# Patient Record
Sex: FEMALE | Race: WHITE | NOT HISPANIC OR LATINO | Employment: OTHER | URBAN - METROPOLITAN AREA
[De-identification: names, ages, dates, MRNs, and addresses within clinical notes are randomized per-mention and may not be internally consistent; named-entity substitution may affect disease eponyms.]

---

## 2011-12-09 LAB — COLONOSCOPY: NORMAL

## 2017-01-04 ENCOUNTER — ALLIED HEALTH/NURSE VISIT (OUTPATIENT)
Dept: CARDIOLOGY | Facility: CLINIC | Age: 72
End: 2017-01-04
Attending: INTERNAL MEDICINE
Payer: MEDICARE

## 2017-01-04 DIAGNOSIS — I45.9 HEART BLOCK: Primary | ICD-10-CM

## 2017-01-04 PROCEDURE — 93296 REM INTERROG EVL PM/IDS: CPT | Mod: ZF

## 2017-01-04 PROCEDURE — 93294 REM INTERROG EVL PM/LDLS PM: CPT | Performed by: INTERNAL MEDICINE

## 2017-01-04 NOTE — PROGRESS NOTES
Pt sent in a routine remote pacemaker check for evaluation per MD order.  Her pacemaker check does not show any episodes of atrial or ventricular arrhythmias, she is RV pacing 99.7% of the time, her heart rate histograms show good heart rate variation and her pacemaker battery estimates 7 years 6 mos left.  Pt was called with the results and a message was left for her to call us back with any questions or concerns.  RTC 3/27/17.    Remote pacemaker

## 2017-03-15 ENCOUNTER — OFFICE VISIT (OUTPATIENT)
Dept: FAMILY MEDICINE | Facility: CLINIC | Age: 72
End: 2017-03-15

## 2017-03-15 VITALS
BODY MASS INDEX: 24.56 KG/M2 | SYSTOLIC BLOOD PRESSURE: 129 MMHG | WEIGHT: 138 LBS | DIASTOLIC BLOOD PRESSURE: 82 MMHG | TEMPERATURE: 97.4 F | OXYGEN SATURATION: 100 % | HEART RATE: 83 BPM

## 2017-03-15 DIAGNOSIS — M94.0 COSTOCHONDRITIS: Primary | ICD-10-CM

## 2017-03-15 RX ORDER — CALCIUM CARBONATE 500(1250)
600 TABLET ORAL DAILY
COMMUNITY

## 2017-03-15 ASSESSMENT — PAIN SCALES - GENERAL: PAINLEVEL: NO PAIN (0)

## 2017-03-15 NOTE — MR AVS SNAPSHOT
After Visit Summary   3/15/2017    Sharona Bravo    MRN: [id number]           Patient Information     Date Of Birth          1945        Visit Information        Provider Department      3/15/2017 4:20 PM Yang Aceves MD [facility]         Follow-ups after your visit        Your next 10 appointments already scheduled     Mar 27, 2017  1:00 PM CDT   (Arrive by 12:45 PM)   MA SCREENING DIGITAL BILATERAL with [id number]   [facility] ([facility])    [address] [place] [id number] [phone]           Do not use any powder, lotion or deodorant under your arms or on your breast. If you do, we will ask you to remove it before your exam.  Wear comfortable, two-piece clothing.  If you have any allergies, tell your care team.  Bring any previous mammograms from other facilities or have them mailed to the breast center. This mammogram location, [facility], now offers 3D mammography. It doesn't replace a screening mammogram and can be done with a regular screening mammogram. It is optional and not all insurances will pay for it. 3D mammography is a special kind of mammogram that produces a three-dimensional image of the breast by using low dose-xrays. 3D allows the radiologist to see the breast tissue differently from 2D, which reduces the chance of repeat testing due to overlapping breast tissue. If you are interested in have a 3D mammogram, please check with your insurance before you arrive for your exam. 3D mammography is offered to all patients. On the day of your exam you will be asked to sign a form stating yes, you wish to have 3D imaging or, no, you decline.            Mar 27, 2017  1:30 PM CDT   (Arrive by 1:15 PM)   Pacemaker Check with Uc Cv Device 1   [facility] ([facility])    [address] [place] [id number]    [phone]              Who to contact     Please call your clinic at [phone] to:    Ask questions about your health    Make or cancel appointments    Discuss your medicines    Learn about your test results    Speak to your doctor   If you have compliments or concerns about an experience at your clinic, or if you wish to file a complaint, please contact [facility] Patient Relations at [phone] or email us at [email or web address]         Additional Information About Your Visit        [employer] Information     [employer] gives you secure access to your electronic health record. If you see a primary care provider, you can also send messages to your care team and make appointments. If you have questions, please call your primary care clinic.  If you do not have a primary care provider, please call [phone] and they will assist you.      [employer] is an electronic gateway that provides easy, online access to your medical records. With [employer], you can request a clinic appointment, read your test results, renew a prescription or communicate with your care team.     To access your existing account, please contact your [facility] or call [phone] for assistance.        Care EveryWhere ID     This is your Care EveryWhere ID. This could be used by other organizations to access your [place] medical records  [id number]        Your Vitals Were     Pulse Temperature Last Period Pulse Oximetry BMI (Body Mass Index)       83 97.4  F (36.3  C) (Oral) 01/01/2000 100% 24.56 kg/m2        Blood Pressure from Last 3 Encounters:   03/15/17 129/82   08/01/16 108/73   07/11/16 117/78    Weight from Last 3 Encounters:   03/15/17 138 lb (62.6 kg)   08/01/16 135 lb (61.2 kg)   07/11/16 134 lb (60.8 kg)              Today, you had the following     No orders found for display       Primary Care Provider Office Phone # Fax #    Ruslan Jean MD  [phone]       [facility] [address]  [facility] [address]        Thank you!     Thank you for choosing [facility]  for your care. Our goal is always to provide you with excellent care. Hearing back from our patients is one way we can continue to improve our services. Please take a few minutes to complete the written survey that you may receive in the mail after your visit with us. Thank you!             Your Updated Medication List - Protect others around you: Learn how to safely use, store and throw away your medicines at www.disposemymeds.org.          This list is accurate as of: 3/15/17  5:11 PM.  Always use your most recent med list.                   Brand Name Dispense Instructions for use    BENADRYL PO      Take by mouth as needed       calcium carbonate 500 MG tablet    OS-DALLAS 500 mg [place]. [place]     Take 600 mg by mouth 2 times daily       estradiol 0.1 MG/GM cream    ESTRACE VAGINAL    42.5 g    Place 2 g vaginally twice a week       levothyroxine 50 MCG tablet    SYNTHROID/LEVOTHROID    90 tablet    Take 1 tablet (50 mcg) by mouth daily Needs labs for further refills       Magnesium 500 MG Caps      Take by mouth daily       MSM 1000 MG Tabs      Take 1,000 mg by mouth 2 times daily       simvastatin 20 MG tablet    ZOCOR    90 tablet    Take 1 tablet (20 mg) by mouth At Bedtime       SUDAFED PO      Take by mouth as needed       temazepam 15 MG capsule    RESTORIL    20 capsule    Take 1 to 2 capsules at bedtime prn insomnia       VITAMIN D (CHOLECALCIFEROL) PO      Take 1,000 Units by mouth daily

## 2017-03-15 NOTE — NURSING NOTE
71 year old  Chief Complaint   Patient presents with     Pain     left side of mid back        Blood pressure 129/82, pulse 83, temperature 97.4  F (36.3  C), temperature source Oral, weight 138 lb (62.6 kg), last menstrual period 01/01/2000, SpO2 100 %, not currently breastfeeding. Body mass index is 24.56 kg/(m^2).  Patient Active Problem List   Diagnosis     Adenocarcinoma of breast (H)     Allergic rhinitis     Hyperlipidemia LDL goal <100     Acquired hypothyroidism     Heart block     Cardiac pacemaker in situ- Medtronic, dual chamber- NOT dependent (Advisa: MRI conditional)     Diverticulosis of large intestine without hemorrhage     BPPV (benign paroxysmal positional vertigo), unspecified laterality     Preventative health care       Wt Readings from Last 2 Encounters:   03/15/17 138 lb (62.6 kg)   08/01/16 135 lb (61.2 kg)     BP Readings from Last 3 Encounters:   03/15/17 129/82   08/01/16 108/73   07/11/16 117/78         Current Outpatient Prescriptions   Medication     levothyroxine (SYNTHROID/LEVOTHROID) 50 MCG tablet     simvastatin (ZOCOR) 20 MG tablet     Pseudoephedrine HCl (SUDAFED PO)     DiphenhydrAMINE HCl (BENADRYL PO)     Magnesium 500 MG CAPS     temazepam (RESTORIL) 15 MG capsule     estradiol (ESTRACE VAGINAL) 0.1 MG/GM vaginal cream     VITAMIN D, CHOLECALCIFEROL, PO     Methylsulfonylmethane (MSM) 1000 MG TABS     No current facility-administered medications for this visit.        Social History   Substance Use Topics     Smoking status: Never Smoker     Smokeless tobacco: Never Used     Alcohol use 0.0 oz/week     0 Standard drinks or equivalent per week      Comment: 10-14 wine a week        Health Maintenance Due   Topic Date Due     ADVANCE DIRECTIVE PLANNING Q5 YRS (NO INBASKET)  12/10/1965       Lab Results   Component Value Date    PAP NIL 06/20/2016         March 15, 2017 4:26 PM

## 2017-03-17 DIAGNOSIS — E03.9 ACQUIRED HYPOTHYROIDISM: ICD-10-CM

## 2017-03-17 RX ORDER — LEVOTHYROXINE SODIUM 50 UG/1
50 TABLET ORAL DAILY
Qty: 30 TABLET | Refills: 0 | Status: SHIPPED | OUTPATIENT
Start: 2017-03-17 | End: 2017-04-17

## 2017-03-17 NOTE — TELEPHONE ENCOUNTER
Routing refill request to provider for review/approval because:  Yuki given x1 and patient did not follow up, please advise  Labs not current:  Needs thyroid labs done.  Has been over a year .

## 2017-03-27 ENCOUNTER — ALLIED HEALTH/NURSE VISIT (OUTPATIENT)
Dept: CARDIOLOGY | Facility: CLINIC | Age: 72
End: 2017-03-27
Attending: FAMILY MEDICINE
Payer: MEDICARE

## 2017-03-27 ENCOUNTER — OFFICE VISIT (OUTPATIENT)
Dept: FAMILY MEDICINE | Facility: CLINIC | Age: 72
End: 2017-03-27

## 2017-03-27 VITALS
WEIGHT: 136.75 LBS | BODY MASS INDEX: 24.34 KG/M2 | HEART RATE: 74 BPM | TEMPERATURE: 97.4 F | SYSTOLIC BLOOD PRESSURE: 109 MMHG | DIASTOLIC BLOOD PRESSURE: 73 MMHG | OXYGEN SATURATION: 98 %

## 2017-03-27 DIAGNOSIS — R10.12 ABDOMINAL PAIN, LEFT UPPER QUADRANT: Primary | ICD-10-CM

## 2017-03-27 DIAGNOSIS — I45.9 HEART BLOCK: Primary | ICD-10-CM

## 2017-03-27 LAB
ALBUMIN SERPL-MCNC: 3.5 G/DL (ref 3.2–4.5)
ALP SERPL-CCNC: 45 U/L (ref 40–150)
ALT SERPL-CCNC: 22 U/L (ref 0–50)
AST SERPL-CCNC: 28 U/L (ref 0–45)
BASOPHILS # BLD AUTO: 0 10E9/L (ref 0–0.2)
BASOPHILS NFR BLD AUTO: 0.5 %
BILIRUB SERPL-MCNC: 0.6 MG/DL (ref 0.2–1.3)
BILIRUBIN UR: NEGATIVE
BLOOD UR: NEGATIVE
BUN SERPL-MCNC: 25 MG/DL (ref 7–30)
CALCIUM SERPL-MCNC: 9.3 MG/DL (ref 8.5–10.4)
CHLORIDE SERPLBLD-SCNC: 107 MMOL/L (ref 94–109)
CO2 SERPL-SCNC: 28 MMOL/L (ref 20–32)
CREAT SERPL-MCNC: 1 MG/DL (ref 0.6–1.3)
DIFFERENTIAL METHOD BLD: NORMAL
EGFR CALCULATED (BLACK REFERENCE): 70.7
EGFR CALCULATED (NON BLACK REFERENCE): 58.4
EOSINOPHIL # BLD AUTO: 0.1 10E9/L (ref 0–0.7)
EOSINOPHIL NFR BLD AUTO: 1.9 %
ERYTHROCYTE [DISTWIDTH] IN BLOOD BY AUTOMATED COUNT: 13.7 % (ref 10–15)
GLUCOSE SERPL-MCNC: 106 MG/DL (ref 60–109)
GLUCOSE URINE: NEGATIVE
HCT VFR BLD AUTO: 44 % (ref 35–47)
HGB BLD-MCNC: 14.4 G/DL (ref 11.7–15.7)
IMM GRANULOCYTES # BLD: 0 10E9/L (ref 0–0.4)
IMM GRANULOCYTES NFR BLD: 0.3 %
KETONES UR QL: NEGATIVE
LEUKOCYTE ESTERASE UR: NEGATIVE
LYMPHOCYTES # BLD AUTO: 1.2 10E9/L (ref 0.8–5.3)
LYMPHOCYTES NFR BLD AUTO: 16.6 %
MCH RBC QN AUTO: 30.8 PG (ref 26.5–33)
MCHC RBC AUTO-ENTMCNC: 32.7 G/DL (ref 31.5–36.5)
MCV RBC AUTO: 94 FL (ref 78–100)
MONOCYTES # BLD AUTO: 0.5 10E9/L (ref 0–1.3)
MONOCYTES NFR BLD AUTO: 6.8 %
NEUTROPHILS # BLD AUTO: 5.5 10E9/L (ref 1.6–8.3)
NEUTROPHILS NFR BLD AUTO: 73.9 %
NITRITE UR QL STRIP: NEGATIVE
NRBC # BLD AUTO: 0 10*3/UL
NRBC BLD AUTO-RTO: 0 /100
PH UR STRIP: 5 [PH] (ref 5–7)
PLATELET # BLD AUTO: 344 10E9/L (ref 150–450)
POTASSIUM SERPL-SCNC: 4.3 MMOL/L (ref 3.4–5.3)
PROT SERPL-MCNC: 7 G/DL (ref 6.8–8.8)
PROTEIN UR: NEGATIVE
RBC # BLD AUTO: 4.68 10E12/L (ref 3.8–5.2)
SODIUM SERPL-SCNC: 142 MMOL/L (ref 133–144)
SP GR UR STRIP: >=1.03
UROBILINOGEN UR STRIP-ACNC: NORMAL
WBC # BLD AUTO: 7.5 10E9/L (ref 4–11)

## 2017-03-27 PROCEDURE — 93280 PM DEVICE PROGR EVAL DUAL: CPT | Mod: 26 | Performed by: INTERNAL MEDICINE

## 2017-03-27 PROCEDURE — 93280 PM DEVICE PROGR EVAL DUAL: CPT | Mod: ZF

## 2017-03-27 ASSESSMENT — PAIN SCALES - GENERAL: PAINLEVEL: NO PAIN (0)

## 2017-03-27 NOTE — PATIENT INSTRUCTIONS
It was a pleasure to see you in clinic today.  Please do not hesitate to call us if you have any questions or concerns.  Please return to clinic in 1 year for a pacemaker check.    Next remote 6/27/17    Abena Wolfe R.N.  Electrophysiology nurse specialist  [facility]  [address] [place] [address]     Ruby Alonso  [phone] business hours    After business hours please call [phone], option #4, and ask for Job code [id number].

## 2017-03-27 NOTE — MR AVS SNAPSHOT
After Visit Summary   3/27/2017    Sharona Bravo    MRN: [id number]           Patient Information     Date Of Birth          1945        Visit Information        Provider Department      3/27/2017 1:30 PM 1, Leighann Cv Device [facility]        Today's Diagnoses     Heart block    -  1      Care Instructions    It was a pleasure to see you in clinic today.  Please do not hesitate to call us if you have any questions or concerns.  Please return to clinic in 1 year for a pacemaker check.    Next remote 6/27/17    Abena Wolfe R.N.  Electrophysiology nurse specialist  [facility]  [address] [place] [address]     Ruby Alonso  [phone] business hours    After business hours please call [phone], option #4, and ask for Job code [id number].              Follow-ups after your visit        Follow-up notes from your care team     Discussed this visit Return in about 1 year (around 3/27/2018) for Pacemaker check.      Your next 10 appointments already scheduled     Mar 27, 2017  3:40 PM CDT   Return Visit with Yang Aceves MD   [facility])    [address], [address]  [facility] [address]   [phone]              Who to contact     If you have questions or need follow up information about today's clinic visit or your schedule please contact [facility] directly at [phone].  Normal or non-critical lab and imaging results will be communicated to you by MyChart, letter or phone within 4 business days after the clinic has received the results. If you do not hear from us within 7 days, please contact the clinic through MyChart or phone. If you have a critical or abnormal lab result, we will notify you by phone as soon as possible.  Submit refill requests through [employer] or call your pharmacy and they will forward the refill request to us. Please allow 3  business days for your refill to be completed.          Additional Information About Your Visit        MyChart Information     [employer] gives you secure access to your electronic health record. If you see a primary care provider, you can also send messages to your care team and make appointments. If you have questions, please call your primary care clinic.  If you do not have a primary care provider, please call [phone] and they will assist you.        Care EveryWhere ID     This is your Care EveryWhere ID. This could be used by other organizations to access your [place] medical records  [id number]        Your Vitals Were     Last Period                   01/01/2000            Blood Pressure from Last 3 Encounters:   03/15/17 129/82   08/01/16 108/73   07/11/16 117/78    Weight from Last 3 Encounters:   03/15/17 62.6 kg (138 lb)   08/01/16 61.2 kg (135 lb)   07/11/16 60.8 kg (134 lb)              We Performed the Following     PM DEVICE PROGRAMMING EVAL, DUAL LEAD PACER        Primary Care Provider Office Phone # Fax #    Ruslan Jean [phone]       [address] [address]        Thank you!     Thank you for choosing [facility]  for your care. Our goal is always to provide you with excellent care. Hearing back from our patients is one way we can continue to improve our services. Please take a few minutes to complete the written survey that you may receive in the mail after your visit with us. Thank you!             Your Updated Medication List - Protect others around you: Learn how to safely use, store and throw away your medicines at www.disposemymeds.org.          This list is accurate as of: 3/27/17  1:53 PM.  Always use your most recent med list.                   Brand Name Dispense Instructions for use    BENADRYL PO      Take by mouth as needed       calcium carbonate 500 MG tablet    OS-DALLAS 500 mg [place]. Ca     Take 600 mg by mouth 2  times daily       estradiol 0.1 MG/GM cream    ESTRACE VAGINAL    42.5 g    Place 2 g vaginally twice a week       levothyroxine 50 MCG tablet    SYNTHROID/LEVOTHROID    30 tablet    Take 1 tablet (50 mcg) by mouth daily Needs labs for further refills       Magnesium 500 MG Caps      Take by mouth daily       MSM 1000 MG Tabs      Take 1,000 mg by mouth 2 times daily       simvastatin 20 MG tablet    ZOCOR    90 tablet    Take 1 tablet (20 mg) by mouth At Bedtime       SUDAFED PO      Take by mouth as needed       temazepam 15 MG capsule    RESTORIL    20 capsule    Take 1 to 2 capsules at bedtime prn insomnia       VITAMIN D (CHOLECALCIFEROL) PO      Take 1,000 Units by mouth daily

## 2017-03-27 NOTE — PROGRESS NOTES
Pt seen in clinic for evaluation and iterative programming of her Medtronic dual chamber pacemaker per MD order.  She looks great and she states that she feels well and she does not offer any complaints.  Her pacemaker does not show any episodes of atrial or ventricular arrhythmias, she is RV pacing 100% of the time, her heart rate histograms show good heart rate variation and her pacemaker battery estimates 7 years left.  Pt will plan to do remote checks quarterly and RTC in 1 year.  Normal pacemaker function.    Dual pacemaker

## 2017-03-27 NOTE — MR AVS SNAPSHOT
After Visit Summary   3/27/2017    Sharona Bravo    MRN: [id number]           Patient Information     Date Of Birth          1945        Visit Information        Provider Department      3/27/2017 3:40 PM Yang Aceves MD [facility]        Today's Diagnoses     Abdominal pain, left upper quadrant    -  1      Care Instructions    PLAN:  - Discussed a variety of options.   Elected to check Urine analysis and also liver and kidney function. Check CBC  For imaging, we discussed issues of a CT scan vs MRI. She has a pacemaker and so for that reason and the issue of diverticula and other intraabdominal possibilities, ordered a CT scan    I will call Sharona with the results. She was informed that I will be away the week of April 3        Follow-ups after your visit        Future tests that were ordered for you today     Open Future Orders        Priority Expected Expires Ordered    CT Abdomen w Contrast Routine  3/27/2018 3/27/2017            Who to contact     Please call your clinic at [phone] to:    Ask questions about your health    Make or cancel appointments    Discuss your medicines    Learn about your test results    Speak to your doctor   If you have compliments or concerns about an experience at your clinic, or if you wish to file a complaint, please contact [facility] Patient Relations at [phone] or email us at [email or web address]         Additional Information About Your Visit        MyChart Information     [employer] gives you secure access to your electronic health record. If you see a primary care provider, you can also send messages to your care team and make appointments. If you have questions, please call your primary care clinic.  If you do not have a primary care provider, please call [phone] and they will assist you.      [employer] is an electronic gateway that provides easy, online access to your medical  records. With [employer], you can request a clinic appointment, read your test results, renew a prescription or communicate with your care team.     To access your existing account, please contact your [facility] or call [phone] for assistance.        Care EveryWhere ID     This is your Care EveryWhere ID. This could be used by other organizations to access your [place] medical records  [id number]        Your Vitals Were     Pulse Temperature Last Period Pulse Oximetry BMI (Body Mass Index)       74 97.4  F (36.3  C) (Oral) 01/01/2000 98% 24.34 kg/m2        Blood Pressure from Last 3 Encounters:   03/27/17 109/73   03/15/17 129/82   08/01/16 108/73    Weight from Last 3 Encounters:   03/27/17 136 lb 12 oz (62 kg)   03/15/17 138 lb (62.6 kg)   08/01/16 135 lb (61.2 kg)              We Performed the Following     CBC with platelets differential     Comprehensive Metabolic Panel ([place])     Urinalysis ([place])        Primary Care Provider Office Phone # Fax #    Ruslan Jean [phone]       [place] [address]        Thank you!     Thank you for choosing [facility]  for your care. Our goal is always to provide you with excellent care. Hearing back from our patients is one way we can continue to improve our services. Please take a few minutes to complete the written survey that you may receive in the mail after your visit with us. Thank you!             Your Updated Medication List - Protect others around you: Learn how to safely use, store and throw away your medicines at www.disposemymeds.org.          This list is accurate as of: 3/27/17  4:07 PM.  Always use your most recent med list.                   Brand Name Dispense Instructions for use    BENADRYL PO      Take by mouth as needed       calcium carbonate 500 MG tablet    OS-DALLAS 500 mg [place]. Ca     Take 600 mg by mouth 2 times daily       estradiol  0.1 MG/GM cream    ESTRACE VAGINAL    42.5 g    Place 2 g vaginally twice a week       levothyroxine 50 MCG tablet    SYNTHROID/LEVOTHROID    30 tablet    Take 1 tablet (50 mcg) by mouth daily Needs labs for further refills       Magnesium 500 MG Caps      Take by mouth daily       MSM 1000 MG Tabs      Take 1,000 mg by mouth 2 times daily       simvastatin 20 MG tablet    ZOCOR    90 tablet    Take 1 tablet (20 mg) by mouth At Bedtime       SUDAFED PO      Take by mouth as needed       temazepam 15 MG capsule    RESTORIL    20 capsule    Take 1 to 2 capsules at bedtime prn insomnia       VITAMIN D (CHOLECALCIFEROL) PO      Take 1,000 Units by mouth daily

## 2017-03-27 NOTE — PROGRESS NOTES
"Sharona Bravo is a 71 year old female who presents to [facility] with the following concern:  -Persistent LEFT sided rib pain.     Since visit on 3/15 (12 days ago), Sharona tried the Advil plus the Salon Pas patches. Both helped some, but without them the pain is still there and even more constant.     No change with food or bowels.   Review Of Systems:  Has felt \"off\" over the past 2 days. Not tired, but just doesn't feel well.   Cardiovascular: negative  Respiratory: No shortness of breath, dyspnea on exertion, cough, or hemoptysis  Gastrointestinal: negative  Genitourinary: negative    Problem list per EMR:  Patient Active Problem List   Diagnosis     Adenocarcinoma of breast (H)     Allergic rhinitis     Hyperlipidemia LDL goal <100     Acquired hypothyroidism     Heart block     Cardiac pacemaker in situ- Medtronic, dual chamber- NOT dependent (Advisa: MRI conditional)     Diverticulosis of large intestine without hemorrhage     BPPV (benign paroxysmal positional vertigo), unspecified laterality     Preventative health care       Current Outpatient Prescriptions   Medication Sig Dispense Refill     levothyroxine (SYNTHROID/LEVOTHROID) 50 MCG tablet Take 1 tablet (50 mcg) by mouth daily Needs labs for further refills 30 tablet 0     calcium carbonate (OS-DALLAS 500 MG [place]. CA) 500 MG tablet Take 600 mg by mouth 2 times daily       simvastatin (ZOCOR) 20 MG tablet Take 1 tablet (20 mg) by mouth At Bedtime 90 tablet 3     Pseudoephedrine HCl (SUDAFED PO) Take by mouth as needed       DiphenhydrAMINE HCl (BENADRYL PO) Take by mouth as needed       Magnesium 500 MG CAPS Take by mouth daily       temazepam (RESTORIL) 15 MG capsule Take 1 to 2 capsules at bedtime prn insomnia 20 capsule 0     estradiol (ESTRACE VAGINAL) 0.1 MG/GM vaginal cream Place 2 g vaginally twice a week 42.5 g 6     VITAMIN D, CHOLECALCIFEROL, PO Take 1,000 Units by mouth daily        Methylsulfonylmethane (MSM) 1000 MG TABS Take " 1,000 mg by mouth 2 times daily          Allergies   Allergen Reactions     Gabapentin Swelling and Rash     Face swelling          EXAM    Vitals: /73 (BP Location: Right arm, Patient Position: Left side, Cuff Size: Adult Regular)  Pulse 74  Temp 97.4  F (36.3  C) (Oral)  Wt 136 lb 12 oz (62 kg)  LMP 01/01/2000  SpO2 98%  BMI 24.34 kg/m2  BMI= Body mass index is 24.34 kg/(m^2).  Appears well and in no distress.  Pain is on LEFT side in Anterior Axillary line at about T12. This is between the normal abdomen and the back. No rebound nor guarding. No masses are palpable.   Normal CV and Lung exams.     ASSESSMENT:  -LEFT sided pain in lateral aspect of abdomen    PLAN:  - Discussed a variety of options.   Elected to check Urine analysis and also liver and kidney function. Check CBC  For imaging, we discussed issues of a CT scan vs MRI. She has a pacemaker and so for that reason and the issue of diverticula and other intraabdominal possibilities, ordered a CT scan    I will call Sharona with the results. She was informed that I will be away the week of April 3.       --Yang Aceves MD  [facility], Department of Family Medicine and Community Health

## 2017-03-27 NOTE — PATIENT INSTRUCTIONS
PLAN:  - Discussed a variety of options.   Elected to check Urine analysis and also liver and kidney function. Check CBC  For imaging, we discussed issues of a CT scan vs MRI. She has a pacemaker and so for that reason and the issue of diverticula and other intraabdominal possibilities, ordered a CT scan    I will call Sharona with the results. She was informed that I will be away the week of April 3

## 2017-03-27 NOTE — NURSING NOTE
71 year old  Chief Complaint   Patient presents with     Pain     left side        Blood pressure 109/73, pulse 74, temperature 97.4  F (36.3  C), temperature source Oral, weight 136 lb 12 oz (62 kg), last menstrual period 01/01/2000, SpO2 98 %, not currently breastfeeding. Body mass index is 24.34 kg/(m^2).  Patient Active Problem List   Diagnosis     Adenocarcinoma of breast (H)     Allergic rhinitis     Hyperlipidemia LDL goal <100     Acquired hypothyroidism     Heart block     Cardiac pacemaker in situ- Medtronic, dual chamber- NOT dependent (Advisa: MRI conditional)     Diverticulosis of large intestine without hemorrhage     BPPV (benign paroxysmal positional vertigo), unspecified laterality     Preventative health care       Wt Readings from Last 2 Encounters:   03/27/17 136 lb 12 oz (62 kg)   03/15/17 138 lb (62.6 kg)     BP Readings from Last 3 Encounters:   03/27/17 109/73   03/15/17 129/82   08/01/16 108/73         Current Outpatient Prescriptions   Medication     levothyroxine (SYNTHROID/LEVOTHROID) 50 MCG tablet     calcium carbonate (OS-DALLAS 500 MG [place]. CA) 500 MG tablet     simvastatin (ZOCOR) 20 MG tablet     Pseudoephedrine HCl (SUDAFED PO)     DiphenhydrAMINE HCl (BENADRYL PO)     Magnesium 500 MG CAPS     temazepam (RESTORIL) 15 MG capsule     estradiol (ESTRACE VAGINAL) 0.1 MG/GM vaginal cream     VITAMIN D, CHOLECALCIFEROL, PO     Methylsulfonylmethane (MSM) 1000 MG TABS     No current facility-administered medications for this visit.        Social History   Substance Use Topics     Smoking status: Never Smoker     Smokeless tobacco: Never Used     Alcohol use 0.0 oz/week     0 Standard drinks or equivalent per week      Comment: 10-14 wine a week        Health Maintenance Due   Topic Date Due     ADVANCE DIRECTIVE PLANNING Q5 YRS (NO INBASKET)  12/10/1965       Lab Results   Component Value Date    PAP NIL 06/20/2016 March 27, 2017 3:50 PM

## 2017-04-06 DIAGNOSIS — E03.9 ACQUIRED HYPOTHYROIDISM: ICD-10-CM

## 2017-04-06 LAB — TSH SERPL DL<=0.005 MIU/L-ACNC: 3.68 MU/L (ref 0.4–4)

## 2017-04-17 DIAGNOSIS — E03.9 ACQUIRED HYPOTHYROIDISM: ICD-10-CM

## 2017-04-17 RX ORDER — LEVOTHYROXINE SODIUM 50 UG/1
50 TABLET ORAL DAILY
Qty: 90 TABLET | Refills: 3 | Status: SHIPPED | OUTPATIENT
Start: 2017-04-17 | End: 2018-05-01

## 2017-06-26 ENCOUNTER — RADIANT APPOINTMENT (OUTPATIENT)
Dept: BONE DENSITY | Facility: CLINIC | Age: 72
End: 2017-06-26
Payer: MEDICARE

## 2017-06-26 ENCOUNTER — OFFICE VISIT (OUTPATIENT)
Dept: OBGYN | Facility: CLINIC | Age: 72
End: 2017-06-26
Payer: MEDICARE

## 2017-06-26 VITALS
SYSTOLIC BLOOD PRESSURE: 122 MMHG | HEIGHT: 62 IN | DIASTOLIC BLOOD PRESSURE: 86 MMHG | WEIGHT: 136 LBS | BODY MASS INDEX: 25.03 KG/M2

## 2017-06-26 DIAGNOSIS — Z01.419 ENCOUNTER FOR GYNECOLOGICAL EXAMINATION WITHOUT ABNORMAL FINDING: Primary | ICD-10-CM

## 2017-06-26 DIAGNOSIS — Z78.0 ASYMPTOMATIC POSTMENOPAUSAL STATE: ICD-10-CM

## 2017-06-26 DIAGNOSIS — N95.2 VAGINAL ATROPHY: ICD-10-CM

## 2017-06-26 PROCEDURE — 87624 HPV HI-RISK TYP POOLED RSLT: CPT | Performed by: OBSTETRICS & GYNECOLOGY

## 2017-06-26 PROCEDURE — 77080 DXA BONE DENSITY AXIAL: CPT | Performed by: OBSTETRICS & GYNECOLOGY

## 2017-06-26 PROCEDURE — G0101 CA SCREEN;PELVIC/BREAST EXAM: HCPCS | Mod: GA | Performed by: OBSTETRICS & GYNECOLOGY

## 2017-06-26 PROCEDURE — G0145 SCR C/V CYTO,THINLAYER,RESCR: HCPCS | Performed by: OBSTETRICS & GYNECOLOGY

## 2017-06-26 RX ORDER — ESTRADIOL 0.1 MG/G
2 CREAM VAGINAL
Qty: 42.5 G | Refills: 6 | Status: SHIPPED | OUTPATIENT
Start: 2017-06-26 | End: 2019-12-02

## 2017-06-26 ASSESSMENT — PATIENT HEALTH QUESTIONNAIRE - PHQ9: 5. POOR APPETITE OR OVEREATING: NOT AT ALL

## 2017-06-26 ASSESSMENT — ANXIETY QUESTIONNAIRES
2. NOT BEING ABLE TO STOP OR CONTROL WORRYING: NOT AT ALL
3. WORRYING TOO MUCH ABOUT DIFFERENT THINGS: NOT AT ALL
1. FEELING NERVOUS, ANXIOUS, OR ON EDGE: NOT AT ALL
GAD7 TOTAL SCORE: 0
6. BECOMING EASILY ANNOYED OR IRRITABLE: NOT AT ALL
7. FEELING AFRAID AS IF SOMETHING AWFUL MIGHT HAPPEN: NOT AT ALL
5. BEING SO RESTLESS THAT IT IS HARD TO SIT STILL: NOT AT ALL

## 2017-06-26 NOTE — MR AVS SNAPSHOT
"              After Visit Summary   6/26/2017    Sharona Bravo    MRN: [id number]           Patient Information     Date Of Birth          1945        Visit Information        Provider Department      6/26/2017 1:00 PM Sonny Gupta MD [facility]        Today's Diagnoses     Encounter for gynecological examination without abnormal finding    -  1    Vaginal atrophy           Follow-ups after your visit        Who to contact     If you have questions or need follow up information about today's clinic visit or your schedule please contact [facility] directly at [phone].  Normal or non-critical lab and imaging results will be communicated to you by Dotstudiozhart, letter or phone within 4 business days after the clinic has received the results. If you do not hear from us within 7 days, please contact the clinic through Dotstudiozhart or phone. If you have a critical or abnormal lab result, we will notify you by phone as soon as possible.  Submit refill requests through Cura TV or call your pharmacy and they will forward the refill request to us. Please allow 3 business days for your refill to be completed.          Additional Information About Your Visit        MyChart Information     Cura TV gives you secure access to your electronic health record. If you see a primary care provider, you can also send messages to your care team and make appointments. If you have questions, please call your primary care clinic.  If you do not have a primary care provider, please call [phone] and they will assist you.        Care EveryWhere ID     This is your Care EveryWhere ID. This could be used by other organizations to access your [place] medical records  [id number]        Your Vitals Were     Height Last Period Breastfeeding? BMI (Body Mass Index)          5' 2.25\" (1.581 m) 01/01/2000 No 24.68 kg/m2         Blood Pressure from Last 3 Encounters:   06/26/17 122/86 "   03/27/17 109/73   03/15/17 129/82    Weight from Last 3 Encounters:   06/26/17 136 lb (61.7 kg)   03/27/17 136 lb 12 oz (62 kg)   03/15/17 138 lb (62.6 kg)              We Performed the Following     HPV High Risk Types DNA Cervical     Medicare Limited Visit     Pap imaged thin layer screen with HPV - recommended age 30 - 65          Where to get your medicines      These medications were sent to [id number] - [place], [place] - [address]  [address], [facility] [address]     Phone:  [phone]     estradiol 0.1 MG/GM cream          Primary Care Provider Office Phone # Fax #    Ruslan Jean [phone]       [address] [address]        Equal Access to Services     [facility] : Rich Jasmine, sabas bobo, jeff bashir, reilly escalera . So [facility] [phone].    ATENCIÓN: Si habla español, tiene a mc disposición servicios gratuitos de asistencia lingüística. Llame al [phone].    We comply with applicable federal civil rights laws and [place] laws. We do not discriminate on the basis of race, color, national origin, age, disability sex, sexual orientation or gender identity.            Thank you!     Thank you for choosing [facility] [place]  for your care. Our goal is always to provide you with excellent care. Hearing back from our patients is one way we can continue to improve our services. Please take a few minutes to complete the written survey that you may receive in the mail after your visit with us. Thank you!             Your Updated Medication List - Protect others around you: Learn how to safely use, store and throw away your medicines at www.disposemymeds.org.          This list is accurate as of: 6/26/17  1:48 PM.  Always use your most recent med list.                   Brand Name Dispense Instructions for use Diagnosis    calcium  carbonate 1250 MG tablet    OS-DALLAS 500 mg [place]. [place]     Take 600 mg by mouth 2 times daily        estradiol 0.1 MG/GM cream    ESTRACE VAGINAL    42.5 g    Place 2 g vaginally twice a week    Vaginal atrophy       FLONASE NA           levothyroxine 50 MCG tablet    SYNTHROID/LEVOTHROID    90 tablet    Take 1 tablet (50 mcg) by mouth daily    Acquired hypothyroidism       Magnesium 500 MG Caps      Take by mouth daily        MSM 1000 MG Tabs      Take 1,000 mg by mouth 2 times daily        simvastatin 20 MG tablet    ZOCOR    90 tablet    Take 1 tablet (20 mg) by mouth At Bedtime    Hyperlipidemia LDL goal <100       VITAMIN D (CHOLECALCIFEROL) PO      Take 1,000 Units by mouth daily

## 2017-06-26 NOTE — PROGRESS NOTES
Sharona is a 71 year old No obstetric history on file. female who presents for Medicare Limited exam.     Do you have a Health Care Directive?: Yes: Patient states has Advance Directive and will bring in a copy to clinic.    Fall risk:   Fallen 2 or more times in the past year?: No  Any fall with injury in the past year?: No    HPI : The patient is seen for her annual examination. She is 26 years out from diagnosis of breast cancer with no evidence of recurrence to date. She uses a small amount of Estrace cream for vaginal dryness and does not have the desired effect so she is either noncompliant or needs to use it more frequently. She has no other health concerns.      GYNECOLOGIC HISTORY:  Patient's last menstrual period was 01/01/2000..   reports that she has never smoked. She has never used smokeless tobacco.    STD testing offered?  Declined  Last PHQ-9 score on record=   PHQ-9 SCORE 6/26/2017   Total Score 0     Last GAD7 score on record=   BYRON-7 SCORE 6/20/2016 6/26/2017   Total Score 0 0       HEALTH MAINTENANCE:  Cholesterol: (  Cholesterol   Date Value Ref Range Status   05/17/2016 172.0 0.0 - 200.0 Final   01/08/2015 200.0 0.0 - 200.0 Final      Last Mammo: 3/2017, Result: normal, Next Mammo: 3/2018   Pap: (  Lab Results   Component Value Date    PAP NIL 06/20/2016    PAP NIL 06/18/2015    )  DEXA: 2015  Colonoscopy:  2014, Result:  normal, Next Colonoscopy: 2024    HISTORY:  Obstetric History     No data available        Past Medical History:   Diagnosis Date     Malignant neoplasm (H)      No past surgical history on file.  Family History   Problem Relation Age of Onset     CANCER Mother      HEART DISEASE Father      Social History     Social History     Marital status:      Spouse name: N/A     Number of children: N/A     Years of education: N/A     Social History Main Topics     Smoking status: Never Smoker     Smokeless tobacco: Never Used     Alcohol use 0.0 oz/week     0 Standard drinks  "or equivalent per week      Comment: 10-14 wine a week      Drug use: No     Sexual activity: Yes     Partners: Male     Birth control/ protection: Post-menopausal     Other Topics Concern     Not on file     Social History Narrative     Current Outpatient Prescriptions   Medication Sig     Fluticasone Propionate (FLONASE NA)      levothyroxine (SYNTHROID/LEVOTHROID) 50 MCG tablet Take 1 tablet (50 mcg) by mouth daily     calcium carbonate (OS-DALLAS 500 MG [place]. [place]) 500 MG tablet Take 600 mg by mouth 2 times daily     simvastatin (ZOCOR) 20 MG tablet Take 1 tablet (20 mg) by mouth At Bedtime     Magnesium 500 MG CAPS Take by mouth daily     estradiol (ESTRACE VAGINAL) 0.1 MG/GM vaginal cream Place 2 g vaginally twice a week     VITAMIN D, CHOLECALCIFEROL, PO Take 1,000 Units by mouth daily      Methylsulfonylmethane (MSM) 1000 MG TABS Take 1,000 mg by mouth 2 times daily      No current facility-administered medications for this visit.      Allergies   Allergen Reactions     Gabapentin Swelling and Rash     Face swelling       Past medical, surgical, social and family history were reviewed and updated in EPIC.    EXAM:  /86  Ht 5' 2.25\" (1.581 m)  Wt 136 lb (61.7 kg)  LMP 01/01/2000  Breastfeeding? No  BMI 24.68 kg/m2   BMI: Body mass index is 24.68 kg/(m^2).    Constitutional: Appearance: Well nourished, well developed alert, in no acute distress  Breasts: Inspection of Breasts:  No lymphadenopathy present    Palpation of Breasts and Axillae:  No masses present on palpation, no  breast tenderness    Axillary Lymph Nodes:  No lymphadenopathy present  Neurologic/Psychiatric:    Mental Status:  Oriented X3     Pelvic Exam:  External Genitalia:     Normal appearance for age, no discharge present, no tenderness present, no inflammatory lesions present, color normal  Vagina:     Normal vaginal vault without central or paravaginal defects, ATROPHIC  Bladder:     Nontender to palpation  Urethra:   Urethral Body:  " Urethra palpation normal, urethra structural support normal   Urethral Meatus:  No erythema or lesions present  Cervix:     Appearance healthy, no lesions present, nontender to palpation, no bleeding present  Uterus:     Nontender to palpation, no masses present, position anteflexed, mobility: normal  Adnexa:     No adnexal tenderness present, no adnexal masses present  Perineum:     Perineum within normal limits, no evidence of trauma, no rashes or skin lesions present  Inguinal Lymph Nodes:     No lymphadenopathy present      Body mass index is 24.68 kg/(m^2).     reports that she has never smoked. She has never used smokeless tobacco.      ASSESSMENT:  71 year old female with satisfactory annual exam.    ICD-10-CM    1. Encounter for gynecological examination without abnormal finding Z01.419        COUNSELING:   Reviewed preventive health counseling, as reflected in patient instructions       Regular exercise       Healthy diet/nutrition    PLAN/PATIENT INSTRUCTIONS:    Contact patient with her bone density results. Her general exam is fine. She has no evidence of return of her breast cancer. She receives her mammography at the [facility].    Sonny Gupta MD

## 2017-06-27 ENCOUNTER — ALLIED HEALTH/NURSE VISIT (OUTPATIENT)
Dept: CARDIOLOGY | Facility: CLINIC | Age: 72
End: 2017-06-27
Attending: INTERNAL MEDICINE
Payer: MEDICARE

## 2017-06-27 DIAGNOSIS — I45.9 HEART BLOCK: Primary | ICD-10-CM

## 2017-06-27 PROCEDURE — 93280 PM DEVICE PROGR EVAL DUAL: CPT | Mod: 26 | Performed by: INTERNAL MEDICINE

## 2017-06-27 PROCEDURE — 93280 PM DEVICE PROGR EVAL DUAL: CPT | Mod: ZF

## 2017-06-27 ASSESSMENT — PATIENT HEALTH QUESTIONNAIRE - PHQ9: SUM OF ALL RESPONSES TO PHQ QUESTIONS 1-9: 0

## 2017-06-27 ASSESSMENT — ANXIETY QUESTIONNAIRES: GAD7 TOTAL SCORE: 0

## 2017-06-27 NOTE — MR AVS SNAPSHOT
After Visit Summary   6/27/2017    Sharona Bravo    MRN: [id number]           Patient Information     Date Of Birth          1945        Visit Information        Provider Department      6/27/2017 6:00 AM UC ICD REMOTE [facility]        Today's Diagnoses     Heart block    -  1       Follow-ups after your visit        Who to contact     If you have questions or need follow up information about today's clinic visit or your schedule please contact [facility] directly at [phone].  Normal or non-critical lab and imaging results will be communicated to you by divorce360hart, letter or phone within 4 business days after the clinic has received the results. If you do not hear from us within 7 days, please contact the clinic through divorce360hart or phone. If you have a critical or abnormal lab result, we will notify you by phone as soon as possible.  Submit refill requests through Telller or call your pharmacy and they will forward the refill request to us. Please allow 3 business days for your refill to be completed.          Additional Information About Your Visit        MyChart Information     Telller gives you secure access to your electronic health record. If you see a primary care provider, you can also send messages to your care team and make appointments. If you have questions, please call your primary care clinic.  If you do not have a primary care provider, please call [phone] and they will assist you.        Care EveryWhere ID     This is your Care EveryWhere ID. This could be used by other organizations to access your [place] medical records  [id number]        Your Vitals Were     Last Period                   01/01/2000            Blood Pressure from Last 3 Encounters:   06/26/17 122/86   03/27/17 109/73   03/15/17 129/82    Weight from Last 3 Encounters:   06/26/17 61.7 kg (136 lb)   03/27/17 62 kg (136 lb 12 oz)   03/15/17 62.6 kg (138 lb)              We  Performed the Following     PM DEVICE PROGRAMMING EVAL, DUAL LEAD PACER        Primary Care Provider Office Phone # Fax #    Ruslan Jean [phone]       [address] [address]        Equal Access to Services     PARTH HUNT : Hadii aad ku hadjonejacquelyn Soloi, waaxda luqadaha, qaybta kaalmada adeegyada, waxcorinna eric cesarnitesh rivastatumscott alford. So [facility] [phone].    ATENCIÓN: Si habla español, tiene a mc disposición servicios gratuitos de asistencia lingüística. Llame al [phone].    We comply with applicable federal civil rights laws and [place] laws. We do not discriminate on the basis of race, color, national origin, age, disability sex, sexual orientation or gender identity.            Thank you!     Thank you for choosing [facility]  for your care. Our goal is always to provide you with excellent care. Hearing back from our patients is one way we can continue to improve our services. Please take a few minutes to complete the written survey that you may receive in the mail after your visit with us. Thank you!             Your Updated Medication List - Protect others around you: Learn how to safely use, store and throw away your medicines at www.disposemymeds.org.          This list is accurate as of: 6/27/17 11:59 PM.  Always use your most recent med list.                   Brand Name Dispense Instructions for use Diagnosis    calcium carbonate 1250 MG tablet    OS-DALLAS 500 mg [place]. [place]     Take 600 mg by mouth 2 times daily        estradiol 0.1 MG/GM cream    ESTRACE VAGINAL    42.5 g    Place 2 g vaginally twice a week    Vaginal atrophy       FLONASE NA           levothyroxine 50 MCG tablet    SYNTHROID/LEVOTHROID    90 tablet    Take 1 tablet (50 mcg) by mouth daily    Acquired hypothyroidism       Magnesium 500 MG Caps      Take by mouth daily        MSM 1000 MG Tabs      Take 1,000 mg by mouth 2 times daily        simvastatin 20 MG  tablet    ZOCOR    90 tablet    Take 1 tablet (20 mg) by mouth At Bedtime    Hyperlipidemia LDL goal <100       VITAMIN D (CHOLECALCIFEROL) PO      Take 1,000 Units by mouth daily

## 2017-06-28 LAB
COPATH REPORT: NORMAL
PAP: NORMAL

## 2017-06-28 NOTE — PROGRESS NOTES
Pt sent in a routine remote pacemaker check for evaluation per MD order.  Her pacemaker check does not show any episodes of atrial or ventricular arrhythmias, she is RV pacing 100% of the time, her heart rate histograms show good heart rate variation and her pacemaker battery estimates 7 years left and her pacemaker is functioning normally.  Pt was called with the results and a message was left for her to call us back with any questions or concerns.  We will plan for her to send another remote check on 9/28/17.    Remote pacemaker

## 2017-06-30 LAB
FINAL DIAGNOSIS: NORMAL
HPV HR 12 DNA CVX QL NAA+PROBE: NEGATIVE
HPV16 DNA SPEC QL NAA+PROBE: NEGATIVE
HPV18 DNA SPEC QL NAA+PROBE: NEGATIVE
SPECIMEN DESCRIPTION: NORMAL

## 2017-07-14 DIAGNOSIS — E78.5 HYPERLIPIDEMIA LDL GOAL <100: ICD-10-CM

## 2017-07-14 RX ORDER — SIMVASTATIN 20 MG
20 TABLET ORAL AT BEDTIME
Qty: 90 TABLET | Refills: 0 | Status: SHIPPED | OUTPATIENT
Start: 2017-07-14 | End: 2017-10-10

## 2017-07-14 NOTE — TELEPHONE ENCOUNTER
Medication is being filled for 1 time refill only due to:  Patient needs labs lipid plus. Future labs ordered lipid plus.

## 2017-09-27 ENCOUNTER — ALLIED HEALTH/NURSE VISIT (OUTPATIENT)
Dept: CARDIOLOGY | Facility: CLINIC | Age: 72
End: 2017-09-27
Attending: INTERNAL MEDICINE
Payer: MEDICARE

## 2017-09-27 DIAGNOSIS — I45.9 HEART BLOCK: Primary | ICD-10-CM

## 2017-09-27 PROCEDURE — 93294 REM INTERROG EVL PM/LDLS PM: CPT | Performed by: INTERNAL MEDICINE

## 2017-09-27 PROCEDURE — 93296 REM INTERROG EVL PM/IDS: CPT | Mod: ZF

## 2017-09-27 NOTE — MR AVS SNAPSHOT
After Visit Summary   9/27/2017    Sharona Bravo    MRN: [id number]           Patient Information     Date Of Birth          1945        Visit Information        Provider Department      9/27/2017 6:00 AM ISHA ICD REMOTE [facility]        Today's Diagnoses     Heart block    -  1       Follow-ups after your visit        Your next 10 appointments already scheduled     Mar 27, 2018 10:00 AM CDT   (Arrive by 9:45 AM)   Pacemaker Check with  Cv Device 1   [facility] ([email or web address] and Surgery [place])    [address] [place] [address]  3rd [facility] [address] [phone]              Who to contact     If you have questions or need follow up information about today's clinic visit or your schedule please contact [facility] directly at [phone].  Normal or non-critical lab and imaging results will be communicated to you by MyChart, letter or phone within 4 business days after the clinic has received the results. If you do not hear from us within 7 days, please contact the clinic through MyChart or phone. If you have a critical or abnormal lab result, we will notify you by phone as soon as possible.  Submit refill requests through CorTec or call your pharmacy and they will forward the refill request to us. Please allow 3 business days for your refill to be completed.          Additional Information About Your Visit        MyChart Information     CorTec gives you secure access to your electronic health record. If you see a primary care provider, you can also send messages to your care team and make appointments. If you have questions, please call your primary care clinic.  If you do not have a primary care provider, please call [phone] and they will assist you.        Care EveryWhere ID     This is your Care EveryWhere ID. This could be used by other organizations to access your [place] medical records  [id number]        Your Vitals Were     Last  Period                   01/01/2000            Blood Pressure from Last 3 Encounters:   09/28/17 122/80   06/26/17 122/86   03/27/17 109/73    Weight from Last 3 Encounters:   09/28/17 61.5 kg (135 lb 8 oz)   06/26/17 61.7 kg (136 lb)   03/27/17 62 kg (136 lb 12 oz)              We Performed the Following     INTERROGATION DEVICE EVAL REMOTE, PACER/ICD        Primary Care Provider Office Phone # Fax #    Ruslannitesh Jean [phone]       [address] [place] [address]        Equal Access to Services     [facility]: Hadii aad eliel hadasho Soloi, waaxda luheberadaha, qaybta kaalmada mckay, reilly escalera . So [facility] [phone].    ATENCIÓN: Si habla español, tiene a mc disposición servicios gratuitos de asistencia lingüística. LlMercy Health St[facility] [phone].    We comply with applicable federal civil rights laws and [place] laws. We do not discriminate on the basis of race, color, national origin, age, disability sex, sexual orientation or gender identity.            Thank you!     Thank you for choosing [facility]  for your care. Our goal is always to provide you with excellent care. Hearing back from our patients is one way we can continue to improve our services. Please take a few minutes to complete the written survey that you may receive in the mail after your visit with us. Thank you!             Your Updated Medication List - Protect others around you: Learn how to safely use, store and throw away your medicines at www.disposemymeds.org.          This list is accurate as of: 9/27/17 11:59 PM.  Always use your most recent med list.                   Brand Name Dispense Instructions for use Diagnosis    calcium carbonate 1250 MG tablet    OS-DALLAS 500 mg [place]. [place]     Take 600 mg by mouth daily        estradiol 0.1 MG/GM cream    ESTRACE VAGINAL    42.5 g    Place 2 g vaginally twice a week    Vaginal atrophy       FLONASE NA           levothyroxine 50  MCG tablet    SYNTHROID/LEVOTHROID    90 tablet    Take 1 tablet (50 mcg) by mouth daily    Acquired hypothyroidism       Magnesium 500 MG Caps      Take 250 mg by mouth daily        MSM 1000 MG Tabs      Take 1,000 mg by mouth 2 times daily        simvastatin 20 MG tablet    ZOCOR    90 tablet    Take 1 tablet (20 mg) by mouth At Bedtime    Hyperlipidemia LDL goal <100       VITAMIN D (CHOLECALCIFEROL) PO      Take 800 Units by mouth daily

## 2017-09-28 ENCOUNTER — OFFICE VISIT (OUTPATIENT)
Dept: FAMILY MEDICINE | Facility: CLINIC | Age: 72
End: 2017-09-28

## 2017-09-28 VITALS
TEMPERATURE: 97.9 F | OXYGEN SATURATION: 97 % | SYSTOLIC BLOOD PRESSURE: 122 MMHG | DIASTOLIC BLOOD PRESSURE: 80 MMHG | HEIGHT: 63 IN | WEIGHT: 135.5 LBS | BODY MASS INDEX: 24.01 KG/M2 | HEART RATE: 61 BPM

## 2017-09-28 DIAGNOSIS — Z23 NEEDS FLU SHOT: ICD-10-CM

## 2017-09-28 DIAGNOSIS — Z00.00 PREVENTATIVE HEALTH CARE: ICD-10-CM

## 2017-09-28 DIAGNOSIS — E03.9 ACQUIRED HYPOTHYROIDISM: ICD-10-CM

## 2017-09-28 DIAGNOSIS — Z23 NEED FOR TD VACCINE: ICD-10-CM

## 2017-09-28 DIAGNOSIS — G47.09 SECONDARY INSOMNIA: ICD-10-CM

## 2017-09-28 DIAGNOSIS — Z13.1 SCREENING FOR DIABETES MELLITUS: ICD-10-CM

## 2017-09-28 DIAGNOSIS — Z00.00 MEDICARE ANNUAL WELLNESS VISIT, SUBSEQUENT: Primary | ICD-10-CM

## 2017-09-28 LAB
GLUCOSE P FAST SERPL-MCNC: 94 MG/DL (ref 51–110)
TSH SERPL DL<=0.005 MIU/L-ACNC: 3.51 MU/L (ref 0.4–4)

## 2017-09-28 RX ORDER — ZOLPIDEM TARTRATE 5 MG/1
5 TABLET ORAL
Qty: 30 TABLET | Refills: 0 | Status: SHIPPED | OUTPATIENT
Start: 2017-09-28 | End: 2018-04-05

## 2017-09-28 ASSESSMENT — PAIN SCALES - GENERAL: PAINLEVEL: NO PAIN (0)

## 2017-09-28 NOTE — NURSING NOTE
"71 year old  Chief Complaint   Patient presents with     Physical     pt. is fasting       Blood pressure 122/80, pulse 61, temperature 97.9  F (36.6  C), temperature source Oral, height 5' 2.5\" (158.8 cm), weight 135 lb 8 oz (61.5 kg), last menstrual period 01/01/2000, SpO2 97 %, not currently breastfeeding. Body mass index is 24.39 kg/(m^2).  Patient Active Problem List   Diagnosis     Adenocarcinoma of breast (H)     Allergic rhinitis     Hyperlipidemia LDL goal <100     Acquired hypothyroidism     Heart block     Cardiac pacemaker in situ- Medtronic, dual chamber- NOT dependent (Advisa: MRI conditional)     Diverticulosis of large intestine without hemorrhage     BPPV (benign paroxysmal positional vertigo), unspecified laterality     Preventative health care       Wt Readings from Last 2 Encounters:   09/28/17 135 lb 8 oz (61.5 kg)   06/26/17 136 lb (61.7 kg)     BP Readings from Last 3 Encounters:   09/28/17 122/80   06/26/17 122/86   03/27/17 109/73         Current Outpatient Prescriptions   Medication     simvastatin (ZOCOR) 20 MG tablet     Fluticasone Propionate (FLONASE NA)     estradiol (ESTRACE VAGINAL) 0.1 MG/GM cream     levothyroxine (SYNTHROID/LEVOTHROID) 50 MCG tablet     calcium carbonate (OS-DALLAS 500 MG [place]. [place]) 500 MG tablet     Magnesium 500 MG CAPS     VITAMIN D, CHOLECALCIFEROL, PO     Methylsulfonylmethane (MSM) 1000 MG TABS     No current facility-administered medications for this visit.        Social History   Substance Use Topics     Smoking status: Never Smoker     Smokeless tobacco: Never Used     Alcohol use 0.0 oz/week     0 Standard drinks or equivalent per week      Comment: 10-14 wine a week        Health Maintenance Due   Topic Date Due     ADVANCE DIRECTIVE PLANNING Q5 YRS  12/10/2002     TETANUS IMMUNIZATION (SYSTEM ASSIGNED)  08/17/2017     INFLUENZA VACCINE (SYSTEM ASSIGNED)  09/01/2017       Lab Results   Component Value Date    PAP NIL 06/26/2017         Aren Shelley, " "L.P.N.  September 28, 2017 8:49 AM      Injectable Influenza Immunization Documentation    1.  Has the patient received the information for the injectable influenza vaccine? YES     2. Is the patient 6 months of age or older? YES     3. Does the patient have any of the following contraindications?         Severe allergy to eggs? No     Severe allergic reaction to previous influenza vaccines? No   Severe allergy to latex? No       History of Guillain-Watson syndrome? No     Currently have a temperature greater than 100.4F? No        4.  Severely egg allergic patients should have flu vaccine eligibility assessed by an MD, RN, or pharmacist, and those who received flu vaccine should be observed for 15 min by an MD, RN, Pharmacist, Medical Technician, or member of clinic staff.\": YES    5. Latex-allergic patients should be given latex-free influenza vaccine Yes. Please reference the Vaccine latex table to determine if your clinic s product is latex-containing.       Vaccination given by Elba Everett CMA  September 28, 2017 9:33 AM        Screening Questionnaire for Adult Immunization    Are you sick today?   No   Do you have allergies to medications, food, a vaccine component or latex?   No   Have you ever had a serious reaction after receiving a vaccination?   No   Do you have a long-term health problem with heart disease, lung disease, asthma, kidney disease, metabolic disease (e.g. diabetes), anemia, or other blood disorder?   No   Do you have cancer, leukemia, HIV/AIDS, or any other immune system problem?   No   In the past 3 months, have you taken medications that affect  your immune system, such as prednisone, other steroids, or anticancer drugs; drugs for the treatment of rheumatoid arthritis, Crohn s disease, or psoriasis; or have you had radiation treatments?   No   Have you had a seizure, or a brain or other nervous system problem?   No   During the past year, have you received a transfusion of blood or blood     " products, or been given immune (gamma) globulin or antiviral drug?   No   For women: Are you pregnant or is there a chance you could become        pregnant during the next month?   No   Have you received any vaccinations in the past 4 weeks?   No     Immunization questionnaire answers were all negative.        Per orders of Dr. Jean, injection of TD given by Elba Everett. Patient instructed to remain in clinic for 15 minutes afterwards, and to report any adverse reaction to me immediately.       Screening performed by Elba Everett on 9/28/2017 at 9:33 AM.

## 2017-09-28 NOTE — MR AVS SNAPSHOT
After Visit Summary   9/28/2017    Sharona Bravo    MRN: [id number]           Patient Information     Date Of Birth          1945        Visit Information        Provider Department      9/28/2017 8:40 AM Ruslan Jean MD [facility]        Today's Diagnoses     Medicare annual wellness visit, subsequent    -  1    Need for TD vaccine        Preventative health care        Needs flu shot        Secondary insomnia        Screening for diabetes mellitus        Acquired hypothyroidism           Follow-ups after your visit        Your next 10 appointments already scheduled     Mar 27, 2018 10:00 AM CDT   (Arrive by 9:45 AM)   Pacemaker Check with Uc Cv Device 1   [facility] ([facility])    [address]  [address] [facility] [address] [phone]              Who to contact     Please call your clinic at [phone] to:    Ask questions about your health    Make or cancel appointments    Discuss your medicines    Learn about your test results    Speak to your doctor   If you have compliments or concerns about an experience at your clinic, or if you wish to file a complaint, please contact [facility] Patient Relations at [phone] or email us at [email or web address]         Additional Information About Your Visit        MyChart Information     [employer] gives you secure access to your electronic health record. If you see a primary care provider, you can also send messages to your care team and make appointments. If you have questions, please call your primary care clinic.  If you do not have a primary care provider, please call [phone] and they will assist you.      Guarnic is an electronic gateway that provides easy, online access to your medical records. With Guarnic, you can request a clinic appointment, read your test results, renew a prescription or communicate with your care team.    "  To access your existing account, please contact your [facility] or call [phone] for assistance.        Care EveryWhere ID     This is your Care EveryWhere ID. This could be used by other organizations to access your [facility] medical records  [id number]        Your Vitals Were     Pulse Temperature Height Last Period Pulse Oximetry BMI (Body Mass Index)    61 97.9  F (36.6  C) (Oral) 5' 2.5\" (158.8 cm) 01/01/2000 97% 24.39 kg/m2       Blood Pressure from Last 3 Encounters:   09/28/17 122/80   06/26/17 122/86   03/27/17 109/73    Weight from Last 3 Encounters:   09/28/17 135 lb 8 oz (61.5 kg)   06/26/17 136 lb (61.7 kg)   03/27/17 136 lb 12 oz (62 kg)              We Performed the Following     ADMIN INFLUENZA VIRUS VACCINE     Glucose Fasting ([place])     HC FLU VACCINE, INCREASED ANTIGEN, PRESV FREE     TD (ADULT, 7+) PRESERVE FREE     TSH with free T4 reflex     VACCINE ADMINISTRATION, EACH ADDITIONAL          Today's Medication Changes          These changes are accurate as of: 9/28/17 12:22 PM.  If you have any questions, ask your nurse or doctor.               Start taking these medicines.        Dose/Directions    zolpidem 5 MG tablet   Commonly known as:  AMBIEN   Used for:  Secondary insomnia        Dose:  5 mg   Take 1 tablet (5 mg) by mouth nightly as needed for sleep   Quantity:  30 tablet   Refills:  0            Where to get your medicines      Some of these will need a paper prescription and others can be bought over the counter.  Ask your nurse if you have questions.     Bring a paper prescription for each of these medications     zolpidem 5 MG tablet                Primary Care Provider Office Phone # Fax #    Ruslan Jean [phone] [address] [place] [id number]        Equal Access to Services     [facility]: Rich Jasmine, sabas bobo, reilly zhou " alex escalera ah. So [facility] [phone].    ATENCIÓN: Si dylan gomez, tiene a mc disposición servicios gratuitos de asistencia lingüística. Nisha alfonso [phone].    We comply with applicable federal civil rights laws and [place] laws. We do not discriminate on the basis of race, color, national origin, age, disability sex, sexual orientation or gender identity.            Thank you!     Thank you for choosing [facility]  for your care. Our goal is always to provide you with excellent care. Hearing back from our patients is one way we can continue to improve our services. Please take a few minutes to complete the written survey that you may receive in the mail after your visit with us. Thank you!             Your Updated Medication List - Protect others around you: Learn how to safely use, store and throw away your medicines at www.disposemymeds.org.          This list is accurate as of: 9/28/17 12:22 PM.  Always use your most recent med list.                   Brand Name Dispense Instructions for use Diagnosis    calcium carbonate 1250 MG tablet    OS-DALLAS 500 mg [place]. [place]     Take 600 mg by mouth daily        estradiol 0.1 MG/GM cream    ESTRACE VAGINAL    42.5 g    Place 2 g vaginally twice a week    Vaginal atrophy       FLONASE NA           levothyroxine 50 MCG tablet    SYNTHROID/LEVOTHROID    90 tablet    Take 1 tablet (50 mcg) by mouth daily    Acquired hypothyroidism       Magnesium 500 MG Caps      Take 250 mg by mouth daily        MSM 1000 MG Tabs      Take 1,000 mg by mouth 2 times daily        simvastatin 20 MG tablet    ZOCOR    90 tablet    Take 1 tablet (20 mg) by mouth At Bedtime    Hyperlipidemia LDL goal <100       VITAMIN D (CHOLECALCIFEROL) PO      Take 800 Units by mouth daily        zolpidem 5 MG tablet    AMBIEN    30 tablet    Take 1 tablet (5 mg) by mouth nightly as needed for sleep    Secondary insomnia

## 2017-09-28 NOTE — PROGRESS NOTES
Remote pacemaker transmission received and reviewed.  Device transmission sent per MD orders.  Patient has a Medtronic dual lead pacemaker.  Normal pacemaker function.  3 AT/AF episodes recorded - 11 seconds in duration.  Presenting EGM = AS- @ 81 bpm.  AP = 43%.   = 97.3%.  Estimated battery longevity to JYOTI = 7 years.  Patient notified of interrogation results via voicemail.  Plan for patient to send a remote transmission in 3 months as scheduled.    Remote pacemaker transmission

## 2017-10-01 NOTE — PROGRESS NOTES
CHIEF COMPLAINT:  Medicare annual wellness visit, subsequent.      HISTORY OF PRESENT ILLNESS:  Kristal is a 71 year old here for the above.  Overall, she is doing quite well.      ACTIVE MEDICAL PROBLEMS:  Reviewed.      She still has some gut issues.  We did an extensive workup last year, and she was relieved there was nothing ominous that was discovered.  She continues to have constipation alternating with softer stools every 2-3 days.  We talked about IBS and the fact that she is not currently taking any Citrucel fiber tablets, and I really think that would be a good idea.  She is doing some yogurt.      She is struggling with some ongoing knee pain and has been getting cortisone injections and is now trying Synvisc.  This actually is working a little bit better.      Finally, she is waking up at night 2-3 times per week, often between 2:00-3:00 in the morning.  Her daughter was recently diagnosed (in her 30s) with a dissection of the left anterior descending artery.  She had a heart attack-like phenomena with this and even some congestive heart failure findings, all which should resolve.  Nevertheless, this really has frightened her and shaken her.  She could use something to help her sleep that will not linger too long into the morning.        CURRENT MEDICATIONS:  Reviewed.      MEDICARE QUESTIONS:  No problems at all at home with activities of daily living.  No falls at home.  No symptoms of depression.  No memory loss symptoms.      HEALTHCARE MAINTENANCE:  She just had her eyes checked and has some early bilateral cataracts.  No surgery at this point.  Her hearing reveals no changes.  Dental care is up-to-date.  Blood pressure is 122/80.  Body mass index is perfect at 24.39.  She will get a seasonal flu shot today.  She also needs TD immunization today.  We are going to check a TSH and glucose, and she has a lipid panel plus pending.      She recently saw her gynecologist and had a Pap smear and mammogram.   Colon cancer screening is up-to-date until 2021.  She had her pacemaker checked yesterday.      REVIEW OF SYSTEMS:  A 10 point review of systems is negative.      OBJECTIVE:     GENERAL:  Pat is in absolutely no distress.  Affect is upbeat.  She is completely alert and oriented x3.     VITAL SIGNS:  BP is 122/80 with a heart rate of 61.  Temperature is 97.9.  She is 5 feet 2.5 inches in height and weighs 135 with a BMI of 24.39.  O2 sats are 97% on room air.   HEENT:  Head is normocephalic, atraumatic.  Ears are free of any significant cerumen.  There is some dry wax.  Her hearing is unaffected.  There is no pain with palpation of the frontal or maxillary sinuses.  Eyes are grossly normal.  Nose is free of any congestion or discharge.  Teeth are in good repair.  No dentures.  Mucous membranes are moist.  Throat looks benign.     NECK:  Supple without any anterior or posterior chain adenopathy.  No visible palpable thyromegaly.   BACK:  Smooth and straight.  No pain to percussion.  No CVA tenderness.   LUNGS:  Clear to auscultation bilaterally.   HEART:  Regular rate and rhythm without murmur.  Pacemaker noted under the left anterior chest wall.     EXTREMITIES:  Ankles are free of any edema.  Good peripheral pulses.      LABORATORY DATA:  Pending.      ASSESSMENT/PLAN:   1.  Medicare annual wellness visit, subsequent.    2.  Up-to-date with gynecologic care and mammography through her OB/GYN.    3.  Up-to-date with colon cancer screening.   4.  TD given today.   5.  Seasonal high-dose influenza immunization given today.   6.  Acquired hypothyroidism.  TSH level pending.   7.  IBS-like symptoms.  Continuing.  Strong recommendation of fiber supplement was made today.   8.  Secondary insomnia.  Options were discussed.  We are going to go ahead with generic zolpidem 5 mg 1-2 at bedtime as needed.  She has not been using anything like this for quite some time.  I will go ahead and give her 30 tablets with no refills.   Call with any problems or questions.  I look forward to seeing her back in approximately 1 year for a similar visit.

## 2017-10-10 DIAGNOSIS — E78.5 HYPERLIPIDEMIA LDL GOAL <100: ICD-10-CM

## 2017-10-10 RX ORDER — SIMVASTATIN 20 MG
20 TABLET ORAL AT BEDTIME
Qty: 90 TABLET | Refills: 0 | Status: SHIPPED | OUTPATIENT
Start: 2017-10-10 | End: 2018-01-08

## 2017-10-10 NOTE — TELEPHONE ENCOUNTER
Routing refill request to provider for review/approval because:  Yuki given x1 and patient did not follow up, please advise  Labs not current:  Lipid panel plus, future placed last July, and lab missed when just in for Medicare Wellness visit unfortunately

## 2017-10-16 DIAGNOSIS — E78.5 HYPERLIPIDEMIA LDL GOAL <100: ICD-10-CM

## 2017-10-16 DIAGNOSIS — Z53.9 ERRONEOUS ENCOUNTER--DISREGARD: Primary | ICD-10-CM

## 2017-10-16 PROBLEM — Z12.4 CERVICAL CANCER SCREENING: Status: RESOLVED | Noted: 2017-10-16 | Resolved: 2017-10-16

## 2017-10-16 RX ORDER — SIMVASTATIN 20 MG
20 TABLET ORAL AT BEDTIME
Qty: 30 TABLET | Refills: 0 | Status: CANCELLED | OUTPATIENT
Start: 2017-10-16

## 2017-10-16 NOTE — TELEPHONE ENCOUNTER
Simvastatin     Last Written Prescription Date: 10/10/2017  Last Fill Quantity: 90, # refills: 0  Last Office Visit with [facility] or [facility] prescribing provider: 09/28/2017       Lab Results   Component Value Date    CHOL 172.0 05/17/2016     Lab Results   Component Value Date    HDL 62.0 05/17/2016     Lab Results   Component Value Date    LDL 93.0 05/17/2016     Lab Results   Component Value Date    TRIG 89.0 05/17/2016     Lab Results   Component Value Date    CHOLHDLRATIO 2.8 05/17/2016     Clay fasting blood work before any more refills.  RX sent to the RN for approval.  Yan DE LA ROSA

## 2017-12-29 ENCOUNTER — ALLIED HEALTH/NURSE VISIT (OUTPATIENT)
Dept: CARDIOLOGY | Facility: CLINIC | Age: 72
End: 2017-12-29
Attending: INTERNAL MEDICINE
Payer: MEDICARE

## 2017-12-29 DIAGNOSIS — I45.9 HEART BLOCK: Primary | ICD-10-CM

## 2017-12-29 PROCEDURE — 93296 REM INTERROG EVL PM/IDS: CPT | Mod: ZF

## 2017-12-29 NOTE — MR AVS SNAPSHOT
After Visit Summary   12/29/2017    Sharona Bravo    MRN: [id number]           Patient Information     Date Of Birth          1945        Visit Information        Provider Department      12/29/2017 6:00 AM UC ICD REMOTE [facility]        Today's Diagnoses     Heart block    -  1       Follow-ups after your visit        Your next 10 appointments already scheduled     Mar 27, 2018 10:00 AM CDT   (Arrive by 9:45 AM)   Pacemaker Check with  Cv Device 1   [facility] ([facility] [place])    [address] [place] [address]  [address] [place] [address] [phone]              Who to contact     If you have questions or need follow up information about today's clinic visit or your schedule please contact [facility] directly at [phone].  Normal or non-critical lab and imaging results will be communicated to you by MyChart, letter or phone within 4 business days after the clinic has received the results. If you do not hear from us within 7 days, please contact the clinic through MyChart or phone. If you have a critical or abnormal lab result, we will notify you by phone as soon as possible.  Submit refill requests through [employer]Made or call your pharmacy and they will forward the refill request to us. Please allow 3 business days for your refill to be completed.          Additional Information About Your Visit        MyChart Information     [employer]Made gives you secure access to your electronic health record. If you see a primary care provider, you can also send messages to your care team and make appointments. If you have questions, please call your primary care clinic.  If you do not have a primary care provider, please call [phone] and they will assist you.        Care EveryWhere ID     This is your Care EveryWhere ID. This could be used by other organizations to access your [facility] medical records  [id number]        Your Vitals Were     Last  Period                   01/01/2000            Blood Pressure from Last 3 Encounters:   09/28/17 122/80   06/26/17 122/86   03/27/17 109/73    Weight from Last 3 Encounters:   09/28/17 61.5 kg (135 lb 8 oz)   06/26/17 61.7 kg (136 lb)   03/27/17 62 kg (136 lb 12 oz)              We Performed the Following     (58408) INTERROGATION DEVICE EVAL REMOTE, PACER/ICD        Primary Care Provider Office Phone # Fax #    Ruslan Jean [phone] [address] [place] [address]        Equal Access to Services     [facility]: Hadii aad eliel Jasmine, wauzairda jihan, qaybta kaalmada mckay, reilly escalera . So [facility] [phone].    ATENCIÓN: Si habla español, tiene a mc disposición servicios gratuitos de asistencia lingüística. [facility] [phone].    We comply with applicable federal civil rights laws and [place] laws. We do not discriminate on the basis of race, color, national origin, age, disability, sex, sexual orientation, or gender identity.            Thank you!     Thank you for choosing [facility]  for your care. Our goal is always to provide you with excellent care. Hearing back from our patients is one way we can continue to improve our services. Please take a few minutes to complete the written survey that you may receive in the mail after your visit with us. Thank you!             Your Updated Medication List - Protect others around you: Learn how to safely use, store and throw away your medicines at www.disposemymeds.org.          This list is accurate as of: 12/29/17 11:59 PM.  Always use your most recent med list.                   Brand Name Dispense Instructions for use Diagnosis    calcium carbonate 1250 MG tablet    OS-DALLAS 500 mg [place]. [place]     Take 600 mg by mouth daily        estradiol 0.1 MG/GM cream    ESTRACE VAGINAL    42.5 g    Place 2 g vaginally twice a week    Vaginal atrophy       FLONASE NA            levothyroxine 50 MCG tablet    SYNTHROID/LEVOTHROID    90 tablet    Take 1 tablet (50 mcg) by mouth daily    Acquired hypothyroidism       Magnesium 500 MG Caps      Take 250 mg by mouth daily        MSM 1000 MG Tabs      Take 1,000 mg by mouth 2 times daily        simvastatin 20 MG tablet    ZOCOR    90 tablet    Take 1 tablet (20 mg) by mouth At Bedtime Needs fasting labs for any further refills    Hyperlipidemia LDL goal <100       VITAMIN D (CHOLECALCIFEROL) PO      Take 800 Units by mouth daily        zolpidem 5 MG tablet    AMBIEN    30 tablet    Take 1 tablet (5 mg) by mouth nightly as needed for sleep    Secondary insomnia

## 2018-01-08 DIAGNOSIS — E78.5 HYPERLIPIDEMIA LDL GOAL <100: ICD-10-CM

## 2018-01-08 RX ORDER — SIMVASTATIN 20 MG
20 TABLET ORAL AT BEDTIME
Qty: 30 TABLET | Refills: 0 | Status: SHIPPED | OUTPATIENT
Start: 2018-01-08 | End: 2018-02-06

## 2018-01-08 NOTE — TELEPHONE ENCOUNTER
Routing refill request to provider for review/approval because:  Yuki given x1 and patient did not follow up, please advise  Labs not current:  Lipid panel plus, future already placed     Nicolle Matamoros RN  January 8, 2018 1:07 PM

## 2018-01-17 ENCOUNTER — RADIANT APPOINTMENT (OUTPATIENT)
Dept: GENERAL RADIOLOGY | Facility: CLINIC | Age: 73
End: 2018-01-17
Payer: MEDICARE

## 2018-01-17 ENCOUNTER — OFFICE VISIT (OUTPATIENT)
Dept: FAMILY MEDICINE | Facility: CLINIC | Age: 73
End: 2018-01-17
Payer: MEDICARE

## 2018-01-17 VITALS
HEART RATE: 79 BPM | HEIGHT: 62 IN | BODY MASS INDEX: 25.21 KG/M2 | WEIGHT: 137 LBS | DIASTOLIC BLOOD PRESSURE: 84 MMHG | SYSTOLIC BLOOD PRESSURE: 129 MMHG | OXYGEN SATURATION: 99 % | TEMPERATURE: 97.9 F

## 2018-01-17 DIAGNOSIS — R53.83 OTHER FATIGUE: ICD-10-CM

## 2018-01-17 DIAGNOSIS — J20.9 ACUTE BRONCHITIS, UNSPECIFIED ORGANISM: Primary | ICD-10-CM

## 2018-01-17 LAB
BASOPHILS # BLD AUTO: 0.1 10E9/L (ref 0–0.2)
BASOPHILS NFR BLD AUTO: 0.8 %
DIFFERENTIAL METHOD BLD: NORMAL
EOSINOPHIL # BLD AUTO: 0.2 10E9/L (ref 0–0.7)
EOSINOPHIL NFR BLD AUTO: 2.6 %
ERYTHROCYTE [DISTWIDTH] IN BLOOD BY AUTOMATED COUNT: 13.2 % (ref 10–15)
HCT VFR BLD AUTO: 42.3 % (ref 35–47)
HGB BLD-MCNC: 14.1 G/DL (ref 11.7–15.7)
IMM GRANULOCYTES # BLD: 0 10E9/L (ref 0–0.4)
IMM GRANULOCYTES NFR BLD: 0.3 %
LYMPHOCYTES # BLD AUTO: 1.6 10E9/L (ref 0.8–5.3)
LYMPHOCYTES NFR BLD AUTO: 22.3 %
MCH RBC QN AUTO: 31.8 PG (ref 26.5–33)
MCHC RBC AUTO-ENTMCNC: 33.3 G/DL (ref 31.5–36.5)
MCV RBC AUTO: 96 FL (ref 78–100)
MONOCYTES # BLD AUTO: 0.8 10E9/L (ref 0–1.3)
MONOCYTES NFR BLD AUTO: 10.8 %
NEUTROPHILS # BLD AUTO: 4.6 10E9/L (ref 1.6–8.3)
NEUTROPHILS NFR BLD AUTO: 63.2 %
NRBC # BLD AUTO: 0 10*3/UL
NRBC BLD AUTO-RTO: 0 /100
PLATELET # BLD AUTO: 295 10E9/L (ref 150–450)
RBC # BLD AUTO: 4.43 10E12/L (ref 3.8–5.2)
WBC # BLD AUTO: 7.3 10E9/L (ref 4–11)

## 2018-01-17 ASSESSMENT — PAIN SCALES - GENERAL: PAINLEVEL: NO PAIN (0)

## 2018-01-17 NOTE — PATIENT INSTRUCTIONS
ASSESSMENT/PLAN    Bronchitis with fatigue. Also, LEFT ear cerumen impaction    Plan - Check CBC and Chest X-ray  Use Debrox ear wax removal.   Communicate via the EMR and consider antibiotics if no improvement in 2 days and/or if dictated by the lab and x-ray    Of note, she's leaving for [place] for 10 days tomorrow. Will send me the pharmacy phone number if needed.       --Yang Aceves MD  [facility], Department of Family Medicine and Community Health

## 2018-01-17 NOTE — PROGRESS NOTES
Sharona Bravo is a 72 year old female who presents to [facility] with the following concern:  - LEFT scapulothoracic pain for past 2 weeks. Went to chiropractor at gym and that has been helpful    -1 week of cough (productive) and  hoarse throat. LEFT ear congestion for a few days. Fatigue for a week.   No fever.   Voice is improving. Still with chest congestion. No shortness of breath.     *Of note, just prior to symptoms starting, Ms Bravo donated platelets. She donates once/month    Review Of Systems:  Has otherwise been in usual state of health, e.g.   Gastrointestinal: negative  Genitourinary: negative    Problem list per EMR:  Patient Active Problem List   Diagnosis     Adenocarcinoma of breast (H)     Allergic rhinitis     Hyperlipidemia LDL goal <100     Acquired hypothyroidism     Heart block     Cardiac pacemaker in situ- Medtronic, dual chamber- NOT dependent (Advisa: MRI conditional)     Diverticulosis of large intestine without hemorrhage     BPPV (benign paroxysmal positional vertigo), unspecified laterality     Preventative health care       Current Outpatient Prescriptions   Medication Sig Dispense Refill     simvastatin (ZOCOR) 20 MG tablet Take 1 tablet (20 mg) by mouth At Bedtime Needs fasting labs for any further refills 30 tablet 0     zolpidem (AMBIEN) 5 MG tablet Take 1 tablet (5 mg) by mouth nightly as needed for sleep 30 tablet 0     Fluticasone Propionate (FLONASE NA)        estradiol (ESTRACE VAGINAL) 0.1 MG/GM cream Place 2 g vaginally twice a week 42.5 g 6     levothyroxine (SYNTHROID/LEVOTHROID) 50 MCG tablet Take 1 tablet (50 mcg) by mouth daily 90 tablet 3     calcium carbonate (OS-DALLAS 500 MG [place]. [place]) 500 MG tablet Take 600 mg by mouth daily        Magnesium 500 MG CAPS Take 250 mg by mouth daily        VITAMIN D, CHOLECALCIFEROL, PO Take 800 Units by mouth daily        Methylsulfonylmethane (MSM) 1000 MG TABS Take 1,000 mg by mouth 2 times daily          Allergies  "  Allergen Reactions     Gabapentin Swelling and Rash     Face swelling        Social:   Does volunteer work    EXAM    Vitals: /84 (BP Location: Right arm, Patient Position: Chair, Cuff Size: Adult Regular)  Pulse 79  Temp 97.9  F (36.6  C) (Oral)  Ht 5' 2.36\" (158.4 cm)  Wt 137 lb 0.1 oz (62.1 kg)  LMP 01/01/2000  SpO2 99%  BMI 24.77 kg/m2  BMI= Body mass index is 24.77 kg/(m^2).  Appears with hoarse throat but otherwise in no distress  Tms - Obscured by cerumen   Nasal passages patent  Oropharynx - clear  Neck - supple. No lymphadenopathy  CV--RRR. No murmurs  Lungs - scattered rhonchi in Right lower lobes.     Attempt at ear irrigation done but cerumen remained in ears    ASSESSMENT/PLAN    Bronchitis with fatigue. Also, LEFT ear cerumen impaction    Plan - Check CBC and Chest X-ray  Use Debrox ear wax removal.   Communicate via the EMR and consider antibiotics if no improvement in 2 days and/or if dictated by the lab and x-ray    Of note, she's leaving for [place] for 10 days tomorrow. Will send me the pharmacy phone number if needed.       --Yang Aceves MD  [facility], Department of Family Medicine and Community Health    "

## 2018-01-17 NOTE — NURSING NOTE
"72 year old  Chief Complaint   Patient presents with     URI     Started with a dry cough and had a bad sore throat for one night. Onset last Wednesday 1/10/18.     Hoarse     Hoarse voice and lost her voice last Wednesday, 1/10/18.        Blood pressure 129/84, pulse 79, temperature 97.9  F (36.6  C), temperature source Oral, height 5' 2.36\" (158.4 cm), weight 137 lb 0.1 oz (62.1 kg), last menstrual period 01/01/2000, SpO2 99 %, not currently breastfeeding. Body mass index is 24.77 kg/(m^2).  Patient Active Problem List   Diagnosis     Adenocarcinoma of breast (H)     Allergic rhinitis     Hyperlipidemia LDL goal <100     Acquired hypothyroidism     Heart block     Cardiac pacemaker in situ- Medtronic, dual chamber- NOT dependent (Advisa: MRI conditional)     Diverticulosis of large intestine without hemorrhage     BPPV (benign paroxysmal positional vertigo), unspecified laterality     Preventative health care       Wt Readings from Last 2 Encounters:   01/17/18 137 lb 0.1 oz (62.1 kg)   09/28/17 135 lb 8 oz (61.5 kg)     BP Readings from Last 3 Encounters:   01/17/18 129/84   09/28/17 122/80   06/26/17 122/86         Current Outpatient Prescriptions   Medication     simvastatin (ZOCOR) 20 MG tablet     zolpidem (AMBIEN) 5 MG tablet     Fluticasone Propionate (FLONASE NA)     estradiol (ESTRACE VAGINAL) 0.1 MG/GM cream     levothyroxine (SYNTHROID/LEVOTHROID) 50 MCG tablet     calcium carbonate (OS-DALLAS 500 MG [place]. [place]) 500 MG tablet     Magnesium 500 MG CAPS     VITAMIN D, CHOLECALCIFEROL, PO     Methylsulfonylmethane (MSM) 1000 MG TABS     No current facility-administered medications for this visit.        Social History   Substance Use Topics     Smoking status: Never Smoker     Smokeless tobacco: Never Used     Alcohol use 0.0 oz/week     0 Standard drinks or equivalent per week      Comment: 10-14 wine a week        Health Maintenance Due   Topic Date Due     ADVANCE DIRECTIVE PLANNING Q5 YRS  12/10/2002 "       Lab Results   Component Value Date    PAP NIL 06/26/2017       Nellie Hearn MA  January 17, 2018 2:58 PM

## 2018-01-17 NOTE — MR AVS SNAPSHOT
After Visit Summary   1/17/2018    Sharona Bravo    MRN: [id number]           Patient Information     Date Of Birth          1945        Visit Information        Provider Department      1/17/2018 2:40 PM Yang Aceves MD [facility]        Today's Diagnoses     Acute bronchitis, unspecified organism    -  1    Other fatigue          Care Instructions      ASSESSMENT/PLAN    Bronchitis with fatigue. Also, LEFT ear cerumen impaction    Plan - Check CBC and Chest X-ray  Use Debrox ear wax removal.   Communicate via the EMR and consider antibiotics if no improvement in 2 days and/or if dictated by the lab and x-ray    Of note, she's leaving for [place] for 10 days tomorrow. Will send me the pharmacy phone number if needed.       --Yang Aceves MD  [facility], Department of Family Medicine and Community Health          Follow-ups after your visit        Your next 10 appointments already scheduled     Mar 27, 2018 10:00 AM CDT   (Arrive by 9:45 AM)   Pacemaker Check with Uc Cv Device 1   [facility] ([facility])    [address] [place] [address] [phone]              Who to contact     Please call your clinic at [phone] to:    Ask questions about your health    Make or cancel appointments    Discuss your medicines    Learn about your test results    Speak to your doctor   If you have compliments or concerns about an experience at your clinic, or if you wish to file a complaint, please contact [facility] Patient Relations at [phone] or email us at [email or web address]         Additional Information About Your Visit        MyChart Information     [employer] gives you secure access to your electronic health record. If you see a primary care provider, you can also send messages to your care team and make appointments. If you have questions, please call your  "primary care clinic.  If you do not have a primary care provider, please call [phone] and they will assist you.      [employer] is an electronic gateway that provides easy, online access to your medical records. With [employer], you can request a clinic appointment, read your test results, renew a prescription or communicate with your care team.     To access your existing account, please contact your [facility] or call [phone] for assistance.        Care EveryWhere ID     This is your Care EveryWhere ID. This could be used by other organizations to access your [place] medical records  [id number]        Your Vitals Were     Pulse Temperature Height Last Period Pulse Oximetry BMI (Body Mass Index)    79 97.9  F (36.6  C) (Oral) 5' 2.36\" (158.4 cm) 01/01/2000 99% 24.77 kg/m2       Blood Pressure from Last 3 Encounters:   01/17/18 129/84   09/28/17 122/80   06/26/17 122/86    Weight from Last 3 Encounters:   01/17/18 137 lb 0.1 oz (62.1 kg)   09/28/17 135 lb 8 oz (61.5 kg)   06/26/17 136 lb (61.7 kg)              We Performed the Following     CBC with platelets differential     X-ray Chest 2 vws*        Primary Care Provider Office Phone # Fax #    Ruslan Jean [phone]       [address] [place] [address]        Equal Access to Services     [facility] Center[facility] : Hadii aad ku hadasho Soomaali, waaxda luqadaha, qaybta kaalmada adeegyada, reilly robertsonin luiza escalera . So [facility] [phone].    ATENCIÓN: Si habla español, tiene a mc disposición servicios gratuitos de asistencia lingüística. Llame al [phone].    We comply with applicable federal civil rights laws and [place] laws. We do not discriminate on the basis of race, color, national origin, age, disability, sex, sexual orientation, or gender identity.            Thank you!     Thank you for choosing [facility]  for your care. Our goal is always to provide you with " excellent care. Hearing back from our patients is one way we can continue to improve our services. Please take a few minutes to complete the written survey that you may receive in the mail after your visit with us. Thank you!             Your Updated Medication List - Protect others around you: Learn how to safely use, store and throw away your medicines at www.disposemymeds.org.          This list is accurate as of: 1/17/18  3:58 PM.  Always use your most recent med list.                   Brand Name Dispense Instructions for use Diagnosis    calcium carbonate 1250 MG tablet    OS-DALLAS 500 mg [place]. [place]     Take 600 mg by mouth daily        estradiol 0.1 MG/GM cream    ESTRACE VAGINAL    42.5 g    Place 2 g vaginally twice a week    Vaginal atrophy       FLONASE NA           levothyroxine 50 MCG tablet    SYNTHROID/LEVOTHROID    90 tablet    Take 1 tablet (50 mcg) by mouth daily    Acquired hypothyroidism       Magnesium 500 MG Caps      Take 250 mg by mouth daily        MSM 1000 MG Tabs      Take 1,000 mg by mouth 2 times daily        simvastatin 20 MG tablet    ZOCOR    30 tablet    Take 1 tablet (20 mg) by mouth At Bedtime Needs fasting labs for any further refills    Hyperlipidemia LDL goal <100       VITAMIN D (CHOLECALCIFEROL) PO      Take 800 Units by mouth daily        zolpidem 5 MG tablet    AMBIEN    30 tablet    Take 1 tablet (5 mg) by mouth nightly as needed for sleep    Secondary insomnia

## 2018-02-06 DIAGNOSIS — E78.5 HYPERLIPIDEMIA LDL GOAL <100: ICD-10-CM

## 2018-02-07 NOTE — TELEPHONE ENCOUNTER
Patient is due for repeat lipids. Future order is in place. Nicho refill was given last month. Please call the patient and assist with scheduling a lab appointment then forward to the provider for possible second nicho refill.    Anna Jaramillo RN

## 2018-02-08 RX ORDER — SIMVASTATIN 20 MG
20 TABLET ORAL AT BEDTIME
Qty: 7 TABLET | Refills: 0 | Status: SHIPPED | OUTPATIENT
Start: 2018-02-08 | End: 2018-02-13

## 2018-02-08 NOTE — TELEPHONE ENCOUNTER
Patient did not read her last 2 uBeam messages, so called patient and informed.  She was under the impression her Lipids had been done.  She will go in the next week to have labs done. She asks for 1 weeks worth.    Per RN protocol, gave 7 pills nicho refill, Lipid orders extended.  Edited Health Maintenance to alert of yearly Lipid Monitoring.    Ina Clifford RN, [facility]

## 2018-02-09 DIAGNOSIS — E78.5 HYPERLIPIDEMIA LDL GOAL <100: ICD-10-CM

## 2018-02-09 LAB
ALT SERPL-CCNC: 18 U/L (ref 0–50)
AST SERPL-CCNC: 23 U/L (ref 0–45)
CHOLEST SERPL-MCNC: 205 MG/DL (ref 0–200)
CHOLEST/HDLC SERPL: 2.8 {RATIO} (ref 0–5)
FASTING SPECIMEN: YES
GLUCOSE SERPL-MCNC: 95 MG/DL (ref 60–109)
HDLC SERPL-MCNC: 74 MG/DL
LDLC SERPL CALC-MCNC: 111 MG/DL (ref 0–129)
TRIGL SERPL-MCNC: 100 MG/DL (ref 0–150)
VLDL-CHOLESTEROL: 20 (ref 7–32)

## 2018-02-13 DIAGNOSIS — E78.5 HYPERLIPIDEMIA LDL GOAL <100: ICD-10-CM

## 2018-02-13 RX ORDER — SIMVASTATIN 20 MG
20 TABLET ORAL AT BEDTIME
Qty: 90 TABLET | Refills: 3 | Status: SHIPPED | OUTPATIENT
Start: 2018-02-13 | End: 2019-02-19

## 2018-03-07 ENCOUNTER — OFFICE VISIT (OUTPATIENT)
Dept: FAMILY MEDICINE | Facility: CLINIC | Age: 73
End: 2018-03-07
Payer: MEDICARE

## 2018-03-07 VITALS
BODY MASS INDEX: 25.72 KG/M2 | WEIGHT: 139.75 LBS | SYSTOLIC BLOOD PRESSURE: 137 MMHG | TEMPERATURE: 97.6 F | OXYGEN SATURATION: 97 % | HEART RATE: 60 BPM | HEIGHT: 62 IN | DIASTOLIC BLOOD PRESSURE: 80 MMHG

## 2018-03-07 DIAGNOSIS — R05.9 COUGH: Primary | ICD-10-CM

## 2018-03-07 RX ORDER — AZITHROMYCIN 250 MG/1
TABLET, FILM COATED ORAL
Qty: 6 TABLET | Refills: 0 | Status: SHIPPED | OUTPATIENT
Start: 2018-03-07 | End: 2018-04-05

## 2018-03-07 NOTE — MR AVS SNAPSHOT
"              After Visit Summary   3/7/2018    Sharona Bravo    MRN: [id number]           Patient Information     Date Of Birth          1945        Visit Information        Provider Department      3/7/2018 3:00 PM Bettye Betts MD [facility]        Today's Diagnoses     Cough    -  1       Follow-ups after your visit        Your next 10 appointments already scheduled     Mar 27, 2018 10:00 AM CDT   (Arrive by 9:45 AM)   Pacemaker Check with Uc Cv Device 1   [facility] ([facility] [place])    [address] [place] [address]  [address] [place] [address] [phone]              Who to contact     Please call your clinic at [phone] to:    Ask questions about your health    Make or cancel appointments    Discuss your medicines    Learn about your test results    Speak to your doctor            Additional Information About Your Visit        MyChart Information     [employer] gives you secure access to your electronic health record. If you see a primary care provider, you can also send messages to your care team and make appointments. If you have questions, please call your primary care clinic.  If you do not have a primary care provider, please call [phone] and they will assist you.      [employer] is an electronic gateway that provides easy, online access to your medical records. With [employer], you can request a clinic appointment, read your test results, renew a prescription or communicate with your care team.     To access your existing account, please contact your [facility] or call [phone] for assistance.        Care EveryWhere ID     This is your Care EveryWhere ID. This could be used by other organizations to access your [place] medical records  [id number]        Your Vitals Were     Pulse Temperature Height Last Period Pulse Oximetry BMI (Body Mass Index)    60 97.6  F (36.4  C) (Oral) 5' 2.36\" (158.4 cm) " 01/01/2000 97% 25.26 kg/m2       Blood Pressure from Last 3 Encounters:   03/07/18 137/80   01/17/18 129/84   09/28/17 122/80    Weight from Last 3 Encounters:   03/07/18 139 lb 12 oz (63.4 kg)   01/17/18 137 lb 0.1 oz (62.1 kg)   09/28/17 135 lb 8 oz (61.5 kg)              Today, you had the following     No orders found for display         Today's Medication Changes          These changes are accurate as of 3/7/18  3:29 PM.  If you have any questions, ask your nurse or doctor.               Start taking these medicines.        Dose/Directions    azithromycin 250 MG tablet   Commonly known as:  ZITHROMAX   Used for:  Cough   Started by:  Bettye Betts MD        Two tablets first day, then one tablet daily for four days.   Quantity:  6 tablet   Refills:  0            Where to get your medicines      These medications were sent to [id number] - [place], [place] - [address] [place] [address]  [address] [facility] [address]     Phone:  [phone]     azithromycin 250 MG tablet                Primary Care Provider Office Phone # Fax #    Ruslan Jean [phone]       [address] [place] [address]        Equal Access to Services     PARTH HUNT : Hadii jo julian hadjoneo Soloi, waaxda luqadaha, qaybta kaalmada adekaz, reilly alford. So [facility] [phone].    ATENCIÓN: Si habla español, tiene a mc disposición servicios gratuitos de asistencia lingüística. Nisha al [phone].    We comply with applicable federal civil rights laws and [place] laws. We do not discriminate on the basis of race, color, national origin, age, disability, sex, sexual orientation, or gender identity.            Thank you!     Thank you for choosing [facility]  for your care. Our goal is always to provide you with excellent care. Hearing back from our patients is one way we can continue to improve our services. Please take a few minutes to complete  the written survey that you may receive in the mail after your visit with us. Thank you!             Your Updated Medication List - Protect others around you: Learn how to safely use, store and throw away your medicines at www.disposemymeds.org.          This list is accurate as of 3/7/18  3:29 PM.  Always use your most recent med list.                   Brand Name Dispense Instructions for use Diagnosis    azithromycin 250 MG tablet    ZITHROMAX    6 tablet    Two tablets first day, then one tablet daily for four days.    Cough       calcium carbonate 1250 MG tablet    OS-DALLAS 500 mg [place]. [place]     Take 600 mg by mouth daily        estradiol 0.1 MG/GM cream    ESTRACE VAGINAL    42.5 g    Place 2 g vaginally twice a week    Vaginal atrophy       FLONASE NA           levothyroxine 50 MCG tablet    SYNTHROID/LEVOTHROID    90 tablet    Take 1 tablet (50 mcg) by mouth daily    Acquired hypothyroidism       Magnesium 500 MG Caps      Take 250 mg by mouth daily        MSM 1000 MG Tabs      Take 1,000 mg by mouth 2 times daily        simvastatin 20 MG tablet    ZOCOR    90 tablet    Take 1 tablet (20 mg) by mouth At Bedtime    Hyperlipidemia LDL goal <100       VITAMIN D (CHOLECALCIFEROL) PO      Take 800 Units by mouth daily        zolpidem 5 MG tablet    AMBIEN    30 tablet    Take 1 tablet (5 mg) by mouth nightly as needed for sleep    Secondary insomnia

## 2018-03-07 NOTE — NURSING NOTE
"72 year old  Chief Complaint   Patient presents with     Cough     muscus coming up but pt states it seems as if it is stuck in her throat.       Blood pressure 137/80, pulse 60, temperature 97.6  F (36.4  C), temperature source Oral, height 5' 2.36\" (158.4 cm), weight 139 lb 12 oz (63.4 kg), last menstrual period 01/01/2000, SpO2 97 %, not currently breastfeeding. Body mass index is 25.26 kg/(m^2).  Patient Active Problem List   Diagnosis     Adenocarcinoma of breast (H)     Allergic rhinitis     Hyperlipidemia LDL goal <100     Acquired hypothyroidism     Heart block     Cardiac pacemaker in situ- Medtronic, dual chamber- NOT dependent (Advisa: MRI conditional)     Diverticulosis of large intestine without hemorrhage     BPPV (benign paroxysmal positional vertigo), unspecified laterality     Preventative health care       Wt Readings from Last 2 Encounters:   03/07/18 139 lb 12 oz (63.4 kg)   01/17/18 137 lb 0.1 oz (62.1 kg)     BP Readings from Last 3 Encounters:   03/07/18 137/80   01/17/18 129/84   09/28/17 122/80         Current Outpatient Prescriptions   Medication     simvastatin (ZOCOR) 20 MG tablet     zolpidem (AMBIEN) 5 MG tablet     Fluticasone Propionate (FLONASE NA)     estradiol (ESTRACE VAGINAL) 0.1 MG/GM cream     levothyroxine (SYNTHROID/LEVOTHROID) 50 MCG tablet     calcium carbonate (OS-DALLAS 500 MG [place]. [place]) 500 MG tablet     Magnesium 500 MG CAPS     VITAMIN D, CHOLECALCIFEROL, PO     Methylsulfonylmethane (MSM) 1000 MG TABS     No current facility-administered medications for this visit.        Social History   Substance Use Topics     Smoking status: Never Smoker     Smokeless tobacco: Never Used     Alcohol use 0.0 oz/week     0 Standard drinks or equivalent per week      Comment: 10-14 wine a week        Health Maintenance Due   Topic Date Due     ADVANCE DIRECTIVE PLANNING Q5 YRS  12/10/2002       Lab Results   Component Value Date    PAP NIL 06/26/2017 March 7, 2018 3:00 PM  "

## 2018-03-07 NOTE — PROGRESS NOTES
"Sharona Bravo is a 72 year old female here for the following issues:    Cough  Kristal is a 73 yo woman who reports that 10 days ago she came down with flu like symptoms. Felt very fatigued, had to take bedrest for several days. Denied fever, sore throat or body aches. However she had a harsh cough that has persisted. She is coughing so hard her abdominal muscles ache. No hx of asthma, no SOB or wheezing, nonsmoker.      Sleeping ok, as she is taking cough medicine, Appetite is ok.    Patient Active Problem List   Diagnosis     Adenocarcinoma of breast (H)     Allergic rhinitis     Hyperlipidemia LDL goal <100     Acquired hypothyroidism     Heart block     Cardiac pacemaker in situ- Medtronic, dual chamber- NOT dependent (Advisa: MRI conditional)     Diverticulosis of large intestine without hemorrhage     BPPV (benign paroxysmal positional vertigo), unspecified laterality     Preventative health care       Current Outpatient Prescriptions   Medication Sig Dispense Refill     simvastatin (ZOCOR) 20 MG tablet Take 1 tablet (20 mg) by mouth At Bedtime 90 tablet 3     zolpidem (AMBIEN) 5 MG tablet Take 1 tablet (5 mg) by mouth nightly as needed for sleep 30 tablet 0     Fluticasone Propionate (FLONASE NA)        estradiol (ESTRACE VAGINAL) 0.1 MG/GM cream Place 2 g vaginally twice a week 42.5 g 6     levothyroxine (SYNTHROID/LEVOTHROID) 50 MCG tablet Take 1 tablet (50 mcg) by mouth daily 90 tablet 3     calcium carbonate (OS-DALLAS 500 MG [place]. [place]) 500 MG tablet Take 600 mg by mouth daily        Magnesium 500 MG CAPS Take 250 mg by mouth daily        VITAMIN D, CHOLECALCIFEROL, PO Take 800 Units by mouth daily        Methylsulfonylmethane (MSM) 1000 MG TABS Take 1,000 mg by mouth 2 times daily          Allergies   Allergen Reactions     Gabapentin Swelling and Rash     Face swelling        EXAM  /80  Pulse 60  Temp 97.6  F (36.4  C) (Oral)  Ht 5' 2.36\" (158.4 cm)  Wt 139 lb 12 oz (63.4 kg)  LMP 01/01/2000 "  SpO2 97%  BMI 25.26 kg/m2  Gen: Pleasant woman with slightly productive cough  HEENT:  Conjunctiva nl, TM normal bilaterally, OP clear, no posterior erythema  COR: S1,S2, no murmur  Lungs: CTA bilaterally, no rhonchi, wheezes or rales  Ext: no peripheral edema, pulses full    Assessment:  (R05) Cough  (primary encounter diagnosis)  Comment: bronchitis on top of flu like illness 10 days ago  Plan: azithromycin (ZITHROMAX) 250 MG tablet        rx for zpack with instruction  Contact MD if not improving, consider MDI of albuterol    Bettye Betts MD  Internal Medicine/Pediatrics

## 2018-03-23 ENCOUNTER — ALLIED HEALTH/NURSE VISIT (OUTPATIENT)
Dept: CARDIOLOGY | Facility: CLINIC | Age: 73
End: 2018-03-23
Payer: MEDICARE

## 2018-03-23 DIAGNOSIS — I44.2 ATRIOVENTRICULAR BLOCK, COMPLETE (H): Primary | ICD-10-CM

## 2018-03-23 PROCEDURE — 93280 PM DEVICE PROGR EVAL DUAL: CPT | Mod: ZF

## 2018-03-23 PROCEDURE — 93288 INTERROG EVL PM/LDLS PM IP: CPT | Mod: 26 | Performed by: INTERNAL MEDICINE

## 2018-03-23 NOTE — PATIENT INSTRUCTIONS
It was a pleasure to see you in clinic today.  Please do not hesitate to call us if you have any questions or concerns.  Please return to clinic in 1 year for a pacemaker check.    Next remote 6/27/18    Abena Wolfe R.N.  Electrophysiology nurse specialist  [facility]  [address] [place] [address]     Ruby Alonso  [phone] business hours    After business hours please call [phone], option #4, and ask for Job code [phone].

## 2018-03-23 NOTE — MR AVS SNAPSHOT
After Visit Summary   3/23/2018    Sharona Bravo    MRN: [id number]           Patient Information     Date Of Birth          1945        Visit Information        Provider Department      3/23/2018 1:30 PM 1, [facility]  [facility]        Today's Diagnoses     Atrioventricular block, complete (H)    -  1      Care Instructions    It was a pleasure to see you in clinic today.  Please do not hesitate to call us if you have any questions or concerns.  Please return to clinic in 1 year for a pacemaker check.    Next remote 6/27/18    Abena Wolfe R.N.  Electrophysiology nurse specialist  [facility]  [address] [place] [address]     Ruby Alonso  [phone] business hours    After business hours please call [phone], option #4, and ask for Job code [phone].                  Follow-ups after your visit        Additional Services     Follow-Up with Device Clinic - 1 year                 Follow-up notes from your care team     Discussed this visit Return in about 1 year (around 3/23/2019) for Pacemaker check.      Your next 10 appointments already scheduled     Apr 06, 2018  1:30 PM CDT   Ech Complete with [address] [facility] Echo ([facility])    [address] [facility]  [address] [facility] [address] [phone]           1. Please bring or wear a comfortable two-piece outfit. 2. You may eat, drink and take your normal medicines. 3. For any questions that cannot be answered, please contact the ordering physician              Future tests that were ordered for you today     Open Future Orders        Priority Expected Expires Ordered    Echocardiogram Complete Routine  3/23/2019 3/23/2018    Follow-Up with Device Clinic - 1 year Routine 3/23/2019 3/24/2019 3/23/2018            Who to contact     If you have questions or need follow up information about today's clinic visit or your schedule please  contact [facility] directly at [phone].  Normal or non-critical lab and imaging results will be communicated to you by MyChart, letter or phone within 4 business days after the clinic has received the results. If you do not hear from us within 7 days, please contact the clinic through MyChart or phone. If you have a critical or abnormal lab result, we will notify you by phone as soon as possible.  Submit refill requests through [employer] or call your pharmacy and they will forward the refill request to us. Please allow 3 business days for your refill to be completed.          Additional Information About Your Visit        Refresh [employer] Information     [employer] gives you secure access to your electronic health record. If you see a primary care provider, you can also send messages to your care team and make appointments. If you have questions, please call your primary care clinic.  If you do not have a primary care provider, please call [phone] and they will assist you.        Care EveryWhere ID     This is your Care EveryWhere ID. This could be used by other organizations to access your [facility] medical records  [id number]        Your Vitals Were     Last Period                   01/01/2000            Blood Pressure from Last 3 Encounters:   03/07/18 137/80   01/17/18 129/84   09/28/17 122/80    Weight from Last 3 Encounters:   03/07/18 63.4 kg (139 lb 12 oz)   01/17/18 62.1 kg (137 lb 0.1 oz)   09/28/17 61.5 kg (135 lb 8 oz)              We Performed the Following     (70500) PM DEVICE PROGRAMMING EVAL, DUAL LEAD PACER        Primary Care Provider Office Phone # Fax #    Ruslan Jean [phone] [address] [facility] [address] [address]  [facility] [address]        Equal Access to Services     PARTH HUNT : Hadii jo Jasmine, sabas bobo, reilly zhou. So [facility] [phone].    ATENCIÓN: Si wendy colin mc  disposición servicios gratuitos de asistencia lingüística. Nisha alfonso [phone].    We comply with applicable federal civil rights laws and [place] laws. We do not discriminate on the basis of race, color, national origin, age, disability, sex, sexual orientation, or gender identity.            Thank you!     Thank you for choosing [facility]  for your care. Our goal is always to provide you with excellent care. Hearing back from our patients is one way we can continue to improve our services. Please take a few minutes to complete the written survey that you may receive in the mail after your visit with us. Thank you!             Your Updated Medication List - Protect others around you: Learn how to safely use, store and throw away your medicines at www.disposemymeds.org.          This list is accurate as of 3/23/18  2:46 PM.  Always use your most recent med list.                   Brand Name Dispense Instructions for use Diagnosis    azithromycin 250 MG tablet    ZITHROMAX    6 tablet    Two tablets first day, then one tablet daily for four days.    Cough       calcium carbonate 1250 MG tablet    OS-DALLAS 500 mg [place]. [place]     Take 600 mg by mouth daily        estradiol 0.1 MG/GM cream    ESTRACE VAGINAL    42.5 g    Place 2 g vaginally twice a week    Vaginal atrophy       FLONASE NA           levothyroxine 50 MCG tablet    SYNTHROID/LEVOTHROID    90 tablet    Take 1 tablet (50 mcg) by mouth daily    Acquired hypothyroidism       Magnesium 500 MG Caps      Take 250 mg by mouth daily        MSM 1000 MG Tabs      Take 1,000 mg by mouth 2 times daily        simvastatin 20 MG tablet    ZOCOR    90 tablet    Take 1 tablet (20 mg) by mouth At Bedtime    Hyperlipidemia LDL goal <100       VITAMIN D (CHOLECALCIFEROL) PO      Take 800 Units by mouth daily        zolpidem 5 MG tablet    AMBIEN    30 tablet    Take 1 tablet (5 mg) by mouth nightly as needed for sleep    Secondary insomnia

## 2018-03-23 NOTE — PROGRESS NOTES
Preliminary Device Interrogation Results.  Final physician signed paceart report to be scanned and attached.    Pt seen in clinic for evaluation and iterative programming of her Medtronic dual chamber per MD order.  She looks well and she states that she feels well and she does not offer any complaints.  Her pacemaker check does not show any episodes of atrial or ventricular arrhythmias, she is RV pacing 99.4% of the time, her heart rate histograms show good heart rate variation and her pacemaker battery estimates 6.5 years left.  Because she RV Paces all the time we will order an echo to monitor her LV function, per Dr Durán.  We will plan for remote checks q 3 mos and see her back in clinic in 1 year.  If her echo results have changed for the worse, then she will RTC and see Dr Durán in 3-6 mos.    Dual pacemaker

## 2018-04-05 ENCOUNTER — OFFICE VISIT (OUTPATIENT)
Dept: FAMILY MEDICINE | Facility: CLINIC | Age: 73
End: 2018-04-05
Payer: MEDICARE

## 2018-04-05 VITALS
SYSTOLIC BLOOD PRESSURE: 117 MMHG | BODY MASS INDEX: 24.05 KG/M2 | WEIGHT: 135.75 LBS | TEMPERATURE: 97.8 F | DIASTOLIC BLOOD PRESSURE: 80 MMHG | HEIGHT: 63 IN | HEART RATE: 81 BPM | OXYGEN SATURATION: 96 %

## 2018-04-05 DIAGNOSIS — G47.09 SECONDARY INSOMNIA: ICD-10-CM

## 2018-04-05 DIAGNOSIS — J98.01 BRONCHOSPASM: ICD-10-CM

## 2018-04-05 DIAGNOSIS — R05.8 POST-VIRAL COUGH SYNDROME: Primary | ICD-10-CM

## 2018-04-05 RX ORDER — ZOLPIDEM TARTRATE 5 MG/1
5 TABLET ORAL
Qty: 30 TABLET | Refills: 0 | Status: SHIPPED | OUTPATIENT
Start: 2018-04-05 | End: 2019-02-18

## 2018-04-05 RX ORDER — ZOLPIDEM TARTRATE 5 MG/1
5 TABLET ORAL
Qty: 30 TABLET | Refills: 0 | Status: SHIPPED | OUTPATIENT
Start: 2018-04-05 | End: 2018-04-05

## 2018-04-05 NOTE — NURSING NOTE
"72 year old  Chief Complaint   Patient presents with     RECHECK     pt reports her chest still feeling tight       Blood pressure 117/80, pulse 81, temperature 97.8  F (36.6  C), temperature source Oral, height 5' 2.52\" (158.8 cm), weight 135 lb 12 oz (61.6 kg), last menstrual period 01/01/2000, SpO2 96 %, not currently breastfeeding. Body mass index is 24.42 kg/(m^2).  Patient Active Problem List   Diagnosis     Adenocarcinoma of breast (H)     Allergic rhinitis     Hyperlipidemia LDL goal <100     Acquired hypothyroidism     Heart block     Cardiac pacemaker in situ- Medtronic, dual chamber- NOT dependent (Advisa: MRI conditional)     Diverticulosis of large intestine without hemorrhage     BPPV (benign paroxysmal positional vertigo), unspecified laterality     Preventative health care       Wt Readings from Last 2 Encounters:   04/05/18 135 lb 12 oz (61.6 kg)   03/07/18 139 lb 12 oz (63.4 kg)     BP Readings from Last 3 Encounters:   04/05/18 117/80   03/07/18 137/80   01/17/18 129/84         Current Outpatient Prescriptions   Medication     azithromycin (ZITHROMAX) 250 MG tablet     simvastatin (ZOCOR) 20 MG tablet     zolpidem (AMBIEN) 5 MG tablet     Fluticasone Propionate (FLONASE NA)     estradiol (ESTRACE VAGINAL) 0.1 MG/GM cream     levothyroxine (SYNTHROID/LEVOTHROID) 50 MCG tablet     calcium carbonate (OS-DALLAS 500 MG [place]. [place]) 500 MG tablet     Magnesium 500 MG CAPS     VITAMIN D, CHOLECALCIFEROL, PO     Methylsulfonylmethane (MSM) 1000 MG TABS     No current facility-administered medications for this visit.        Social History   Substance Use Topics     Smoking status: Never Smoker     Smokeless tobacco: Never Used     Alcohol use 0.0 oz/week     0 Standard drinks or equivalent per week      Comment: 10-14 wine a week        Health Maintenance Due   Topic Date Due     ADVANCE DIRECTIVE PLANNING Q5 YRS  12/10/2002       Lab Results   Component Value Date    PAP NIL 06/26/2017 April 5, 2018 " 4:29 PM

## 2018-04-05 NOTE — PROGRESS NOTES
SUBJECTIVE:   Sharona Bravo is a 72 year old female who presents to clinic today for the following health issues:    Acute Illness   Acute illness concerns: Persistent congested cough for the past 4 weeks following presumed diagnosis of influenza. She describes congested cough with tightness in her chest worse in the morning.  She has no history of asthma and is a non-smoker but she has used an inhaler in the remote past.  Notably she has been moving and been exposed to more dust and dirt due to move. Cough has not limited her activity and she denies shortness of breath.      Fever: no    Chills/Sweats: no    Headache (location?): no    Sinus Pressure:no    Conjunctivitis:  no    Ear Pain: no    Rhinorrhea: no    Nasal Congestion: no    Sore Throat: no     Cough: YES-mild and occasionally productive    Wheeze: suspected but described as tightness in her chest.    Decreased Appetite: no    Nausea: no    Vomiting: no    Diarrhea:  no    Dysuria/Freq.: no    Fatigue/Achiness: no    Sick/Strep Exposure: no     Therapies Tried and outcome: Nothing recently.  Was taking cold medicine initially and tylenol    Insomnia:  Patient has a history of intermittent insomnia. Primary care provider has previously prescribed Ambien 5 mg. Her last prescription was written 11/2017. [place] prescription monitoring system was queried and no concerns were noted. With her recent move she has struggled more with insomnia due to stress. The cough mentioned above is not playing a role in her difficulty with sleep. She is requesting a refill of the Ambien 5 mg.      Problem list and histories reviewed & adjusted, as indicated.  Additional history: as documented    Patient Active Problem List   Diagnosis     Adenocarcinoma of breast (H)     Allergic rhinitis     Hyperlipidemia LDL goal <100     Acquired hypothyroidism     Heart block     Cardiac pacemaker in situ- Medtronic, dual chamber- NOT dependent (Advisa: MRI conditional)      Diverticulosis of large intestine without hemorrhage     BPPV (benign paroxysmal positional vertigo), unspecified laterality     Preventative health care     Primary osteoarthritis of left knee     Tear of medial meniscus of left knee     Patellofemoral syndrome, left     No past surgical history on file.    Social History   Substance Use Topics     Smoking status: Never Smoker     Smokeless tobacco: Never Used     Alcohol use 0.0 oz/week     0 Standard drinks or equivalent per week      Comment: 10-14 wine a week      Family History   Problem Relation Age of Onset     CANCER Mother      HEART DISEASE Father          Current Outpatient Prescriptions   Medication Sig Dispense Refill     albuterol (PROAIR HFA/PROVENTIL HFA/VENTOLIN HFA) 108 (90 BASE) MCG/ACT Inhaler Inhale 2 puffs into the lungs every 6 hours as needed for shortness of breath / dyspnea or wheezing 1 Inhaler 1     zolpidem (AMBIEN) 5 MG tablet Take 1 tablet (5 mg) by mouth nightly as needed for sleep 30 tablet 0     simvastatin (ZOCOR) 20 MG tablet Take 1 tablet (20 mg) by mouth At Bedtime 90 tablet 3     Fluticasone Propionate (FLONASE NA)        estradiol (ESTRACE VAGINAL) 0.1 MG/GM cream Place 2 g vaginally twice a week 42.5 g 6     levothyroxine (SYNTHROID/LEVOTHROID) 50 MCG tablet Take 1 tablet (50 mcg) by mouth daily 90 tablet 3     calcium carbonate (OS-DALLAS 500 MG [place]. [place]) 500 MG tablet Take 600 mg by mouth daily        Magnesium 500 MG CAPS Take 250 mg by mouth daily        VITAMIN D, CHOLECALCIFEROL, PO Take 800 Units by mouth daily        Methylsulfonylmethane (MSM) 1000 MG TABS Take 1,000 mg by mouth 2 times daily        Allergies   Allergen Reactions     Gabapentin Swelling and Rash     Face swelling       Reviewed and updated as needed this visit by clinical staff  Tobacco  Allergies  Meds  Problems       Reviewed and updated as needed this visit by Provider  Allergies  Meds  Problems         ROS:  Constitutional, HEENT,  "cardiovascular, pulmonary, gi and gu systems are negative, except as otherwise noted.    OBJECTIVE:     /80  Pulse 81  Temp 97.8  F (36.6  C) (Oral)  Ht 5' 2.52\" (158.8 cm)  Wt 135 lb 12 oz (61.6 kg)  LMP 01/01/2000  SpO2 96%  BMI 24.42 kg/m2  Body mass index is 24.42 kg/(m^2).  GENERAL: healthy, alert and no distress  EYES: Eyes grossly normal to inspection, PERRL and conjunctivae and sclerae normal.  Cerumen noted in both ear canals without complete occlusion.  HENT: ear canals and TM's normal, nose and mouth without ulcers or lesions  NECK: no adenopathy, no asymmetry, masses, or scars and thyroid normal to palpation  RESP: lungs clear to auscultation - no rales, rhonchi or wheezes.  Harsh breath sounds noted. Mild congested cough present.  CV: regular rate and rhythm, normal S1 S2, no S3 or S4, no murmur, click or rub, no peripheral edema and peripheral pulses strong  SKIN: no suspicious lesions or rashes    ASSESSMENT/PLAN:       ICD-10-CM    1. Post-viral cough syndrome R05 albuterol (PROAIR HFA/PROVENTIL HFA/VENTOLIN HFA) 108 (90 BASE) MCG/ACT Inhaler   2. Bronchospasm J98.01 albuterol (PROAIR HFA/PROVENTIL HFA/VENTOLIN HFA) 108 (90 BASE) MCG/ACT Inhaler   3. Secondary insomnia G47.00 zolpidem (AMBIEN) 5 MG tablet     DISCONTINUED: zolpidem (AMBIEN) 5 MG tablet       Patient Instructions   Use the Mucinex Plain twice daily.    Trial of the albuterol inhaler if it helps continue using.  I think this is a reaction to your recent flu illness but could also be triggered by the move and dust and change of moving.    Drink plenty of fluids, rest and take care of yourself.      Step-by-Step  Using an Inhaler (in Mouth) Without a Spacer    Date Last Reviewed: 2/1/2017 2000-2017 [employer]. [address] [place] [address]. All rights reserved. This information is not intended as a substitute for professional medical care. Always follow your healthcare professional's " instructions.            Melissa Álvarez PA-C  [facility]

## 2018-04-05 NOTE — MR AVS SNAPSHOT
After Visit Summary   4/5/2018    Sharona Bravo    MRN: [id number]           Patient Information     Date Of Birth          1945        Visit Information        Provider Department      4/5/2018 4:20 PM Melissa Álvarez PA-C [facility]        Today's Diagnoses     Secondary insomnia          Care Instructions    Use the Mucinex Plain twice daily.    Trial of the albuterol inhaler if it helps continue using.  I think this is a reaction to your recent flu illness but could also be triggered by the move and dust and change of moving.    Drink plenty of fluids, rest and take care of yourself.      Step-by-Step  Using an Inhaler (in Mouth) Without a Spacer    Date Last Reviewed: 2/1/2017 2000-2017 [employer]. [address] [place] [address]. All rights reserved. This information is not intended as a substitute for professional medical care. Always follow your healthcare professional's instructions.                Follow-ups after your visit        Your next 10 appointments already scheduled     Apr 06, 2018  1:30 PM CDT   Ech Complete with [address] ([facility])    [address] [place] [address] [phone]           1. Please bring or wear a comfortable two-piece outfit. 2. You may eat, drink and take your normal medicines. 3. For any questions that cannot be answered, please contact the ordering physician              Who to contact     Please call your clinic at [phone] to:    Ask questions about your health    Make or cancel appointments    Discuss your medicines    Learn about your test results    Speak to your doctor            Additional Information About Your Visit        MyChart Information     [employer] gives you secure access to your electronic health record. If you see a primary care provider, you can also send messages to your care team and make appointments. If you  "have questions, please call your primary care clinic.  If you do not have a primary care provider, please call [phone] and they will assist you.      [employer] is an electronic gateway that provides easy, online access to your medical records. With [employer], you can request a clinic appointment, read your test results, renew a prescription or communicate with your care team.     To access your existing account, please contact your [facility] or call [phone] for assistance.        Care EveryWhere ID     This is your Care EveryWhere ID. This could be used by other organizations to access your [facility] medical records  [id number]        Your Vitals Were     Pulse Temperature Height Last Period Pulse Oximetry BMI (Body Mass Index)    81 97.8  F (36.6  C) (Oral) 5' 2.52\" (158.8 cm) 01/01/2000 96% 24.42 kg/m2       Blood Pressure from Last 3 Encounters:   04/05/18 117/80   03/07/18 137/80   01/17/18 129/84    Weight from Last 3 Encounters:   04/05/18 135 lb 12 oz (61.6 kg)   03/07/18 139 lb 12 oz (63.4 kg)   01/17/18 137 lb 0.1 oz (62.1 kg)              Today, you had the following     No orders found for display         Today's Medication Changes          These changes are accurate as of 4/5/18  4:47 PM.  If you have any questions, ask your nurse or doctor.               Start taking these medicines.        Dose/Directions    zolpidem 5 MG tablet   Commonly known as:  AMBIEN   Used for:  Secondary insomnia   Started by:  Melissa Álvarez PA-C        Dose:  5 mg   Take 1 tablet (5 mg) by mouth nightly as needed for sleep   Quantity:  30 tablet   Refills:  0         Stop taking these medicines if you haven't already. Please contact your care team if you have questions.     azithromycin 250 MG tablet   Commonly known as:  ZITHROMAX   Stopped by:  Melissa Álvarez PA-C                Where to get your medicines      Some of these will need a paper prescription and others can " be bought over the counter.  Ask your nurse if you have questions.     Bring a paper prescription for each of these medications     zolpidem 5 MG tablet                Primary Care Provider Office Phone # Fax #    Ruslan Jean [phone]       0 [address] [place] [address]        Equal Access to Services     PARTH HUNT : Hadii jo ku hadasho Soomaali, waaxda luqadaha, qaybta kaalmada adechristenyada, reilly rivastatumscott escalera . So [facility] [phone].    ATENCIÓN: Si habla español, tiene a mc disposición servicios gratuitos de asistencia lingüística. Nisha al [phone].    We comply with applicable federal civil rights laws and [place] laws. We do not discriminate on the basis of race, color, national origin, age, disability, sex, sexual orientation, or gender identity.            Thank you!     Thank you for choosing [facility]  for your care. Our goal is always to provide you with excellent care. Hearing back from our patients is one way we can continue to improve our services. Please take a few minutes to complete the written survey that you may receive in the mail after your visit with us. Thank you!             Your Updated Medication List - Protect others around you: Learn how to safely use, store and throw away your medicines at www.disposemymeds.org.          This list is accurate as of 4/5/18  4:47 PM.  Always use your most recent med list.                   Brand Name Dispense Instructions for use Diagnosis    calcium carbonate 1250 MG tablet    OS-DALLAS 500 mg [place]. [place]     Take 600 mg by mouth daily        estradiol 0.1 MG/GM cream    ESTRACE VAGINAL    42.5 g    Place 2 g vaginally twice a week    Vaginal atrophy       FLONASE NA           levothyroxine 50 MCG tablet    SYNTHROID/LEVOTHROID    90 tablet    Take 1 tablet (50 mcg) by mouth daily    Acquired hypothyroidism       Magnesium 500 MG Caps      Take 250 mg by mouth daily        MSM 1000 MG  Tabs      Take 1,000 mg by mouth 2 times daily        simvastatin 20 MG tablet    ZOCOR    90 tablet    Take 1 tablet (20 mg) by mouth At Bedtime    Hyperlipidemia LDL goal <100       VITAMIN D (CHOLECALCIFEROL) PO      Take 800 Units by mouth daily        zolpidem 5 MG tablet    AMBIEN    30 tablet    Take 1 tablet (5 mg) by mouth nightly as needed for sleep    Secondary insomnia

## 2018-04-05 NOTE — PATIENT INSTRUCTIONS
Use the Mucinex Plain twice daily.    Trial of the albuterol inhaler if it helps continue using.  I think this is a reaction to your recent flu illness but could also be triggered by the move and dust and change of moving.    Drink plenty of fluids, rest and take care of yourself.      Step-by-Step  Using an Inhaler (in Mouth) Without a Spacer    Date Last Reviewed: 2/1/2017 2000-2017 [employer]. [address] [place] [address], [place] [id number]. All rights reserved. This information is not intended as a substitute for professional medical care. Always follow your healthcare professional's instructions.

## 2018-04-06 ENCOUNTER — RADIANT APPOINTMENT (OUTPATIENT)
Dept: CARDIOLOGY | Facility: CLINIC | Age: 73
End: 2018-04-06
Payer: MEDICARE

## 2018-04-06 DIAGNOSIS — I44.2 ATRIOVENTRICULAR BLOCK, COMPLETE (H): ICD-10-CM

## 2018-04-06 PROBLEM — M17.12 PRIMARY OSTEOARTHRITIS OF LEFT KNEE: Status: ACTIVE | Noted: 2017-06-26

## 2018-04-06 RX ORDER — ALBUTEROL SULFATE 90 UG/1
2 AEROSOL, METERED RESPIRATORY (INHALATION) EVERY 6 HOURS PRN
Qty: 1 INHALER | Refills: 1 | Status: SHIPPED | OUTPATIENT
Start: 2018-04-06 | End: 2019-02-18

## 2018-04-16 NOTE — PROGRESS NOTES
[address], [address]  [facility] [address]  [phone]  Dept: [phone]    PRE-OP EVALUATION:  Today's date: 2018    Sharona Bravo (: 1945) presents for pre-operative evaluation assessment as requested by Dr. Marcin Mendosa.  She requires evaluation and anesthesia risk assessment prior to undergoing surgery/procedure for treatment of Left knee replacement .    Proposed Surgery/ Procedure: Left TKA  Date of Surgery/ Procedure: 2018  Time of Surgery/ Procedure: [facility]  Hospital/Surgical Facility: [facility]  Fax number for surgical facility: [phone]  Primary Physician: Ruslan Jean  Type of Anesthesia Anticipated: General        RE: Sharona Bravo   MRN: [id number]   : 1945              Dear Dr. Mendosa:        Thank you for having me perform the preoperative evaluation on Sharona Bravo.  She has had significant discomfort in her left knee due to bone-on-bone arthritis.  She has failed conservative measures.  She is looking forward to having her knee replaced.  She has no pulmonary or neurologic disease that puts her at higher risk.  She has a history of heart block and is on a cardiac pacemaker with paced rhythm.  This does not pose higher risk for her.  Please contact me if I can be of further assistance in her care.      HISTORY OF PRESENT ILLNESS:  Kristal is a 72-year-old female with longstanding left knee arthritis.  It has gotten to the point now where she would like to have the knee replaced.  She has no concerns about this.      ACTIVE MEDICAL PROBLEMS:  Benign paroxysmal positional vertigo, episodic, allergic rhinitis, diverticulosis of the large intestine, hyperlipidemia, hypothyroidism, heart block requiring pacemaker and a history of adenocarcinoma of the breast.      CURRENT MEDICATIONS:     1. Please see the attached alphabetical list.  Of note, she takes aspirin 81 mg daily and will stop  that 1 week prior to surgery.        PAST SURGICAL HISTORY:   1. Pacemaker insertion, 06/2015.     2. Oophorectomy approximately 15 years ago.   3. Bilateral eyelid surgery, approximately 15 years ago.   4. Breast lumpectomy for adenocarcinoma of the breast, approximately 1990.   5. Arthroscopy of the knee when she was 35-40 years old.   6. Tonsillectomy and adenoidectomy in childhood.      ANESTHETIC COMPLICATIONS:  None.  However, she was somewhat nauseous after having the pacemaker placed in 2015.      BLEEDING TENDENCIES:  None.      FAMILY HISTORY:  No bleeding tendencies or anesthetic complications that she is aware of.      SOCIAL HISTORY:  She is .  She is a nonsmoker.  Very physically active.  Alcohol is minimal.      ALLERGIES AND SENSITIVITIES:  Gabapentin (face swelling).  No other allergies are noted.        REVIEW OF SYSTEMS:  A 10-point review of systems is negative.  No recent upper respiratory infection symptoms or UTI symptoms.      OBJECTIVE:  Pat is in absolutely no distress.  Affect is very upbeat.  BP is 112/74 with a heart rate of 60 and regular.  Temperature is 97.5.  O2 sats are 99% on room air.  She is 5 feet 2.5 inches in height and weighs 135 pounds with a BMI of 24.31.  HEENT:  Head is normocephalic, atraumatic.  Ears show a right canal completely free of any cerumen.  However, the left ear was completely full.  This was irrigated.  The ear looked great.  There is no pain with palpation of the frontal or maxillary sinuses.  Eyes are grossly normal.  Nose is free of any congestion or discharge.  Teeth in good repair.  No dentures.  Mucous membranes are moist.  Throat looks benign.  No tonsillar hypertrophy.  Neck is supple without any anterior or posterior chain adenopathy.  No visible or palpable thyromegaly.  Good flexion of the neck.  Back is smooth and straight.  Lungs are clear to auscultation bilaterally.  Heart reveals a regular rate and rhythm without murmur.  Pacemaker  under the left pectoral skin noted.  Abdomen is benign.  Ankles are free of any edema.  Good peripheral pulses.      LABORATORY DATA:  Laboratory tests will consist of the far consist of the following:  Basic metabolic panel was entirely normal.  Creatinine 0.9 with a GFR 65.8.  Potassium 4.2.  CBC with platelets was normal with a hemoglobin of 15.5.  Platelet count 223,000.  Blood type is O positive.  INR 0.98.  EKG shows a paced rhythm at 60 beats per minute.      ASSESSMENT AND PLAN:  Kristal is a 72-year-old female with significant left knee pain due to bone on bone osteoarthritis that has defied conservative management.  Undergoing a total left knee arthroplasty.  She has a paced rhythm due to heart block.  She has been seen by Cardiology.  She has tolerated previous surgeries without difficulty.  She should be at low risk.  She will stop aspirin 1 week prior to surgery.  She had some nausea following her most recent procedure in 2015.  Ear wax removed from her left canal.  Please contact me if I can be of any further assistance in her care.      Sincerely,              Ruslan Jean MD

## 2018-04-20 ENCOUNTER — OFFICE VISIT (OUTPATIENT)
Dept: FAMILY MEDICINE | Facility: CLINIC | Age: 73
End: 2018-04-20
Payer: MEDICARE

## 2018-04-20 VITALS
DIASTOLIC BLOOD PRESSURE: 79 MMHG | WEIGHT: 136.75 LBS | BODY MASS INDEX: 25.17 KG/M2 | HEIGHT: 62 IN | OXYGEN SATURATION: 99 % | HEART RATE: 60 BPM | SYSTOLIC BLOOD PRESSURE: 125 MMHG | TEMPERATURE: 96.1 F

## 2018-04-20 DIAGNOSIS — G47.09 SECONDARY INSOMNIA: ICD-10-CM

## 2018-04-20 DIAGNOSIS — J20.9 ACUTE BRONCHITIS WITH SYMPTOMS > 10 DAYS: Primary | ICD-10-CM

## 2018-04-20 RX ORDER — DOXYCYCLINE 100 MG/1
100 CAPSULE ORAL 2 TIMES DAILY
Qty: 20 CAPSULE | Refills: 0 | Status: SHIPPED | OUTPATIENT
Start: 2018-04-20 | End: 2019-02-18

## 2018-04-20 ASSESSMENT — PAIN SCALES - GENERAL: PAINLEVEL: NO PAIN (0)

## 2018-04-20 NOTE — NURSING NOTE
"72 year old  Chief Complaint   Patient presents with     URI     Chest congestion for the last 2 weeks. Has not gotten better, but worse.        Blood pressure 125/79, pulse 60, temperature 96.1  F (35.6  C), temperature source Temporal, height 5' 2.28\" (158.2 cm), weight 136 lb 12 oz (62 kg), last menstrual period 01/01/2000, SpO2 99 %, not currently breastfeeding. Body mass index is 24.79 kg/(m^2).  Patient Active Problem List   Diagnosis     Adenocarcinoma of breast (H)     Allergic rhinitis     Hyperlipidemia LDL goal <100     Acquired hypothyroidism     Heart block     Cardiac pacemaker in situ- Medtronic, dual chamber- NOT dependent (Advisa: MRI conditional)     Diverticulosis of large intestine without hemorrhage     BPPV (benign paroxysmal positional vertigo), unspecified laterality     Preventative health care     Primary osteoarthritis of left knee     Tear of medial meniscus of left knee     Patellofemoral syndrome, left       Wt Readings from Last 2 Encounters:   04/20/18 136 lb 12 oz (62 kg)   04/05/18 135 lb 12 oz (61.6 kg)     BP Readings from Last 3 Encounters:   04/20/18 125/79   04/05/18 117/80   03/07/18 137/80         Current Outpatient Prescriptions   Medication     albuterol (PROAIR HFA/PROVENTIL HFA/VENTOLIN HFA) 108 (90 BASE) MCG/ACT Inhaler     calcium carbonate (OS-DALLAS 500 MG [place]. [place]) 500 MG tablet     estradiol (ESTRACE VAGINAL) 0.1 MG/GM cream     Fluticasone Propionate (FLONASE NA)     levothyroxine (SYNTHROID/LEVOTHROID) 50 MCG tablet     Magnesium 500 MG CAPS     Methylsulfonylmethane (MSM) 1000 MG TABS     simvastatin (ZOCOR) 20 MG tablet     VITAMIN D, CHOLECALCIFEROL, PO     zolpidem (AMBIEN) 5 MG tablet     No current facility-administered medications for this visit.        Social History   Substance Use Topics     Smoking status: Never Smoker     Smokeless tobacco: Never Used     Alcohol use 0.0 oz/week     0 Standard drinks or equivalent per week      Comment: 10-14 wine a week  "       Health Maintenance Due   Topic Date Due     ADVANCE DIRECTIVE PLANNING Q5 YRS  12/10/2002       Lab Results   Component Value Date    PAP NIL 06/26/2017       Nellie Hearn MA  April 20, 2018 10:25 AM

## 2018-04-20 NOTE — PROGRESS NOTES
SUBJECTIVE:   Sharona Bravo is a 72 year old female who presents to clinic today for the following health issues:    Acute Illness   Acute illness concerns: Patient was seen 4/6/2018 for a persistent cough 4 weeks following the onset of a presumed diagnosis of influenza. Notes reviewed. She was prescribed and completed a course of azithromycin 3/8/2018. She presents today with persistent cough and chest congestion.  When she takes a deep breath she feels tightness in the upper airways.  She has been using albuterol and it has not been helpful.  Her cough is mildly productive and minimal.   Though she does not have a fever she does not feel perfectly well.  Onset: 6 weeks ago with acute infection/likely influenza.    Fever: no    Chills/Sweats: no    Headache (location?): no    Sinus Pressure:no    Conjunctivitis:  no    Ear Pain: no    Rhinorrhea: no    Congestion: YES    Sore Throat: no     Cough: YES-non-productive    Wheeze: no    Decreased Appetite: no    Nausea: no    Vomiting: no    Diarrhea:  no    Dysuria/Freq.: no    Fatigue/Achiness: no    Sick/Strep Exposure: YES     Therapies Tried and outcome: albuterol, Mucinex DM    Feels tight in her chest and she feels sick.  Still doing all her regualr activities    Problem list and histories reviewed & adjusted, as indicated.  Additional history: as documented    Patient Active Problem List   Diagnosis     Adenocarcinoma of breast (H)     Allergic rhinitis     Hyperlipidemia LDL goal <100     Acquired hypothyroidism     Heart block     Cardiac pacemaker in situ- Medtronic, dual chamber- NOT dependent (Advisa: MRI conditional)     Diverticulosis of large intestine without hemorrhage     BPPV (benign paroxysmal positional vertigo), unspecified laterality     Preventative health care     Primary osteoarthritis of left knee     Tear of medial meniscus of left knee     Patellofemoral syndrome, left     Past Surgical History:   Procedure Laterality Date      PACEMAKER/ICD CHECK         Social History   Substance Use Topics     Smoking status: Never Smoker     Smokeless tobacco: Never Used     Alcohol use 0.0 oz/week     0 Standard drinks or equivalent per week      Comment: 10-14 wine a week      Family History   Problem Relation Age of Onset     CANCER Mother      HEART DISEASE Father          Current Outpatient Prescriptions   Medication Sig Dispense Refill     doxycycline (VIBRAMYCIN) 100 MG capsule Take 1 capsule (100 mg) by mouth 2 times daily 20 capsule 0     albuterol (PROAIR HFA/PROVENTIL HFA/VENTOLIN HFA) 108 (90 BASE) MCG/ACT Inhaler Inhale 2 puffs into the lungs every 6 hours as needed for shortness of breath / dyspnea or wheezing 1 Inhaler 1     calcium carbonate (OS-DALLAS 500 MG [place]. [place]) 500 MG tablet Take 600 mg by mouth daily        estradiol (ESTRACE VAGINAL) 0.1 MG/GM cream Place 2 g vaginally twice a week 42.5 g 6     Fluticasone Propionate (FLONASE NA)        levothyroxine (SYNTHROID/LEVOTHROID) 50 MCG tablet Take 1 tablet (50 mcg) by mouth daily 90 tablet 3     Magnesium 500 MG CAPS Take 250 mg by mouth daily        Methylsulfonylmethane (MSM) 1000 MG TABS Take 1,000 mg by mouth 2 times daily        simvastatin (ZOCOR) 20 MG tablet Take 1 tablet (20 mg) by mouth At Bedtime 90 tablet 3     VITAMIN D, CHOLECALCIFEROL, PO Take 800 Units by mouth daily        zolpidem (AMBIEN) 5 MG tablet Take 1 tablet (5 mg) by mouth nightly as needed for sleep 30 tablet 0     Allergies   Allergen Reactions     Gabapentin Swelling and Rash     Face swelling       Reviewed and updated as needed this visit by clinical staff  Tobacco  Allergies  Meds  Problems  Med Hx  Surg Hx  Fam Hx  Soc Hx        Reviewed and updated as needed this visit by Provider  Tobacco  Allergies  Meds  Problems  Med Hx  Surg Hx  Fam Hx  Soc Hx          ROS:  Constitutional, HEENT, cardiovascular, pulmonary, gi and gu systems are negative, except as otherwise noted.    OBJECTIVE:  "    /79 (BP Location: Right arm, Patient Position: Chair, Cuff Size: Adult Regular)  Pulse 60  Temp 96.1  F (35.6  C) (Temporal)  Ht 5' 2.28\" (158.2 cm)  Wt 136 lb 12 oz (62 kg)  LMP 01/01/2000  SpO2 99%  BMI 24.79 kg/m2  Body mass index is 24.79 kg/(m^2).  GENERAL: healthy, alert and no distress  EYES: Eyes grossly normal to inspection, PERRL and conjunctivae and sclerae normal  HENT:  TM's normal, nose and mouth without ulcers or lesions.  Ear canals bilaterally occluded by cerumen.  NECK: no adenopathy, no asymmetry, masses, or scars and thyroid normal to palpation  RESP: lungs clear to auscultation - no rales, rhonchi or wheezes.  Cough noted.  Slight congestion  CV: regular rate and rhythm, normal S1 S2, no S3 or S4, no murmur, click or rub, no peripheral edema and peripheral pulses strong  MS: extremities normal- no gross deformities noted.  Mild tenderness with palpation right anterior chest along sternal border.  SKIN: no suspicious lesions or rashes      ASSESSMENT/PLAN:       ICD-10-CM    1. Acute bronchitis with symptoms > 10 days J20.9 doxycycline (VIBRAMYCIN) 100 MG capsule   2. Secondary insomnia G47.00      I believe there is some post viral inflammation.  Can stop the albuterol as no wheezing is heard and no improvement with the inhaler.  Patient Instructions   We are going to try an antibiotic take as directed.    You have been prescribed an antibiotic to treat a bacterial infection. Watch for signs of allergic reaction including swelling around the mouth or eyes and the development of a rash.  If you develop any of these symptoms, stop your medication, take a benadryl (25-50 mg) and give our office a call. Consider probiotic therapy to decrease the possibility of diarrhea associated with antibiotic use.    Trial of flonase  Daily for nasal congestion and inflammation.    Drink plenty of fluids.    Follow up if you have any worsening or persistent problems or " concerns.          LYNDON PatinoC  [facility]

## 2018-04-20 NOTE — MR AVS SNAPSHOT
After Visit Summary   4/20/2018    Sharona Bravo    MRN: [id number]           Patient Information     Date Of Birth          1945        Visit Information        Provider Department      4/20/2018 10:20 AM Melissa Álvarez PA-C [facility]        Today's Diagnoses     Acute bronchitis with symptoms > 10 days    -  1    Secondary insomnia          Care Instructions    We are going to try an antibiotic take as directed.    You have been prescribed an antibiotic to treat a bacterial infection. Watch for signs of allergic reaction including swelling around the mouth or eyes and the development of a rash.  If you develop any of these symptoms, stop your medication, take a benadryl (25-50 mg) and give our office a call. Consider probiotic therapy to decrease the possibility of diarrhea associated with antibiotic use.    Trial of flonase  Daily for nasal congestion and inflammation.    Drink plenty of fluids.    Follow up if you have any worsening or persistent problems or concerns.              Follow-ups after your visit        Your next 10 appointments already scheduled     Apr 30, 2018 10:40 AM CDT   PRE-OP with Ruslan Jean MD   [facility] ([facility])    [place] [address]   [phone]              Who to contact     Please call your clinic at [phone] to:    Ask questions about your health    Make or cancel appointments    Discuss your medicines    Learn about your test results    Speak to your doctor            Additional Information About Your Visit        MyChart Information     [employer] gives you secure access to your electronic health record. If you see a primary care provider, you can also send messages to your care team and make appointments. If you have questions, please call your primary care clinic.  If you do not have a primary care provider, please call [phone] and they will  "assist you.      [employer] is an electronic gateway that provides easy, online access to your medical records. With [employer], you can request a clinic appointment, read your test results, renew a prescription or communicate with your care team.     To access your existing account, please contact your [facility] or call [phone] for assistance.        Care EveryWhere ID     This is your Care EveryWhere ID. This could be used by other organizations to access your [place] medical records  [id number]        Your Vitals Were     Pulse Temperature Height Last Period Pulse Oximetry BMI (Body Mass Index)    60 96.1  F (35.6  C) (Temporal) 5' 2.28\" (158.2 cm) 01/01/2000 99% 24.79 kg/m2       Blood Pressure from Last 3 Encounters:   04/20/18 125/79   04/05/18 117/80   03/07/18 137/80    Weight from Last 3 Encounters:   04/20/18 136 lb 12 oz (62 kg)   04/05/18 135 lb 12 oz (61.6 kg)   03/07/18 139 lb 12 oz (63.4 kg)              Today, you had the following     No orders found for display         Today's Medication Changes          These changes are accurate as of 4/20/18 10:46 AM.  If you have any questions, ask your nurse or doctor.               Start taking these medicines.        Dose/Directions    doxycycline 100 MG capsule   Commonly known as:  VIBRAMYCIN   Used for:  Acute bronchitis with symptoms > 10 days   Started by:  Melissa Álvarez PA-C        Dose:  100 mg   Take 1 capsule (100 mg) by mouth 2 times daily   Quantity:  20 capsule   Refills:  0            Where to get your medicines      These medications [id number] - [place], [place] - [address] [place] [id number]     Phone:  [phone]     doxycycline 100 MG capsule                Primary Care Provider Office Phone # Fax #    Ruslan Jean [phone]       [address] [place] [address]        Equal Access to Services  "    PARTH HUNT : Hadii aad eliel erick Jasmine, waaxda luqadaha, qaybta kaalmada mckay, reilly eric cesarnitesh rivastatumscott escalera . So [facility] [phone].    ATENCIÓN: Si habla español, tiene a mc disposición servicios gratuitos de asistencia lingüística. Llame al [phone].    We comply with applicable federal civil rights laws and [place] laws. We do not discriminate on the basis of race, color, national origin, age, disability, sex, sexual orientation, or gender identity.            Thank you!     Thank you for choosing [facility]  for your care. Our goal is always to provide you with excellent care. Hearing back from our patients is one way we can continue to improve our services. Please take a few minutes to complete the written survey that you may receive in the mail after your visit with us. Thank you!             Your Updated Medication List - Protect others around you: Learn how to safely use, store and throw away your medicines at www.disposemymeds.org.          This list is accurate as of 4/20/18 10:46 AM.  Always use your most recent med list.                   Brand Name Dispense Instructions for use Diagnosis    albuterol 108 (90 Base) MCG/ACT Inhaler    PROAIR HFA/PROVENTIL HFA/VENTOLIN HFA    1 Inhaler    Inhale 2 puffs into the lungs every 6 hours as needed for shortness of breath / dyspnea or wheezing    Post-viral cough syndrome, Bronchospasm       calcium carbonate 1250 MG tablet    OS-DALLAS 500 mg [place]. [place]     Take 600 mg by mouth daily        doxycycline 100 MG capsule    VIBRAMYCIN    20 capsule    Take 1 capsule (100 mg) by mouth 2 times daily    Acute bronchitis with symptoms > 10 days       estradiol 0.1 MG/GM cream    ESTRACE VAGINAL    42.5 g    Place 2 g vaginally twice a week    Vaginal atrophy       FLONASE NA           levothyroxine 50 MCG tablet    SYNTHROID/LEVOTHROID    90 tablet    Take 1 tablet (50 mcg) by mouth daily    Acquired hypothyroidism       Magnesium 500 MG  Caps      Take 250 mg by mouth daily        MSM 1000 MG Tabs      Take 1,000 mg by mouth 2 times daily        simvastatin 20 MG tablet    ZOCOR    90 tablet    Take 1 tablet (20 mg) by mouth At Bedtime    Hyperlipidemia LDL goal <100       VITAMIN D (CHOLECALCIFEROL) PO      Take 800 Units by mouth daily        zolpidem 5 MG tablet    AMBIEN    30 tablet    Take 1 tablet (5 mg) by mouth nightly as needed for sleep    Secondary insomnia

## 2018-04-30 ENCOUNTER — OFFICE VISIT (OUTPATIENT)
Dept: FAMILY MEDICINE | Facility: CLINIC | Age: 73
End: 2018-04-30
Payer: MEDICARE

## 2018-04-30 VITALS
WEIGHT: 135.08 LBS | OXYGEN SATURATION: 99 % | SYSTOLIC BLOOD PRESSURE: 112 MMHG | TEMPERATURE: 97.5 F | BODY MASS INDEX: 23.93 KG/M2 | HEART RATE: 60 BPM | HEIGHT: 63 IN | DIASTOLIC BLOOD PRESSURE: 74 MMHG

## 2018-04-30 DIAGNOSIS — H61.22 IMPACTED CERUMEN OF LEFT EAR: ICD-10-CM

## 2018-04-30 DIAGNOSIS — Z01.818 PRE-OP EVALUATION: Primary | ICD-10-CM

## 2018-04-30 LAB
ABO + RH BLD: NORMAL
ABO + RH BLD: NORMAL
BLD GP AB SCN SERPL QL: NORMAL
BLOOD BANK CMNT PATIENT-IMP: NORMAL
BUN SERPL-MCNC: 18 MG/DL (ref 7–30)
CALCIUM SERPL-MCNC: 9.5 MG/DL (ref 8.5–10.4)
CHLORIDE SERPLBLD-SCNC: 103 MMOL/L (ref 94–109)
CO2 SERPL-SCNC: 26 MMOL/L (ref 20–32)
CREAT SERPL-MCNC: 0.9 MG/DL (ref 0.6–1.3)
EGFR CALCULATED (BLACK REFERENCE): 79.6
EGFR CALCULATED (NON BLACK REFERENCE): 65.8
ERYTHROCYTE [DISTWIDTH] IN BLOOD BY AUTOMATED COUNT: 13.2 % (ref 10–15)
GLUCOSE SERPL-MCNC: 99 MG/DL (ref 60–109)
HCT VFR BLD AUTO: 46.3 % (ref 35–47)
HGB BLD-MCNC: 15.5 G/DL (ref 11.7–15.7)
INR PPP: 0.98 (ref 0.86–1.14)
MCH RBC QN AUTO: 31.7 PG (ref 26.5–33)
MCHC RBC AUTO-ENTMCNC: 33.5 G/DL (ref 31.5–36.5)
MCV RBC AUTO: 95 FL (ref 78–100)
PLATELET # BLD AUTO: 223 10E9/L (ref 150–450)
POTASSIUM SERPL-SCNC: 4.2 MMOL/L (ref 3.4–5.3)
RBC # BLD AUTO: 4.89 10E12/L (ref 3.8–5.2)
SODIUM SERPL-SCNC: 140 MMOL/L (ref 133–144)
SPECIMEN EXP DATE BLD: NORMAL
WBC # BLD AUTO: 4.6 10E9/L (ref 4–11)

## 2018-04-30 RX ORDER — CETIRIZINE HYDROCHLORIDE 10 MG/1
10 TABLET ORAL DAILY
COMMUNITY

## 2018-04-30 ASSESSMENT — PAIN SCALES - GENERAL: PAINLEVEL: NO PAIN (0)

## 2018-04-30 NOTE — MR AVS SNAPSHOT
"              After Visit Summary   4/30/2018    Sharona Bravo    MRN: [id number]           Patient Information     Date Of Birth          1945        Visit Information        Provider Department      4/30/2018 10:40 AM Ruslan Jean MD [facility]        Today's Diagnoses     Pre-op evaluation    -  1    Impacted cerumen of left ear           Follow-ups after your visit        Follow-up notes from your care team     Return if symptoms worsen or fail to improve.      Who to contact     Please call your clinic at [phone] to:    Ask questions about your health    Make or cancel appointments    Discuss your medicines    Learn about your test results    Speak to your doctor            Additional Information About Your Visit        Loopsterhart Information     [employer] gives you secure access to your electronic health record. If you see a primary care provider, you can also send messages to your care team and make appointments. If you have questions, please call your primary care clinic.  If you do not have a primary care provider, please call [phone] and they will assist you.      [employer] is an electronic gateway that provides easy, online access to your medical records. With [employer], you can request a clinic appointment, read your test results, renew a prescription or communicate with your care team.     To access your existing account, please contact your [facility] or call [phone] for assistance.        Care EveryWhere ID     This is your Care EveryWhere ID. This could be used by other organizations to access your [facility] medical records  [id number]        Your Vitals Were     Pulse Temperature Height Last Period Pulse Oximetry BMI (Body Mass Index)    60 97.5  F (36.4  C) 5' 2.5\" (158.8 cm) 01/01/2000 99% 24.31 kg/m2       Blood Pressure from Last 3 Encounters:   04/30/18 112/74   04/20/18 125/79   04/05/18 117/80    Weight from Last 3 Encounters: "   04/30/18 135 lb 1.3 oz (61.3 kg)   04/20/18 136 lb 12 oz (62 kg)   04/05/18 135 lb 12 oz (61.6 kg)              We Performed the Following     ABO/Rh type and screen     Basic Metabolic Panel ([place])     CBC with platelets     EKG 12-lead complete w/read - Clinics     INR     REMOVE IMPACTED CERUMEN        Primary Care Provider Office Phone # Fax #    Ruslan Jean [phone]       [address] [place] [address]        Equal Access to Services     [facility]: Hadii aad ku hadasho Soomaali, waaxda luqadaha, qaybta kaalmada adeegyachandrakant, waxcorinna escalera . So [facility] [phone].    ATENCIÓN: Si habla español, tiene a mc disposición servicios gratuitos de asistencia lingüística. [facility] [phone].    We comply with applicable federal civil rights laws and [place] laws. We do not discriminate on the basis of race, color, national origin, age, disability, sex, sexual orientation, or gender identity.            Thank you!     Thank you for choosing [facility]  for your care. Our goal is always to provide you with excellent care. Hearing back from our patients is one way we can continue to improve our services. Please take a few minutes to complete the written survey that you may receive in the mail after your visit with us. Thank you!             Your Updated Medication List - Protect others around you: Learn how to safely use, store and throw away your medicines at www.disposemymeds.org.          This list is accurate as of 4/30/18  2:03 PM.  Always use your most recent med list.                   Brand Name Dispense Instructions for use Diagnosis    albuterol 108 (90 Base) MCG/ACT Inhaler    PROAIR HFA/PROVENTIL HFA/VENTOLIN HFA    1 Inhaler    Inhale 2 puffs into the lungs every 6 hours as needed for shortness of breath / dyspnea or wheezing    Post-viral cough syndrome, Bronchospasm       aspirin 81 MG tablet      Take 81 mg by mouth daily         calcium carbonate 500 tablet    OS-DALLAS 500 mg [place]. Ca     Take 600 mg by mouth daily        cetirizine 10 MG tablet    zyrTEC     Take 10 mg by mouth daily        doxycycline 100 MG capsule    VIBRAMYCIN    20 capsule    Take 1 capsule (100 mg) by mouth 2 times daily    Acute bronchitis with symptoms > 10 days       estradiol 0.1 MG/GM cream    ESTRACE VAGINAL    42.5 g    Place 2 g vaginally twice a week    Vaginal atrophy       FLONASE NA           levothyroxine 50 MCG tablet    SYNTHROID/LEVOTHROID    90 tablet    Take 1 tablet (50 mcg) by mouth daily    Acquired hypothyroidism       Magnesium 500 MG Caps      Take 250 mg by mouth daily        MSM 1000 MG Tabs      Take 1,000 mg by mouth 2 times daily        PRILOSEC PO      Take 20 mg by mouth every morning        simvastatin 20 MG tablet    ZOCOR    90 tablet    Take 1 tablet (20 mg) by mouth At Bedtime    Hyperlipidemia LDL goal <100       VITAMIN D (CHOLECALCIFEROL) PO      Take 800 Units by mouth daily        zolpidem 5 MG tablet    AMBIEN    30 tablet    Take 1 tablet (5 mg) by mouth nightly as needed for sleep    Secondary insomnia

## 2018-04-30 NOTE — NURSING NOTE
"72 year old  Chief Complaint   Patient presents with     Pre Op Exam     Left knee replacement with Dr. Marcin Mendosa at [facility] on 5/14/18.        Blood pressure 112/74, pulse 60, temperature 97.5  F (36.4  C), height 5' 2.5\" (158.8 cm), weight 135 lb 1.3 oz (61.3 kg), last menstrual period 01/01/2000, SpO2 99 %, not currently breastfeeding. Body mass index is 24.31 kg/(m^2).  Patient Active Problem List   Diagnosis     Adenocarcinoma of breast (H)     Allergic rhinitis     Hyperlipidemia LDL goal <100     Acquired hypothyroidism     Heart block     Cardiac pacemaker in situ- Medtronic, dual chamber- NOT dependent (Advisa: MRI conditional)     Diverticulosis of large intestine without hemorrhage     BPPV (benign paroxysmal positional vertigo), unspecified laterality     Preventative health care     Primary osteoarthritis of left knee     Tear of medial meniscus of left knee     Patellofemoral syndrome, left       Wt Readings from Last 2 Encounters:   04/30/18 135 lb 1.3 oz (61.3 kg)   04/20/18 136 lb 12 oz (62 kg)     BP Readings from Last 3 Encounters:   04/30/18 112/74   04/20/18 125/79   04/05/18 117/80         Current Outpatient Prescriptions   Medication     albuterol (PROAIR HFA/PROVENTIL HFA/VENTOLIN HFA) 108 (90 BASE) MCG/ACT Inhaler     aspirin 81 MG tablet     calcium carbonate (OS-DALLAS 500 MG [place]. [place]) 500 MG tablet     doxycycline (VIBRAMYCIN) 100 MG capsule     estradiol (ESTRACE VAGINAL) 0.1 MG/GM cream     Fluticasone Propionate (FLONASE NA)     levothyroxine (SYNTHROID/LEVOTHROID) 50 MCG tablet     Magnesium 500 MG CAPS     simvastatin (ZOCOR) 20 MG tablet     VITAMIN D, CHOLECALCIFEROL, PO     zolpidem (AMBIEN) 5 MG tablet     Methylsulfonylmethane (MSM) 1000 MG TABS     No current facility-administered medications for this visit.        Social History   Substance Use Topics     Smoking status: Never Smoker     Smokeless tobacco: Never Used     Alcohol use 0.0 oz/week     0 Standard drinks " or equivalent per week      Comment: 10-14 wine a week        Health Maintenance Due   Topic Date Due     ADVANCE DIRECTIVE PLANNING Q5 YRS  12/10/2002       Lab Results   Component Value Date    PAP NIL 06/26/2017         KINZA ReaganPERIC  April 30, 2018 10:48 AM

## 2018-05-01 DIAGNOSIS — E03.9 ACQUIRED HYPOTHYROIDISM: ICD-10-CM

## 2018-05-01 RX ORDER — LEVOTHYROXINE SODIUM 50 UG/1
50 TABLET ORAL DAILY
Qty: 90 TABLET | Refills: 1 | Status: SHIPPED | OUTPATIENT
Start: 2018-05-01 | End: 2018-10-30

## 2018-05-01 NOTE — TELEPHONE ENCOUNTER
Last office visit: 04/30/2018, no future appointment.    Prescription approved per [facility] Refill Protocol.

## 2018-05-10 ENCOUNTER — TRANSFERRED RECORDS (OUTPATIENT)
Dept: HEALTH INFORMATION MANAGEMENT | Facility: CLINIC | Age: 73
End: 2018-05-10

## 2018-05-24 ENCOUNTER — OFFICE VISIT (OUTPATIENT)
Dept: FAMILY MEDICINE | Facility: CLINIC | Age: 73
End: 2018-05-24
Payer: MEDICARE

## 2018-05-24 VITALS
WEIGHT: 138 LBS | HEIGHT: 63 IN | SYSTOLIC BLOOD PRESSURE: 106 MMHG | DIASTOLIC BLOOD PRESSURE: 71 MMHG | HEART RATE: 82 BPM | OXYGEN SATURATION: 98 % | TEMPERATURE: 98 F | RESPIRATION RATE: 16 BRPM | BODY MASS INDEX: 24.45 KG/M2

## 2018-05-24 DIAGNOSIS — K59.01 SLOW TRANSIT CONSTIPATION: ICD-10-CM

## 2018-05-24 DIAGNOSIS — G89.18 ACUTE POST-OPERATIVE PAIN: ICD-10-CM

## 2018-05-24 DIAGNOSIS — R10.84 ABDOMINAL PAIN, GENERALIZED: ICD-10-CM

## 2018-05-24 DIAGNOSIS — Z96.652 S/P TOTAL KNEE ARTHROPLASTY, LEFT: Primary | ICD-10-CM

## 2018-05-24 RX ORDER — ACETAMINOPHEN 500 MG
1000 TABLET ORAL EVERY 8 HOURS PRN
COMMUNITY
Start: 2018-05-14 | End: 2020-03-13

## 2018-05-24 RX ORDER — OXYCODONE HYDROCHLORIDE 5 MG/1
5 TABLET ORAL EVERY 6 HOURS PRN
Qty: 20 TABLET | Refills: 0 | Status: SHIPPED | OUTPATIENT
Start: 2018-05-24 | End: 2019-02-18

## 2018-05-24 RX ORDER — POLYETHYLENE GLYCOL 3350 17 G/17G
17 POWDER, FOR SOLUTION ORAL PRN
COMMUNITY
End: 2019-12-02

## 2018-05-24 RX ORDER — CELECOXIB 200 MG/1
200 CAPSULE ORAL 2 TIMES DAILY
COMMUNITY
Start: 2018-05-14 | End: 2019-12-02

## 2018-05-24 NOTE — MR AVS SNAPSHOT
"              After Visit Summary   5/24/2018    Sharona Bravo    MRN: [id number]           Patient Information     Date Of Birth          1945        Visit Information        Provider Department      5/24/2018 10:20 AM Ruslan Jean MD [facility]        Today's Diagnoses     Acute post-operative pain    -  1    S/P total knee arthroplasty, left           Follow-ups after your visit        Who to contact     Please call your clinic at [phone] to:    Ask questions about your health    Make or cancel appointments    Discuss your medicines    Learn about your test results    Speak to your doctor            Additional Information About Your Visit        MyChart Information     [employer] gives you secure access to your electronic health record. If you see a primary care provider, you can also send messages to your care team and make appointments. If you have questions, please call your primary care clinic.  If you do not have a primary care provider, please call [phone] and they will assist you.      [employer] is an electronic gateway that provides easy, online access to your medical records. With [employer], you can request a clinic appointment, read your test results, renew a prescription or communicate with your care team.     To access your existing account, please contact your [facility] or call [phone] for assistance.        Care EveryWhere ID     This is your Care EveryWhere ID. This could be used by other organizations to access your [place] medical records  [id number]        Your Vitals Were     Pulse Temperature Respirations Height Last Period Pulse Oximetry    82 98  F (36.7  C) (Temporal) 16 5' 2.75\" (159.4 cm) 01/01/2000 98%    BMI (Body Mass Index)                   24.64 kg/m2            Blood Pressure from Last 3 Encounters:   05/24/18 106/71   04/30/18 112/74   04/20/18 125/79    Weight from Last 3 Encounters:   05/24/18 138 lb (62.6 " kg)   04/30/18 135 lb 1.3 oz (61.3 kg)   04/20/18 136 lb 12 oz (62 kg)              Today, you had the following     No orders found for display         Today's Medication Changes          These changes are accurate as of 5/24/18  4:51 PM.  If you have any questions, ask your nurse or doctor.               Start taking these medicines.        Dose/Directions    oxyCODONE IR 5 MG tablet   Commonly known as:  ROXICODONE   Used for:  Acute post-operative pain, S/P total knee arthroplasty, left   Started by:  Ruslan Jean MD        Dose:  5 mg   Take 1 tablet (5 mg) by mouth every 6 hours as needed for severe pain   Quantity:  20 tablet   Refills:  0            Where to get your medicines      Some of these will need a paper prescription and others can be bought over the counter.  Ask your nurse if you have questions.     Bring a paper prescription for each of these medications     oxyCODONE IR 5 MG tablet               Information about OPIOIDS     PRESCRIPTION OPIOIDS: WHAT YOU NEED TO KNOW   You have a prescription for an opioid (narcotic) pain medicine. Opioids can cause addiction. If you have a history of chemical dependency of any type, you are at a higher risk of becoming addicted to opioids. Only take this medicine after all other options have been tried. Take it for as short a time and as few doses as possible.     Do not:    Drive. If you drive while taking these medicines, you could be arrested for driving under the influence (DUI).    Operate heavy machinery    Do any other dangerous activities while taking these medicines.     Drink any alcohol while taking these medicines.      Take with any other medicines that contain acetaminophen. Read all labels carefully. Look for the word  acetaminophen  or  Tylenol.  Ask your pharmacist if you have questions or are unsure.    Store your pills in a secure place, locked if possible. We will not replace any lost or stolen medicine. If you don t finish your  medicine, please throw away (dispose) as directed by your pharmacist. The [place] Pollution Control Agency has more information about safe disposal: https://www.pca.[facility].mn.us/living-green/managing-unwanted-medications    All opioids tend to cause constipation. Drink plenty of water and eat foods that have a lot of fiber, such as fruits, vegetables, prune juice, apple juice and high-fiber cereal. Take a laxative (Miralax, milk of magnesia, Colace, Senna) if you don t move your bowels at least every other day.          Primary Care Provider Office Phone # Fax #    Ruslan Jean [phone] [address] [place] [address]        Equal Access to Services     [employer] : Rich Jasmine, wauzairda deidraqadaha, qaybta kaalmada mckay, reilly alford. So [facility] [phone].    ATENCIÓN: Si habla español, tiene a mc disposición servicios gratuitos de asistencia lingüística. Nisha al [phone].    We comply with applicable federal civil rights laws and [place] laws. We do not discriminate on the basis of race, color, national origin, age, disability, sex, sexual orientation, or gender identity.            Thank you!     Thank you for choosing [facility]  for your care. Our goal is always to provide you with excellent care. Hearing back from our patients is one way we can continue to improve our services. Please take a few minutes to complete the written survey that you may receive in the mail after your visit with us. Thank you!             Your Updated Medication List - Protect others around you: Learn how to safely use, store and throw away your medicines at www.disposemymeds.org.          This list is accurate as of 5/24/18  4:51 PM.  Always use your most recent med list.                   Brand Name Dispense Instructions for use Diagnosis    acetaminophen 500 MG tablet    TYLENOL     Take 1,000 mg by mouth every 8 hours as needed         albuterol 108 (90 Base) MCG/ACT Inhaler    PROAIR HFA/PROVENTIL HFA/VENTOLIN HFA    1 Inhaler    Inhale 2 puffs into the lungs every 6 hours as needed for shortness of breath / dyspnea or wheezing    Post-viral cough syndrome, Bronchospasm       * aspirin 81 MG tablet      Take 81 mg by mouth daily        * aspirin 325 MG EC tablet      Take 325 mg by mouth 2 times daily        calcium carbonate 500 MG tablet    OS-DALLAS 500 mg [place]. Ca     Take 600 mg by mouth daily        celecoxib 200 MG capsule    celeBREX     Take 200 mg by mouth 2 times daily        cetirizine 10 MG tablet    zyrTEC     Take 10 mg by mouth daily        doxycycline 100 MG capsule    VIBRAMYCIN    20 capsule    Take 1 capsule (100 mg) by mouth 2 times daily    Acute bronchitis with symptoms > 10 days       estradiol 0.1 MG/GM cream    ESTRACE VAGINAL    42.5 g    Place 2 g vaginally twice a week    Vaginal atrophy       FLONASE NA           levothyroxine 50 MCG tablet    SYNTHROID/LEVOTHROID    90 tablet    Take 1 tablet (50 mcg) by mouth daily    Acquired hypothyroidism       Magnesium 500 MG Caps      Take 250 mg by mouth daily        MSM 1000 MG Tabs      Take 1,000 mg by mouth 2 times daily        oxyCODONE IR 5 MG tablet    ROXICODONE    20 tablet    Take 1 tablet (5 mg) by mouth every 6 hours as needed for severe pain    Acute post-operative pain, S/P total knee arthroplasty, left       polyethylene glycol powder    MIRALAX/GLYCOLAX     Take 17 g by mouth as needed        PRILOSEC PO      Take 20 mg by mouth every morning        simvastatin 20 MG tablet    ZOCOR    90 tablet    Take 1 tablet (20 mg) by mouth At Bedtime    Hyperlipidemia LDL goal <100       VITAMIN D (CHOLECALCIFEROL) PO      Take 800 Units by mouth daily        zolpidem 5 MG tablet    AMBIEN    30 tablet    Take 1 tablet (5 mg) by mouth nightly as needed for sleep    Secondary insomnia       * Notice:  This list has 2 medication(s) that are the same as other  medications prescribed for you. Read the directions carefully, and ask your doctor or other care provider to review them with you.

## 2018-05-24 NOTE — PROGRESS NOTES
CHIEF COMPLAINT:  Hospital followup after left total knee arthroplasty.      HISTORY OF PRESENT ILLNESS:  Kristal is a 72 year old who was asked by her orthopedic surgeon to come in and be seen following surgery.  I am happy to report that she has done very well.  She continues to use some oxycodone for pain relief, but the amount of this has diminished markedly.  She is down to 4 tablets total per day (1 in the morning, 1 midday and 2 at night), and this has really improved and turned the corner in just the last 2 days.  She is hoping to taper off over the next few days.  However, she has only 4 tablets left at this point.  She will be seeing her orthopedic surgeon tomorrow.      As a consequence, she has had some significant constipation.  She went for 4 days without a BM and then 2 days ago had fairly significant activity.  No pain or bleeding.  However, her abdomen generally does not feel great.  Her appetite is slowly improving.  Her weight had gone up in the hospital to 145 from 135 preoperatively.  Almost all of this was due to swelling around and in the left leg.  That is diminishing nicely.  Her weight is now down to 138, just 3 pounds more than it was preoperatively.  She can get her left knee to about 90 degrees of flexion.  It is still covered.  The incision site will be examined tomorrow by her orthopedic surgeon.  Blood loss was apparently very minimal.      ACTIVE MEDICAL PROBLEMS:  Reviewed.      CURRENT MEDICATIONS:  Reviewed.  She is on 2000 mEq daily of generic acetaminophen.  It is not clear that it is helping much, but she will talk to her orthopedic surgeon about whether or not to stay on that.  She is using some Senokot and can stand that, but I think that going with MiraLax once daily ongoing would be a very reasonable thing to do.        OBJECTIVE:  Kristal is in absolutely no distress.  Breathing comfortably.  Affect upbeat.  BP is 106/71 with a pulse of 82.  Temperature is 98.  She is 5 feet 2.75  inches in height and weighs 138 with a BMI of 24.64.  O2 sats are 98% on room air.  Skin is not unusually pale.  She is not diaphoretic.  She is alert and oriented x3.  Large vertical dressing over the left leg.  The left leg is a little more swollen than the right, but it is minimal.  She is breathing comfortably.  Affect is very upbeat.  I had her get up on the exam table, and she was able to rise out of a chair and get to the table without much difficulty.  She was good about which leg to put for weightbearing purposes.  Abdomen is not distended.  Bowel sounds are heard actively throughout all 4 quadrants.  No reproducible pain with palpation.  No obvious mass or stool burden apparent.      ASSESSMENT/PLAN:   1.  Hospital followup for left total knee arthroplasty that occurred on 05/14/2018.   2.  Follow up with Orthopedic Surgery tomorrow as planned.   3.  Some vague abdominal discomfort likely due to stool burden.  Improving nicely.  She is not constipated at this point.  Continue using MiraLax once daily.   4.  Pain is improving and is mainly related to the surgical site.  Both she and her family are very watchful and concerned about any opiate pain medication use.  I will go ahead and allow her to use an additional 20 oxycodone 5 mg immediate-release tablets without acetaminophen.  No refills, of course.  She can get this filled, and any unused pills can be discarded safely at her pharmacy, and apparently there is a depository at her library for unused pills.   5.  Continue as above with the MiraLax.  Call with any problems or questions.      Total time spent with Pat was over 25 minutes; almost all that time was spent in care coordination and counseling.

## 2018-05-24 NOTE — NURSING NOTE
"72 year old  Chief Complaint   Patient presents with     Surgical Followup     F/U knee replacement 5/14/18 with Dr. Mendosa. Pt reports abdominal pain since operation.      Medication Request     discuss Mapap dosage and senexon dosage       Blood pressure 106/71, pulse 82, temperature 98  F (36.7  C), temperature source Temporal, resp. rate 16, height 5' 2.75\" (159.4 cm), weight 138 lb (62.6 kg), last menstrual period 01/01/2000, SpO2 98 %, not currently breastfeeding. Body mass index is 24.64 kg/(m^2).  Patient Active Problem List   Diagnosis     Adenocarcinoma of breast (H)     Allergic rhinitis     Hyperlipidemia LDL goal <100     Acquired hypothyroidism     Heart block     Cardiac pacemaker in situ- Medtronic, dual chamber- NOT dependent (Advisa: MRI conditional)     Diverticulosis of large intestine without hemorrhage     BPPV (benign paroxysmal positional vertigo), unspecified laterality     Preventative health care     Primary osteoarthritis of left knee     Tear of medial meniscus of left knee     Patellofemoral syndrome, left       Wt Readings from Last 3 Encounters:   05/24/18 138 lb (62.6 kg)   04/30/18 135 lb 1.3 oz (61.3 kg)   04/20/18 136 lb 12 oz (62 kg)     BP Readings from Last 6 Encounters:   05/24/18 106/71   04/30/18 112/74   04/20/18 125/79   04/05/18 117/80   03/07/18 137/80   01/17/18 129/84       Current Outpatient Prescriptions   Medication     albuterol (PROAIR HFA/PROVENTIL HFA/VENTOLIN HFA) 108 (90 BASE) MCG/ACT Inhaler     aspirin 81 MG tablet     calcium carbonate (OS-DALLAS 500 MG [place]. CA) 500 MG tablet     cetirizine (ZYRTEC) 10 MG tablet     doxycycline (VIBRAMYCIN) 100 MG capsule     estradiol (ESTRACE VAGINAL) 0.1 MG/GM cream     Fluticasone Propionate (FLONASE NA)     levothyroxine (SYNTHROID/LEVOTHROID) 50 MCG tablet     Magnesium 500 MG CAPS     Methylsulfonylmethane (MSM) 1000 MG TABS     Omeprazole (PRILOSEC PO)     simvastatin (ZOCOR) 20 MG tablet     VITAMIN D, CHOLECALCIFEROL, " PO     zolpidem (AMBIEN) 5 MG tablet     No current facility-administered medications for this visit.        Social History   Substance Use Topics     Smoking status: Never Smoker     Smokeless tobacco: Never Used     Alcohol use 0.0 oz/week     0 Standard drinks or equivalent per week      Comment: 10-14 wine a week        Health Maintenance Due   Topic Date Due     ADVANCE DIRECTIVE PLANNING Q5 YRS  12/10/2002       Lab Results   Component Value Date    PAP NIL 06/26/2017       Adele Lucreo CMA  May 24, 2018 10:38 AM

## 2018-06-27 ENCOUNTER — ALLIED HEALTH/NURSE VISIT (OUTPATIENT)
Dept: CARDIOLOGY | Facility: CLINIC | Age: 73
End: 2018-06-27
Attending: INTERNAL MEDICINE
Payer: MEDICARE

## 2018-06-27 DIAGNOSIS — I45.9 HEART BLOCK: Primary | ICD-10-CM

## 2018-06-27 PROCEDURE — 93294 REM INTERROG EVL PM/LDLS PM: CPT | Performed by: INTERNAL MEDICINE

## 2018-06-27 PROCEDURE — 93296 REM INTERROG EVL PM/IDS: CPT | Mod: ZF

## 2018-06-27 NOTE — MR AVS SNAPSHOT
After Visit Summary   6/27/2018    Sharona Bravo    MRN: [id number]           Patient Information     Date Of Birth          1945        Visit Information        Provider Department      6/27/2018 6:00 AM UC ICD REMOTE [facility]        Today's Diagnoses     Heart block    -  1       Follow-ups after your visit        Who to contact     If you have questions or need follow up information about today's clinic visit or your schedule please contact [facility] directly at [phone].  Normal or non-critical lab and imaging results will be communicated to you by ADINCONhart, letter or phone within 4 business days after the clinic has received the results. If you do not hear from us within 7 days, please contact the clinic through ADINCONhart or phone. If you have a critical or abnormal lab result, we will notify you by phone as soon as possible.  Submit refill requests through NetSpend or call your pharmacy and they will forward the refill request to us. Please allow 3 business days for your refill to be completed.          Additional Information About Your Visit        MyChart Information     NetSpend gives you secure access to your electronic health record. If you see a primary care provider, you can also send messages to your care team and make appointments. If you have questions, please call your primary care clinic.  If you do not have a primary care provider, please call [phone] and they will assist you.        Care EveryWhere ID     This is your Care EveryWhere ID. This could be used by other organizations to access your [place] medical records  [id number]        Your Vitals Were     Last Period                   01/01/2000            Blood Pressure from Last 3 Encounters:   06/28/18 121/74   05/24/18 106/71   04/30/18 112/74    Weight from Last 3 Encounters:   06/28/18 60.8 kg (134 lb)   05/24/18 62.6 kg (138 lb)   04/30/18 61.3 kg (135 lb 1.3 oz)              We  Performed the Following     INTERROGATION DEVICE EVAL REMOTE, PACER/ICD        Primary Care Provider Office Phone # Fax #    Ruslan Jean [phone]       [address] [place] [address]        Equal Access to Services     PARTH HUNT : Hadii aad ku hadjoneo Soomaali, waaxda luqadaha, qaybta kaalmada adeegyada, reilly reyes hayjudyn lindachristen johnson lali . So [facility] [phone].    ATENCIÓN: Si habla español, tiene a mc disposición servicios gratuitos de asistencia lingüística. Llame al [phone].    We comply with applicable federal civil rights laws and [place] laws. We do not discriminate on the basis of race, color, national origin, age, disability, sex, sexual orientation, or gender identity.            Thank you!     Thank you for choosing [facility]  for your care. Our goal is always to provide you with excellent care. Hearing back from our patients is one way we can continue to improve our services. Please take a few minutes to complete the written survey that you may receive in the mail after your visit with us. Thank you!             Your Updated Medication List - Protect others around you: Learn how to safely use, store and throw away your medicines at www.disposemymeds.org.          This list is accurate as of 6/27/18 11:59 PM.  Always use your most recent med list.                   Brand Name Dispense Instructions for use Diagnosis    acetaminophen 500 MG tablet    TYLENOL     Take 1,000 mg by mouth every 8 hours as needed        albuterol 108 (90 Base) MCG/ACT Inhaler    PROAIR HFA/PROVENTIL HFA/VENTOLIN HFA    1 Inhaler    Inhale 2 puffs into the lungs every 6 hours as needed for shortness of breath / dyspnea or wheezing    Post-viral cough syndrome, Bronchospasm       aspirin 81 MG tablet      Take 81 mg by mouth daily        calcium carbonate 500 MG tablet    OS-DALLAS 500 mg [place]. [place]     Take 600 mg by mouth daily        cetirizine 10 MG tablet    zyrTEC      Take 10 mg by mouth daily        doxycycline 100 MG capsule    VIBRAMYCIN    20 capsule    Take 1 capsule (100 mg) by mouth 2 times daily    Acute bronchitis with symptoms > 10 days       estradiol 0.1 MG/GM cream    ESTRACE VAGINAL    42.5 g    Place 2 g vaginally twice a week    Vaginal atrophy       FLONASE NA           levothyroxine 50 MCG tablet    SYNTHROID/LEVOTHROID    90 tablet    Take 1 tablet (50 mcg) by mouth daily    Acquired hypothyroidism       Magnesium 500 MG Caps      Take 250 mg by mouth daily        MSM 1000 MG Tabs      Take 1,000 mg by mouth 2 times daily        oxyCODONE IR 5 MG tablet    ROXICODONE    20 tablet    Take 1 tablet (5 mg) by mouth every 6 hours as needed for severe pain    Acute post-operative pain, S/P total knee arthroplasty, left       polyethylene glycol powder    MIRALAX/GLYCOLAX     Take 17 g by mouth as needed        PRILOSEC PO      Take 20 mg by mouth every morning        simvastatin 20 MG tablet    ZOCOR    90 tablet    Take 1 tablet (20 mg) by mouth At Bedtime    Hyperlipidemia LDL goal <100       VITAMIN D (CHOLECALCIFEROL) PO      Take 800 Units by mouth daily        zolpidem 5 MG tablet    AMBIEN    30 tablet    Take 1 tablet (5 mg) by mouth nightly as needed for sleep    Secondary insomnia

## 2018-06-28 ENCOUNTER — OFFICE VISIT (OUTPATIENT)
Dept: FAMILY MEDICINE | Facility: CLINIC | Age: 73
End: 2018-06-28
Payer: MEDICARE

## 2018-06-28 VITALS
DIASTOLIC BLOOD PRESSURE: 74 MMHG | OXYGEN SATURATION: 98 % | HEART RATE: 70 BPM | WEIGHT: 134 LBS | HEIGHT: 63 IN | BODY MASS INDEX: 23.74 KG/M2 | SYSTOLIC BLOOD PRESSURE: 121 MMHG | TEMPERATURE: 97.8 F

## 2018-06-28 DIAGNOSIS — R19.7 DIARRHEA, UNSPECIFIED TYPE: Primary | ICD-10-CM

## 2018-06-28 NOTE — MR AVS SNAPSHOT
After Visit Summary   6/28/2018    Sharona Bravo    MRN: [id number]           Patient Information     Date Of Birth          1945        Visit Information        Provider Department      6/28/2018 2:20 PM Melissa Álvarez PA-C [facility]        Today's Diagnoses     Diarrhea, unspecified type    -  1       Follow-ups after your visit        Future tests that were ordered for you today     Open Future Orders        Priority Expected Expires Ordered    Clostridium difficile toxin B PCR Routine  7/28/2018 6/28/2018    Ova and Parasite Exam Routine Routine  7/28/2018 6/28/2018    Enteric Bacteria and Virus Panel by JACOB Stool Routine  7/28/2018 6/28/2018    Giardia antigen Routine  7/28/2018 6/28/2018            Who to contact     Please call your clinic at [phone] to:    Ask questions about your health    Make or cancel appointments    Discuss your medicines    Learn about your test results    Speak to your doctor            Additional Information About Your Visit        MyChart Information     WellTrackOne gives you secure access to your electronic health record. If you see a primary care provider, you can also send messages to your care team and make appointments. If you have questions, please call your primary care clinic.  If you do not have a primary care provider, please call [phone] and they will assist you.      WellTrackOne is an electronic gateway that provides easy, online access to your medical records. With WellTrackOne, you can request a clinic appointment, read your test results, renew a prescription or communicate with your care team.     To access your existing account, please contact your [facility] or call [phone] for assistance.        Care EveryWhere ID     This is your Care EveryWhere ID. This could be used by other organizations to access your [place] medical records  [id number]        Your Vitals Were     Pulse Temperature  "Height Last Period Pulse Oximetry BMI (Body Mass Index)    70 97.8  F (36.6  C) (Oral) 5' 2.76\" (159.4 cm) 01/01/2000 98% 23.92 kg/m2       Blood Pressure from Last 3 Encounters:   06/28/18 121/74   05/24/18 106/71   04/30/18 112/74    Weight from Last 3 Encounters:   06/28/18 134 lb (60.8 kg)   05/24/18 138 lb (62.6 kg)   04/30/18 135 lb 1.3 oz (61.3 kg)               Primary Care Provider Office Phone # Fax #    Ruslan Jean [phone]       [address] [place] [address]        Equal Access to Services     PARTH HUNT : Rich Jasmine, waparris luqadaha, qaybazul kaalmada mckay, reilly escalera . So [facility] [phone].    ATENCIÓN: Si habla español, tiene a mc disposición servicios gratuitos de asistencia lingüística. [facility] [phone].    We comply with applicable federal civil rights laws and [place] laws. We do not discriminate on the basis of race, color, national origin, age, disability, sex, sexual orientation, or gender identity.            Thank you!     Thank you for choosing [facility]  for your care. Our goal is always to provide you with excellent care. Hearing back from our patients is one way we can continue to improve our services. Please take a few minutes to complete the written survey that you may receive in the mail after your visit with us. Thank you!             Your Updated Medication List - Protect others around you: Learn how to safely use, store and throw away your medicines at www.disposemymeds.org.          This list is accurate as of 6/28/18  3:44 PM.  Always use your most recent med list.                   Brand Name Dispense Instructions for use Diagnosis    acetaminophen 500 MG tablet    TYLENOL     Take 1,000 mg by mouth every 8 hours as needed        albuterol 108 (90 Base) MCG/ACT Inhaler    PROAIR HFA/PROVENTIL HFA/VENTOLIN HFA    1 Inhaler    Inhale 2 puffs into the lungs every 6 hours as " needed for shortness of breath / dyspnea or wheezing    Post-viral cough syndrome, Bronchospasm       aspirin 81 MG tablet      Take 81 mg by mouth daily        calcium carbonate 500 MG tablet    OS-DALLAS 500 mg [place]. Ca     Take 600 mg by mouth daily        celecoxib 200 MG capsule    celeBREX     Take 200 mg by mouth 2 times daily        cetirizine 10 MG tablet    zyrTEC     Take 10 mg by mouth daily        doxycycline 100 MG capsule    VIBRAMYCIN    20 capsule    Take 1 capsule (100 mg) by mouth 2 times daily    Acute bronchitis with symptoms > 10 days       estradiol 0.1 MG/GM cream    ESTRACE VAGINAL    42.5 g    Place 2 g vaginally twice a week    Vaginal atrophy       FLONASE NA           levothyroxine 50 MCG tablet    SYNTHROID/LEVOTHROID    90 tablet    Take 1 tablet (50 mcg) by mouth daily    Acquired hypothyroidism       Magnesium 500 MG Caps      Take 250 mg by mouth daily        MELOXICAM PO      Take 7.5 mg by mouth daily        MSM 1000 MG Tabs      Take 1,000 mg by mouth 2 times daily        oxyCODONE IR 5 MG tablet    ROXICODONE    20 tablet    Take 1 tablet (5 mg) by mouth every 6 hours as needed for severe pain    Acute post-operative pain, S/P total knee arthroplasty, left       polyethylene glycol powder    MIRALAX/GLYCOLAX     Take 17 g by mouth as needed        PRILOSEC PO      Take 20 mg by mouth every morning        simvastatin 20 MG tablet    ZOCOR    90 tablet    Take 1 tablet (20 mg) by mouth At Bedtime    Hyperlipidemia LDL goal <100       VITAMIN D (CHOLECALCIFEROL) PO      Take 800 Units by mouth daily        zolpidem 5 MG tablet    AMBIEN    30 tablet    Take 1 tablet (5 mg) by mouth nightly as needed for sleep    Secondary insomnia

## 2018-06-28 NOTE — NURSING NOTE
"72 year old  Chief Complaint   Patient presents with     Surgical Followup     knee replacement 6 weeks ago     Diarrhea     pt reports stool if formed but not hard and has a dark green color       Blood pressure 121/74, pulse 70, temperature 97.8  F (36.6  C), temperature source Oral, height 5' 2.76\" (159.4 cm), weight 134 lb (60.8 kg), last menstrual period 01/01/2000, SpO2 98 %, not currently breastfeeding. Body mass index is 23.92 kg/(m^2).  Patient Active Problem List   Diagnosis     Adenocarcinoma of breast (H)     Allergic rhinitis     Hyperlipidemia LDL goal <100     Acquired hypothyroidism     Heart block     Cardiac pacemaker in situ- Medtronic, dual chamber- NOT dependent (Advisa: MRI conditional)     Diverticulosis of large intestine without hemorrhage     BPPV (benign paroxysmal positional vertigo), unspecified laterality     Preventative health care     Primary osteoarthritis of left knee     Tear of medial meniscus of left knee     Patellofemoral syndrome, left       Wt Readings from Last 2 Encounters:   06/28/18 134 lb (60.8 kg)   05/24/18 138 lb (62.6 kg)     BP Readings from Last 3 Encounters:   06/28/18 121/74   05/24/18 106/71   04/30/18 112/74         Current Outpatient Prescriptions   Medication     acetaminophen (TYLENOL) 500 MG tablet     aspirin 81 MG tablet     calcium carbonate (OS-DALLAS 500 MG [place]. [place]) 500 MG tablet     cetirizine (ZYRTEC) 10 MG tablet     estradiol (ESTRACE VAGINAL) 0.1 MG/GM cream     Fluticasone Propionate (FLONASE NA)     levothyroxine (SYNTHROID/LEVOTHROID) 50 MCG tablet     Magnesium 500 MG CAPS     MELOXICAM PO     Methylsulfonylmethane (MSM) 1000 MG TABS     Omeprazole (PRILOSEC PO)     oxyCODONE IR (ROXICODONE) 5 MG tablet     polyethylene glycol (MIRALAX/GLYCOLAX) powder     simvastatin (ZOCOR) 20 MG tablet     VITAMIN D, CHOLECALCIFEROL, PO     zolpidem (AMBIEN) 5 MG tablet     albuterol (PROAIR HFA/PROVENTIL HFA/VENTOLIN HFA) 108 (90 BASE) MCG/ACT Inhaler     " celecoxib (CELEBREX) 200 MG capsule     doxycycline (VIBRAMYCIN) 100 MG capsule     No current facility-administered medications for this visit.        Social History   Substance Use Topics     Smoking status: Never Smoker     Smokeless tobacco: Never Used     Alcohol use 0.0 oz/week     0 Standard drinks or equivalent per week      Comment: 10-14 wine a week        Health Maintenance Due   Topic Date Due     ADVANCE DIRECTIVE PLANNING Q5 YRS  12/10/2002     BYRON QUESTIONNAIRE 1 YEAR  06/26/2018     PHQ-9 Q1YR  06/26/2018       Lab Results   Component Value Date    PAP NIL 06/26/2017 June 28, 2018 2:25 PM

## 2018-06-28 NOTE — PROGRESS NOTES
SUBJECTIVE:   Sharona Bravo is a 72 year old female who presents to clinic today for the following health issues:    Acute Illness:  Patient is six weeks status post total knee replacement. She is generally doing well and following all of her recommended postop care. She's here today because she has had a few days of diarrhea. Initially five episodes of watery stools without blood or mucus. It seemed to get better yesterday and had a couple loose stools yesterday and none today. She denies associated fever, abdominal cramping nor symptoms of dehydration. Following her surgery she had been using MiraLAX once daily which she stopped three days ago following the onset of diarrhea. Because of her recent surgery she was concerned that this could represent Clostridium difficile infection. She has not been on ongoing amp biotics but it's presumed that she had antibiotics in the operating room.    appetite is normal today and patient denies weight loss.      Problem list and histories reviewed & adjusted, as indicated.  Additional history: as documented    Patient Active Problem List   Diagnosis     Adenocarcinoma of breast (H)     Allergic rhinitis     Hyperlipidemia LDL goal <100     Acquired hypothyroidism     Heart block     Cardiac pacemaker in situ- Medtronic, dual chamber- NOT dependent (Advisa: MRI conditional)     Diverticulosis of large intestine without hemorrhage     BPPV (benign paroxysmal positional vertigo), unspecified laterality     Preventative health care     Primary osteoarthritis of left knee     Tear of medial meniscus of left knee     Patellofemoral syndrome, left     Past Surgical History:   Procedure Laterality Date     PACEMAKER/ICD CHECK         Social History   Substance Use Topics     Smoking status: Never Smoker     Smokeless tobacco: Never Used     Alcohol use 0.0 oz/week     0 Standard drinks or equivalent per week      Comment: 10-14 wine a week      Family History   Problem  Relation Age of Onset     Cancer Mother      HEART DISEASE Father          Current Outpatient Prescriptions   Medication Sig Dispense Refill     acetaminophen (TYLENOL) 500 MG tablet Take 1,000 mg by mouth every 8 hours as needed       aspirin 81 MG tablet Take 81 mg by mouth daily       calcium carbonate (OS-DALLAS 500 MG [place]. [place]) 500 MG tablet Take 600 mg by mouth daily        cetirizine (ZYRTEC) 10 MG tablet Take 10 mg by mouth daily       estradiol (ESTRACE VAGINAL) 0.1 MG/GM cream Place 2 g vaginally twice a week 42.5 g 6     Fluticasone Propionate (FLONASE NA)        levothyroxine (SYNTHROID/LEVOTHROID) 50 MCG tablet Take 1 tablet (50 mcg) by mouth daily 90 tablet 1     Magnesium 500 MG CAPS Take 250 mg by mouth daily        MELOXICAM PO Take 7.5 mg by mouth daily       Methylsulfonylmethane (MSM) 1000 MG TABS Take 1,000 mg by mouth 2 times daily        Omeprazole (PRILOSEC PO) Take 20 mg by mouth every morning       oxyCODONE IR (ROXICODONE) 5 MG tablet Take 1 tablet (5 mg) by mouth every 6 hours as needed for severe pain 20 tablet 0     polyethylene glycol (MIRALAX/GLYCOLAX) powder Take 17 g by mouth as needed       simvastatin (ZOCOR) 20 MG tablet Take 1 tablet (20 mg) by mouth At Bedtime 90 tablet 3     VITAMIN D, CHOLECALCIFEROL, PO Take 800 Units by mouth daily        zolpidem (AMBIEN) 5 MG tablet Take 1 tablet (5 mg) by mouth nightly as needed for sleep 30 tablet 0     albuterol (PROAIR HFA/PROVENTIL HFA/VENTOLIN HFA) 108 (90 BASE) MCG/ACT Inhaler Inhale 2 puffs into the lungs every 6 hours as needed for shortness of breath / dyspnea or wheezing (Patient not taking: Reported on 6/28/2018) 1 Inhaler 1     celecoxib (CELEBREX) 200 MG capsule Take 200 mg by mouth 2 times daily       doxycycline (VIBRAMYCIN) 100 MG capsule Take 1 capsule (100 mg) by mouth 2 times daily (Patient not taking: Reported on 6/28/2018) 20 capsule 0     Allergies   Allergen Reactions     Gabapentin Swelling and Rash     Face swelling  "      Reviewed and updated as needed this visit by clinical staff  Tobacco  Allergies  Meds  Problems       Reviewed and updated as needed this visit by Provider  Allergies  Meds  Problems         ROS:  CONSTITUTIONAL: NEGATIVE for fever, chills, change in weight  ENT/MOUTH: NEGATIVE for ear, mouth and throat problems  RESP: NEGATIVE for significant cough or SOB  CV: NEGATIVE for chest pain, palpitations or peripheral edema  GI: as above  : no vaginal or urinary symptoms    OBJECTIVE:     /74  Pulse 70  Temp 97.8  F (36.6  C) (Oral)  Ht 5' 2.76\" (159.4 cm)  Wt 134 lb (60.8 kg)  LMP 01/01/2000  SpO2 98%  BMI 23.92 kg/m2  Body mass index is 23.92 kg/(m^2).  GENERAL: healthy, alert and no distress  NECK: no adenopathy, no asymmetry, masses, or scars and thyroid normal to palpation  RESP: lungs clear to auscultation - no rales, rhonchi or wheezes  CV: regular rate and rhythm, normal S1 S2, no S3 or S4, no murmur, click or rub, no peripheral edema and peripheral pulses strong  ABDOMEN: soft, nontender, no hepatosplenomegaly, no masses and bowel sounds normal  BACK: no CVA tenderness, no paralumbar tenderness      ASSESSMENT/PLAN:         ICD-10-CM    1. Diarrhea, unspecified type R19.7 Clostridium difficile toxin B PCR     Ova and Parasite Exam Routine     Enteric Bacteria and Virus Panel by JACOB Stool     Giardia antigen      Symptoms appear to be resolving thus making Clostridium difficile unlikely. Testing kit and orders placed patients to return stool samples if any persistent problems. Recommended fiber supplementation, probiotics and follow-up with any worsening persistent problems or concerns.      Melissa Álvarez PA-C  [facility]    "

## 2018-07-09 NOTE — PROGRESS NOTES
Preliminary Device Interrogation Results.  Final physician signed paceart report to be scanned and attached.    Remote pacemaker transmission received and reviewed.  Device transmission sent per MD orders.  Patient has a Medtronic dual lead pacemaker.  Normal pacemaker function.  2 AT/AF episodes recorded - 5 seconds in duration.  No ventricular arrythmias recorded.  Presenting EGM = AS- @ 92 bpm.  AP = 35.1%.   = 99.8%.  Estimated battery longevity to JYOTI = 6.5 years.  Patient notified of interrogation results.  Patient reports that she is feeling well and denies specific complaints.  Plan for patient to send a remote transmission in 3 months as scheduled.    Remote pacemaker transmission

## 2018-09-26 ENCOUNTER — ALLIED HEALTH/NURSE VISIT (OUTPATIENT)
Dept: CARDIOLOGY | Facility: CLINIC | Age: 73
End: 2018-09-26
Attending: INTERNAL MEDICINE
Payer: MEDICARE

## 2018-09-26 DIAGNOSIS — I45.9 HEART BLOCK: Primary | ICD-10-CM

## 2018-09-26 PROCEDURE — 93296 REM INTERROG EVL PM/IDS: CPT | Mod: ZF

## 2018-09-26 PROCEDURE — 93294 REM INTERROG EVL PM/LDLS PM: CPT | Performed by: INTERNAL MEDICINE

## 2018-09-26 NOTE — MR AVS SNAPSHOT
After Visit Summary   9/26/2018    Sharona Bravo    MRN: [id number]           Patient Information     Date Of Birth          1945        Visit Information        Provider Department      9/26/2018 6:00 AM [facility] ICD REMOTE [facility]        Today's Diagnoses     Heart block    -  1       Follow-ups after your visit        Who to contact     If you have questions or need follow up information about today's clinic visit or your schedule please contact [facility] directly at [phone].  Normal or non-critical lab and imaging results will be communicated to you by [employer], letter or phone within 4 business days after the clinic has received the results. If you do not hear from us within 7 days, please contact the clinic through [employer] or phone. If you have a critical or abnormal lab result, we will notify you by phone as soon as possible.  Submit refill requests through [employer] or call your pharmacy and they will forward the refill request to us. Please allow 3 business days for your refill to be completed.          Additional Information About Your Visit        MyChart Information     [employer] gives you secure access to your electronic health record. If you see a primary care provider, you can also send messages to your care team and make appointments. If you have questions, please call your primary care clinic.  If you do not have a primary care provider, please call [phone] and they will assist you.        Care EveryWhere ID     This is your Care EveryWhere ID. This could be used by other organizations to access your [facility] medical records  [id number]        Your Vitals Were     Last Period                   01/01/2000            Blood Pressure from Last 3 Encounters:   06/28/18 121/74   05/24/18 106/71   04/30/18 112/74    Weight from Last 3 Encounters:   06/28/18 60.8 kg (134 lb)   05/24/18 62.6 kg (138 lb)   04/30/18 61.3 kg (135 lb 1.3 oz)              We  Performed the Following     INTERROGATION DEVICE EVAL REMOTE, PACER/ICD        Primary Care Provider Office Phone # Fax #    Ruslan Jean [phone]       [address] [place] [id number]        Equal Access to Services     PARTH HUNT : Hadii aad ku hadjoneo Soomaali, waaxda luqadaha, qaybta kaalmada adeegyada, reilly guerreron lindachristen johnson lali . So [facility] [phone].    ATENCIÓN: Si habla español, tiene a mc disposición servicios gratuitos de asistencia lingüística. Llame al [phone].    We comply with applicable federal civil rights laws and [place] laws. We do not discriminate on the basis of race, color, national origin, age, disability, sex, sexual orientation, or gender identity.            Thank you!     Thank you for choosing [facility]  for your care. Our goal is always to provide you with excellent care. Hearing back from our patients is one way we can continue to improve our services. Please take a few minutes to complete the written survey that you may receive in the mail after your visit with us. Thank you!             Your Updated Medication List - Protect others around you: Learn how to safely use, store and throw away your medicines at www.disposemymeds.org.          This list is accurate as of 9/26/18 11:59 PM.  Always use your most recent med list.                   Brand Name Dispense Instructions for use Diagnosis    acetaminophen 500 MG tablet    TYLENOL     Take 1,000 mg by mouth every 8 hours as needed        albuterol 108 (90 Base) MCG/ACT inhaler    PROAIR HFA/PROVENTIL HFA/VENTOLIN HFA    1 Inhaler    Inhale 2 puffs into the lungs every 6 hours as needed for shortness of breath / dyspnea or wheezing    Post-viral cough syndrome, Bronchospasm       aspirin 81 MG tablet      Take 81 mg by mouth daily        calcium carbonate 500 mg {elemental} 500 MG tablet    OS-DALLAS     Take 600 mg by mouth daily        cetirizine 10 MG tablet    zyrTEC      Take 10 mg by mouth daily        doxycycline 100 MG capsule    VIBRAMYCIN    20 capsule    Take 1 capsule (100 mg) by mouth 2 times daily    Acute bronchitis with symptoms > 10 days       estradiol 0.1 MG/GM cream    ESTRACE VAGINAL    42.5 g    Place 2 g vaginally twice a week    Vaginal atrophy       FLONASE NA           levothyroxine 50 MCG tablet    SYNTHROID/LEVOTHROID    90 tablet    Take 1 tablet (50 mcg) by mouth daily    Acquired hypothyroidism       Magnesium 500 MG Caps      Take 250 mg by mouth daily        MELOXICAM PO      Take 7.5 mg by mouth daily        MSM 1000 MG Tabs      Take 1,000 mg by mouth 2 times daily        oxyCODONE IR 5 MG tablet    ROXICODONE    20 tablet    Take 1 tablet (5 mg) by mouth every 6 hours as needed for severe pain    Acute post-operative pain, S/P total knee arthroplasty, left       polyethylene glycol powder    MIRALAX/GLYCOLAX     Take 17 g by mouth as needed        PRILOSEC PO      Take 20 mg by mouth every morning        simvastatin 20 MG tablet    ZOCOR    90 tablet    Take 1 tablet (20 mg) by mouth At Bedtime    Hyperlipidemia LDL goal <100       VITAMIN D (CHOLECALCIFEROL) PO      Take 800 Units by mouth daily        zolpidem 5 MG tablet    AMBIEN    30 tablet    Take 1 tablet (5 mg) by mouth nightly as needed for sleep    Secondary insomnia

## 2018-10-02 NOTE — PROGRESS NOTES
Preliminary Device Interrogation Results.  Final physician signed paceart report to be scanned and attached.    Remote pacemaker transmission received and reviewed.  Device transmission sent per MD orders.  Patient has a Medtronic dual lead pacemaker.  Normal pacemaker function.  1 AT/AF episode recorded - 5 seconds in duration.  No VHR episodes recorded.  Presenting EGM = AS- @ 83 bpm.  AP = 27.6%.   = 99.8%.  Estimated battery longevity to JYOTI = 6.5 years.  Patient notified of interrogation results.  Patient reports that she is feeling well and denies specific complaints.  Plan for patient to send a remote transmission in 3 months as scheduled.    Remote pacemaker transmission

## 2018-10-30 DIAGNOSIS — E03.9 ACQUIRED HYPOTHYROIDISM: ICD-10-CM

## 2018-10-30 RX ORDER — LEVOTHYROXINE SODIUM 50 UG/1
50 TABLET ORAL DAILY
Qty: 90 TABLET | Refills: 0 | Status: SHIPPED | OUTPATIENT
Start: 2018-10-30 | End: 2019-01-29

## 2018-10-30 NOTE — TELEPHONE ENCOUNTER
Last office visit was on 06/28/2018, no future visits scheduled.    Medication is being filled for 1 time refill only due to:  Patient needs labs tsh. Future labs ordered yes.     Nicolle Matamoros RN  October 30, 2018 4:53 PM

## 2018-12-26 ENCOUNTER — ANCILLARY PROCEDURE (OUTPATIENT)
Dept: CARDIOLOGY | Facility: CLINIC | Age: 73
End: 2018-12-26
Attending: INTERNAL MEDICINE
Payer: MEDICARE

## 2018-12-26 DIAGNOSIS — I44.30 AV BLOCK: ICD-10-CM

## 2018-12-26 PROCEDURE — 93294 REM INTERROG EVL PM/LDLS PM: CPT | Performed by: INTERNAL MEDICINE

## 2018-12-26 PROCEDURE — 93296 REM INTERROG EVL PM/IDS: CPT | Mod: ZF

## 2019-01-13 LAB
ICDO DEVICE COMMENTS: NORMAL
MDC_IDC_LEAD_IMPLANT_DT: NORMAL
MDC_IDC_LEAD_IMPLANT_DT: NORMAL
MDC_IDC_LEAD_LOCATION: NORMAL
MDC_IDC_LEAD_LOCATION: NORMAL
MDC_IDC_LEAD_LOCATION_DETAIL_1: NORMAL
MDC_IDC_LEAD_LOCATION_DETAIL_1: NORMAL
MDC_IDC_LEAD_MFG: NORMAL
MDC_IDC_LEAD_MFG: NORMAL
MDC_IDC_LEAD_MODEL: NORMAL
MDC_IDC_LEAD_MODEL: NORMAL
MDC_IDC_LEAD_POLARITY_TYPE: NORMAL
MDC_IDC_LEAD_POLARITY_TYPE: NORMAL
MDC_IDC_LEAD_SERIAL: NORMAL
MDC_IDC_LEAD_SERIAL: NORMAL
MDC_IDC_MSMT_BATTERY_DTM: NORMAL
MDC_IDC_MSMT_BATTERY_REMAINING_LONGEVITY: 70 MO
MDC_IDC_MSMT_BATTERY_RRT_TRIGGER: 2.83
MDC_IDC_MSMT_BATTERY_STATUS: NORMAL
MDC_IDC_MSMT_BATTERY_VOLTAGE: 3 V
MDC_IDC_MSMT_LEADCHNL_RA_IMPEDANCE_VALUE: 361 OHM
MDC_IDC_MSMT_LEADCHNL_RA_IMPEDANCE_VALUE: 418 OHM
MDC_IDC_MSMT_LEADCHNL_RA_PACING_THRESHOLD_AMPLITUDE: 0.5 V
MDC_IDC_MSMT_LEADCHNL_RA_PACING_THRESHOLD_PULSEWIDTH: 0.4 MS
MDC_IDC_MSMT_LEADCHNL_RA_SENSING_INTR_AMPL: 4.12 MV
MDC_IDC_MSMT_LEADCHNL_RA_SENSING_INTR_AMPL: 4.12 MV
MDC_IDC_MSMT_LEADCHNL_RV_IMPEDANCE_VALUE: 475 OHM
MDC_IDC_MSMT_LEADCHNL_RV_IMPEDANCE_VALUE: 532 OHM
MDC_IDC_MSMT_LEADCHNL_RV_PACING_THRESHOLD_AMPLITUDE: 0.62 V
MDC_IDC_MSMT_LEADCHNL_RV_PACING_THRESHOLD_PULSEWIDTH: 0.4 MS
MDC_IDC_MSMT_LEADCHNL_RV_SENSING_INTR_AMPL: 19.62 MV
MDC_IDC_MSMT_LEADCHNL_RV_SENSING_INTR_AMPL: 19.62 MV
MDC_IDC_PG_IMPLANT_DTM: NORMAL
MDC_IDC_PG_MFG: NORMAL
MDC_IDC_PG_MODEL: NORMAL
MDC_IDC_PG_SERIAL: NORMAL
MDC_IDC_PG_TYPE: NORMAL
MDC_IDC_SESS_CLINIC_NAME: NORMAL
MDC_IDC_SESS_DTM: NORMAL
MDC_IDC_SESS_TYPE: NORMAL
MDC_IDC_SET_BRADY_AT_MODE_SWITCH_RATE: 171 {BEATS}/MIN
MDC_IDC_SET_BRADY_HYSTRATE: NORMAL
MDC_IDC_SET_BRADY_LOWRATE: 60 {BEATS}/MIN
MDC_IDC_SET_BRADY_MAX_SENSOR_RATE: 130 {BEATS}/MIN
MDC_IDC_SET_BRADY_MAX_TRACKING_RATE: 130 {BEATS}/MIN
MDC_IDC_SET_BRADY_MODE: NORMAL
MDC_IDC_SET_BRADY_PAV_DELAY_LOW: 220 MS
MDC_IDC_SET_BRADY_SAV_DELAY_LOW: 200 MS
MDC_IDC_SET_LEADCHNL_RA_PACING_AMPLITUDE: 1.5 V
MDC_IDC_SET_LEADCHNL_RA_PACING_ANODE_ELECTRODE_1: NORMAL
MDC_IDC_SET_LEADCHNL_RA_PACING_ANODE_LOCATION_1: NORMAL
MDC_IDC_SET_LEADCHNL_RA_PACING_CAPTURE_MODE: NORMAL
MDC_IDC_SET_LEADCHNL_RA_PACING_CATHODE_ELECTRODE_1: NORMAL
MDC_IDC_SET_LEADCHNL_RA_PACING_CATHODE_LOCATION_1: NORMAL
MDC_IDC_SET_LEADCHNL_RA_PACING_POLARITY: NORMAL
MDC_IDC_SET_LEADCHNL_RA_PACING_PULSEWIDTH: 0.4 MS
MDC_IDC_SET_LEADCHNL_RA_SENSING_ANODE_ELECTRODE_1: NORMAL
MDC_IDC_SET_LEADCHNL_RA_SENSING_ANODE_LOCATION_1: NORMAL
MDC_IDC_SET_LEADCHNL_RA_SENSING_CATHODE_ELECTRODE_1: NORMAL
MDC_IDC_SET_LEADCHNL_RA_SENSING_CATHODE_LOCATION_1: NORMAL
MDC_IDC_SET_LEADCHNL_RA_SENSING_POLARITY: NORMAL
MDC_IDC_SET_LEADCHNL_RA_SENSING_SENSITIVITY: 0.6 MV
MDC_IDC_SET_LEADCHNL_RV_PACING_AMPLITUDE: 2 V
MDC_IDC_SET_LEADCHNL_RV_PACING_ANODE_ELECTRODE_1: NORMAL
MDC_IDC_SET_LEADCHNL_RV_PACING_ANODE_LOCATION_1: NORMAL
MDC_IDC_SET_LEADCHNL_RV_PACING_CAPTURE_MODE: NORMAL
MDC_IDC_SET_LEADCHNL_RV_PACING_CATHODE_ELECTRODE_1: NORMAL
MDC_IDC_SET_LEADCHNL_RV_PACING_CATHODE_LOCATION_1: NORMAL
MDC_IDC_SET_LEADCHNL_RV_PACING_POLARITY: NORMAL
MDC_IDC_SET_LEADCHNL_RV_PACING_PULSEWIDTH: 0.4 MS
MDC_IDC_SET_LEADCHNL_RV_SENSING_ANODE_ELECTRODE_1: NORMAL
MDC_IDC_SET_LEADCHNL_RV_SENSING_ANODE_LOCATION_1: NORMAL
MDC_IDC_SET_LEADCHNL_RV_SENSING_CATHODE_ELECTRODE_1: NORMAL
MDC_IDC_SET_LEADCHNL_RV_SENSING_CATHODE_LOCATION_1: NORMAL
MDC_IDC_SET_LEADCHNL_RV_SENSING_POLARITY: NORMAL
MDC_IDC_SET_LEADCHNL_RV_SENSING_SENSITIVITY: 0.9 MV
MDC_IDC_SET_ZONE_DETECTION_INTERVAL: 350 MS
MDC_IDC_SET_ZONE_DETECTION_INTERVAL: 400 MS
MDC_IDC_SET_ZONE_TYPE: NORMAL
MDC_IDC_STAT_AT_BURDEN_PERCENT: 0 %
MDC_IDC_STAT_AT_DTM_END: NORMAL
MDC_IDC_STAT_AT_DTM_START: NORMAL
MDC_IDC_STAT_BRADY_AP_VP_PERCENT: 30.03 %
MDC_IDC_STAT_BRADY_AP_VS_PERCENT: 0 %
MDC_IDC_STAT_BRADY_AS_VP_PERCENT: 69.79 %
MDC_IDC_STAT_BRADY_AS_VS_PERCENT: 0.18 %
MDC_IDC_STAT_BRADY_DTM_END: NORMAL
MDC_IDC_STAT_BRADY_DTM_START: NORMAL
MDC_IDC_STAT_BRADY_RA_PERCENT_PACED: 29.89 %
MDC_IDC_STAT_BRADY_RV_PERCENT_PACED: 99.44 %
MDC_IDC_STAT_EPISODE_RECENT_COUNT: 0
MDC_IDC_STAT_EPISODE_RECENT_COUNT_DTM_END: NORMAL
MDC_IDC_STAT_EPISODE_RECENT_COUNT_DTM_START: NORMAL
MDC_IDC_STAT_EPISODE_TOTAL_COUNT: 0
MDC_IDC_STAT_EPISODE_TOTAL_COUNT_DTM_END: NORMAL
MDC_IDC_STAT_EPISODE_TOTAL_COUNT_DTM_START: NORMAL
MDC_IDC_STAT_EPISODE_TYPE: NORMAL

## 2019-01-29 DIAGNOSIS — E03.9 ACQUIRED HYPOTHYROIDISM: ICD-10-CM

## 2019-01-29 NOTE — TELEPHONE ENCOUNTER
Last time prescribed: 10/30/18 , 90 tabs/caps x 0 refills  Last office visit: 6/28/18  Next appointment: No Future Appointment Scheduled    Routing refill request to provider for review/approval because:  Yuki given x1 and patient did not follow up, please advise  Labs not current:  Tsh, futures placed     Nicolle Matamoros RN  January 29, 2019 4:16 PM

## 2019-01-30 RX ORDER — LEVOTHYROXINE SODIUM 50 UG/1
50 TABLET ORAL DAILY
Qty: 30 TABLET | Refills: 0 | Status: SHIPPED | OUTPATIENT
Start: 2019-01-30 | End: 2019-03-04

## 2019-02-07 DIAGNOSIS — E03.9 ACQUIRED HYPOTHYROIDISM: ICD-10-CM

## 2019-02-07 LAB — TSH SERPL DL<=0.005 MIU/L-ACNC: 2.65 MU/L (ref 0.4–4)

## 2019-02-11 NOTE — PROGRESS NOTES
SUBJECTIVE:   Sharona Bravo is a 73 year old female who presents to clinic today for a return visit.    Itinerary: (List all countries)  [place] and [place]  Departure Date: [place] May 3rd thru May 16, [place] August 24th thru Sept 5th  Reason for Travel (i.e. business, pleasure): Pleasure  Visiting an urban or rural area? Both. In [place] is going on a cruise, but will have day excursions into rural areas. In [place] and [place] is going on safaris in both countries.  Accommodations (i.e. hotel, hostel, friends, family etc.): Hotel and Tents    IMMUNIZATION HISTORY  Have you received any vaccinations in the past 4 weeks?  No   Have you ever fainted from having your blood drawn or from an injection?  No  Have you ever had a fever reaction to a vaccination?  No  Have you had any bad reaction / side effect from any vaccination?  No  Have you ever had hepatitis A or B vaccine?  No  Do you live (or work closely with anyone who has AIDS, or any other immune disorder, or who is on chemotherapy for cancer or family history of immunodeficiency?  No  Have you received any injection of immune globulin or any blood products during the past 12 months?  No    GENERAL MEDICAL HISTORY  Do you have a medical condition that warrants maintenance meds or physician follow-up?  YES  Do you have a medical condition that is stable now, but that may recur while traveling?  Yes  Has your spleen been removed?   No  Have you had an acute illness or a fever in the past 48 hours?  No  Are you pregnant or might you become pregnant on this trip? Any chance of pregnancy?  No  Are you breastfeeding?  No  Do you have HIV, AIDS, an AIDS-like condition, any other immune disorder, leukemia or cancer?  No  Do you have a severe combined immunodeficiency disease?  No  Have you had your thymus gland removed or a history of problems with your thymus, such as myasthenia gravis, DiGeorge syndrome, or thymoma?  No  Do you have severe  thrombocytopenia (low platelet count) or blood clotting disorder?  No  Have you ever had a convulsion, seizure, epilepsy, neurologic condition or brain infection?  No  Do you have any stomach conditions?  No  Do you have a G6PD deficiency?  No  Do you have severe renal or kidney impairment?  No  Do you have a history of psychiatric problems?  No  Do you have a problem with strange dreams and/or nightmares?  No  Do you have insomnia?  Yes  Do you have problems with vaginitis?  No  Do you have psoriasis?  No  Are you prone to motion sickness?  No  Have you ever had headaches, nausea, vomiting or breathing problems from altitude exposure?  No     # Hypothyroidism  TSH   Date Value Ref Range Status   02/07/2019 2.65 0.40 - 4.00 mU/L Final   - takes Synthroid 50mcg daily    # Heart Block  - has pacemaker  - has appointment for check in March    MEDICATIONS  ARE YOU TAKING:  Steroids, prednisone, or anti-cancer drugs?  No  Antibiotics or sulfonamides?  No  Oral contraceptives?  No  Aspirin therapy? (children & adolescents)  Yes    ALLERGIES  ARE YOU ALLERGIC TO:  Any medications?  Yes Gabapentin  Any foods or other?  No    ROS: Denies fevers, chills, chest pain, difficulty breathing, abdominal pain    Patient Active Problem List   Diagnosis     Adenocarcinoma of breast (H)     Allergic rhinitis     Hyperlipidemia LDL goal <100     Acquired hypothyroidism     Heart block     Cardiac pacemaker in situ- Medtronic, dual chamber- NOT dependent (Advisa: MRI conditional)     Diverticulosis of large intestine without hemorrhage     BPPV (benign paroxysmal positional vertigo), unspecified laterality     Primary osteoarthritis of left knee     Tear of medial meniscus of left knee     Patellofemoral syndrome, left     S/P TKR (total knee replacement), left     Current Outpatient Medications   Medication     acetaminophen (TYLENOL) 500 MG tablet     Ascorbic Acid (VITAMIN C) 500 MG CAPS     aspirin 81 MG tablet     calcium carbonate  (OS-D[place]AS 500 MG [place]. [place]) 500 MG tablet     cetirizine (ZYRTEC) 10 MG tablet     estradiol (ESTRACE VAGINAL) 0.1 MG/GM cream     fish oil-omega-3 fatty acids 1000 MG capsule     Fluticasone Propionate (FLONASE NA)     levothyroxine (SYNTHROID/LEVOTHROID) 50 MCG tablet     Magnesium 500 MG CAPS     simvastatin (ZOCOR) 20 MG tablet     UNABLE TO FIND     VITAMIN D, CHOLECALCIFEROL, PO     zolpidem (AMBIEN) 5 MG tablet     celecoxib (CELEBREX) 200 MG capsule     MELOXICAM PO     Methylsulfonylmethane (MSM) 1000 MG TABS     Omeprazole (PRILOSEC PO)     polyethylene glycol (MIRALAX/GLYCOLAX) powder     No current facility-administered medications for this visit.        I have reviewed the patient's relevant past medical history.     OBJECTIVE:   /84 (BP Location: Right arm, Patient Position: Sitting, Cuff Size: Adult Regular)   Pulse 62   Temp 97.7  F (36.5  C) (Oral)   Resp 16   Wt 61.8 kg (136 lb 4 oz)   LMP 01/01/2000   SpO2 96%   BMI 24.32 kg/m      Constitutional: well-appearing, appears stated age  Eyes: conjunctivae without erythema, sclera anicteric.   Cardiac: regular rate and rhythm, normal S1/S2, no murmur/rubs/gallops  Respiratory: lungs clear to auscultation bilaterally, normal work of breathing, no wheezes/crackles  Skin: no rashes, lesions, or wounds  Psych: affect is full and appropriate, speech is fluent and non-pressured      ASSESSMENT AND PLAN:     (Z71.89) Travel advice encounter  (primary encounter diagnosis)  Plan: atovaquone-proguanil (MALARONE) 250-100 MG         tablet, zolpidem (AMBIEN) 5 MG tablet, VACCINE         ADMINISTRATION, EACH ADDITIONAL, VACCINE         ADMINISTRATION, INITIAL, TYPHOID VACCINE, IM, C        IMOVAX RABIES VACCINE, IM, HEPA/HEPB VACCINE         ADULT IM, HEPA/HEPB VACCINE ADULT IM,         azithromycin (ZITHROMAX) 500 MG tablet    Immunizations discussed include: Hepatitis A, Hepatitis B, Typhoid and Rabies  Up to date on influenza, tetanus, and pneumonia  vaccines.    Malaraia prophylaxis recommended: Malarone  Symptomatic treatment for traveler's diarrhea: azithromycin    I have reviewed general recommendations for safe travel including: food/water precautions, insect avoidance including permethrin and DEET, roadway safety. Educational materials and links to the [facility] Traveler's health website have been provided.    Total time 35 minutes, greater than 50 percent in counseling and coordination of care.    (G47.00) Secondary insomnia  Comment: Takes very infrequently, last given a prescription in 04/2018. Given another refill for travel.   Plan: zolpidem (AMBIEN) 5 MG tablet          Grant Harding   [facility]  02/18/2019, 10:02 AM

## 2019-02-18 ENCOUNTER — OFFICE VISIT (OUTPATIENT)
Dept: FAMILY MEDICINE | Facility: CLINIC | Age: 74
End: 2019-02-18
Payer: MEDICARE

## 2019-02-18 VITALS
RESPIRATION RATE: 16 BRPM | BODY MASS INDEX: 24.32 KG/M2 | TEMPERATURE: 97.7 F | HEART RATE: 62 BPM | WEIGHT: 136.25 LBS | OXYGEN SATURATION: 96 % | SYSTOLIC BLOOD PRESSURE: 126 MMHG | DIASTOLIC BLOOD PRESSURE: 84 MMHG

## 2019-02-18 DIAGNOSIS — G47.09 SECONDARY INSOMNIA: ICD-10-CM

## 2019-02-18 DIAGNOSIS — Z71.84 TRAVEL ADVICE ENCOUNTER: Primary | ICD-10-CM

## 2019-02-18 PROBLEM — K57.90 DIVERTICULOSIS: Status: ACTIVE | Noted: 2018-05-10

## 2019-02-18 PROBLEM — K57.90 DIVERTICULOSIS: Status: RESOLVED | Noted: 2018-05-10 | Resolved: 2019-02-18

## 2019-02-18 PROBLEM — Z96.652 S/P TKR (TOTAL KNEE REPLACEMENT), LEFT: Status: ACTIVE | Noted: 2018-05-15

## 2019-02-18 RX ORDER — CHLORAL HYDRATE 500 MG
2 CAPSULE ORAL DAILY
COMMUNITY
End: 2020-03-13

## 2019-02-18 RX ORDER — ATOVAQUONE AND PROGUANIL HYDROCHLORIDE 250; 100 MG/1; MG/1
1 TABLET, FILM COATED ORAL DAILY
Qty: 16 TABLET | Refills: 0 | Status: SHIPPED | OUTPATIENT
Start: 2019-02-18 | End: 2019-05-24

## 2019-02-18 RX ORDER — AZITHROMYCIN 500 MG/1
500 TABLET, FILM COATED ORAL DAILY
Qty: 3 TABLET | Refills: 0 | Status: SHIPPED | OUTPATIENT
Start: 2019-02-18 | End: 2019-05-24

## 2019-02-18 RX ORDER — MULTIVIT-MIN/IRON/FOLIC ACID/K 18-600-40
CAPSULE ORAL
COMMUNITY
End: 2022-09-02

## 2019-02-18 RX ORDER — ZOLPIDEM TARTRATE 5 MG/1
5 TABLET ORAL
Qty: 30 TABLET | Refills: 0 | Status: SHIPPED | OUTPATIENT
Start: 2019-02-18 | End: 2019-04-08

## 2019-02-18 NOTE — NURSING NOTE
Screening Questionnaire for Adult Immunization    Are you sick today?   No   Do you have allergies to medications, food, a vaccine component or latex?   No   Have you ever had a serious reaction after receiving a vaccination?   No   Do you have a long-term health problem with heart disease, lung disease, asthma, kidney disease, metabolic disease (e.g. diabetes), anemia, or other blood disorder?   No   Do you have cancer, leukemia, HIV/AIDS, or any other immune system problem?   No   In the past 3 months, have you taken medications that affect  your immune system, such as prednisone, other steroids, or anticancer drugs; drugs for the treatment of rheumatoid arthritis, Crohn s disease, or psoriasis; or have you had radiation treatments?   No   Have you had a seizure, or a brain or other nervous system problem?   No   During the past year, have you received a transfusion of blood or blood     products, or been given immune (gamma) globulin or antiviral drug?   No   For women: Are you pregnant or is there a chance you could become        pregnant during the next month?   No   Have you received any vaccinations in the past 4 weeks?   No     Immunization questionnaire answers were all negative.        Per orders of Dr. Harding, injection of Twinrix, Typhoid and Rabies given by Tatiana Kelly. Patient instructed to remain in clinic for 15 minutes afterwards, and to report any adverse reaction to me immediately.       Screening performed by Tatiana Kelly on 2/18/2019 at 10:59 AM.

## 2019-02-18 NOTE — NURSING NOTE
73 year old  Chief Complaint   Patient presents with     travel advice       Blood pressure 126/84, pulse 62, temperature 97.7  F (36.5  C), temperature source Oral, resp. rate 16, weight 61.8 kg (136 lb 4 oz), last menstrual period 01/01/2000, SpO2 96 %, not currently breastfeeding. Body mass index is 24.32 kg/m .  Patient Active Problem List   Diagnosis     Adenocarcinoma of breast (H)     Allergic rhinitis     Hyperlipidemia LDL goal <100     Acquired hypothyroidism     Heart block     Cardiac pacemaker in situ- Medtronic, dual chamber- NOT dependent (Advisa: MRI conditional)     Diverticulosis of large intestine without hemorrhage     BPPV (benign paroxysmal positional vertigo), unspecified laterality     Primary osteoarthritis of left knee     Tear of medial meniscus of left knee     Patellofemoral syndrome, left     S/P TKR (total knee replacement), left     Diverticulosis       Wt Readings from Last 2 Encounters:   02/18/19 61.8 kg (136 lb 4 oz)   06/28/18 60.8 kg (134 lb)     BP Readings from Last 3 Encounters:   02/18/19 126/84   06/28/18 121/74   05/24/18 106/71         Current Outpatient Medications   Medication     acetaminophen (TYLENOL) 500 MG tablet     Ascorbic Acid (VITAMIN C) 500 MG CAPS     aspirin 81 MG tablet     calcium carbonate (OS-DALLAS 500 MG [place]. [place]) 500 MG tablet     cetirizine (ZYRTEC) 10 MG tablet     estradiol (ESTRACE VAGINAL) 0.1 MG/GM cream     fish oil-omega-3 fatty acids 1000 MG capsule     Fluticasone Propionate (FLONASE NA)     levothyroxine (SYNTHROID/LEVOTHROID) 50 MCG tablet     Magnesium 500 MG CAPS     simvastatin (ZOCOR) 20 MG tablet     UNABLE TO FIND     VITAMIN D, CHOLECALCIFEROL, PO     zolpidem (AMBIEN) 5 MG tablet     celecoxib (CELEBREX) 200 MG capsule     MELOXICAM PO     Methylsulfonylmethane (MSM) 1000 MG TABS     Omeprazole (PRILOSEC PO)     polyethylene glycol (MIRALAX/GLYCOLAX) powder     No current facility-administered medications for this visit.         Social History     Tobacco Use     Smoking status: Never Smoker     Smokeless tobacco: Never Used   Substance Use Topics     Alcohol use: Yes     Alcohol/week: 0.0 oz     Comment: 10-14 wine a week      Drug use: No       Health Maintenance Due   Topic Date Due     ADVANCE DIRECTIVE PLANNING Q5 YRS  12/10/2002     ZOSTER IMMUNIZATION (2 of 3) 01/09/2008     BYRON QUESTIONNAIRE 1 YEAR  06/26/2018     PHQ-9 Q1YR  06/26/2018     FALL RISK ASSESSMENT  09/28/2018     LIPID MONITORING Q1 YEAR  02/09/2019       Lab Results   Component Value Date    PAP NIL 06/26/2017 February 18, 2019 10:01 AM

## 2019-02-19 DIAGNOSIS — E78.5 HYPERLIPIDEMIA LDL GOAL <100: ICD-10-CM

## 2019-02-19 RX ORDER — SIMVASTATIN 20 MG
20 TABLET ORAL AT BEDTIME
Qty: 90 TABLET | Refills: 0 | Status: SHIPPED | OUTPATIENT
Start: 2019-02-19 | End: 2019-05-24

## 2019-02-19 NOTE — TELEPHONE ENCOUNTER
Last seen 2/18/19    Medication is being filled for 1 time refill only due to:  Patient needs labs lipid panel plus. Future labs ordered yes.     Nicolle Matamoros RN  February 19, 2019 2:42 PM

## 2019-02-20 ENCOUNTER — ANCILLARY PROCEDURE (OUTPATIENT)
Dept: MAMMOGRAPHY | Facility: CLINIC | Age: 74
End: 2019-02-20
Attending: FAMILY MEDICINE
Payer: MEDICARE

## 2019-02-20 DIAGNOSIS — Z12.31 SCREENING MAMMOGRAM, ENCOUNTER FOR: ICD-10-CM

## 2019-03-04 DIAGNOSIS — E03.9 ACQUIRED HYPOTHYROIDISM: ICD-10-CM

## 2019-03-04 RX ORDER — LEVOTHYROXINE SODIUM 50 UG/1
50 TABLET ORAL DAILY
Qty: 90 TABLET | Refills: 3 | Status: SHIPPED | OUTPATIENT
Start: 2019-03-04 | End: 2019-09-07

## 2019-03-04 NOTE — TELEPHONE ENCOUNTER
Last seen 2/18/19    Prescription approved per [facility] Refill Protocol.    Nicolle MatamorosRN  March 4, 2019 4:46 PM

## 2019-03-21 NOTE — PROGRESS NOTES
SUBJECTIVE:   Sharona Bravo is a 73 year old female who presents to clinic today for a return visit.    # Rib Pain  - history of breast cancer 28 years ago, had lumpectomy and radiation on right  - has had intermittent pain in right axilla/lateral breast since then, last was 4 years ago, usually lasts a couple of months  - pain returned a few months ago, initially mild  - pain builds up over the morning, lasts all day, and tends to fade in the evening  - feels like a sharp pressure  - doesn't hurt when she moves arm  - doesn't affect breathing and no worse with deep breaths  - not worse when physically active  - feels that right breast has shrunk over past few months  - additionally feels generally unwell - not achy  - sleep is poor generally  - heat does not relieve pain  - not relieved with a trial of oxycodone she had left over from knee replacement  - no relief with aleve or 600mg ibuprofen    - no longer follows with oncology  - had normal mammogram on 2/20/19    ROS: Denies fevers, chills, difficulty breathing, abdominal pain    Patient Active Problem List   Diagnosis     Adenocarcinoma of breast (H)     Allergic rhinitis     Hyperlipidemia LDL goal <100     Acquired hypothyroidism     Heart block     Cardiac pacemaker in situ- Medtronic, dual chamber- NOT dependent (Advisa: MRI conditional)     Diverticulosis of large intestine without hemorrhage     BPPV (benign paroxysmal positional vertigo), unspecified laterality     Primary osteoarthritis of left knee     Tear of medial meniscus of left knee     Patellofemoral syndrome, left     S/P TKR (total knee replacement), left     Current Outpatient Medications   Medication     acetaminophen (TYLENOL) 500 MG tablet     Ascorbic Acid (VITAMIN C) 500 MG CAPS     aspirin 81 MG tablet     atovaquone-proguanil (MALARONE) 250-100 MG tablet     calcium carbonate (OS-DALLAS 500 MG [place]. [place]) 500 MG tablet     cetirizine (ZYRTEC) 10 MG tablet     estradiol (ESTRACE  VAGINAL) 0.1 MG/GM cream     fish oil-omega-3 fatty acids 1000 MG capsule     Fluticasone Propionate (FLONASE NA)     levothyroxine (SYNTHROID/LEVOTHROID) 50 MCG tablet     Magnesium 500 MG CAPS     MELOXICAM PO     Methylsulfonylmethane (MSM) 1000 MG TABS     Omeprazole (PRILOSEC PO)     polyethylene glycol (MIRALAX/GLYCOLAX) powder     simvastatin (ZOCOR) 20 MG tablet     UNABLE TO FIND     VITAMIN D, CHOLECALCIFEROL, PO     zolpidem (AMBIEN) 5 MG tablet     celecoxib (CELEBREX) 200 MG capsule     No current facility-administered medications for this visit.      I have reviewed the patient's relevant past medical history.     OBJECTIVE:   /81 (BP Location: Right arm, Patient Position: Sitting, Cuff Size: Adult Regular)   Pulse 68   Temp 97.4  F (36.3  C) (Oral)   Resp 18   Wt 60.4 kg (133 lb 4 oz)   LMP 01/01/2000   SpO2 100%   BMI 23.79 kg/m      Constitutional: well-appearing, appears stated age  Eyes: conjunctivae without erythema, sclera anicteric.   Cardiac: regular rate and rhythm, normal S1/S2, no murmur/rubs/gallops  Respiratory: lungs clear to auscultation bilaterally, normal work of breathing, no wheezes/crackles  Breast: no abnormalities palpated in area of pain indicated and not more tender in that area compared to rest of breast or to contralateral side. Scar tissue palpated at former incision site in right superior lateral breast.   On visual exam with patient seated, right breast is higher than left, appears slightly smaller, and nipple angles laterally relative to left breast.   Remainder of breast exam normal.  Skin: well healed surgical scar in right superior lateral portion of right breast (approximate 11 o'clock) no rashes, lesions, or wounds  Psych: affect is full and appropriate, speech is fluent and non-pressured      ASSESSMENT AND PLAN:     (N64.4) Breast pain  (primary encounter diagnosis)  Comment: Had intermittent pain in the same area on and off since initial breast  surgery 28 years ago. The more concerning feature to her is she reports a change in breast shape/size on right. Normal screening mammogram last month is obviously reassuring, but will proceed with repeat diagnostic mammo and targeted US evaluation of area of pain.  Plan: MA Diagnostic Digital Right, US Breast Right         Limited 1-3 Quadrants          (R53.81) Malaise  Comment: Normal TSH last month. She is more concerned with her breast symptoms and believes that her fatigue/malaise is related to this and expects to feel improved if the breast evaluation is normal. Check basic labs today. If normal and she does improved with the expected good news from her breast imaging, no further evaluation planned.   Plan: CBC with Diff Plt ([facility]), Comprehensive         Metabolic Panel ([facility]), Erythrocyte         sedimentation rate auto    (Z13.220) Screening cholesterol level  Comment:   Plan: Lipid Panel ([facility])          Grant Harding   [facility]  03/22/2019, 12:55 PM

## 2019-03-22 ENCOUNTER — OFFICE VISIT (OUTPATIENT)
Dept: FAMILY MEDICINE | Facility: CLINIC | Age: 74
End: 2019-03-22
Payer: MEDICARE

## 2019-03-22 VITALS
TEMPERATURE: 97.4 F | WEIGHT: 133.25 LBS | OXYGEN SATURATION: 100 % | SYSTOLIC BLOOD PRESSURE: 132 MMHG | DIASTOLIC BLOOD PRESSURE: 81 MMHG | RESPIRATION RATE: 18 BRPM | BODY MASS INDEX: 23.79 KG/M2 | HEART RATE: 68 BPM

## 2019-03-22 DIAGNOSIS — R53.81 MALAISE: ICD-10-CM

## 2019-03-22 DIAGNOSIS — N64.4 BREAST PAIN: Primary | ICD-10-CM

## 2019-03-22 DIAGNOSIS — Z13.220 SCREENING CHOLESTEROL LEVEL: ICD-10-CM

## 2019-03-22 LAB
% GRANULOCYTES: 75.6 %G (ref 40–75)
ALBUMIN SERPL-MCNC: 4 G/DL (ref 3.2–4.5)
ALP SERPL-CCNC: 53 U/L (ref 40–150)
ALT SERPL-CCNC: 20 U/L (ref 0–50)
AST SERPL-CCNC: 26 U/L (ref 0–45)
BILIRUB SERPL-MCNC: 0.9 MG/DL (ref 0.2–1.3)
BUN SERPL-MCNC: 19 MG/DL (ref 7–30)
CALCIUM SERPL-MCNC: 9.6 MG/DL (ref 8.5–10.4)
CHLORIDE SERPLBLD-SCNC: 103 MMOL/L (ref 94–109)
CHOLEST SERPL-MCNC: 207 MG/DL (ref 0–200)
CHOLEST/HDLC SERPL: 2.8 {RATIO} (ref 0–5)
CO2 SERPL-SCNC: 27 MMOL/L (ref 20–32)
CREAT SERPL-MCNC: 0.9 MG/DL (ref 0.6–1.3)
EGFR CALCULATED (BLACK REFERENCE): 79.4
EGFR CALCULATED (NON BLACK REFERENCE): 65.6
ERYTHROCYTE [DISTWIDTH] IN BLOOD BY AUTOMATED COUNT: 13 %
ERYTHROCYTE [SEDIMENTATION RATE] IN BLOOD BY WESTERGREN METHOD: 8 MM/H (ref 0–30)
FASTING SPECIMEN: NO
GLUCOSE SERPL-MCNC: 95 MG/DL (ref 60–109)
GRANULOCYTES #: 5.8 K/UL (ref 1.6–8.3)
HCT VFR BLD AUTO: 45.7 % (ref 35–47)
HDLC SERPL-MCNC: 75 MG/DL
HEMOGLOBIN: 15 G/DL (ref 11.7–15.7)
LDLC SERPL CALC-MCNC: 110 MG/DL (ref 0–129)
LYMPHOCYTES # BLD AUTO: 1.4 K/UL (ref 0.8–5.3)
LYMPHOCYTES NFR BLD AUTO: 18.2 %L (ref 20–48)
MCH RBC QN AUTO: 30.4 PG (ref 26.5–35)
MCHC RBC AUTO-ENTMCNC: 32.8 G/DL (ref 32–36)
MCV RBC AUTO: 92.7 FL (ref 78–100)
MID #: 0.5 K/UL (ref 0–2.2)
MID %: 6.2 %M (ref 0–20)
PLATELET # BLD AUTO: 294 K/UL (ref 150–450)
POTASSIUM SERPL-SCNC: 4.2 MMOL/L (ref 3.4–5.3)
PROT SERPL-MCNC: 7.1 G/DL (ref 6.8–8.8)
RBC # BLD AUTO: 4.93 M/UL (ref 3.8–5.2)
SODIUM SERPL-SCNC: 139 MMOL/L (ref 136–145)
TRIGL SERPL-MCNC: 111 MG/DL (ref 0–150)
VLDL-CHOLESTEROL: 22 (ref 7–32)
WBC # BLD AUTO: 7.7 K/UL (ref 4–11)

## 2019-03-25 ENCOUNTER — ANCILLARY PROCEDURE (OUTPATIENT)
Dept: CARDIOLOGY | Facility: CLINIC | Age: 74
End: 2019-03-25
Attending: INTERNAL MEDICINE
Payer: MEDICARE

## 2019-03-25 ENCOUNTER — ANCILLARY PROCEDURE (OUTPATIENT)
Dept: MAMMOGRAPHY | Facility: CLINIC | Age: 74
End: 2019-03-25
Attending: FAMILY MEDICINE
Payer: MEDICARE

## 2019-03-25 DIAGNOSIS — N64.4 BREAST PAIN: ICD-10-CM

## 2019-03-25 DIAGNOSIS — I44.30 AV BLOCK: ICD-10-CM

## 2019-03-25 NOTE — PATIENT INSTRUCTIONS
It was a pleasure to see you in clinic today.  Please do not hesitate to call with any questions or concerns.  You are scheduled for remote transmissions on 6/26/19, 9/30/19, and 1/2/20.  We look forward to seeing you in clinic at your next device check in 6 months.    Ashley Pickard, RN, MS, CCRN  Electrophysiology Nurse Clinician  [facility]    During Business Hours Please Call:  [phone]  After Hours Please Call:  [phone] - select option #4 and ask for job code [phone]

## 2019-03-26 ENCOUNTER — TELEPHONE (OUTPATIENT)
Dept: ONCOLOGY | Facility: CLINIC | Age: 74
End: 2019-03-26

## 2019-03-26 DIAGNOSIS — Z85.3 PERSONAL HISTORY OF MALIGNANT NEOPLASM OF BREAST: ICD-10-CM

## 2019-03-26 DIAGNOSIS — N64.89 BREAST ASYMMETRY: Primary | ICD-10-CM

## 2019-03-26 NOTE — TELEPHONE ENCOUNTER
ONCOLOGY INTAKE: Records Information      APPT INFORMATION:04/01 at 1245 w/Blaes  Referring provider:  Grant Harding MD  Referring provider s clinic:  [facility]  Reason for visit/diagnosis:  Personal history of malignant neoplasm of breast [Z85.3];Breast asymmetry [N64.89]    Were the records received with the referral (via [employer])? Complete    Has patient been seen for any external appt for this diagnosis (enter clinic/location)? No    ADDITIONAL INFORMATION:  Dx:Personal history of malignant neoplasm of breast [Z85.3];Breast asymmetry [N64.89]: Caller pt: Ref by:Grant Harding MD-[facility]: Records In Epic

## 2019-03-27 NOTE — TELEPHONE ENCOUNTER
RECORDS STATUS - BREAST    RECORDS REQUESTED FROM: Epic   DATE REQUESTED: 3/27/2019   NOTES DETAILS STATUS   OFFICE NOTE from referring provider Complete [facility] Dr. Harding   OFFICE NOTE from medical oncologist NA    OFFICE NOTE from surgeon NA    OFFICE NOTE from radiation oncologist NA    DISCHARGE SUMMARY from hospital NA    DISCHARGE REPORT from the ER NA    OPERATIVE REPORT NA    MEDICATION LIST Complete [facility]   CLINICAL TRIAL TREATMENTS TO DATE NA    LABS     PATHOLOGY REPORTS  (Tissue diagnosis, Stage, ER/ID percentage positive and intensity of staining, HER2 IHC, FISH, and all biopsies from breast and any distant metastasis)                 NA    GENONOMIC TESTING     TYPE:   (Next Generation Sequencing, including Foundation One testing, and Oncotype score) NA    IMAGING (NEED IMAGES & REPORT)     CT SCANS NA    MRI NA    MAMMO Complete Epic   ULTRASOUND Complete Epic   PET NA    BONE SCAN Complete [facility]   BRAIN MRI NA

## 2019-04-01 ENCOUNTER — PRE VISIT (OUTPATIENT)
Dept: ONCOLOGY | Facility: CLINIC | Age: 74
End: 2019-04-01

## 2019-04-01 ENCOUNTER — ONCOLOGY VISIT (OUTPATIENT)
Dept: ONCOLOGY | Facility: CLINIC | Age: 74
End: 2019-04-01
Attending: FAMILY MEDICINE
Payer: MEDICARE

## 2019-04-01 VITALS
DIASTOLIC BLOOD PRESSURE: 77 MMHG | SYSTOLIC BLOOD PRESSURE: 117 MMHG | BODY MASS INDEX: 23.64 KG/M2 | TEMPERATURE: 98.3 F | HEIGHT: 63 IN | OXYGEN SATURATION: 97 % | HEART RATE: 84 BPM | WEIGHT: 133.4 LBS

## 2019-04-01 DIAGNOSIS — N64.89 BREAST ASYMMETRY: ICD-10-CM

## 2019-04-01 DIAGNOSIS — Z85.3 PERSONAL HISTORY OF MALIGNANT NEOPLASM OF BREAST: ICD-10-CM

## 2019-04-01 PROCEDURE — G0463 HOSPITAL OUTPT CLINIC VISIT: HCPCS | Mod: ZF

## 2019-04-01 PROCEDURE — 99204 OFFICE O/P NEW MOD 45 MIN: CPT | Mod: ZP | Performed by: INTERNAL MEDICINE

## 2019-04-01 ASSESSMENT — PAIN SCALES - GENERAL: PAINLEVEL: MODERATE PAIN (4)

## 2019-04-01 ASSESSMENT — MIFFLIN-ST. JEOR: SCORE: 1085.35

## 2019-04-01 NOTE — PROGRESS NOTES
RADIATION ONCOLOGY CONSULT NOTE      PATIENT NAME: Sharona Bravo  MRN: [id number]  : 1945    REFERRAL:  Grant Harding    REASON FOR CONSULTATION: Right breast/axillary pain/tenderness in the context of remote history of stage I breast cancer status post lumpectomy and radiotherapy.    HISTORY OF PRESENT ILLNESS:  Ms. Bravo is a very pleasant 73 year old female with prior history of T1 N0 M0 right sided stage I breast cancer 28 years status post lumpectomy with adjuvant radiotherapy (completed: 1991)     Ms. Bravo reports that approximately 28 years ago she was diagnosed with early stage breast cancer.  She underwent a right breast lumpectomy with complete axillary lymph node dissection and then adjuvant radiotherapy under the care of Dr. Pavel Hardwick.  She did not receive any adjuvant endocrine therapy.  She also denies receiving chemotherapy as part of her treatment course at this time.    Since this time she reports that on and off she has had some discomfort in her right lateral breast and axilla.  This pain has come and gone over the years and before this episode was last present in .  She was treated with gabapentin however after an allergy was discovered on first administration this was discontinued.  She was then admitted to the hospital for type II heart block and pacemaker was installed.  She reports that this right axillary pain resolved at this time.    She reports in the past several weeks this sensation of discomfort has reemerge.  She reports that this pain is approximately a 1/10 today.  She reports that at times his pain may exacerbate to a 6/10.  she describes this as a dull sensation.  She reports no nipple discharge or associated fluctuance of the breast.  She has been unable to alleviating or exacerbating factors.  She does continue to work out 3 times a week with resistive exercises focused on her pectoralis muscles, however she notes no relation to this.   "She denies any relation of this discomfort her clothing or bra choice.  She also reports a longstanding cosmetic asymmetry between her breasts. She notes no skin changes related to her pain.  She has not had any relief from the use of Aleve or a trial of oxycodone.  She reports that she has a sense of being \"unwell\" which has persisted for approximately the past 3 weeks.  She notes that this may be related to her nerves.  She does not report any associated fatigue as she continues to volunteer twice weekly and exercise 3 times weekly. She has recently undergone a screening bilateral mammogram on 19 which was read as BIRADS 2 and an ultrasound of the right breast (3/25/19) which did not show any evidence of cancer.  In regards to her family cancer history, she has a cousin who was diagnosed with ovarian cancer and her mother  of an unknown cancer, either ovarian or pancreatic cancer by her report.  She has never undergone genetic testing.     REVIEW OF SYSTEMS: A 10 point review of systems was obtained. Pertinent findings are noted in the HPI and nursing note from today, but are otherwise unremarkable.     CHEMOTHERAPY HISTORY: None.    RADIATION THERAPY HISTORY: Yes, right breast radiotherapy approximately 28 years ago.    IMPLANTABLE CARDIAC DEVICE: None    PREGNANCY RISK: Postmenopausal    PAST MEDICAL /SURGICAL HISTORY:  Past Medical History:   Diagnosis Date     Malignant neoplasm (H)      Past Surgical History:   Procedure Laterality Date     PACEMAKER/ICD CHECK         ALLERGIES:  Allergies as of 2019 - Reviewed 2019   Allergen Reaction Noted     Gabapentin Swelling and Rash 04/15/2015       MEDICATIONS:  Current Outpatient Medications   Medication Sig Dispense Refill     acetaminophen (TYLENOL) 500 MG tablet Take 1,000 mg by mouth every 8 hours as needed       Ascorbic Acid (VITAMIN C) 500 MG CAPS        aspirin 81 MG tablet Take 81 mg by mouth daily       atovaquone-proguanil (MALARONE) " 250-100 MG tablet Take 1 tablet by mouth daily Start 2 days before travel and continue 7 days after return. 16 tablet 0     calcium carbonate (OS-DALLAS 500 MG [place]. [place]) 500 MG tablet Take 600 mg by mouth daily        cetirizine (ZYRTEC) 10 MG tablet Take 10 mg by mouth daily       estradiol (ESTRACE VAGINAL) 0.1 MG/GM cream Place 2 g vaginally twice a week 42.5 g 6     fish oil-omega-3 fatty acids 1000 MG capsule Take 2 g by mouth daily       Fluticasone Propionate (FLONASE NA)        levothyroxine (SYNTHROID/LEVOTHROID) 50 MCG tablet Take 1 tablet (50 mcg) by mouth daily 90 tablet 3     Magnesium 500 MG CAPS Take 250 mg by mouth daily        MELOXICAM PO Take 7.5 mg by mouth daily       Methylsulfonylmethane (MSM) 1000 MG TABS Take 1,000 mg by mouth 2 times daily        Omeprazole (PRILOSEC PO) Take 20 mg by mouth every morning       polyethylene glycol (MIRALAX/GLYCOLAX) powder Take 17 g by mouth as needed       simvastatin (ZOCOR) 20 MG tablet Take 1 tablet (20 mg) by mouth At Bedtime Needs fasting labs for further refills 90 tablet 0     UNABLE TO FIND MEDICATION NAME: Tumeric       VITAMIN D, CHOLECALCIFEROL, PO Take 800 Units by mouth daily        zolpidem (AMBIEN) 5 MG tablet Take 1 tablet (5 mg) by mouth nightly as needed for sleep 30 tablet 0     celecoxib (CELEBREX) 200 MG capsule Take 200 mg by mouth 2 times daily          FAMILY CANCER HISTORY:  Cousin with ovarian cancer  Mother with an unknown type of cancer (ovarian versus pancreatic) by Ms. Bravo's report.    SOCIAL HISTORY:  Social History     Socioeconomic History     Marital status:      Spouse name: Not on file     Number of children: Not on file     Years of education: Not on file     Highest education level: Not on file   Occupational History     Not on file   Social Needs     Financial resource strain: Not on file     Food insecurity:     Worry: Not on file     Inability: Not on file     Transportation needs:     Medical: Not on file  "    Non-medical: Not on file   Tobacco Use     Smoking status: Never Smoker     Smokeless tobacco: Never Used   Substance and Sexual Activity     Alcohol use: Yes     Alcohol/week: 0.0 oz     Comment: 10-14 wine a week      Drug use: No     Sexual activity: Yes     Partners: Male     Birth control/protection: Post-menopausal   Lifestyle     Physical activity:     Days per week: Not on file     Minutes per session: Not on file     Stress: Not on file   Relationships     Social connections:     Talks on phone: Not on file     Gets together: Not on file     Attends [employer] service: Not on file     Active member of club or organization: Not on file     Attends meetings of clubs or organizations: Not on file     Relationship status: Not on file     Intimate partner violence:     Fear of current or ex partner: Not on file     Emotionally abused: Not on file     Physically abused: Not on file     Forced sexual activity: Not on file   Other Topics Concern     Parent/sibling w/ CABG, MI or angioplasty before 65F 55M? Not Asked   Social History Narrative     Not on file       PHYSICAL EXAM:  Vital Signs: /77 (BP Location: Right arm, Patient Position: Sitting, Cuff Size: Adult Regular)   Pulse 84   Temp 98.3  F (36.8  C) (Oral)   Ht 1.594 m (5' 2.76\")   Wt 60.5 kg (133 lb 6.4 oz)   LMP 01/01/2000   SpO2 97%   BMI 23.81 kg/m    Gen: NAD  Eyes: EOMI, sclera anicteric  HENT      Head: NC/AT     Ears: No external auricular lesions     Nose/sinus: No rhinorrhea or epistaxis     Oral Cavity/Oropharynx: MMM  Pulm: Breathing comfortably on room air  CV: No visible cyanosis  Skin: Normal color and turgor of the visualized skin  Neurologic/MSK/psych: A&Ox3, Gross motor movements against gravity noted in the upper and lower extremities bilaterally  Breast and lymphatics: left breast without any palpable skin changes, nipple changes.  Right breast with expected post-radiation fibrosis.  No discharge from the right or left " nipple.  No retraction of the right or left nipple. Moderate tenderness in the right axilla to palpation. No axillary, supraclavicular, or infraclavicular lymphadenopathy bilaterally.     ECOG PERFORMANCE STATUS: 0    RADIOLOGY AND LABORATORIES: Relevant studies reviewed as above outlined in HPI.     ASSESSMENT/RECOMMENDATIONS: In summary, Ms. Bravo is a very pleasant 73 year old female with prior history of T1 N0 M0 right sided stage I breast cancer 28 years status post lumpectomy with adjuvant radiotherapy (completed: 2/20/1991).  She currently has no clinical or radiographic evidence of recurrence at this time.  She is a candidate for continued clinical and radiographic monitoring for breast cancer recurrence.  Given her unknown hormone receptor status and her long disease-free interval we did not recommend anti-hormonal prophylaxis.  On the basis of her family history she is on the borderline of meeting criteria to undergo genetic screening, however after discussion we will proceed with genetic screening.    PLAN:  1. RTC with Dr. Venegas on an as needed basis.   2. Continued routine follow-up with Dr. Jean as scheduled with follow-up for malaise should it persist  3. Referral to genetics given history of cousin with ovarian cancer and mother with possible ovarian (versus pancreatic) cancer.   4. Continued routine mammographic surveillance at direction of Dr. Jean    The patient was seen and discussed with staff, Dr. Venegas. Thank you for involving us in the care of this patient.  Please feel free to contact us with questions or concerns at any time.      Manfred Woodard MD  Radiation Oncology Resident, PGY-4  [facility]  Phone: [phone]    Addendum;  Pt was seen and evaluated by me with Dr Manfred Woodard.      Ms. Bravo has a history of a T1 N0 M0 right sided stage I breast cancer 28 years status post lumpectomy with adjuvant radiotherapy (completed: 2/20/1991).   She currently has no clinical or radiographic evidence of recurrence at this time.      She is a candidate for continued clinical and radiographic monitoring for breast cancer recurrence.      Given her unknown hormone receptor status and her long disease-free interval we did not recommend anti-hormonal prophylaxis.      On the basis of her family history she is on the borderline of meeting criteria to undergo genetic screening, however after discussion we will proceed with genetic screening.    PRN follow up in the oncology clinic.  We discussed annual mammography.    CC  Patient Care Team:  Ruslan Jean MD as PCP - General (Family Practice)  Sonny Gupta MD as MD (OB/Gyn)  Melissa Álvarez PA-C as Physician Assistant (Family Practice)  Yang Aceves MD as MD ([facility])  KEMI HARRIS

## 2019-04-01 NOTE — NURSING NOTE
"Oncology Rooming Note    April 1, 2019 12:57 PM   Sharona Bravo is a 73 year old female who presents for:    Chief Complaint   Patient presents with     Oncology Clinic Visit     Complete [facility] malignant neoplasm of breast      Initial Vitals: LMP 01/01/2000  Estimated body mass index is 23.79 kg/m  as calculated from the following:    Height as of 6/28/18: 1.594 m (5' 2.76\").    Weight as of 3/22/19: 60.4 kg (133 lb 4 oz). There is no height or weight on file to calculate BSA.  Data Unavailable Comment: Data Unavailable   Patient's last menstrual period was 01/01/2000.  Allergies reviewed: Yes  Medications reviewed: Yes    Medications: Medication refills not needed today.  Pharmacy name entered into alike:    [id number] - [address]  [facility][id number] - [place], [place] - [address] [place] [address]    Clinical concerns: pain on the right side. Miguel A was notified.      Nicole Jose Guadalupe, JOSE              "

## 2019-04-01 NOTE — LETTER
2019       RE: Sharona Bravo  [address]  [facility] [place] [address]     Dear Colleague,    Thank you for referring your patient, Sharona Bravo, to the [facility]. Please see a copy of my visit note below.    RADIATION ONCOLOGY CONSULT NOTE      PATIENT NAME: Sharona Bravo  MRN: [id number]  : 1945    REFERRAL:  Grant Harding    REASON FOR CONSULTATION: Right breast/axillary pain/tenderness in the context of remote history of stage I breast cancer status post lumpectomy and radiotherapy.    HISTORY OF PRESENT ILLNESS:  Ms. Bravo is a very pleasant 73 year old female with prior history of T1 N0 M0 right sided stage I breast cancer 28 years status post lumpectomy with adjuvant radiotherapy (completed: 1991)     Ms. Bravo reports that approximately 28 years ago she was diagnosed with early stage breast cancer.  She underwent a right breast lumpectomy with complete axillary lymph node dissection and then adjuvant radiotherapy under the care of Dr. Pavel Hardwick.  She did not receive any adjuvant endocrine therapy.  She also denies receiving chemotherapy as part of her treatment course at this time.    Since this time she reports that on and off she has had some discomfort in her right lateral breast and axilla.  This pain has come and gone over the years and before this episode was last present in .  She was treated with gabapentin however after an allergy was discovered on first administration this was discontinued.  She was then admitted to the hospital for type II heart block and pacemaker was installed.  She reports that this right axillary pain resolved at this time.    She reports in the past several weeks this sensation of discomfort has reemerge.  She reports that this pain is approximately a 1/10 today.  She reports that at times his pain may exacerbate to a 6/10.  she describes this as a dull sensation.  She reports no nipple  "discharge or associated fluctuance of the breast.  She has been unable to alleviating or exacerbating factors.  She does continue to work out 3 times a week with resistive exercises focused on her pectoralis muscles, however she notes no relation to this.  She denies any relation of this discomfort her clothing or bra choice.  She also reports a longstanding cosmetic asymmetry between her breasts. She notes no skin changes related to her pain.  She has not had any relief from the use of Aleve or a trial of oxycodone.  She reports that she has a sense of being \"unwell\" which has persisted for approximately the past 3 weeks.  She notes that this may be related to her nerves.  She does not report any associated fatigue as she continues to volunteer twice weekly and exercise 3 times weekly. She has recently undergone a screening bilateral mammogram on 19 which was read as BIRADS 2 and an ultrasound of the right breast (3/25/19) which did not show any evidence of cancer.  In regards to her family cancer history, she has a cousin who was diagnosed with ovarian cancer and her mother  of an unknown cancer, either ovarian or pancreatic cancer by her report.  She has never undergone genetic testing.     REVIEW OF SYSTEMS: A 10 point review of systems was obtained. Pertinent findings are noted in the HPI and nursing note from today, but are otherwise unremarkable.     CHEMOTHERAPY HISTORY: None.    RADIATION THERAPY HISTORY: Yes, right breast radiotherapy approximately 28 years ago.    IMPLANTABLE CARDIAC DEVICE: None    PREGNANCY RISK: Postmenopausal    PAST MEDICAL /SURGICAL HISTORY:  Past Medical History:   Diagnosis Date     Malignant neoplasm (H)      Past Surgical History:   Procedure Laterality Date     PACEMAKER/ICD CHECK         ALLERGIES:  Allergies as of 2019 - Reviewed 2019   Allergen Reaction Noted     Gabapentin Swelling and Rash 04/15/2015       MEDICATIONS:  Current Outpatient Medications "   Medication Sig Dispense Refill     acetaminophen (TYLENOL) 500 MG tablet Take 1,000 mg by mouth every 8 hours as needed       Ascorbic Acid (VITAMIN C) 500 MG CAPS        aspirin 81 MG tablet Take 81 mg by mouth daily       atovaquone-proguanil (MALARONE) 250-100 MG tablet Take 1 tablet by mouth daily Start 2 days before travel and continue 7 days after return. 16 tablet 0     calcium carbonate (OS-DALLAS 500 MG [place]. [place]) 500 MG tablet Take 600 mg by mouth daily        cetirizine (ZYRTEC) 10 MG tablet Take 10 mg by mouth daily       estradiol (ESTRACE VAGINAL) 0.1 MG/GM cream Place 2 g vaginally twice a week 42.5 g 6     fish oil-omega-3 fatty acids 1000 MG capsule Take 2 g by mouth daily       Fluticasone Propionate (FLONASE NA)        levothyroxine (SYNTHROID/LEVOTHROID) 50 MCG tablet Take 1 tablet (50 mcg) by mouth daily 90 tablet 3     Magnesium 500 MG CAPS Take 250 mg by mouth daily        MELOXICAM PO Take 7.5 mg by mouth daily       Methylsulfonylmethane (MSM) 1000 MG TABS Take 1,000 mg by mouth 2 times daily        Omeprazole (PRILOSEC PO) Take 20 mg by mouth every morning       polyethylene glycol (MIRALAX/GLYCOLAX) powder Take 17 g by mouth as needed       simvastatin (ZOCOR) 20 MG tablet Take 1 tablet (20 mg) by mouth At Bedtime Needs fasting labs for further refills 90 tablet 0     UNABLE TO FIND MEDICATION NAME: Tumeric       VITAMIN D, CHOLECALCIFEROL, PO Take 800 Units by mouth daily        zolpidem (AMBIEN) 5 MG tablet Take 1 tablet (5 mg) by mouth nightly as needed for sleep 30 tablet 0     celecoxib (CELEBREX) 200 MG capsule Take 200 mg by mouth 2 times daily          FAMILY CANCER HISTORY:  Cousin with ovarian cancer  Mother with an unknown type of cancer (ovarian versus pancreatic) by Ms. Bravo's report.    SOCIAL HISTORY:  Social History     Socioeconomic History     Marital status:      Spouse name: Not on file     Number of children: Not on file     Years of education: Not on file      "Highest education level: Not on file   Occupational History     Not on file   Social Needs     Financial resource strain: Not on file     Food insecurity:     Worry: Not on file     Inability: Not on file     Transportation needs:     Medical: Not on file     Non-medical: Not on file   Tobacco Use     Smoking status: Never Smoker     Smokeless tobacco: Never Used   Substance and Sexual Activity     Alcohol use: Yes     Alcohol/week: 0.0 oz     Comment: 10-14 wine a week      Drug use: No     Sexual activity: Yes     Partners: Male     Birth control/protection: Post-menopausal   Lifestyle     Physical activity:     Days per week: Not on file     Minutes per session: Not on file     Stress: Not on file   Relationships     Social connections:     Talks on phone: Not on file     Gets together: Not on file     Attends [employer] service: Not on file     Active member of club or organization: Not on file     Attends meetings of clubs or organizations: Not on file     Relationship status: Not on file     Intimate partner violence:     Fear of current or ex partner: Not on file     Emotionally abused: Not on file     Physically abused: Not on file     Forced sexual activity: Not on file   Other Topics Concern     Parent/sibling w/ CABG, MI or angioplasty before 65F 55M? Not Asked   Social History Narrative     Not on file       PHYSICAL EXAM:  Vital Signs: /77 (BP Location: Right arm, Patient Position: Sitting, Cuff Size: Adult Regular)   Pulse 84   Temp 98.3  F (36.8  C) (Oral)   Ht 1.594 m (5' 2.76\")   Wt 60.5 kg (133 lb 6.4 oz)   LMP 01/01/2000   SpO2 97%   BMI 23.81 kg/m     Gen: NAD  Eyes: EOMI, sclera anicteric  HENT      Head: NC/AT     Ears: No external auricular lesions     Nose/sinus: No rhinorrhea or epistaxis     Oral Cavity/Oropharynx: MMM  Pulm: Breathing comfortably on room air  CV: No visible cyanosis  Skin: Normal color and turgor of the visualized skin  Neurologic/MSK/psych: A&Ox3, Gross motor " movements against gravity noted in the upper and lower extremities bilaterally  Breast and lymphatics: left breast without any palpable skin changes, nipple changes.  Right breast with expected post-radiation fibrosis.  No discharge from the right or left nipple.  No retraction of the right or left nipple. Moderate tenderness in the right axilla to palpation. No axillary, supraclavicular, or infraclavicular lymphadenopathy bilaterally.     ECOG PERFORMANCE STATUS: 0    RADIOLOGY AND LABORATORIES: Relevant studies reviewed as above outlined in HPI.     ASSESSMENT/RECOMMENDATIONS: In summary, Ms. Bravo is a very pleasant 73 year old female with prior history of T1 N0 M0 right sided stage I breast cancer 28 years status post lumpectomy with adjuvant radiotherapy (completed: 2/20/1991).  She currently has no clinical or radiographic evidence of recurrence at this time.  She is a candidate for continued clinical and radiographic monitoring for breast cancer recurrence.  Given her unknown hormone receptor status and her long disease-free interval we did not recommend anti-hormonal prophylaxis.  On the basis of her family history she is on the borderline of meeting criteria to undergo genetic screening, however after discussion we will proceed with genetic screening.    PLAN:  1. RTC with Dr. Venegas on an as needed basis.   2. Continued routine follow-up with Dr. Jean as scheduled with follow-up for malaise should it persist  3. Referral to genetics given history of cousin with ovarian cancer and mother with possible ovarian (versus pancreatic) cancer.   4. Continued routine mammographic surveillance at direction of Dr. Jean    The patient was seen and discussed with staff, Dr. Venegas. Thank you for involving us in the care of this patient.  Please feel free to contact us with questions or concerns at any time.      Manfred Woodard MD  Radiation Oncology Resident, PGY-4  [facility]  Phone: [phone]    Addendum;  Pt was seen and evaluated by me with Dr Manfred Woodard.      Ms. Bravo has a history of a T1 N0 M0 right sided stage I breast cancer 28 years status post lumpectomy with adjuvant radiotherapy (completed: 2/20/1991).  She currently has no clinical or radiographic evidence of recurrence at this time.      She is a candidate for continued clinical and radiographic monitoring for breast cancer recurrence.      Given her unknown hormone receptor status and her long disease-free interval we did not recommend anti-hormonal prophylaxis.      On the basis of her family history she is on the borderline of meeting criteria to undergo genetic screening, however after discussion we will proceed with genetic screening.    PRN follow up in the oncology clinic.  We discussed annual mammography.    CC  Patient Care Team:  Ruslan Jean MD as PCP - General (Family Practice)  Sonny Gupta MD as MD (OB/Gyn)  Melissa Álvarez PA-C as Physician Assistant (Family Practice)  Yang Aceves MD as MD ([facility])  KEMI HARRIS

## 2019-04-08 DIAGNOSIS — G47.09 SECONDARY INSOMNIA: ICD-10-CM

## 2019-04-08 DIAGNOSIS — Z23 NEED FOR HEPATITIS A AND B VACCINATION: Primary | ICD-10-CM

## 2019-04-08 DIAGNOSIS — Z71.84 TRAVEL ADVICE ENCOUNTER: ICD-10-CM

## 2019-04-08 RX ORDER — ZOLPIDEM TARTRATE 5 MG/1
5 TABLET ORAL
Qty: 30 TABLET | Refills: 0
Start: 2019-04-08 | End: 2020-01-27

## 2019-04-10 ENCOUNTER — ANCILLARY PROCEDURE (OUTPATIENT)
Dept: CARDIOLOGY | Facility: CLINIC | Age: 74
End: 2019-04-10
Attending: INTERNAL MEDICINE
Payer: MEDICARE

## 2019-04-10 ENCOUNTER — OFFICE VISIT (OUTPATIENT)
Dept: CARDIOLOGY | Facility: CLINIC | Age: 74
End: 2019-04-10
Attending: NURSE PRACTITIONER
Payer: MEDICARE

## 2019-04-10 VITALS
OXYGEN SATURATION: 97 % | SYSTOLIC BLOOD PRESSURE: 116 MMHG | DIASTOLIC BLOOD PRESSURE: 72 MMHG | BODY MASS INDEX: 23.92 KG/M2 | HEIGHT: 63 IN | HEART RATE: 64 BPM | WEIGHT: 135 LBS

## 2019-04-10 DIAGNOSIS — Z95.0 S/P PLACEMENT OF CARDIAC PACEMAKER: Primary | ICD-10-CM

## 2019-04-10 DIAGNOSIS — I44.30 AV BLOCK: ICD-10-CM

## 2019-04-10 PROCEDURE — 99203 OFFICE O/P NEW LOW 30 MIN: CPT | Mod: ZP | Performed by: NURSE PRACTITIONER

## 2019-04-10 PROCEDURE — G0463 HOSPITAL OUTPT CLINIC VISIT: HCPCS | Mod: 25,ZF

## 2019-04-10 ASSESSMENT — ENCOUNTER SYMPTOMS
WEIGHT LOSS: 0
ALTERED TEMPERATURE REGULATION: 0
NIGHT SWEATS: 0
HALLUCINATIONS: 0
CHILLS: 0
POLYPHAGIA: 0
BREAST MASS: 0
DECREASED APPETITE: 0
FEVER: 0
BREAST PAIN: 1
WEIGHT GAIN: 0
INCREASED ENERGY: 0
POLYDIPSIA: 0
FATIGUE: 0

## 2019-04-10 ASSESSMENT — PAIN SCALES - GENERAL: PAINLEVEL: NO PAIN (0)

## 2019-04-10 ASSESSMENT — MIFFLIN-ST. JEOR: SCORE: 1096.49

## 2019-04-10 NOTE — LETTER
4/10/2019      RE: Sharona Bravo  [address]  [facility] [address]       Dear Colleague,    Thank you for the opportunity to participate in the care of your patient, Sharona Bravo, at the [facility]. Please see a copy of my visit note below.    Electrophysiology Clinic Note  HPI:   Ms. Bravo is a 73 year old female who has a past medical history significant for breast cancer s/p radiation and lumpectomy, hypothyroidism, HLD, and symptomatic bradycardia (Mobitz II) s/p PPM 6/23/2015.     She was admitted from clinic in 6/2015 with bradycardia. She had been feeling fatigue especially when she exerted herself and even climbing a flight of stairs was more difficult for her. She was found to be in Mobitz II Block and was subsequently transferred here for admission. Telemetry showed intermittent episodes of SB 50's and episodes of Mobitz II 30's. ECG from 2013 showed baseline bifascicular block. Echo Showed LVEF hyperkinetic with an EF of 65-70%. Right ventricular function, chamber size, wall motion, and thickness are normal. She ultimately underwent a dual chamber PPM implant on 6/23/15.     She presents today for follow up. She reports feeling well and has no complaints. She is active without limitation. She denies any chest pain/pressures, dizziness, lightheadedness, worsening shortness of breath, leg/ankle swelling, PND, orthopnea, palpitations, or syncopal symptoms. Device interrogation shows normal pacemaker function. No episodes/arrhythmias recorded. Intrinsic rhythm = SB 56 bpm with CHB no R waves @ VVI 30 bpm. AP= 37.7%.  = 99.8%. No short v-v intervals recorded. Lead trends appear stable. Last echo from 4/2018 showed LVEF 55-60%, normal RV size and function, and no valvular issues. Current cardiac medications include: ASA and Zocor.     PAST MEDICAL HISTORY:  Past Medical History:   Diagnosis Date     Malignant neoplasm  (H)        CURRENT MEDICATIONS:  Current Outpatient Medications   Medication Sig Dispense Refill     acetaminophen (TYLENOL) 500 MG tablet Take 1,000 mg by mouth every 8 hours as needed       Ascorbic Acid (VITAMIN C) 500 MG CAPS        aspirin 81 MG tablet Take 81 mg by mouth daily       atovaquone-proguanil (MALARONE) 250-100 MG tablet Take 1 tablet by mouth daily Start 2 days before travel and continue 7 days after return. 16 tablet 0     calcium carbonate (OS-DALLAS 500 MG [place]. [place]) 500 MG tablet Take 600 mg by mouth daily        celecoxib (CELEBREX) 200 MG capsule Take 200 mg by mouth 2 times daily       cetirizine (ZYRTEC) 10 MG tablet Take 10 mg by mouth daily       estradiol (ESTRACE VAGINAL) 0.1 MG/GM cream Place 2 g vaginally twice a week 42.5 g 6     fish oil-omega-3 fatty acids 1000 MG capsule Take 2 g by mouth daily       Fluticasone Propionate (FLONASE NA)        levothyroxine (SYNTHROID/LEVOTHROID) 50 MCG tablet Take 1 tablet (50 mcg) by mouth daily 90 tablet 3     Magnesium 500 MG CAPS Take 250 mg by mouth daily        MELOXICAM PO Take 7.5 mg by mouth daily       Methylsulfonylmethane (MSM) 1000 MG TABS Take 1,000 mg by mouth 2 times daily        Omeprazole (PRILOSEC PO) Take 20 mg by mouth every morning       polyethylene glycol (MIRALAX/GLYCOLAX) powder Take 17 g by mouth as needed       simvastatin (ZOCOR) 20 MG tablet Take 1 tablet (20 mg) by mouth At Bedtime Needs fasting labs for further refills 90 tablet 0     UNABLE TO FIND MEDICATION NAME: Tumeric       VITAMIN D, CHOLECALCIFEROL, PO Take 800 Units by mouth daily        zolpidem (AMBIEN) 5 MG tablet Take 1 tablet (5 mg) by mouth nightly as needed for sleep 30 tablet 0       PAST SURGICAL HISTORY:  Past Surgical History:   Procedure Laterality Date     PACEMAKER/ICD CHECK         ALLERGIES:     Allergies   Allergen Reactions     Gabapentin Swelling and Rash     Face swelling       FAMILY HISTORY:  Family History   Problem Relation Age of Onset  "    Cancer Mother      Heart Disease Father        SOCIAL HISTORY:  Social History     Tobacco Use     Smoking status: Never Smoker     Smokeless tobacco: Never Used   Substance Use Topics     Alcohol use: Yes     Alcohol/week: 0.0 oz     Comment: 10-14 wine a week      Drug use: No       ROS:   A comprehensive 10 point review of systems negative other than as mentioned in HPI.  Exam:  /72 (BP Location: Right arm)   Pulse 64   Ht 1.6 m (5' 3\")   Wt 61.2 kg (135 lb)   LMP 01/01/2000   SpO2 97%   BMI 23.91 kg/m     GENERAL APPEARANCE: alert and no distress  HEENT: no icterus, no xanthelasmas, normal pupil size and reaction, normal palate, mucosa moist, no central cyanosis  NECK: no adenopathy, no asymmetry, masses, or scars, thyroid normal to palpation and no bruits, JVP not elevated  RESPIRATORY: lungs clear to auscultation - no rales, rhonchi or wheezes, no use of accessory muscles, no retractions, respirations are unlabored, normal respiratory rate  CARDIOVASCULAR: regular rhythm, normal S1 with physiologic split S2, no S3 or S4 and no murmur, click or rub, precordium quiet with normal PMI.  ABDOMEN: soft, non tender, bowel sounds normal, non-distended  EXTREMITIES: peripheral pulses normal, no edema  NEURO: alert and oriented to person/place/time, normal speech, gait and affect  SKIN: no ecchymoses, no rashes  PSYCH: normal affect, cooperative    Labs:  Reviewed.     Testing/Procedures:  4/2018 ECHOCARDIOGRAM:   Interpretation Summary  Left ventricular function is normal.The EF is 55-60%.  The right ventricle is normal size. Global right ventricular function is  normal.  No significant valvular dysfunction noted.  Pulmonary artery systolic pressure is normal.  The inferior vena cava was normal in size with preserved respiratory  variability.  Estimated mean right atrial pressure is 3 mmHg.     This study was compared with the study from 6/23/15 . There has been no  change.      Assessment and Plan: "   Ms. Bravo is a 73 year old female who has a past medical history significant for breast cancer s/p radiation and lumpectomy, hypothyroidism, HLD, and symptomatic bradycardia (Mobitz II) s/p PPM 6/23/2015. She was admitted from clinic in 6/2015 with bradycardia. She had been feeling fatigue especially when she exerted herself and even climbing a flight of stairs was more difficult for her. She was found to be in Mobitz II Block and was subsequently transferred here for admission. Telemetry showed intermittent episodes of SB 50's and episodes of Mobitz II 30's. ECG from 2013 showed baseline bifascicular block. She ultimately underwent a dual chamber PPM implant on 6/23/15. She presents today for follow up. She reports feeling well and has no complaints. She is active without limitation. Device interrogation shows normal pacemaker function. No episodes/arrhythmias recorded. Intrinsic rhythm = SB 56 bpm with CHB no R waves @ VVI 30 bpm. AP= 37.7%.  = 99.8%. No short v-v intervals recorded. Lead trends appear stable. Last echo from 4/2018 showed LVEF 55-60%, normal RV size and function, and no valvular issues.     She is doing well from an EP standpoint. Device is functioning well with stable lead parameters. She has been 100% RV paced, so would like to get an echo at next follow up to ensure stability. Otherwise, no changes are required.     Follow up in 1 year with echo.     The patient states understanding and is agreeable with plan.     MARIN Zhang CNP  Pager: [phone]    CC  CANDELARIO FRANKLIN

## 2019-04-10 NOTE — NURSING NOTE
Chief Complaint   Patient presents with     Follow Up     Establish [facility] care.      Vitals were taken and medications were reconciled.     Latisha Melendez RMA  9:29 AM

## 2019-04-10 NOTE — PROGRESS NOTES
Electrophysiology Clinic Note  HPI:   Ms. Bravo is a 73 year old female who has a past medical history significant for breast cancer s/p radiation and lumpectomy, hypothyroidism, HLD, and symptomatic bradycardia (Mobitz II) s/p PPM 6/23/2015.     She was admitted from clinic in 6/2015 with bradycardia. She had been feeling fatigue especially when she exerted herself and even climbing a flight of stairs was more difficult for her. She was found to be in Mobitz II Block and was subsequently transferred here for admission. Telemetry showed intermittent episodes of SB 50's and episodes of Mobitz II 30's. ECG from 2013 showed baseline bifascicular block. Echo Showed LVEF hyperkinetic with an EF of 65-70%. Right ventricular function, chamber size, wall motion, and thickness are normal. She ultimately underwent a dual chamber PPM implant on 6/23/15.     She presents today for follow up. She reports feeling well and has no complaints. She is active without limitation. She denies any chest pain/pressures, dizziness, lightheadedness, worsening shortness of breath, leg/ankle swelling, PND, orthopnea, palpitations, or syncopal symptoms. Device interrogation shows normal pacemaker function. No episodes/arrhythmias recorded. Intrinsic rhythm = SB 56 bpm with CHB no R waves @ VVI 30 bpm. AP= 37.7%.  = 99.8%. No short v-v intervals recorded. Lead trends appear stable. Last echo from 4/2018 showed LVEF 55-60%, normal RV size and function, and no valvular issues. Current cardiac medications include: ASA and Zocor.     PAST MEDICAL HISTORY:  Past Medical History:   Diagnosis Date     Malignant neoplasm (H)        CURRENT MEDICATIONS:  Current Outpatient Medications   Medication Sig Dispense Refill     acetaminophen (TYLENOL) 500 MG tablet Take 1,000 mg by mouth every 8 hours as needed       Ascorbic Acid (VITAMIN C) 500 MG CAPS        aspirin 81 MG tablet Take 81 mg by mouth daily       atovaquone-proguanil (MALARONE) 250-100 MG  tablet Take 1 tablet by mouth daily Start 2 days before travel and continue 7 days after return. 16 tablet 0     calcium carbonate (OS-DALLAS 500 MG [place]. [place]) 500 MG tablet Take 600 mg by mouth daily        celecoxib (CELEBREX) 200 MG capsule Take 200 mg by mouth 2 times daily       cetirizine (ZYRTEC) 10 MG tablet Take 10 mg by mouth daily       estradiol (ESTRACE VAGINAL) 0.1 MG/GM cream Place 2 g vaginally twice a week 42.5 g 6     fish oil-omega-3 fatty acids 1000 MG capsule Take 2 g by mouth daily       Fluticasone Propionate (FLONASE NA)        levothyroxine (SYNTHROID/LEVOTHROID) 50 MCG tablet Take 1 tablet (50 mcg) by mouth daily 90 tablet 3     Magnesium 500 MG CAPS Take 250 mg by mouth daily        MELOXICAM PO Take 7.5 mg by mouth daily       Methylsulfonylmethane (MSM) 1000 MG TABS Take 1,000 mg by mouth 2 times daily        Omeprazole (PRILOSEC PO) Take 20 mg by mouth every morning       polyethylene glycol (MIRALAX/GLYCOLAX) powder Take 17 g by mouth as needed       simvastatin (ZOCOR) 20 MG tablet Take 1 tablet (20 mg) by mouth At Bedtime Needs fasting labs for further refills 90 tablet 0     UNABLE TO FIND MEDICATION NAME: Tumeric       VITAMIN D, CHOLECALCIFEROL, PO Take 800 Units by mouth daily        zolpidem (AMBIEN) 5 MG tablet Take 1 tablet (5 mg) by mouth nightly as needed for sleep 30 tablet 0       PAST SURGICAL HISTORY:  Past Surgical History:   Procedure Laterality Date     PACEMAKER/ICD CHECK         ALLERGIES:     Allergies   Allergen Reactions     Gabapentin Swelling and Rash     Face swelling       FAMILY HISTORY:  Family History   Problem Relation Age of Onset     Cancer Mother      Heart Disease Father        SOCIAL HISTORY:  Social History     Tobacco Use     Smoking status: Never Smoker     Smokeless tobacco: Never Used   Substance Use Topics     Alcohol use: Yes     Alcohol/week: 0.0 oz     Comment: 10-14 wine a week      Drug use: No       ROS:   A comprehensive 10 point review of  "systems negative other than as mentioned in HPI.  Exam:  /72 (BP Location: Right arm)   Pulse 64   Ht 1.6 m (5' 3\")   Wt 61.2 kg (135 lb)   LMP 01/01/2000   SpO2 97%   BMI 23.91 kg/m    GENERAL APPEARANCE: alert and no distress  HEENT: no icterus, no xanthelasmas, normal pupil size and reaction, normal palate, mucosa moist, no central cyanosis  NECK: no adenopathy, no asymmetry, masses, or scars, thyroid normal to palpation and no bruits, JVP not elevated  RESPIRATORY: lungs clear to auscultation - no rales, rhonchi or wheezes, no use of accessory muscles, no retractions, respirations are unlabored, normal respiratory rate  CARDIOVASCULAR: regular rhythm, normal S1 with physiologic split S2, no S3 or S4 and no murmur, click or rub, precordium quiet with normal PMI.  ABDOMEN: soft, non tender, bowel sounds normal, non-distended  EXTREMITIES: peripheral pulses normal, no edema  NEURO: alert and oriented to person/place/time, normal speech, gait and affect  SKIN: no ecchymoses, no rashes  PSYCH: normal affect, cooperative    Labs:  Reviewed.     Testing/Procedures:  4/2018 ECHOCARDIOGRAM:   Interpretation Summary  Left ventricular function is normal.The EF is 55-60%.  The right ventricle is normal size. Global right ventricular function is  normal.  No significant valvular dysfunction noted.  Pulmonary artery systolic pressure is normal.  The inferior vena cava was normal in size with preserved respiratory  variability.  Estimated mean right atrial pressure is 3 mmHg.     This study was compared with the study from 6/23/15 . There has been no  change.      Assessment and Plan:   Ms. Bravo is a 73 year old female who has a past medical history significant for breast cancer s/p radiation and lumpectomy, hypothyroidism, HLD, and symptomatic bradycardia (Mobitz II) s/p PPM 6/23/2015. She was admitted from clinic in 6/2015 with bradycardia. She had been feeling fatigue especially when she exerted herself and " even climbing a flight of stairs was more difficult for her. She was found to be in Mobitz II Block and was subsequently transferred here for admission. Telemetry showed intermittent episodes of SB 50's and episodes of Mobitz II 30's. ECG from 2013 showed baseline bifascicular block. She ultimately underwent a dual chamber PPM implant on 6/23/15. She presents today for follow up. She reports feeling well and has no complaints. She is active without limitation. Device interrogation shows normal pacemaker function. No episodes/arrhythmias recorded. Intrinsic rhythm = SB 56 bpm with CHB no R waves @ VVI 30 bpm. AP= 37.7%.  = 99.8%. No short v-v intervals recorded. Lead trends appear stable. Last echo from 4/2018 showed LVEF 55-60%, normal RV size and function, and no valvular issues.     She is doing well from an EP standpoint. Device is functioning well with stable lead parameters. She has been 100% RV paced, so would like to get an echo at next follow up to ensure stability. Otherwise, no changes are required.     Follow up in 1 year with echo.     The patient states understanding and is agreeable with plan.   MARIN Zhang CNP  Pager: [phone]  CC  [facility]

## 2019-04-10 NOTE — PATIENT INSTRUCTIONS
It was a pleasure to see you in clinic today. Please do not hesitate to call with any questions or concerns. You are scheduled for a remote Pacemaker transmission on 7/10/19. This will happen automatically in the night. We will call in 1-2 business days to discuss the results with you. We look forward to seeing you in clinic at your next device check in 1 year.    Pratima Harding, RN  Electrophysiology Nurse Clinician  [facility]  During business hours call:  [phone]  After business hours please call: [phone]- select option #4 and ask for job code [phone].

## 2019-04-15 ENCOUNTER — ALLIED HEALTH/NURSE VISIT (OUTPATIENT)
Dept: FAMILY MEDICINE | Facility: CLINIC | Age: 74
End: 2019-04-15
Payer: MEDICARE

## 2019-04-15 DIAGNOSIS — Z23 NEED FOR HEPATITIS A AND B VACCINATION: Primary | ICD-10-CM

## 2019-04-15 LAB
MDC_IDC_LEAD_IMPLANT_DT: NORMAL
MDC_IDC_LEAD_IMPLANT_DT: NORMAL
MDC_IDC_LEAD_LOCATION: NORMAL
MDC_IDC_LEAD_LOCATION: NORMAL
MDC_IDC_LEAD_LOCATION_DETAIL_1: NORMAL
MDC_IDC_LEAD_LOCATION_DETAIL_1: NORMAL
MDC_IDC_LEAD_MFG: NORMAL
MDC_IDC_LEAD_MFG: NORMAL
MDC_IDC_LEAD_MODEL: NORMAL
MDC_IDC_LEAD_MODEL: NORMAL
MDC_IDC_LEAD_POLARITY_TYPE: NORMAL
MDC_IDC_LEAD_POLARITY_TYPE: NORMAL
MDC_IDC_LEAD_SERIAL: NORMAL
MDC_IDC_LEAD_SERIAL: NORMAL
MDC_IDC_MSMT_BATTERY_DTM: NORMAL
MDC_IDC_MSMT_BATTERY_REMAINING_LONGEVITY: 68 MO
MDC_IDC_MSMT_BATTERY_RRT_TRIGGER: 2.83
MDC_IDC_MSMT_BATTERY_STATUS: NORMAL
MDC_IDC_MSMT_BATTERY_VOLTAGE: 3 V
MDC_IDC_MSMT_LEADCHNL_RA_IMPEDANCE_VALUE: 361 OHM
MDC_IDC_MSMT_LEADCHNL_RA_IMPEDANCE_VALUE: 418 OHM
MDC_IDC_MSMT_LEADCHNL_RA_PACING_THRESHOLD_AMPLITUDE: 0.5 V
MDC_IDC_MSMT_LEADCHNL_RA_PACING_THRESHOLD_AMPLITUDE: 0.5 V
MDC_IDC_MSMT_LEADCHNL_RA_PACING_THRESHOLD_PULSEWIDTH: 0.4 MS
MDC_IDC_MSMT_LEADCHNL_RA_PACING_THRESHOLD_PULSEWIDTH: 0.4 MS
MDC_IDC_MSMT_LEADCHNL_RA_SENSING_INTR_AMPL: 3.62 MV
MDC_IDC_MSMT_LEADCHNL_RA_SENSING_INTR_AMPL: 3.88 MV
MDC_IDC_MSMT_LEADCHNL_RV_IMPEDANCE_VALUE: 494 OHM
MDC_IDC_MSMT_LEADCHNL_RV_IMPEDANCE_VALUE: 570 OHM
MDC_IDC_MSMT_LEADCHNL_RV_PACING_THRESHOLD_AMPLITUDE: 0.62 V
MDC_IDC_MSMT_LEADCHNL_RV_PACING_THRESHOLD_AMPLITUDE: 0.75 V
MDC_IDC_MSMT_LEADCHNL_RV_PACING_THRESHOLD_PULSEWIDTH: 0.4 MS
MDC_IDC_MSMT_LEADCHNL_RV_PACING_THRESHOLD_PULSEWIDTH: 0.4 MS
MDC_IDC_MSMT_LEADCHNL_RV_SENSING_INTR_AMPL: 19.12 MV
MDC_IDC_PG_IMPLANT_DTM: NORMAL
MDC_IDC_PG_MFG: NORMAL
MDC_IDC_PG_MODEL: NORMAL
MDC_IDC_PG_SERIAL: NORMAL
MDC_IDC_PG_TYPE: NORMAL
MDC_IDC_SESS_CLINIC_NAME: NORMAL
MDC_IDC_SESS_DTM: NORMAL
MDC_IDC_SESS_TYPE: NORMAL
MDC_IDC_SET_BRADY_AT_MODE_SWITCH_RATE: 171 {BEATS}/MIN
MDC_IDC_SET_BRADY_HYSTRATE: NORMAL
MDC_IDC_SET_BRADY_LOWRATE: 60 {BEATS}/MIN
MDC_IDC_SET_BRADY_MAX_SENSOR_RATE: 130 {BEATS}/MIN
MDC_IDC_SET_BRADY_MAX_TRACKING_RATE: 130 {BEATS}/MIN
MDC_IDC_SET_BRADY_MODE: NORMAL
MDC_IDC_SET_BRADY_PAV_DELAY_LOW: 220 MS
MDC_IDC_SET_BRADY_SAV_DELAY_LOW: 200 MS
MDC_IDC_SET_LEADCHNL_RA_PACING_AMPLITUDE: 1.5 V
MDC_IDC_SET_LEADCHNL_RA_PACING_ANODE_ELECTRODE_1: NORMAL
MDC_IDC_SET_LEADCHNL_RA_PACING_ANODE_LOCATION_1: NORMAL
MDC_IDC_SET_LEADCHNL_RA_PACING_CAPTURE_MODE: NORMAL
MDC_IDC_SET_LEADCHNL_RA_PACING_CATHODE_ELECTRODE_1: NORMAL
MDC_IDC_SET_LEADCHNL_RA_PACING_CATHODE_LOCATION_1: NORMAL
MDC_IDC_SET_LEADCHNL_RA_PACING_POLARITY: NORMAL
MDC_IDC_SET_LEADCHNL_RA_PACING_PULSEWIDTH: 0.4 MS
MDC_IDC_SET_LEADCHNL_RA_SENSING_ANODE_ELECTRODE_1: NORMAL
MDC_IDC_SET_LEADCHNL_RA_SENSING_ANODE_LOCATION_1: NORMAL
MDC_IDC_SET_LEADCHNL_RA_SENSING_CATHODE_ELECTRODE_1: NORMAL
MDC_IDC_SET_LEADCHNL_RA_SENSING_CATHODE_LOCATION_1: NORMAL
MDC_IDC_SET_LEADCHNL_RA_SENSING_POLARITY: NORMAL
MDC_IDC_SET_LEADCHNL_RA_SENSING_SENSITIVITY: 0.6 MV
MDC_IDC_SET_LEADCHNL_RV_PACING_AMPLITUDE: 2 V
MDC_IDC_SET_LEADCHNL_RV_PACING_ANODE_ELECTRODE_1: NORMAL
MDC_IDC_SET_LEADCHNL_RV_PACING_ANODE_LOCATION_1: NORMAL
MDC_IDC_SET_LEADCHNL_RV_PACING_CAPTURE_MODE: NORMAL
MDC_IDC_SET_LEADCHNL_RV_PACING_CATHODE_ELECTRODE_1: NORMAL
MDC_IDC_SET_LEADCHNL_RV_PACING_CATHODE_LOCATION_1: NORMAL
MDC_IDC_SET_LEADCHNL_RV_PACING_POLARITY: NORMAL
MDC_IDC_SET_LEADCHNL_RV_PACING_PULSEWIDTH: 0.4 MS
MDC_IDC_SET_LEADCHNL_RV_SENSING_ANODE_ELECTRODE_1: NORMAL
MDC_IDC_SET_LEADCHNL_RV_SENSING_ANODE_LOCATION_1: NORMAL
MDC_IDC_SET_LEADCHNL_RV_SENSING_CATHODE_ELECTRODE_1: NORMAL
MDC_IDC_SET_LEADCHNL_RV_SENSING_CATHODE_LOCATION_1: NORMAL
MDC_IDC_SET_LEADCHNL_RV_SENSING_POLARITY: NORMAL
MDC_IDC_SET_LEADCHNL_RV_SENSING_SENSITIVITY: 0.9 MV
MDC_IDC_SET_ZONE_DETECTION_INTERVAL: 350 MS
MDC_IDC_SET_ZONE_DETECTION_INTERVAL: 400 MS
MDC_IDC_SET_ZONE_TYPE: NORMAL
MDC_IDC_STAT_AT_BURDEN_PERCENT: 0 %
MDC_IDC_STAT_AT_DTM_END: NORMAL
MDC_IDC_STAT_AT_DTM_START: NORMAL
MDC_IDC_STAT_BRADY_AP_VP_PERCENT: 37.65 %
MDC_IDC_STAT_BRADY_AP_VS_PERCENT: 0 %
MDC_IDC_STAT_BRADY_AS_VP_PERCENT: 62.19 %
MDC_IDC_STAT_BRADY_AS_VS_PERCENT: 0.16 %
MDC_IDC_STAT_BRADY_DTM_END: NORMAL
MDC_IDC_STAT_BRADY_DTM_START: NORMAL
MDC_IDC_STAT_BRADY_RA_PERCENT_PACED: 37.56 %
MDC_IDC_STAT_BRADY_RV_PERCENT_PACED: 99.65 %
MDC_IDC_STAT_EPISODE_RECENT_COUNT: 0
MDC_IDC_STAT_EPISODE_RECENT_COUNT_DTM_END: NORMAL
MDC_IDC_STAT_EPISODE_RECENT_COUNT_DTM_START: NORMAL
MDC_IDC_STAT_EPISODE_TOTAL_COUNT: 0
MDC_IDC_STAT_EPISODE_TOTAL_COUNT_DTM_END: NORMAL
MDC_IDC_STAT_EPISODE_TOTAL_COUNT_DTM_START: NORMAL
MDC_IDC_STAT_EPISODE_TYPE: NORMAL

## 2019-04-15 NOTE — NURSING NOTE
Screening Questionnaire for Adult Immunization    Are you sick today?   No   Do you have allergies to medications, food, a vaccine component or latex?   No   Have you ever had a serious reaction after receiving a vaccination?   No   Do you have a long-term health problem with heart disease, lung disease, asthma, kidney disease, metabolic disease (e.g. diabetes), anemia, or other blood disorder?   No   Do you have cancer, leukemia, HIV/AIDS, or any other immune system problem?   No   In the past 3 months, have you taken medications that affect  your immune system, such as prednisone, other steroids, or anticancer drugs; drugs for the treatment of rheumatoid arthritis, Crohn s disease, or psoriasis; or have you had radiation treatments?   No   Have you had a seizure, or a brain or other nervous system problem?   No   During the past year, have you received a transfusion of blood or blood     products, or been given immune (gamma) globulin or antiviral drug?   No   For women: Are you pregnant or is there a chance you could become        pregnant during the next month?   No   Have you received any vaccinations in the past 4 weeks?   No     Immunization questionnaire answers were all negative.        Per orders of Dr. Jean, injection of Twinrix given by Elba Everett. Patient instructed to remain in clinic for 15 minutes afterwards, and to report any adverse reaction to me immediately.       Screening performed by Elba Everett on 4/15/2019 at 9:58 AM.    Sharona Renata Acunaman comes into clinic today at the request of Dr. Jean Ordering Provider for #2 Twinrix.        This service provided today was under the supervising provider of the day Dr. Jean, who was available if needed.    Elba Everett

## 2019-05-24 DIAGNOSIS — E78.5 HYPERLIPIDEMIA LDL GOAL <100: ICD-10-CM

## 2019-05-24 RX ORDER — SIMVASTATIN 20 MG
20 TABLET ORAL AT BEDTIME
Qty: 90 TABLET | Refills: 3 | Status: SHIPPED | OUTPATIENT
Start: 2019-05-24 | End: 2019-08-17

## 2019-05-24 NOTE — TELEPHONE ENCOUNTER
Last time prescribed: 2/19/19 , 90 tabs/caps x 0 refills  Last office visit: 3/22/19  Last labs: 3/22/19  Next appointment: No future appointments    Prescription approved per [facility].  Kayla Sosa RN  05/24/19  3:49 PM

## 2019-06-03 ENCOUNTER — TELEPHONE (OUTPATIENT)
Dept: FAMILY MEDICINE | Facility: CLINIC | Age: 74
End: 2019-06-03

## 2019-06-03 RX ORDER — AMOXICILLIN 500 MG/1
CAPSULE ORAL
Qty: 4 CAPSULE | Refills: 1 | Status: SHIPPED | OUTPATIENT
Start: 2019-06-03 | End: 2019-12-02

## 2019-06-03 NOTE — TELEPHONE ENCOUNTER
----- Message from Ruslan Jean MD sent at 5/31/2019  4:16 PM CDT -----  Regarding: RE: antibiotics  Yes, sure, that's fine: amoxicillin 500 mg, 4 together ~1 hour before dental care.    Thanks!    Ruslan  ----- Message -----  From: Annie Mcmanus RN  Sent: 5/31/2019   3:25 PM  To: Ruslan Jean MD  Subject: antibiotics                                      Hi   Kristal is having dental cleaning on Wed, and is requesting amoxicillin treatment. Had knee replacement a year ago - said ortho doc ordered antibiotics for her the first 2 dental appts, but asked her to get this from her PCP - dentist also asked her to get from the PCP - are you OK with this?  Annie Mcmanus RN  [facility]

## 2019-07-11 ENCOUNTER — ANCILLARY PROCEDURE (OUTPATIENT)
Dept: CARDIOLOGY | Facility: CLINIC | Age: 74
End: 2019-07-11
Attending: INTERNAL MEDICINE
Payer: MEDICARE

## 2019-07-11 DIAGNOSIS — I44.30 AV BLOCK: ICD-10-CM

## 2019-07-11 PROCEDURE — 93294 REM INTERROG EVL PM/LDLS PM: CPT | Performed by: INTERNAL MEDICINE

## 2019-07-11 PROCEDURE — 93296 REM INTERROG EVL PM/IDS: CPT | Mod: ZF

## 2019-07-25 LAB
MDC_IDC_LEAD_IMPLANT_DT: NORMAL
MDC_IDC_LEAD_IMPLANT_DT: NORMAL
MDC_IDC_LEAD_LOCATION: NORMAL
MDC_IDC_LEAD_LOCATION: NORMAL
MDC_IDC_LEAD_LOCATION_DETAIL_1: NORMAL
MDC_IDC_LEAD_LOCATION_DETAIL_1: NORMAL
MDC_IDC_LEAD_MFG: NORMAL
MDC_IDC_LEAD_MFG: NORMAL
MDC_IDC_LEAD_MODEL: NORMAL
MDC_IDC_LEAD_MODEL: NORMAL
MDC_IDC_LEAD_POLARITY_TYPE: NORMAL
MDC_IDC_LEAD_POLARITY_TYPE: NORMAL
MDC_IDC_LEAD_SERIAL: NORMAL
MDC_IDC_LEAD_SERIAL: NORMAL
MDC_IDC_MSMT_BATTERY_DTM: NORMAL
MDC_IDC_MSMT_BATTERY_REMAINING_LONGEVITY: 67 MO
MDC_IDC_MSMT_BATTERY_RRT_TRIGGER: 2.83
MDC_IDC_MSMT_BATTERY_STATUS: NORMAL
MDC_IDC_MSMT_BATTERY_VOLTAGE: 3 V
MDC_IDC_MSMT_LEADCHNL_RA_IMPEDANCE_VALUE: 380 OHM
MDC_IDC_MSMT_LEADCHNL_RA_IMPEDANCE_VALUE: 437 OHM
MDC_IDC_MSMT_LEADCHNL_RA_PACING_THRESHOLD_AMPLITUDE: 0.38 V
MDC_IDC_MSMT_LEADCHNL_RA_PACING_THRESHOLD_PULSEWIDTH: 0.4 MS
MDC_IDC_MSMT_LEADCHNL_RA_SENSING_INTR_AMPL: 2.38 MV
MDC_IDC_MSMT_LEADCHNL_RA_SENSING_INTR_AMPL: 2.38 MV
MDC_IDC_MSMT_LEADCHNL_RV_IMPEDANCE_VALUE: 456 OHM
MDC_IDC_MSMT_LEADCHNL_RV_IMPEDANCE_VALUE: 513 OHM
MDC_IDC_MSMT_LEADCHNL_RV_PACING_THRESHOLD_AMPLITUDE: 0.75 V
MDC_IDC_MSMT_LEADCHNL_RV_PACING_THRESHOLD_PULSEWIDTH: 0.4 MS
MDC_IDC_MSMT_LEADCHNL_RV_SENSING_INTR_AMPL: 16.5 MV
MDC_IDC_MSMT_LEADCHNL_RV_SENSING_INTR_AMPL: 16.5 MV
MDC_IDC_PG_IMPLANT_DTM: NORMAL
MDC_IDC_PG_MFG: NORMAL
MDC_IDC_PG_MODEL: NORMAL
MDC_IDC_PG_SERIAL: NORMAL
MDC_IDC_PG_TYPE: NORMAL
MDC_IDC_SESS_CLINIC_NAME: NORMAL
MDC_IDC_SESS_DTM: NORMAL
MDC_IDC_SESS_TYPE: NORMAL
MDC_IDC_SET_BRADY_AT_MODE_SWITCH_RATE: 171 {BEATS}/MIN
MDC_IDC_SET_BRADY_HYSTRATE: NORMAL
MDC_IDC_SET_BRADY_LOWRATE: 60 {BEATS}/MIN
MDC_IDC_SET_BRADY_MAX_SENSOR_RATE: 130 {BEATS}/MIN
MDC_IDC_SET_BRADY_MAX_TRACKING_RATE: 130 {BEATS}/MIN
MDC_IDC_SET_BRADY_MODE: NORMAL
MDC_IDC_SET_BRADY_PAV_DELAY_LOW: 220 MS
MDC_IDC_SET_BRADY_SAV_DELAY_LOW: 200 MS
MDC_IDC_SET_LEADCHNL_RA_PACING_AMPLITUDE: 1.5 V
MDC_IDC_SET_LEADCHNL_RA_PACING_ANODE_ELECTRODE_1: NORMAL
MDC_IDC_SET_LEADCHNL_RA_PACING_ANODE_LOCATION_1: NORMAL
MDC_IDC_SET_LEADCHNL_RA_PACING_CAPTURE_MODE: NORMAL
MDC_IDC_SET_LEADCHNL_RA_PACING_CATHODE_ELECTRODE_1: NORMAL
MDC_IDC_SET_LEADCHNL_RA_PACING_CATHODE_LOCATION_1: NORMAL
MDC_IDC_SET_LEADCHNL_RA_PACING_POLARITY: NORMAL
MDC_IDC_SET_LEADCHNL_RA_PACING_PULSEWIDTH: 0.4 MS
MDC_IDC_SET_LEADCHNL_RA_SENSING_ANODE_ELECTRODE_1: NORMAL
MDC_IDC_SET_LEADCHNL_RA_SENSING_ANODE_LOCATION_1: NORMAL
MDC_IDC_SET_LEADCHNL_RA_SENSING_CATHODE_ELECTRODE_1: NORMAL
MDC_IDC_SET_LEADCHNL_RA_SENSING_CATHODE_LOCATION_1: NORMAL
MDC_IDC_SET_LEADCHNL_RA_SENSING_POLARITY: NORMAL
MDC_IDC_SET_LEADCHNL_RA_SENSING_SENSITIVITY: 0.6 MV
MDC_IDC_SET_LEADCHNL_RV_PACING_AMPLITUDE: 2 V
MDC_IDC_SET_LEADCHNL_RV_PACING_ANODE_ELECTRODE_1: NORMAL
MDC_IDC_SET_LEADCHNL_RV_PACING_ANODE_LOCATION_1: NORMAL
MDC_IDC_SET_LEADCHNL_RV_PACING_CAPTURE_MODE: NORMAL
MDC_IDC_SET_LEADCHNL_RV_PACING_CATHODE_ELECTRODE_1: NORMAL
MDC_IDC_SET_LEADCHNL_RV_PACING_CATHODE_LOCATION_1: NORMAL
MDC_IDC_SET_LEADCHNL_RV_PACING_POLARITY: NORMAL
MDC_IDC_SET_LEADCHNL_RV_PACING_PULSEWIDTH: 0.4 MS
MDC_IDC_SET_LEADCHNL_RV_SENSING_ANODE_ELECTRODE_1: NORMAL
MDC_IDC_SET_LEADCHNL_RV_SENSING_ANODE_LOCATION_1: NORMAL
MDC_IDC_SET_LEADCHNL_RV_SENSING_CATHODE_ELECTRODE_1: NORMAL
MDC_IDC_SET_LEADCHNL_RV_SENSING_CATHODE_LOCATION_1: NORMAL
MDC_IDC_SET_LEADCHNL_RV_SENSING_POLARITY: NORMAL
MDC_IDC_SET_LEADCHNL_RV_SENSING_SENSITIVITY: 0.9 MV
MDC_IDC_SET_ZONE_DETECTION_INTERVAL: 350 MS
MDC_IDC_SET_ZONE_DETECTION_INTERVAL: 400 MS
MDC_IDC_SET_ZONE_TYPE: NORMAL
MDC_IDC_STAT_AT_BURDEN_PERCENT: 0 %
MDC_IDC_STAT_AT_DTM_END: NORMAL
MDC_IDC_STAT_AT_DTM_START: NORMAL
MDC_IDC_STAT_BRADY_AP_VP_PERCENT: 42.62 %
MDC_IDC_STAT_BRADY_AP_VS_PERCENT: 0.01 %
MDC_IDC_STAT_BRADY_AS_VP_PERCENT: 57.25 %
MDC_IDC_STAT_BRADY_AS_VS_PERCENT: 0.12 %
MDC_IDC_STAT_BRADY_DTM_END: NORMAL
MDC_IDC_STAT_BRADY_DTM_START: NORMAL
MDC_IDC_STAT_BRADY_RA_PERCENT_PACED: 42.27 %
MDC_IDC_STAT_BRADY_RV_PERCENT_PACED: 99.32 %
MDC_IDC_STAT_EPISODE_RECENT_COUNT: 0
MDC_IDC_STAT_EPISODE_RECENT_COUNT_DTM_END: NORMAL
MDC_IDC_STAT_EPISODE_RECENT_COUNT_DTM_START: NORMAL
MDC_IDC_STAT_EPISODE_TOTAL_COUNT: 0
MDC_IDC_STAT_EPISODE_TOTAL_COUNT: 1
MDC_IDC_STAT_EPISODE_TOTAL_COUNT_DTM_END: NORMAL
MDC_IDC_STAT_EPISODE_TOTAL_COUNT_DTM_START: NORMAL
MDC_IDC_STAT_EPISODE_TYPE: NORMAL

## 2019-08-17 ENCOUNTER — MYC REFILL (OUTPATIENT)
Dept: FAMILY MEDICINE | Facility: CLINIC | Age: 74
End: 2019-08-17

## 2019-08-17 DIAGNOSIS — E78.5 HYPERLIPIDEMIA LDL GOAL <100: ICD-10-CM

## 2019-08-19 RX ORDER — SIMVASTATIN 20 MG
20 TABLET ORAL AT BEDTIME
Qty: 90 TABLET | Refills: 2 | Status: SHIPPED | OUTPATIENT
Start: 2019-08-19 | End: 2020-05-11

## 2019-08-19 NOTE — TELEPHONE ENCOUNTER
Script refills from [facility] closing did not transfer to new pharmacy      Resending today to new [facility].    Nicolle Matamoros RN  August 19, 2019 9:19 AM

## 2019-09-03 ENCOUNTER — MYC MEDICAL ADVICE (OUTPATIENT)
Dept: FAMILY MEDICINE | Facility: CLINIC | Age: 74
End: 2019-09-03

## 2019-09-03 DIAGNOSIS — Z71.84 TRAVEL ADVICE ENCOUNTER: Primary | ICD-10-CM

## 2019-09-03 RX ORDER — ATOVAQUONE AND PROGUANIL HYDROCHLORIDE 250; 100 MG/1; MG/1
TABLET, FILM COATED ORAL
Qty: 2 TABLET | Refills: 0 | Status: SHIPPED | OUTPATIENT
Start: 2019-09-03 | End: 2019-12-02

## 2019-09-07 ENCOUNTER — MYC REFILL (OUTPATIENT)
Dept: FAMILY MEDICINE | Facility: CLINIC | Age: 74
End: 2019-09-07

## 2019-09-07 DIAGNOSIS — E03.9 ACQUIRED HYPOTHYROIDISM: ICD-10-CM

## 2019-09-09 RX ORDER — LEVOTHYROXINE SODIUM 50 UG/1
50 TABLET ORAL DAILY
Qty: 90 TABLET | Refills: 1 | Status: SHIPPED | OUTPATIENT
Start: 2019-09-09 | End: 2020-03-03

## 2019-09-09 NOTE — TELEPHONE ENCOUNTER
Former [facility] which has closed, needs Rx to new pharmacy.     Last TSH lab 2/7/19    Kayla Sosa RN  09/09/19  10:45 AM

## 2019-09-30 ENCOUNTER — HEALTH MAINTENANCE LETTER (OUTPATIENT)
Age: 74
End: 2019-09-30

## 2019-10-10 ENCOUNTER — ANCILLARY PROCEDURE (OUTPATIENT)
Dept: CARDIOLOGY | Facility: CLINIC | Age: 74
End: 2019-10-10
Attending: INTERNAL MEDICINE
Payer: MEDICARE

## 2019-10-10 DIAGNOSIS — I44.30 AV BLOCK: ICD-10-CM

## 2019-10-10 PROCEDURE — 93296 REM INTERROG EVL PM/IDS: CPT | Mod: ZF

## 2019-10-10 PROCEDURE — 93294 REM INTERROG EVL PM/LDLS PM: CPT | Mod: ZP | Performed by: INTERNAL MEDICINE

## 2019-10-17 LAB
MDC_IDC_LEAD_IMPLANT_DT: NORMAL
MDC_IDC_LEAD_IMPLANT_DT: NORMAL
MDC_IDC_LEAD_LOCATION: NORMAL
MDC_IDC_LEAD_LOCATION: NORMAL
MDC_IDC_LEAD_LOCATION_DETAIL_1: NORMAL
MDC_IDC_LEAD_LOCATION_DETAIL_1: NORMAL
MDC_IDC_LEAD_MFG: NORMAL
MDC_IDC_LEAD_MFG: NORMAL
MDC_IDC_LEAD_MODEL: NORMAL
MDC_IDC_LEAD_MODEL: NORMAL
MDC_IDC_LEAD_POLARITY_TYPE: NORMAL
MDC_IDC_LEAD_POLARITY_TYPE: NORMAL
MDC_IDC_LEAD_SERIAL: NORMAL
MDC_IDC_LEAD_SERIAL: NORMAL
MDC_IDC_MSMT_BATTERY_DTM: NORMAL
MDC_IDC_MSMT_BATTERY_REMAINING_LONGEVITY: 59 MO
MDC_IDC_MSMT_BATTERY_RRT_TRIGGER: 2.83
MDC_IDC_MSMT_BATTERY_STATUS: NORMAL
MDC_IDC_MSMT_BATTERY_VOLTAGE: 3 V
MDC_IDC_MSMT_LEADCHNL_RA_IMPEDANCE_VALUE: 361 OHM
MDC_IDC_MSMT_LEADCHNL_RA_IMPEDANCE_VALUE: 399 OHM
MDC_IDC_MSMT_LEADCHNL_RA_PACING_THRESHOLD_AMPLITUDE: 0.5 V
MDC_IDC_MSMT_LEADCHNL_RA_PACING_THRESHOLD_PULSEWIDTH: 0.4 MS
MDC_IDC_MSMT_LEADCHNL_RA_SENSING_INTR_AMPL: 4.12 MV
MDC_IDC_MSMT_LEADCHNL_RV_IMPEDANCE_VALUE: 456 OHM
MDC_IDC_MSMT_LEADCHNL_RV_IMPEDANCE_VALUE: 513 OHM
MDC_IDC_MSMT_LEADCHNL_RV_PACING_THRESHOLD_AMPLITUDE: 0.62 V
MDC_IDC_MSMT_LEADCHNL_RV_PACING_THRESHOLD_PULSEWIDTH: 0.4 MS
MDC_IDC_MSMT_LEADCHNL_RV_SENSING_INTR_AMPL: 14.88 MV
MDC_IDC_PG_IMPLANT_DTM: NORMAL
MDC_IDC_PG_MFG: NORMAL
MDC_IDC_PG_MODEL: NORMAL
MDC_IDC_PG_SERIAL: NORMAL
MDC_IDC_PG_TYPE: NORMAL
MDC_IDC_SESS_CLINIC_NAME: NORMAL
MDC_IDC_SESS_DTM: NORMAL
MDC_IDC_SESS_TYPE: NORMAL
MDC_IDC_SET_BRADY_AT_MODE_SWITCH_RATE: 171 {BEATS}/MIN
MDC_IDC_SET_BRADY_HYSTRATE: NORMAL
MDC_IDC_SET_BRADY_LOWRATE: 60 {BEATS}/MIN
MDC_IDC_SET_BRADY_MAX_SENSOR_RATE: 130 {BEATS}/MIN
MDC_IDC_SET_BRADY_MAX_TRACKING_RATE: 130 {BEATS}/MIN
MDC_IDC_SET_BRADY_MODE: NORMAL
MDC_IDC_SET_BRADY_PAV_DELAY_LOW: 220 MS
MDC_IDC_SET_BRADY_SAV_DELAY_LOW: 200 MS
MDC_IDC_SET_LEADCHNL_RA_PACING_AMPLITUDE: 1.5 V
MDC_IDC_SET_LEADCHNL_RA_PACING_ANODE_ELECTRODE_1: NORMAL
MDC_IDC_SET_LEADCHNL_RA_PACING_ANODE_LOCATION_1: NORMAL
MDC_IDC_SET_LEADCHNL_RA_PACING_CAPTURE_MODE: NORMAL
MDC_IDC_SET_LEADCHNL_RA_PACING_CATHODE_ELECTRODE_1: NORMAL
MDC_IDC_SET_LEADCHNL_RA_PACING_CATHODE_LOCATION_1: NORMAL
MDC_IDC_SET_LEADCHNL_RA_PACING_POLARITY: NORMAL
MDC_IDC_SET_LEADCHNL_RA_PACING_PULSEWIDTH: 0.4 MS
MDC_IDC_SET_LEADCHNL_RA_SENSING_ANODE_ELECTRODE_1: NORMAL
MDC_IDC_SET_LEADCHNL_RA_SENSING_ANODE_LOCATION_1: NORMAL
MDC_IDC_SET_LEADCHNL_RA_SENSING_CATHODE_ELECTRODE_1: NORMAL
MDC_IDC_SET_LEADCHNL_RA_SENSING_CATHODE_LOCATION_1: NORMAL
MDC_IDC_SET_LEADCHNL_RA_SENSING_POLARITY: NORMAL
MDC_IDC_SET_LEADCHNL_RA_SENSING_SENSITIVITY: 0.6 MV
MDC_IDC_SET_LEADCHNL_RV_PACING_AMPLITUDE: 2 V
MDC_IDC_SET_LEADCHNL_RV_PACING_ANODE_ELECTRODE_1: NORMAL
MDC_IDC_SET_LEADCHNL_RV_PACING_ANODE_LOCATION_1: NORMAL
MDC_IDC_SET_LEADCHNL_RV_PACING_CAPTURE_MODE: NORMAL
MDC_IDC_SET_LEADCHNL_RV_PACING_CATHODE_ELECTRODE_1: NORMAL
MDC_IDC_SET_LEADCHNL_RV_PACING_CATHODE_LOCATION_1: NORMAL
MDC_IDC_SET_LEADCHNL_RV_PACING_POLARITY: NORMAL
MDC_IDC_SET_LEADCHNL_RV_PACING_PULSEWIDTH: 0.4 MS
MDC_IDC_SET_LEADCHNL_RV_SENSING_ANODE_ELECTRODE_1: NORMAL
MDC_IDC_SET_LEADCHNL_RV_SENSING_ANODE_LOCATION_1: NORMAL
MDC_IDC_SET_LEADCHNL_RV_SENSING_CATHODE_ELECTRODE_1: NORMAL
MDC_IDC_SET_LEADCHNL_RV_SENSING_CATHODE_LOCATION_1: NORMAL
MDC_IDC_SET_LEADCHNL_RV_SENSING_POLARITY: NORMAL
MDC_IDC_SET_LEADCHNL_RV_SENSING_SENSITIVITY: 0.9 MV
MDC_IDC_SET_ZONE_DETECTION_INTERVAL: 350 MS
MDC_IDC_SET_ZONE_DETECTION_INTERVAL: 400 MS
MDC_IDC_SET_ZONE_TYPE: NORMAL
MDC_IDC_STAT_AT_BURDEN_PERCENT: 0 %
MDC_IDC_STAT_AT_DTM_END: NORMAL
MDC_IDC_STAT_AT_DTM_START: NORMAL
MDC_IDC_STAT_BRADY_AP_VP_PERCENT: 46.27 %
MDC_IDC_STAT_BRADY_AP_VS_PERCENT: 0.01 %
MDC_IDC_STAT_BRADY_AS_VP_PERCENT: 53.65 %
MDC_IDC_STAT_BRADY_AS_VS_PERCENT: 0.08 %
MDC_IDC_STAT_BRADY_DTM_END: NORMAL
MDC_IDC_STAT_BRADY_DTM_START: NORMAL
MDC_IDC_STAT_BRADY_RA_PERCENT_PACED: 46.03 %
MDC_IDC_STAT_BRADY_RV_PERCENT_PACED: 99.38 %
MDC_IDC_STAT_EPISODE_RECENT_COUNT: 0
MDC_IDC_STAT_EPISODE_RECENT_COUNT_DTM_END: NORMAL
MDC_IDC_STAT_EPISODE_RECENT_COUNT_DTM_START: NORMAL
MDC_IDC_STAT_EPISODE_TOTAL_COUNT: 0
MDC_IDC_STAT_EPISODE_TOTAL_COUNT: 1
MDC_IDC_STAT_EPISODE_TOTAL_COUNT_DTM_END: NORMAL
MDC_IDC_STAT_EPISODE_TOTAL_COUNT_DTM_START: NORMAL
MDC_IDC_STAT_EPISODE_TYPE: NORMAL

## 2019-10-30 ENCOUNTER — OFFICE VISIT (OUTPATIENT)
Dept: OBGYN | Facility: CLINIC | Age: 74
End: 2019-10-30
Payer: MEDICARE

## 2019-10-30 VITALS
WEIGHT: 138 LBS | DIASTOLIC BLOOD PRESSURE: 74 MMHG | HEIGHT: 63 IN | HEART RATE: 78 BPM | SYSTOLIC BLOOD PRESSURE: 118 MMHG | BODY MASS INDEX: 24.45 KG/M2

## 2019-10-30 DIAGNOSIS — N95.2 ATROPHIC VAGINITIS: Primary | ICD-10-CM

## 2019-10-30 PROCEDURE — 99213 OFFICE O/P EST LOW 20 MIN: CPT | Performed by: OBSTETRICS & GYNECOLOGY

## 2019-10-30 RX ORDER — ESTRADIOL 0.1 MG/G
1 CREAM VAGINAL
Qty: 42.5 G | Refills: 4 | Status: SHIPPED | OUTPATIENT
Start: 2019-10-30 | End: 2021-06-07

## 2019-10-30 ASSESSMENT — MIFFLIN-ST. JEOR: SCORE: 1110.09

## 2019-10-30 NOTE — PATIENT INSTRUCTIONS
Will restart her vaginal estrogen to help with atrophy.  Will use 0.5-1g vaginally every night x 2 weeks and then 2x per week for maintenance moving forward.    Reassurance given with exam today --no evidence of infection and likely all due to atrophy.  Will schedule yearly exam with Dr. Gupta this winter.

## 2019-10-30 NOTE — PROGRESS NOTES
SUBJECTIVE:                                                   Sharona Bravo is a 73 year old female who presents to clinic today for the following health issue(s):  Patient presents with:  Vaginal Problem: Discomfort in vagina           HPI:  Here today for vaginal irritation/dryness/pain.  Has been a long time issue but seems to have acutely worsened in the last couple of days.  Has used estrace cream in the past per Dr. Gupta with good results but ran out and has not used now for several months.  No vb/spotting.  No discharge.  No itching.  Symptoms more external than internal.  Wondered if she had a yeast infection 2 weeks ago so did self treat with a monistat 1day suppository ~10d ago.  Initial relief but now symptoms worse again.  No other self treatments  +SA --infreqent  No urinary symptoms; no burning/pain with urination  Overdue for yearly exam --last saw Dr. Gupta in fall 2017.  Had her left knee replaced the following spring and simply got off schedule.      Patient's last menstrual period was 01/01/2000..     Patient is sexually active, No obstetric history on file..  Using menopause for contraception.    reports that she has never smoked. She has never used smokeless tobacco.    STD testing offered?  Declined    Health maintenance updated:  yes    Today's PHQ-2 Score:   PHQ-2 ( 1999 Pf[employer]) 3/22/2019   Q1: Little interest or pleasure in doing things 0   Q2: Feeling down, depressed or hopeless 0   PHQ-2 Score 0     Today's PHQ-9 Score:   PHQ-9 SCORE 6/26/2017   PHQ-9 Total Score 0     Today's BYRON-7 Score:   BYRON-7 SCORE 6/26/2017   Total Score 0       Problem list and histories reviewed & adjusted, as indicated.  Additional history: as documented.    Patient Active Problem List   Diagnosis     Adenocarcinoma of breast (H)     Allergic rhinitis     Hyperlipidemia LDL goal <100     Acquired hypothyroidism     Heart block     Cardiac pacemaker in situ- Medtronic, dual chamber- NOT dependent  (Advisa: MRI conditional)     Diverticulosis of large intestine without hemorrhage     BPPV (benign paroxysmal positional vertigo), unspecified laterality     Primary osteoarthritis of left knee     Tear of medial meniscus of left knee     Patellofemoral syndrome, left     S/P TKR (total knee replacement), left     Past Surgical History:   Procedure Laterality Date     PACEMAKER/ICD CHECK        Social History     Tobacco Use     Smoking status: Never Smoker     Smokeless tobacco: Never Used   Substance Use Topics     Alcohol use: Yes     Alcohol/week: 0.0 standard drinks     Comment: 10-14 wine a week       Problem (# of Occurrences) Relation (Name,Age of Onset)    Cancer (1) Mother    Heart Disease (1) Father            Current Outpatient Medications   Medication Sig     Ascorbic Acid (VITAMIN C) 500 MG CAPS      aspirin 81 MG tablet Take 81 mg by mouth daily     atovaquone-proguanil (MALARONE) 250-100 MG tablet Take 1 tablet by mouth daily to complete a total of 7 days after return from travel.     calcium carbonate (OS-D[place]AS 500 MG [place]. [place]) 500 MG tablet Take 600 mg by mouth daily      cetirizine (ZYRTEC) 10 MG tablet Take 10 mg by mouth daily     estradiol (ESTRACE VAGINAL) 0.1 MG/GM cream Place 2 g vaginally twice a week     estradiol (ESTRACE) 0.1 MG/GM vaginal cream Place 1 g vaginally three times a week Place 1g vaginally nightly x 2 weeks and then 2-3 times per week for maintenance     fish oil-omega-3 fatty acids 1000 MG capsule Take 2 g by mouth daily     Fluticasone Propionate (FLONASE NA)      levothyroxine (SYNTHROID/LEVOTHROID) 50 MCG tablet Take 1 tablet (50 mcg) by mouth daily     Magnesium 500 MG CAPS Take 250 mg by mouth daily      MELOXICAM PO Take 7.5 mg by mouth daily     Omeprazole (PRILOSEC PO) Take 20 mg by mouth every morning     simvastatin (ZOCOR) 20 MG tablet Take 1 tablet (20 mg) by mouth At Bedtime     VITAMIN D, CHOLECALCIFEROL, PO Take 800 Units by mouth daily      zolpidem (AMBIEN)  "5 MG tablet Take 1 tablet (5 mg) by mouth nightly as needed for sleep     acetaminophen (TYLENOL) 500 MG tablet Take 1,000 mg by mouth every 8 hours as needed     amoxicillin (AMOXIL) 500 MG capsule Take 4 capsules together 1 hour before dental procedures. (Patient not taking: Reported on 10/30/2019)     celecoxib (CELEBREX) 200 MG capsule Take 200 mg by mouth 2 times daily     polyethylene glycol (MIRALAX/GLYCOLAX) powder Take 17 g by mouth as needed     UNABLE TO FIND MEDICATION NAME: Tumeric     No current facility-administered medications for this visit.      Allergies   Allergen Reactions     Gabapentin Swelling and Rash     Face swelling       ROS:  12 point review of systems negative other than symptoms noted below.  Genitourinary: Vaginal Dryness    OBJECTIVE:     /74   Pulse 78   Ht 1.6 m (5' 3\")   Wt 62.6 kg (138 lb)   LMP 01/01/2000   BMI 24.45 kg/m    Body mass index is 24.45 kg/m .    Exam:  Constitutional:  Appearance: Well nourished, well developed alert, in no acute distress  Gastrointestinal:  Abdominal Examination:  Abdomen nontender to palpation, tone normal without rigidity or guarding, no masses present, umbilicus without lesions; Liver/Spleen:  No hepatomegaly present, liver nontender to palpation; Hernias:  No hernias present  Lymphatic: Lymph Nodes:  No other lymphadenopathy present  Skin: General Inspection:  No rashes present, no lesions present, no areas of discoloration.  Neurologic:  Mental Status:  Oriented X3.  Normal strength and tone, sensory exam grossly normal, mentation intact and speech normal.    Psychiatric:  Mentation appears normal and affect normal/bright.  Pelvic Exam:  External Genitalia:     Normal appearance for age, no discharge present, no tenderness present, no inflammatory lesions present, color normal  Vagina:     Normal vaginal vault without central or paravaginal defects, ATROPHIC  Bladder:     Nontender to palpation  Urethra:   Urethral Body:  Urethra " palpation normal, urethra structural support normal   Urethral Meatus:  No erythema or lesions present  Cervix:     Appearance healthy, no lesions present, nontender to palpation, no bleeding present  Uterus:     Nontender to palpation, no masses present, position anteflexed, mobility: normal  Adnexa:     No adnexal tenderness present, no adnexal masses present  Perineum:     Perineum within normal limits, no evidence of trauma, no rashes or skin lesions present  Inguinal Lymph Nodes:     No lymphadenopathy present       In-Clinic Test Results:  No results found for this or any previous visit (from the past 24 hour(s)).    ASSESSMENT/PLAN:                                                        ICD-10-CM    1. Atrophic vaginitis N95.2 estradiol (ESTRACE) 0.1 MG/GM vaginal cream       Patient Instructions   Will restart her vaginal estrogen to help with atrophy.  Will use 0.5-1g vaginally every night x 2 weeks and then 2x per week for maintenance moving forward.    Reassurance given with exam today --no evidence of infection and likely all due to atrophy.  Will schedule yearly exam with Dr. Gupta this winter.      Tatiana Watts MD  [facility] [place]

## 2019-11-07 ENCOUNTER — TELEPHONE (OUTPATIENT)
Dept: CARDIOLOGY | Facility: CLINIC | Age: 74
End: 2019-11-07

## 2019-11-07 NOTE — TELEPHONE ENCOUNTER
[facility] Call Center    Phone Message    May a detailed message be left on voicemail: yes    Reason for Call: Other: pt said she received a Notifot message about an appt that needs tot be scheduled but she isn't sure with who, please call to discuss furhter     Action Taken: Message routed to:  [facility] ([facility]): Cardiology

## 2019-11-11 NOTE — TELEPHONE ENCOUNTER
Called pt and let her know that she is not due for an appt. Let her know her remote transmission appt is 1/10/2020. She thanked me for the call.     Vivek PORTER

## 2019-11-26 NOTE — PROGRESS NOTES
Sharona is a 73 year old No obstetric history on file. female who presents for Medicare Limited exam.     Do you have a Health Care Directive?: Yes, patient states has an Advance Care Planning document and will bring a copy to the clinic.    Fall risk:   Fallen 2 or more times in the past year?: No  Any fall with injury in the past year?: No    HPI : The patient is seen at this time for her annual exam.  She had a knee replacement last year and did so well that she actually has been skiing.  She still uses estrogen on a 3 times a week basis.  She has no negative side effects.      GYNECOLOGIC HISTORY:  Patient's last menstrual period was 01/01/2000..   reports that she has never smoked. She has never used smokeless tobacco.    STD testing offered?  Declined  Last PHQ-9 score on record=   PHQ-9 SCORE 12/2/2019   PHQ-9 Total Score 0     Last GAD7 score on record=   BYRON-7 SCORE 6/20/2016 6/26/2017 12/2/2019   Total Score 0 0 0       HEALTH MAINTENANCE:  Cholesterol:   Cholesterol   Date Value Ref Range Status   03/22/2019 207.0 (H) 0.0 - 200.0 Final   02/09/2018 205.0 (H) 0.0 - 200.0 Final      Last Mammo: 2/20/19, Result: Normal, Next Mammo: 2/2020   Pap:   Lab Results   Component Value Date    PAP NIL HPV- 06/26/2017    PAP NIL 06/20/2016    PAP NIL 06/18/2015      DEXA:  6/26/17, today  Colonoscopy: Per patient 2014, Result:  Normal, Next Colonoscopy: Unsure. Will check with pcp    HISTORY:  OB History   No obstetric history on file.     Past Medical History:   Diagnosis Date     Malignant neoplasm (H)      Past Surgical History:   Procedure Laterality Date     JOINT REPLACEMENT  2018    left knee     PACEMAKER/ICD CHECK       Family History   Problem Relation Age of Onset     Cancer Mother      Heart Disease Father      Social History     Socioeconomic History     Marital status:      Spouse name: Not on file     Number of children: Not on file     Years of education: Not on file     Highest education  level: Not on file   Occupational History     Not on file   Social Needs     Financial resource strain: Not on file     Food insecurity:     Worry: Not on file     Inability: Not on file     Transportation needs:     Medical: Not on file     Non-medical: Not on file   Tobacco Use     Smoking status: Never Smoker     Smokeless tobacco: Never Used   Substance and Sexual Activity     Alcohol use: Yes     Alcohol/week: 0.0 standard drinks     Comment: 10-14 wine a week      Drug use: No     Sexual activity: Yes     Partners: Male     Birth control/protection: Post-menopausal   Lifestyle     Physical activity:     Days per week: Not on file     Minutes per session: Not on file     Stress: Not on file   Relationships     Social connections:     Talks on phone: Not on file     Gets together: Not on file     Attends Quaker service: Not on file     Active member of club or organization: Not on file     Attends meetings of clubs or organizations: Not on file     Relationship status: Not on file     Intimate partner violence:     Fear of current or ex partner: Not on file     Emotionally abused: Not on file     Physically abused: Not on file     Forced sexual activity: Not on file   Other Topics Concern     Parent/sibling w/ CABG, MI or angioplasty before 65F 55M? Not Asked   Social History Narrative     Not on file     Current Outpatient Medications   Medication Sig     acetaminophen (TYLENOL) 500 MG tablet Take 1,000 mg by mouth every 8 hours as needed     Ascorbic Acid (VITAMIN C) 500 MG CAPS      aspirin 81 MG tablet Take 81 mg by mouth daily     calcium carbonate (OS-DALLAS 500 MG [place]. [place]) 500 MG tablet Take 600 mg by mouth daily      cetirizine (ZYRTEC) 10 MG tablet Take 10 mg by mouth daily     estradiol (ESTRACE) 0.1 MG/GM vaginal cream Place 1 g vaginally three times a week Place 1g vaginally nightly x 2 weeks and then 2-3 times per week for maintenance     fish oil-omega-3 fatty acids 1000 MG capsule Take 2 g by  "mouth daily     levothyroxine (SYNTHROID/LEVOTHROID) 50 MCG tablet Take 1 tablet (50 mcg) by mouth daily     Magnesium 500 MG CAPS Take 250 mg by mouth daily      simvastatin (ZOCOR) 20 MG tablet Take 1 tablet (20 mg) by mouth At Bedtime     UNABLE TO FIND MEDICATION NAME: Tumeric     VITAMIN D, CHOLECALCIFEROL, PO Take 800 Units by mouth daily      zolpidem (AMBIEN) 5 MG tablet Take 1 tablet (5 mg) by mouth nightly as needed for sleep     No current facility-administered medications for this visit.      Allergies   Allergen Reactions     Gabapentin Swelling and Rash     Face swelling       Past medical, surgical, social and family history were reviewed and updated in EPIC.    EXAM:  /70   Pulse 66   Ht 1.575 m (5' 2\")   Wt 62.1 kg (137 lb)   LMP 01/01/2000   BMI 25.06 kg/m     BMI: Body mass index is 25.06 kg/m .    Constitutional: Appearance: Well nourished, well developed alert, in no acute distress  Breasts: Inspection of Breasts:  No lymphadenopathy present    Palpation of Breasts and Axillae:  No masses present on palpation, no  breast tenderness    Axillary Lymph Nodes:  No lymphadenopathy present  Neurologic/Psychiatric:    Mental Status:  Oriented X3     Pelvic Exam:  External Genitalia:     Normal appearance for age, no discharge present, no tenderness present, no inflammatory lesions present, color normal  Vagina:     Normal vaginal vault without central or paravaginal defects, ATROPHIC  Bladder:     Nontender to palpation  Urethra:   Urethral Body:  Urethra palpation normal, urethra structural support normal   Urethral Meatus:  No erythema or lesions present  Cervix:     Appearance healthy, no lesions present, nontender to palpation, no bleeding present  Uterus:     Nontender to palpation, no masses present, position anteflexed, mobility: normal  Adnexa:     No adnexal tenderness present, no adnexal masses present  Perineum:     Perineum within normal limits, no evidence of trauma, no rashes " or skin lesions present  Inguinal Lymph Nodes:     No lymphadenopathy present      Body mass index is 25.06 kg/m .  Weight management plan noted, stable and monitoring   reports that she has never smoked. She has never used smokeless tobacco.      ASSESSMENT:  73 year old female with satisfactory annual exam.  DEXA showed osteopenia at the hip but composites in the normal range.    ICD-10-CM    1. Encounter for gynecological examination without abnormal finding Z01.419 Pap imaged thin layer screen with HPV - recommended age 30 - 65     HPV High Risk Types DNA Cervical     Medicare Limited Visit       COUNSELING:   Reviewed preventive health counseling, as reflected in patient instructions       Regular exercise       Healthy diet/nutrition    PLAN/PATIENT INSTRUCTIONS:    We will convey the patient's screening test when available.  Sonny Gupta MD

## 2019-12-02 ENCOUNTER — OFFICE VISIT (OUTPATIENT)
Dept: OBGYN | Facility: CLINIC | Age: 74
End: 2019-12-02
Payer: MEDICARE

## 2019-12-02 ENCOUNTER — ANCILLARY PROCEDURE (OUTPATIENT)
Dept: BONE DENSITY | Facility: CLINIC | Age: 74
End: 2019-12-02
Payer: MEDICARE

## 2019-12-02 VITALS
BODY MASS INDEX: 25.21 KG/M2 | SYSTOLIC BLOOD PRESSURE: 106 MMHG | DIASTOLIC BLOOD PRESSURE: 70 MMHG | WEIGHT: 137 LBS | HEIGHT: 62 IN | HEART RATE: 66 BPM

## 2019-12-02 DIAGNOSIS — Z01.419 ENCOUNTER FOR GYNECOLOGICAL EXAMINATION WITHOUT ABNORMAL FINDING: Primary | ICD-10-CM

## 2019-12-02 DIAGNOSIS — Z78.0 ASYMPTOMATIC POSTMENOPAUSAL STATE: ICD-10-CM

## 2019-12-02 DIAGNOSIS — C50.911 ADENOCARCINOMA OF RIGHT BREAST (H): ICD-10-CM

## 2019-12-02 PROCEDURE — G0145 SCR C/V CYTO,THINLAYER,RESCR: HCPCS | Performed by: OBSTETRICS & GYNECOLOGY

## 2019-12-02 PROCEDURE — G0476 HPV COMBO ASSAY CA SCREEN: HCPCS | Performed by: OBSTETRICS & GYNECOLOGY

## 2019-12-02 PROCEDURE — 77080 DXA BONE DENSITY AXIAL: CPT | Performed by: OBSTETRICS & GYNECOLOGY

## 2019-12-02 PROCEDURE — 87624 HPV HI-RISK TYP POOLED RSLT: CPT | Performed by: OBSTETRICS & GYNECOLOGY

## 2019-12-02 PROCEDURE — G0101 CA SCREEN;PELVIC/BREAST EXAM: HCPCS | Performed by: OBSTETRICS & GYNECOLOGY

## 2019-12-02 ASSESSMENT — ANXIETY QUESTIONNAIRES
2. NOT BEING ABLE TO STOP OR CONTROL WORRYING: NOT AT ALL
5. BEING SO RESTLESS THAT IT IS HARD TO SIT STILL: NOT AT ALL
1. FEELING NERVOUS, ANXIOUS, OR ON EDGE: NOT AT ALL
6. BECOMING EASILY ANNOYED OR IRRITABLE: NOT AT ALL
3. WORRYING TOO MUCH ABOUT DIFFERENT THINGS: NOT AT ALL
7. FEELING AFRAID AS IF SOMETHING AWFUL MIGHT HAPPEN: NOT AT ALL
GAD7 TOTAL SCORE: 0

## 2019-12-02 ASSESSMENT — PATIENT HEALTH QUESTIONNAIRE - PHQ9
5. POOR APPETITE OR OVEREATING: NOT AT ALL
SUM OF ALL RESPONSES TO PHQ QUESTIONS 1-9: 0

## 2019-12-02 ASSESSMENT — MIFFLIN-ST. JEOR: SCORE: 1089.68

## 2019-12-03 ASSESSMENT — ANXIETY QUESTIONNAIRES: GAD7 TOTAL SCORE: 0

## 2019-12-04 LAB
COPATH REPORT: NORMAL
PAP: NORMAL

## 2019-12-06 LAB
FINAL DIAGNOSIS: NORMAL
HPV HR 12 DNA CVX QL NAA+PROBE: NEGATIVE
HPV16 DNA SPEC QL NAA+PROBE: NEGATIVE
HPV18 DNA SPEC QL NAA+PROBE: NEGATIVE
SPECIMEN DESCRIPTION: NORMAL
SPECIMEN SOURCE CVX/VAG CYTO: NORMAL

## 2019-12-09 DIAGNOSIS — Z79.2 PROPHYLACTIC ANTIBIOTIC: ICD-10-CM

## 2019-12-09 RX ORDER — AMOXICILLIN 500 MG/1
CAPSULE ORAL
Qty: 4 CAPSULE | Refills: 1 | Status: SHIPPED | OUTPATIENT
Start: 2019-12-09 | End: 2020-03-13

## 2019-12-15 ENCOUNTER — HEALTH MAINTENANCE LETTER (OUTPATIENT)
Age: 74
End: 2019-12-15

## 2020-01-10 ENCOUNTER — ANCILLARY PROCEDURE (OUTPATIENT)
Dept: CARDIOLOGY | Facility: CLINIC | Age: 75
End: 2020-01-10
Attending: INTERNAL MEDICINE
Payer: MEDICARE

## 2020-01-10 DIAGNOSIS — I44.30 ATRIOVENTRICULAR BLOCK: ICD-10-CM

## 2020-01-10 PROCEDURE — 93294 REM INTERROG EVL PM/LDLS PM: CPT | Performed by: INTERNAL MEDICINE

## 2020-01-10 PROCEDURE — 93296 REM INTERROG EVL PM/IDS: CPT | Mod: ZF

## 2020-01-13 LAB
MDC_IDC_LEAD_IMPLANT_DT: NORMAL
MDC_IDC_LEAD_IMPLANT_DT: NORMAL
MDC_IDC_LEAD_LOCATION: NORMAL
MDC_IDC_LEAD_LOCATION: NORMAL
MDC_IDC_LEAD_LOCATION_DETAIL_1: NORMAL
MDC_IDC_LEAD_LOCATION_DETAIL_1: NORMAL
MDC_IDC_LEAD_MFG: NORMAL
MDC_IDC_LEAD_MFG: NORMAL
MDC_IDC_LEAD_MODEL: NORMAL
MDC_IDC_LEAD_MODEL: NORMAL
MDC_IDC_LEAD_POLARITY_TYPE: NORMAL
MDC_IDC_LEAD_POLARITY_TYPE: NORMAL
MDC_IDC_LEAD_SERIAL: NORMAL
MDC_IDC_LEAD_SERIAL: NORMAL
MDC_IDC_MSMT_BATTERY_DTM: NORMAL
MDC_IDC_MSMT_BATTERY_REMAINING_LONGEVITY: 60 MO
MDC_IDC_MSMT_BATTERY_RRT_TRIGGER: 2.83
MDC_IDC_MSMT_BATTERY_STATUS: NORMAL
MDC_IDC_MSMT_BATTERY_VOLTAGE: 3 V
MDC_IDC_MSMT_LEADCHNL_RA_IMPEDANCE_VALUE: 380 OHM
MDC_IDC_MSMT_LEADCHNL_RA_IMPEDANCE_VALUE: 418 OHM
MDC_IDC_MSMT_LEADCHNL_RA_PACING_THRESHOLD_AMPLITUDE: 0.5 V
MDC_IDC_MSMT_LEADCHNL_RA_PACING_THRESHOLD_PULSEWIDTH: 0.4 MS
MDC_IDC_MSMT_LEADCHNL_RA_SENSING_INTR_AMPL: 4 MV
MDC_IDC_MSMT_LEADCHNL_RV_IMPEDANCE_VALUE: 456 OHM
MDC_IDC_MSMT_LEADCHNL_RV_IMPEDANCE_VALUE: 494 OHM
MDC_IDC_MSMT_LEADCHNL_RV_PACING_THRESHOLD_AMPLITUDE: 0.88 V
MDC_IDC_MSMT_LEADCHNL_RV_PACING_THRESHOLD_PULSEWIDTH: 0.4 MS
MDC_IDC_MSMT_LEADCHNL_RV_SENSING_INTR_AMPL: 23.5 MV
MDC_IDC_PG_IMPLANT_DTM: NORMAL
MDC_IDC_PG_MFG: NORMAL
MDC_IDC_PG_MODEL: NORMAL
MDC_IDC_PG_SERIAL: NORMAL
MDC_IDC_PG_TYPE: NORMAL
MDC_IDC_SESS_CLINIC_NAME: NORMAL
MDC_IDC_SESS_DTM: NORMAL
MDC_IDC_SESS_TYPE: NORMAL
MDC_IDC_SET_BRADY_AT_MODE_SWITCH_RATE: 171 {BEATS}/MIN
MDC_IDC_SET_BRADY_HYSTRATE: NORMAL
MDC_IDC_SET_BRADY_LOWRATE: 60 {BEATS}/MIN
MDC_IDC_SET_BRADY_MAX_SENSOR_RATE: 130 {BEATS}/MIN
MDC_IDC_SET_BRADY_MAX_TRACKING_RATE: 130 {BEATS}/MIN
MDC_IDC_SET_BRADY_MODE: NORMAL
MDC_IDC_SET_BRADY_PAV_DELAY_LOW: 220 MS
MDC_IDC_SET_BRADY_SAV_DELAY_LOW: 200 MS
MDC_IDC_SET_LEADCHNL_RA_PACING_AMPLITUDE: 1.5 V
MDC_IDC_SET_LEADCHNL_RA_PACING_ANODE_ELECTRODE_1: NORMAL
MDC_IDC_SET_LEADCHNL_RA_PACING_ANODE_LOCATION_1: NORMAL
MDC_IDC_SET_LEADCHNL_RA_PACING_CAPTURE_MODE: NORMAL
MDC_IDC_SET_LEADCHNL_RA_PACING_CATHODE_ELECTRODE_1: NORMAL
MDC_IDC_SET_LEADCHNL_RA_PACING_CATHODE_LOCATION_1: NORMAL
MDC_IDC_SET_LEADCHNL_RA_PACING_POLARITY: NORMAL
MDC_IDC_SET_LEADCHNL_RA_PACING_PULSEWIDTH: 0.4 MS
MDC_IDC_SET_LEADCHNL_RA_SENSING_ANODE_ELECTRODE_1: NORMAL
MDC_IDC_SET_LEADCHNL_RA_SENSING_ANODE_LOCATION_1: NORMAL
MDC_IDC_SET_LEADCHNL_RA_SENSING_CATHODE_ELECTRODE_1: NORMAL
MDC_IDC_SET_LEADCHNL_RA_SENSING_CATHODE_LOCATION_1: NORMAL
MDC_IDC_SET_LEADCHNL_RA_SENSING_POLARITY: NORMAL
MDC_IDC_SET_LEADCHNL_RA_SENSING_SENSITIVITY: 0.6 MV
MDC_IDC_SET_LEADCHNL_RV_PACING_AMPLITUDE: 2 V
MDC_IDC_SET_LEADCHNL_RV_PACING_ANODE_ELECTRODE_1: NORMAL
MDC_IDC_SET_LEADCHNL_RV_PACING_ANODE_LOCATION_1: NORMAL
MDC_IDC_SET_LEADCHNL_RV_PACING_CAPTURE_MODE: NORMAL
MDC_IDC_SET_LEADCHNL_RV_PACING_CATHODE_ELECTRODE_1: NORMAL
MDC_IDC_SET_LEADCHNL_RV_PACING_CATHODE_LOCATION_1: NORMAL
MDC_IDC_SET_LEADCHNL_RV_PACING_POLARITY: NORMAL
MDC_IDC_SET_LEADCHNL_RV_PACING_PULSEWIDTH: 0.4 MS
MDC_IDC_SET_LEADCHNL_RV_SENSING_ANODE_ELECTRODE_1: NORMAL
MDC_IDC_SET_LEADCHNL_RV_SENSING_ANODE_LOCATION_1: NORMAL
MDC_IDC_SET_LEADCHNL_RV_SENSING_CATHODE_ELECTRODE_1: NORMAL
MDC_IDC_SET_LEADCHNL_RV_SENSING_CATHODE_LOCATION_1: NORMAL
MDC_IDC_SET_LEADCHNL_RV_SENSING_POLARITY: NORMAL
MDC_IDC_SET_LEADCHNL_RV_SENSING_SENSITIVITY: 0.9 MV
MDC_IDC_SET_ZONE_DETECTION_INTERVAL: 350 MS
MDC_IDC_SET_ZONE_DETECTION_INTERVAL: 400 MS
MDC_IDC_SET_ZONE_TYPE: NORMAL
MDC_IDC_STAT_AT_BURDEN_PERCENT: 0 %
MDC_IDC_STAT_AT_DTM_END: NORMAL
MDC_IDC_STAT_AT_DTM_START: NORMAL
MDC_IDC_STAT_BRADY_AP_VP_PERCENT: 42.29 %
MDC_IDC_STAT_BRADY_AP_VS_PERCENT: 0 %
MDC_IDC_STAT_BRADY_AS_VP_PERCENT: 57.68 %
MDC_IDC_STAT_BRADY_AS_VS_PERCENT: 0.03 %
MDC_IDC_STAT_BRADY_DTM_END: NORMAL
MDC_IDC_STAT_BRADY_DTM_START: NORMAL
MDC_IDC_STAT_BRADY_RA_PERCENT_PACED: 42.28 %
MDC_IDC_STAT_BRADY_RV_PERCENT_PACED: 99.96 %
MDC_IDC_STAT_EPISODE_RECENT_COUNT: 0
MDC_IDC_STAT_EPISODE_RECENT_COUNT_DTM_END: NORMAL
MDC_IDC_STAT_EPISODE_RECENT_COUNT_DTM_START: NORMAL
MDC_IDC_STAT_EPISODE_TOTAL_COUNT: 0
MDC_IDC_STAT_EPISODE_TOTAL_COUNT: 1
MDC_IDC_STAT_EPISODE_TOTAL_COUNT_DTM_END: NORMAL
MDC_IDC_STAT_EPISODE_TOTAL_COUNT_DTM_START: NORMAL
MDC_IDC_STAT_EPISODE_TYPE: NORMAL

## 2020-01-27 ENCOUNTER — OFFICE VISIT (OUTPATIENT)
Dept: FAMILY MEDICINE | Facility: CLINIC | Age: 75
End: 2020-01-27
Payer: MEDICARE

## 2020-01-27 VITALS
TEMPERATURE: 97.6 F | BODY MASS INDEX: 25.21 KG/M2 | RESPIRATION RATE: 16 BRPM | OXYGEN SATURATION: 96 % | SYSTOLIC BLOOD PRESSURE: 128 MMHG | HEART RATE: 76 BPM | HEIGHT: 62 IN | WEIGHT: 137 LBS | DIASTOLIC BLOOD PRESSURE: 87 MMHG

## 2020-01-27 DIAGNOSIS — Z00.00 MEDICARE ANNUAL WELLNESS VISIT, SUBSEQUENT: Primary | ICD-10-CM

## 2020-01-27 DIAGNOSIS — G47.09 SECONDARY INSOMNIA: ICD-10-CM

## 2020-01-27 DIAGNOSIS — E03.9 ACQUIRED HYPOTHYROIDISM: ICD-10-CM

## 2020-01-27 DIAGNOSIS — Z13.1 SCREENING FOR DIABETES MELLITUS: ICD-10-CM

## 2020-01-27 DIAGNOSIS — Z71.84 TRAVEL ADVICE ENCOUNTER: ICD-10-CM

## 2020-01-27 DIAGNOSIS — M77.41 METATARSALGIA OF BOTH FEET: ICD-10-CM

## 2020-01-27 DIAGNOSIS — M77.42 METATARSALGIA OF BOTH FEET: ICD-10-CM

## 2020-01-27 DIAGNOSIS — E78.5 HYPERLIPIDEMIA LDL GOAL <100: ICD-10-CM

## 2020-01-27 LAB
ALT SERPL-CCNC: 20 U/L (ref 0–50)
AST SERPL-CCNC: 21 U/L (ref 0–45)
BUN SERPL-MCNC: 20 MG/DL (ref 7–30)
CALCIUM SERPL-MCNC: 9.9 MG/DL (ref 8.5–10.4)
CHLORIDE SERPLBLD-SCNC: 105 MMOL/L (ref 94–109)
CHOLEST SERPL-MCNC: 197 MG/DL (ref 0–200)
CHOLEST/HDLC SERPL: 2.7 {RATIO} (ref 0–5)
CO2 SERPL-SCNC: 26 MMOL/L (ref 20–32)
CREAT SERPL-MCNC: 0.7 MG/DL (ref 0.6–1.3)
EGFR CALCULATED (BLACK REFERENCE): 105.8
EGFR CALCULATED (NON BLACK REFERENCE): 87.4
FASTING SPECIMEN: YES
GLUCOSE SERPL-MCNC: 105 MG/DL (ref 60–99)
GLUCOSE SERPL-MCNC: 106 MG/DL (ref 60–99)
HDLC SERPL-MCNC: 73 MG/DL
LDLC SERPL CALC-MCNC: 100 MG/DL (ref 0–129)
POTASSIUM SERPL-SCNC: 4 MMOL/L (ref 3.4–5.3)
SODIUM SERPL-SCNC: 144 MMOL/L (ref 137.3–146.3)
TRIGL SERPL-MCNC: 120 MG/DL (ref 0–150)
TSH SERPL DL<=0.005 MIU/L-ACNC: 1.7 MU/L (ref 0.4–4)
VLDL-CHOLESTEROL: 24 (ref 7–32)

## 2020-01-27 RX ORDER — ZOLPIDEM TARTRATE 5 MG/1
5 TABLET ORAL
Qty: 30 TABLET | Refills: 0 | Status: SHIPPED | OUTPATIENT
Start: 2020-01-27 | End: 2020-05-04

## 2020-01-27 SDOH — HEALTH STABILITY: MENTAL HEALTH: HOW OFTEN DO YOU HAVE A DRINK CONTAINING ALCOHOL?: 4 OR MORE TIMES A WEEK

## 2020-01-27 ASSESSMENT — MIFFLIN-ST. JEOR: SCORE: 1091.06

## 2020-01-27 NOTE — PROGRESS NOTES
CHIEF COMPLAINT: Medicare annual wellness visit, subsequent      HISTORY OF PRESENT ILLNESS: Kristal Bravo is a 74 year old female who presents for an annual physical. Overall she is doing well.    She has a pacemaker and follows with cardiology 3 times a year. Pacemaker was placed in 2015. She recently had her pacemaker tested and results were normal. Her PVC burden decreased from 35/hour to <1/hour. We discussed the meaning of this in detail. She followed with Dr. Shepard, but he unfortunately passed away. She is wondering if she should find a new cardiologist.    She has been experiencing bilateral foot pain on the dorsal side. She has a history of flat fleet. She also has a bunion on her right foot.    FAMILY, SOCIAL AND PAST SURGICAL HISTORIES:  Unchanged.       MEDICATIONS:  Carefully reviewed.       HEALTHCARE MAINTENANCE: Eye care is up-to-date. She has cataracts that is stable and does not need to be taken care of at this point. No glaucoma or macular degeneration noted. Her hearing has slightly worsened. Finger rub is heard from 4-inches away bilaterally. She is not interested in wearing hearing aids. Dental care is up-to-date. Her blood pressure is 128/89 today. Body mass index is 24.74 kg/m  and weight is stable. She is working out with a  three times a week.   From an immunization standpoint, she is up-to-date besides Shingrix. Mammogram and DEXA are up to date. She no longer needs pap smears. Her last colonoscopy was in 2011 and a 10-year follow up was advised.    MEDICARE QUESTIONS: She has no problems with any activities of daily living. No falls in the last year. She has no symptoms of depression. No significant concerns regarding her memory.       REVIEW OF SYSTEMS:  A 10-point review is negative.       OBJECTIVE:    GENERAL: Kristal is in no distress.  Affect is upbeat.   Alert and oriented x3  VITAL SIGNS: /89 (BP Location: Right arm, Patient Position: Sitting, Cuff Size: Adult Regular)    "Pulse 75   Temp 97.6  F (36.4  C) (Oral)   Resp 16   Ht 1.585 m (5' 2.4\")   Wt 62.1 kg (137 lb)   LMP 01/01/2000   SpO2 96%   BMI 24.74 kg/m    HEENT:  Head is normocephalic, atraumatic. Small amount of dry cerumen in both ears. TM's are visible and normal. No pain with palpation of the frontal or maxillary sinuses.  Eyes are grossly normal.  Nose is free of any congestion or discharge.  Teeth in good repair.  Mucous membranes are moist.  Throat looks benign.  Neck is supple without adenopathy or thyromegaly.  No carotid bruits are heard, no JVD.   BACK:  Smooth and straight.  No pain to percussion.  No CVA tenderness.   LUNGS:  Clear to auscultation bilaterally.   HEART:  Regular rate and rhythm without murmur.   ABDOMEN:  Benign.     EXTREMITIES:  Ankles free of any edema.   MS: No evidence of neuroma. Bunion present on the right foot.  SKIN:  Warm and dry.       LABORATORY DATA: Labs pending      ASSESSMENT AND PLAN:   1. Medicare annual wellness visit, subsequent, up-to-date with HCM  2. Discussed recommendation for shingles vaccine when available at pharmacy.  3. Routine screening. BMP, pending  4. Hyperlipidemia. Lipid panel plus, pending  5. Hypothyroidism. TSH, pending  6. S/p placement of cardiac pacemaker, doing well. Recommend she continues to follow with cardiology NP (as she's been doing) at the [facility].  7. Bilateral metatarsalgia. Referral to Dr. Grayson Balderas from podiatry.   8. Secondary insomnia associated with travel. Refilled Ambien.  9. Return to clinic in 1 year for Medicare wellness visit    Call with any problems or questions.       I, Reza Valenzuela, am serving as a scribe to document services personally performed by Dr. Ruslan Jean, based on data collection and the provider's statements to me. Dr. Jean has reviewed, edited, and approved the above note.     --Ruslan Jean MD  "

## 2020-01-27 NOTE — NURSING NOTE
"74 year old  Chief Complaint   Patient presents with     Physical     Referral     has pacemaker, needs cardiologist     Musculoskeletal Problem     bilateral foot pain, thinks possible arthritis        Blood pressure 128/89, pulse 75, temperature 97.6  F (36.4  C), temperature source Oral, resp. rate 16, height 1.585 m (5' 2.4\"), weight 62.1 kg (137 lb), last menstrual period 01/01/2000, SpO2 96 %, not currently breastfeeding. Body mass index is 24.74 kg/m .  Patient Active Problem List   Diagnosis     Adenocarcinoma of breast (H)     Allergic rhinitis     Hyperlipidemia LDL goal <100     Acquired hypothyroidism     Heart block     Cardiac pacemaker in situ- Medtronic, dual chamber- NOT dependent (Advisa: MRI conditional)     Diverticulosis of large intestine without hemorrhage     BPPV (benign paroxysmal positional vertigo), unspecified laterality     Primary osteoarthritis of left knee     Tear of medial meniscus of left knee     Patellofemoral syndrome, left     S/P TKR (total knee replacement), left       Wt Readings from Last 2 Encounters:   01/27/20 62.1 kg (137 lb)   12/02/19 62.1 kg (137 lb)     BP Readings from Last 3 Encounters:   01/27/20 128/89   12/02/19 106/70   10/30/19 118/74         Current Outpatient Medications   Medication     Ascorbic Acid (VITAMIN C) 500 MG CAPS     aspirin 81 MG tablet     calcium carbonate (OS-DALLAS 500 MG [place]. [place]) 500 MG tablet     cetirizine (ZYRTEC) 10 MG tablet     estradiol (ESTRACE) 0.1 MG/GM vaginal cream     levothyroxine (SYNTHROID/LEVOTHROID) 50 MCG tablet     Magnesium 500 MG CAPS     simvastatin (ZOCOR) 20 MG tablet     VITAMIN D, CHOLECALCIFEROL, PO     zolpidem (AMBIEN) 5 MG tablet     acetaminophen (TYLENOL) 500 MG tablet     amoxicillin (AMOXIL) 500 MG capsule     fish oil-omega-3 fatty acids 1000 MG capsule     UNABLE TO FIND     No current facility-administered medications for this visit.        Social History     Tobacco Use     Smoking status: Never Smoker "     Smokeless tobacco: Never Used   Substance Use Topics     Alcohol use: Yes     Alcohol/week: 0.0 standard drinks     Frequency: 4 or more times a week     Comment: 10-14 wine a week      Drug use: No       Health Maintenance Due   Topic Date Due     ZOSTER IMMUNIZATION (2 of 3) 01/09/2008     MEDICARE ANNUAL WELLNESS VISIT  09/28/2018       Lab Results   Component Value Date    PAP NIL 12/02/2019 January 27, 2020 8:57 AM

## 2020-01-28 PROBLEM — Z00.00 MEDICARE ANNUAL WELLNESS VISIT, SUBSEQUENT: Status: ACTIVE | Noted: 2020-01-28

## 2020-01-28 NOTE — TELEPHONE ENCOUNTER
DIAGNOSIS: Metatarsalgia of both feet   APPOINTMENT DATE: 2.24.2020   NOTES STATUS DETAILS   [facility]   OFFICE NOTE from other specialist N/A    DISCHARGE SUMMARY from hospital N/A    DISCHARGE REPORT from the ER N/A    OPERATIVE REPORT N/A    MEDICATION LIST Internal    IMPLANT RECORD/STICKER N/A    LABS     CBC/DIFF Internal 3.22.19   CULTURES N/A    INJECTIONS DONE IN RADIOLOGY N/A    MRI N/A    CT SCAN N/A    XRAYS (IMAGES & REPORTS) N/A    TUMOR     PATHOLOGY  Slides & report N/A

## 2020-02-24 ENCOUNTER — PRE VISIT (OUTPATIENT)
Dept: ORTHOPEDICS | Facility: CLINIC | Age: 75
End: 2020-02-24

## 2020-02-24 ENCOUNTER — OFFICE VISIT (OUTPATIENT)
Dept: ORTHOPEDICS | Facility: CLINIC | Age: 75
End: 2020-02-24
Payer: MEDICARE

## 2020-02-24 ENCOUNTER — ANCILLARY PROCEDURE (OUTPATIENT)
Dept: GENERAL RADIOLOGY | Facility: CLINIC | Age: 75
End: 2020-02-24
Attending: PODIATRIST
Payer: MEDICARE

## 2020-02-24 VITALS — BODY MASS INDEX: 24.63 KG/M2 | HEIGHT: 63 IN | WEIGHT: 139 LBS

## 2020-02-24 DIAGNOSIS — M54.17 LUMBOSACRAL RADICULOPATHY: ICD-10-CM

## 2020-02-24 DIAGNOSIS — M19.071 ARTHRITIS OF RIGHT FOOT: ICD-10-CM

## 2020-02-24 DIAGNOSIS — M19.072 ARTHRITIS OF LEFT FOOT: ICD-10-CM

## 2020-02-24 DIAGNOSIS — R20.2 PARESTHESIA OF FOOT, BILATERAL: ICD-10-CM

## 2020-02-24 DIAGNOSIS — M19.072 ARTHRITIS OF LEFT FOOT: Primary | ICD-10-CM

## 2020-02-24 ASSESSMENT — MIFFLIN-ST. JEOR: SCORE: 1103.24

## 2020-02-24 NOTE — NURSING NOTE
"Reason For Visit:   Chief Complaint   Patient presents with     Consult     Metatarsalgia of both feet, and now tingling       Ht 1.59 m (5' 2.6\")   Wt 63 kg (139 lb)   LMP 01/01/2000   BMI 24.94 kg/m      Pain Assessment  Patient Currently in Pain: Yes  0-10 Pain Scale: 5  Primary Pain Location: Foot(bilateral)    Ruby Bates, BARBARA  "

## 2020-02-24 NOTE — LETTER
2/24/2020      RE: Sharona Bravo  [address]  [facility] [address]       Date of Service: 2/24/2020    Chief Complaint:   Chief Complaint   Patient presents with     Consult     Metatarsalgia of both feet, and now tingling        HPI: Sharona is a 74 year old female who presents today for further evaluation of BL foot pain.  Nature: sharp & dull    Location: tops of both feet.     Duration: months    Onset: gradual. No inciting trauma.     Course: waxes and wanes.     Aggravating/alleviating factors: exercise and walking aggravate. Rest alleviates.     Previous Treatments: None.   She also relates to having numbness and tingling in both her hands and feet, especially at night.     Review of Systems: No n/v/d/f/c/ns/sob/cp    PMH:   Past Medical History:   Diagnosis Date     Malignant neoplasm (H)        PSxH:   Past Surgical History:   Procedure Laterality Date     JOINT REPLACEMENT  2018    left knee     PACEMAKER/ICD CHECK         Allergies: Gabapentin    SH:   Social History     Socioeconomic History     Marital status:      Spouse name: Not on file     Number of children: Not on file     Years of education: Not on file     Highest education level: Not on file   Occupational History     Not on file   Social Needs     Financial resource strain: Not on file     Food insecurity:     Worry: Not on file     Inability: Not on file     Transportation needs:     Medical: Not on file     Non-medical: Not on file   Tobacco Use     Smoking status: Never Smoker     Smokeless tobacco: Never Used   Substance and Sexual Activity     Alcohol use: Yes     Alcohol/week: 0.0 standard drinks     Frequency: 4 or more times a week     Comment: 10-14 wine a week      Drug use: No     Sexual activity: Yes     Partners: Male     Birth control/protection: Post-menopausal   Lifestyle     Physical activity:     Days per week: Not on file     Minutes per session: Not on file     Stress: Not on file   Relationships  "    Social connections:     Talks on phone: Not on file     Gets together: Not on file     Attends Episcopalian service: Not on file     Active member of club or organization: Not on file     Attends meetings of clubs or organizations: Not on file     Relationship status: Not on file     Intimate partner violence:     Fear of current or ex partner: Not on file     Emotionally abused: Not on file     Physically abused: Not on file     Forced sexual activity: Not on file   Other Topics Concern     Parent/sibling w/ CABG, MI or angioplasty before 65F 55M? Not Asked   Social History Narrative     Not on file       FH:   Family History   Problem Relation Age of Onset     Cancer Mother      Heart Disease Father        Objective:  Data Unavailable Data Unavailable Data Unavailable Data Unavailable 5' 2.598\" 139 lbs 0 oz    PT and DP pulses are 2/4 bilaterally. CRT is instant. Positive pedal hair.   Gross sensation is intact bilaterally. Protective sensation is intact as demonstrated with a 5.07G monofilament. Decreased knee jerk and ankle reflexes BL. Normal vibratory sensation.   Equinus is noted bilaterally. Pain noted at the base of the BL 3rd mets at the articulations of the mets and middle cuneiform. Dorsal osteophyte formation noted to the areas. Increased ROM to the STJs and MTJs BL. No pain along the courses of the PT, peroneal, or Achilles tendons BL.   Nails normal bilaterally. No open lesions are noted.     bilateral foot xrays indicated in 6 weightbearing views.    Degenerative changes noted to BL 2nd and 3rd met bases at the cuneiform junctions. Dorsal osteophyte formations.     Impression:  1. No acute osseous abnormality.   2. Multilevel degenerative changes most pronounced at L5/S1 with trace  retrolisthesis of L2 on L3.     I have personally reviewed the examination and initial interpretation  and I agree with the findings.     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   She is having neuropathic symptoms in her hands and feet. Possibly related to her spine. Because she is having upper extremity involvement, recommend consult with neurology.     Plan:  - Pt seen and evaluated.  - XRs taken and discussed with her.  - She may benefit from a pair of orthotics. However these are not covered by Medicare. I would initially recommend that she try a pair of Powerstep orthotics to see if these do help. If so, she can continue to use these or we can upgrade to a pair of custom orthotics.   - Referral for neurology.   - See again in 6 weeks.              Grayson Balderas DPM

## 2020-02-25 NOTE — PROGRESS NOTES
Date of Service: 2/24/2020    Chief Complaint:   Chief Complaint   Patient presents with     Consult     Metatarsalgia of both feet, and now tingling        HPI: Sharona is a 74 year old female who presents today for further evaluation of BL foot pain.  Nature: sharp & dull    Location: tops of both feet.     Duration: months    Onset: gradual. No inciting trauma.     Course: waxes and wanes.     Aggravating/alleviating factors: exercise and walking aggravate. Rest alleviates.     Previous Treatments: None.   She also relates to having numbness and tingling in both her hands and feet, especially at night.     Review of Systems: No n/v/d/f/c/ns/sob/cp    PMH:   Past Medical History:   Diagnosis Date     Malignant neoplasm (H)        PSxH:   Past Surgical History:   Procedure Laterality Date     JOINT REPLACEMENT  2018    left knee     PACEMAKER/ICD CHECK         Allergies: Gabapentin    SH:   Social History     Socioeconomic History     Marital status:      Spouse name: Not on file     Number of children: Not on file     Years of education: Not on file     Highest education level: Not on file   Occupational History     Not on file   Social Needs     Financial resource strain: Not on file     Food insecurity:     Worry: Not on file     Inability: Not on file     Transportation needs:     Medical: Not on file     Non-medical: Not on file   Tobacco Use     Smoking status: Never Smoker     Smokeless tobacco: Never Used   Substance and Sexual Activity     Alcohol use: Yes     Alcohol/week: 0.0 standard drinks     Frequency: 4 or more times a week     Comment: 10-14 wine a week      Drug use: No     Sexual activity: Yes     Partners: Male     Birth control/protection: Post-menopausal   Lifestyle     Physical activity:     Days per week: Not on file     Minutes per session: Not on file     Stress: Not on file   Relationships     Social connections:     Talks on phone: Not on file     Gets together: Not on file      "Attends Latter day service: Not on file     Active member of club or organization: Not on file     Attends meetings of clubs or organizations: Not on file     Relationship status: Not on file     Intimate partner violence:     Fear of current or ex partner: Not on file     Emotionally abused: Not on file     Physically abused: Not on file     Forced sexual activity: Not on file   Other Topics Concern     Parent/sibling w/ CABG, MI or angioplasty before 65F 55M? Not Asked   Social History Narrative     Not on file       FH:   Family History   Problem Relation Age of Onset     Cancer Mother      Heart Disease Father        Objective:  Data Unavailable Data Unavailable Data Unavailable Data Unavailable 5' 2.598\" 139 lbs 0 oz    PT and DP pulses are 2/4 bilaterally. CRT is instant. Positive pedal hair.   Gross sensation is intact bilaterally. Protective sensation is intact as demonstrated with a 5.07G monofilament. Decreased knee jerk and ankle reflexes BL. Normal vibratory sensation.   Equinus is noted bilaterally. Pain noted at the base of the BL 3rd mets at the articulations of the mets and middle cuneiform. Dorsal osteophyte formation noted to the areas. Increased ROM to the STJs and MTJs BL. No pain along the courses of the PT, peroneal, or Achilles tendons BL.   Nails normal bilaterally. No open lesions are noted.     bilateral foot xrays indicated in 6 weightbearing views.    Degenerative changes noted to BL 2nd and 3rd met bases at the cuneiform junctions. Dorsal osteophyte formations.     Impression:  1. No acute osseous abnormality.   2. Multilevel degenerative changes most pronounced at L5/S1 with trace  retrolisthesis of L2 on L3.     I have personally reviewed the examination and initial interpretation  and I agree with the findings.     CLOVER SANCHEZ    Assessment: Pat is a 73 YO woman with BL midtarsal joint arthritis at the 3rd met-cuneiform joints BL.   She is having neuropathic symptoms in her hands " and feet. Possibly related to her spine. Because she is having upper extremity involvement, recommend consult with neurology.     Plan:  - Pt seen and evaluated.  - XRs taken and discussed with her.  - She may benefit from a pair of orthotics. However these are not covered by Medicare. I would initially recommend that she try a pair of Powerstep orthotics to see if these do help. If so, she can continue to use these or we can upgrade to a pair of custom orthotics.   - Referral for neurology.   - See again in 6 weeks.

## 2020-02-26 ENCOUNTER — PRE VISIT (OUTPATIENT)
Dept: NEUROLOGY | Facility: CLINIC | Age: 75
End: 2020-02-26

## 2020-02-26 NOTE — TELEPHONE ENCOUNTER
FUTURE VISIT INFORMATION      FUTURE VISIT INFORMATION:    Date: 3/24/2020    Time: 9AM     [facility]: [facility]  REFERRAL INFORMATION:    Referring provider:  Grayson Balderas DPM     Referring providers clinic:  [facility]     Reason for visit/diagnosis  Parethesia of Foot     RECORDS REQUESTED FROM:       Clinic name Comments Records Status Imaging Status   Internal Grayson Balderas-2/24/2020    XR foot and Lumbar Spine 2/24/2020 Epic PACS

## 2020-03-02 DIAGNOSIS — E03.9 ACQUIRED HYPOTHYROIDISM: ICD-10-CM

## 2020-03-02 NOTE — TELEPHONE ENCOUNTER
Last time prescribed: 9/9/19 , 90 tabs/caps x 1 refills  Last office visit: 1/27/2020  Next appointment: No Future Appointment Scheduled    Prescription approved per [facility] Refill Protocol.        Nicolle Matamoros RN  March 3, 2020 11:48 AM

## 2020-03-03 RX ORDER — LEVOTHYROXINE SODIUM 50 UG/1
50 TABLET ORAL DAILY
Qty: 90 TABLET | Refills: 3 | Status: SHIPPED | OUTPATIENT
Start: 2020-03-03 | End: 2021-03-02

## 2020-03-13 ENCOUNTER — OFFICE VISIT (OUTPATIENT)
Dept: FAMILY MEDICINE | Facility: CLINIC | Age: 75
End: 2020-03-13
Payer: MEDICARE

## 2020-03-13 ENCOUNTER — TELEPHONE (OUTPATIENT)
Dept: FAMILY MEDICINE | Facility: CLINIC | Age: 75
End: 2020-03-13

## 2020-03-13 VITALS
RESPIRATION RATE: 15 BRPM | HEART RATE: 81 BPM | OXYGEN SATURATION: 99 % | SYSTOLIC BLOOD PRESSURE: 126 MMHG | TEMPERATURE: 97.5 F | BODY MASS INDEX: 24.58 KG/M2 | DIASTOLIC BLOOD PRESSURE: 81 MMHG | WEIGHT: 137 LBS

## 2020-03-13 DIAGNOSIS — J01.90 ACUTE BACTERIAL RHINOSINUSITIS: Primary | ICD-10-CM

## 2020-03-13 DIAGNOSIS — B96.89 ACUTE BACTERIAL RHINOSINUSITIS: Primary | ICD-10-CM

## 2020-03-13 NOTE — TELEPHONE ENCOUNTER
"Pt calling clinic - states she has had increasing sinus pressure for past 2 weeks. States she has not had fever, but has had occ chills. States she feels \"like my eyes are tight\", cheeks and forehead feel \"dull\". Nasal drainage is green in color. States she is flying to [place] on a private plane tomorrow.   States she has had several sinus infections in the past, that required antibiotics. Has used sudafed, with some relief.   Pt requests appt today to eval symptoms, and receive treatment, if needed.   Appt made with Dr Harding.  Annie Mcmanus RN  [facility]  "

## 2020-03-13 NOTE — NURSING NOTE
74 year old  Chief Complaint   Patient presents with     Facial Pain     x 1 month     Chills     occasional no fever       Blood pressure 126/81, pulse 81, temperature 97.5  F (36.4  C), temperature source Oral, resp. rate 15, weight 62.1 kg (137 lb), last menstrual period 01/01/2000, SpO2 99 %, not currently breastfeeding. Body mass index is 24.58 kg/m .  Patient Active Problem List   Diagnosis     Adenocarcinoma of breast (H)     Allergic rhinitis     Hyperlipidemia LDL goal <100     Acquired hypothyroidism     Heart block     Cardiac pacemaker in situ- Medtronic, dual chamber- NOT dependent (Advisa: MRI conditional)     Diverticulosis of large intestine without hemorrhage     BPPV (benign paroxysmal positional vertigo), unspecified laterality     Primary osteoarthritis of left knee     Tear of medial meniscus of left knee     Patellofemoral syndrome, left     S/P TKR (total knee replacement), left     Medicare annual wellness visit, subsequent       Wt Readings from Last 2 Encounters:   03/13/20 62.1 kg (137 lb)   02/24/20 63 kg (139 lb)     BP Readings from Last 3 Encounters:   03/13/20 126/81   01/27/20 128/87   12/02/19 106/70         Current Outpatient Medications   Medication     Ascorbic Acid (VITAMIN C) 500 MG CAPS     aspirin 81 MG tablet     calcium carbonate (OS-DALLAS 500 MG [place]. [place]) 500 MG tablet     cetirizine (ZYRTEC) 10 MG tablet     estradiol (ESTRACE) 0.1 MG/GM vaginal cream     levothyroxine (SYNTHROID/LEVOTHROID) 50 MCG tablet     Magnesium 500 MG CAPS     simvastatin (ZOCOR) 20 MG tablet     VITAMIN D, CHOLECALCIFEROL, PO     zolpidem (AMBIEN) 5 MG tablet     No current facility-administered medications for this visit.        Social History     Tobacco Use     Smoking status: Never Smoker     Smokeless tobacco: Never Used   Substance Use Topics     Alcohol use: Yes     Alcohol/week: 0.0 standard drinks     Frequency: 4 or more times a week     Comment: 10-14 wine a week      Drug use: No        Health Maintenance Due   Topic Date Due     ZOSTER IMMUNIZATION (3 of 3) 03/23/2020       Lab Results   Component Value Date    PAP NIL 12/02/2019 March 13, 2020 2:08 PM

## 2020-03-13 NOTE — PROGRESS NOTES
SUBJECTIVE:   Sharona Bravo is a 74 year old female who presents to clinic today for a return visit.    # Sinus Symptoms  - symptoms started about a month ago  - feeling tightness in cheeks  - occasional chills, no fever  - nasal congestion  - postnasal drainage  - originally had pain in upper teeth, but not the past few weeks  - occasional a little pressure in left ear  - no change in sense of smell  - no cough or dyspnea  - denies antibiotic use in the past 90 days    - has had issues with sinus infections in the past but usually takes Sudafed and they go away  - taking pseudoephedrine which helps with congestion    ROS: Denies fevers, chills, chest pain, difficulty breathing, abdominal pain    Patient Active Problem List   Diagnosis     Adenocarcinoma of breast (H)     Allergic rhinitis     Hyperlipidemia LDL goal <100     Acquired hypothyroidism     Heart block     Cardiac pacemaker in situ- Medtronic, dual chamber- NOT dependent (Advisa: MRI conditional)     Diverticulosis of large intestine without hemorrhage     BPPV (benign paroxysmal positional vertigo), unspecified laterality     Primary osteoarthritis of left knee     Tear of medial meniscus of left knee     Patellofemoral syndrome, left     S/P TKR (total knee replacement), left     Medicare annual wellness visit, subsequent     Current Outpatient Medications   Medication     amoxicillin-clavulanate (AUGMENTIN) 875-125 MG tablet     Ascorbic Acid (VITAMIN C) 500 MG CAPS     aspirin 81 MG tablet     calcium carbonate (OS-DALLAS 500 MG [place]. [place]) 500 MG tablet     cetirizine (ZYRTEC) 10 MG tablet     estradiol (ESTRACE) 0.1 MG/GM vaginal cream     levothyroxine (SYNTHROID/LEVOTHROID) 50 MCG tablet     Magnesium 500 MG CAPS     simvastatin (ZOCOR) 20 MG tablet     VITAMIN D, CHOLECALCIFEROL, PO     zolpidem (AMBIEN) 5 MG tablet     No current facility-administered medications for this visit.        I have reviewed the patient's relevant past medical  history.     OBJECTIVE:   /81 (BP Location: Right arm, Patient Position: Sitting, Cuff Size: Adult Regular)   Pulse 81   Temp 97.5  F (36.4  C) (Oral)   Resp 15   Wt 62.1 kg (137 lb)   LMP 01/01/2000   SpO2 99%   BMI 24.58 kg/m      Constitutional: well-appearing, appears stated age  Eyes: conjunctivae without erythema, sclera anicteric.   ENT: bilateral medial maxillary sinus tenderness. TM normal bilateral. Posterior oropharynx normal.   Skin: no rashes, lesions, or wounds  Psych: affect is full and appropriate, speech is fluent and non-pressured    ASSESSMENT AND PLAN:     (J01.90,  B96.89) Acute bacterial rhinosinusitis  (primary encounter diagnosis)  Comment: History consistent with acute rhinosinusitis. Given lack of improvement with long duration of symptoms, reasonable to try empiric treatment for bacterial sinus infection. Will treat with 10 days of Augmentin. Cautioned about risks of diarrhea, yeast infection. Recommended taking with probiotic supplement. Asked to call if does not improve.   Plan: amoxicillin-clavulanate (AUGMENTIN) 875-125 MG         tablet          Grant Harding MD   [facility]  03/17/2020, 10:12 AM

## 2020-03-22 ASSESSMENT — ENCOUNTER SYMPTOMS
JOINT SWELLING: 0
SINUS PAIN: 1
MEMORY LOSS: 0
SPEECH CHANGE: 0
STIFFNESS: 1
DISTURBANCES IN COORDINATION: 0
SEIZURES: 0
HEADACHES: 0
WEAKNESS: 0
MYALGIAS: 0
ARTHRALGIAS: 1
MUSCLE WEAKNESS: 0
NECK PAIN: 0
MUSCLE CRAMPS: 0
NUMBNESS: 0
TREMORS: 0
LOSS OF CONSCIOUSNESS: 0
TINGLING: 1
DIZZINESS: 0
PARALYSIS: 0
BACK PAIN: 0

## 2020-03-24 ENCOUNTER — OFFICE VISIT (OUTPATIENT)
Dept: NEUROLOGY | Facility: CLINIC | Age: 75
End: 2020-03-24
Attending: PODIATRIST
Payer: MEDICARE

## 2020-03-24 VITALS
SYSTOLIC BLOOD PRESSURE: 132 MMHG | WEIGHT: 136 LBS | DIASTOLIC BLOOD PRESSURE: 87 MMHG | TEMPERATURE: 97.7 F | HEIGHT: 63 IN | RESPIRATION RATE: 15 BRPM | BODY MASS INDEX: 24.1 KG/M2 | HEART RATE: 65 BPM | OXYGEN SATURATION: 96 %

## 2020-03-24 DIAGNOSIS — R20.2 PARESTHESIAS: ICD-10-CM

## 2020-03-24 DIAGNOSIS — R20.2 PARESTHESIAS: Primary | ICD-10-CM

## 2020-03-24 PROCEDURE — 00000402 ZZHCL STATISTIC TOTAL PROTEIN: Performed by: PSYCHIATRY & NEUROLOGY

## 2020-03-24 PROCEDURE — 84165 PROTEIN E-PHORESIS SERUM: CPT | Performed by: PSYCHIATRY & NEUROLOGY

## 2020-03-24 ASSESSMENT — PAIN SCALES - GENERAL: PAINLEVEL: NO PAIN (0)

## 2020-03-24 ASSESSMENT — MIFFLIN-ST. JEOR: SCORE: 1089.67

## 2020-03-24 NOTE — LETTER
"3/24/2020       RE: Sharona Bravo  [address]  [facility] [address]     Dear Colleague,    Thank you for referring your patient, Sharona Bravo, to the [facility] NEUROLOGY at [facility]. Please see a copy of my visit note below.    [facility] Neurology Consultation    Sharona Bravo MRN# [id number]   Age: 74 year old YOB: 1945     Requesting physician: Ruslan Puga     Reason for Consultation: bilateral foot pain with paresthesias of hands and feet      History of Presenting Symptoms:   Sharona Bravo is a 74 year old female who presents today for evaluation of bilateral foot pain with paresthesias of hands and feet.    Today, the patient had a physical at the end of 01/2020.  Two weeks after this visit, she had an acute onset of toes tingling as well as tingling in her hands.  The tingling was paroxysmal, and occurred mostly at night.  She indicates also that her right foot (the ball of the foot) for the last year feels like \"her sock is bunched up\" and has become progressively numb. This has been occurring for the last year.    She works out with a  multiple times a week, but a few (3-4) months ago she reported feeling a tight band around her knee and a numbness going down her tibialis anterior.  This is similar to sensations she felt post-knee replacement (tight band around knee) but the numbness into the leg is new.  She also feels that when going to bed sometimes she gets a numbness in to her thigh and groin.  The paresthesias aren't necessarily present or noticed in the day, but she notices them when going to bed (they are numb then).  She has no issues with urination or defecation. For the last few weeks, she hasn't had any further tingling in her feet but has had some tingling in her hands while reading a book in her bed.  There is no weakness reported in the leg.  There is no tripping or " falling reported. She has no back injuries to speak of, and no other pain in her back.    Pertinent history of AV heart block (Mobitz II) s/p PPM 6/23/2015, breast cancer (Adenocarcinoma, T10 NO MO right sided) s/p radiation (2/20/1991) and lumpectomy (1990), hypothyroidism and HLD.  After 4/1/2019 visit with oncology, she was to be referred to genetics given her history of a cousin with ovarian cancer and mother with possible ovarian/or pancreatic cancer.  The patient had polio when she was 8 y/o and did have right leg (calf) weakness which has remained.      Past Medical History:     Patient Active Problem List   Diagnosis     Adenocarcinoma of breast (H)     Allergic rhinitis     Hyperlipidemia LDL goal <100     Acquired hypothyroidism     Heart block     Cardiac pacemaker in situ- Medtronic, dual chamber- NOT dependent (Advisa: MRI conditional)     Diverticulosis of large intestine without hemorrhage     BPPV (benign paroxysmal positional vertigo), unspecified laterality     Primary osteoarthritis of left knee     Tear of medial meniscus of left knee     Patellofemoral syndrome, left     S/P TKR (total knee replacement), left     Medicare annual wellness visit, subsequent      Past Surgical History:     Past Surgical History:   Procedure Laterality Date     JOINT REPLACEMENT  2018    left knee     PACEMAKER/ICD CHECK        Social History:   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 , 2 children.   Tobacco Use     Smoking status: Never Smoker     Smokeless tobacco: Never Used   Substance Use Topics     Alcohol use: Yes     Alcohol/week: 0.0 standard drinks     Frequency: 4 or more times a week     Comment: 10-14 wine a week      Drug use: No      Family History:     Family History   Problem Relation Age of Onset     Cancer (pancreatic or ovarian) Mother      Heart Disease Father    - Cousin had ovarian cancer     Medications:     Current Outpatient Medications   Medication Sig     amoxicillin-clavulanate  "(AUGMENTIN) 875-125 MG tablet Take 1 tablet by mouth 2 times daily     Ascorbic Acid (VITAMIN C) 500 MG CAPS      aspirin 81 MG tablet Take 81 mg by mouth daily     calcium carbonate (OS-D[place] 500 MG [place]. [place]) 500 MG tablet Take 600 mg by mouth daily      cetirizine (ZYRTEC) 10 MG tablet Take 10 mg by mouth daily     estradiol (ESTRACE) 0.1 MG/GM vaginal cream Place 1 g vaginally three times a week Place 1g vaginally nightly x 2 weeks and then 2-3 times per week for maintenance     levothyroxine (SYNTHROID/LEVOTHROID) 50 MCG tablet Take 1 tablet (50 mcg) by mouth daily     Magnesium 500 MG CAPS Take 250 mg by mouth daily      simvastatin (ZOCOR) 20 MG tablet Take 1 tablet (20 mg) by mouth At Bedtime     VITAMIN D, CHOLECALCIFEROL, PO Take 1,000 Units by mouth daily      zolpidem (AMBIEN) 5 MG tablet Take 1 tablet (5 mg) by mouth nightly as needed for sleep      Allergies:     Allergies   Allergen Reactions     Gabapentin Swelling and Rash     Face swelling        Review of Systems:   A comprehensive 10 point review of systems (constitutional, ENT, cardiac, peripheral vascular, lymphatic, respiratory, GI, , Musculoskeletal, skin, Neurological) was performed and found to be negative except as described in this note.      Physical Exam:   Vitals: /87   Pulse 65   Temp 97.7  F (36.5  C) (Oral)   Resp 15   Ht 1.59 m (5' 2.6\")   Wt 61.7 kg (136 lb)   LMP 01/01/2000   SpO2 96%   BMI 24.40 kg/m    General: Seated comfortably in no acute distress.  HEENT: Neck supple with normal range of motion. No paracervical muscle tenderness or tightness.    Heart: Regular rate  Lungs: breathing comfortably  GI: Non tender  Extremities: no edema  Skin: No rashes  Neurologic:     Mental Status: Fully alert, attentive and oriented. Speech clear and fluent, no paraphasic errors.      Cranial Nerves: Visual fields intact. PERRL. EOMI with normal smooth pursuit. Facial sensation intact/symmetric. Facial movements symmetric. " Hearing not formally tested but intact to conversation. Palate elevation symmetric, uvula midline. No dysarthria. Shoulder shrug strong bilaterally. Tongue protrusion midline.     Motor: No tremors or other abnormal movements observed. Muscle tone normal throughout. No pronator drift. Normal/symmetric rapid finger tapping. Strength 5/5 throughout upper and lower extremities.     Deep Tendon Reflexes: 2+/symmetric throughout upper extremities. 0 at left patellar, 1+ right patellar, 1+ b/l achilles. No clonus. Toes downgoing bilaterally.     Sensory: Intact/symmetric to light touch, pinprick, temperature, vibration and proprioception throughout upper and lower extremities. NO specific loss of sensation in any part of body. Negative Romberg.      Coordination: Finger-nose-finger without dysmetria. Rapid alternating movements intact/symmetric with normal speed and rhythm.     Gait: Normal, steady casual gait. Able to walk on toes, heels and tandem without difficulty.         Data: Pertinent prior to visit   Imaging:  Xray lumbar spine: 2/24/2020  1. No acute osseous abnormality.   2. Multilevel degenerative changes most pronounced at L5/S1 with trace retrolisthesis of L2 on L3.    Laboratory:  3/22/2019;  - CMP (normal).  Slightly elevated Cholesterol (207)  - CBC (normal)  - ESR normal    1/27/2020;  Liver enzymes (normal)  TSH 1.7 (normal)         Assessment and Plan:   Assessment:  - paresthesias of left anterior thigh, knee, and leg (L3 and L4 distribution)  - paroxysmal hand and feet paresthesias    The patient has a relatively normal exam (loss of reflexes at left patellar, diminished lower extremity reflexes in general) and had no specific loss of sensation in her body.  Given her Xray, and symptom report of the left lower extremity, it would be beneficial to determine if there is a slight sensory neuropathy or a radiculopathy in the left femoral nerve or L3-4 roots.  MRI lumbar spine and EMG would be the best  tests to do so, and EMG would also be able to assess her left upper extremity for possible carpal tunnel or ulnar neuropathy given her report of fluctuating hand paresthesias occurring at night.  Also, given her cancer history and paroxysmal acute onset hand and feet sensory paresthesias, I would want to look for possible paraneoplastic or immunoglobulin etiologies of her symptoms.  She has had recent TSH, inflammatory, and blood/chem levels checked within the last year, but if the above tests return w/out positive findings, I would expand the blood work to include B-vitamins, vitamin D, vitamin E.     Plan:  - MRI lumbar spine  - EMG left leg and arm  - Paraneoplastic panel  - ELP    Follow up in Neurology clinic in 3 months or earlier as needed should new concerns arise.    ARIA Ruiz D.O.   of Neurology    Greater than 50% of the total time (60 min) in this patient encounter was spent on counseling and/or coordination of care. We reviewed diagnostic results, impressions, and discussed other possible tests if symptoms do not improve. We discussed the implications of the diagnosis, as well as risks and benefits of management options. We reviewed treatment instructions and our scheduled follow-up as specified in the discharge plan. We also discussed the importance of compliance with the chosen course of treatment. The patient is in agreement with this plan and has no further questions.      Again, thank you for allowing me to participate in the care of your patient.      Sincerely,    Uri Ruiz, DO

## 2020-03-24 NOTE — PATIENT INSTRUCTIONS
You have positive symptoms (numbness/tingling) in the left knee/leg/groin that may be due to a nerve compression from your back (lumbar spine) or due to other nerve damage.  We should obtain an image (MRI lumbar spine) of your back as well as a nerve/muscle test (EMG) to determine where the damage is or if it is present at all.      Also, given your history of cancer, we should look into whether an atypical antibody is present in your blood with a lab test (paraneoplastic richard test), as a small reoccurrence of a cancer can lead to your symptoms.  This is a similar reason as to why we should obtain a general immunoglobulin test (ELP).    If tests return normal, we may expand the work up to include other things such as vitamins, minerals, inflammatory proteins.

## 2020-03-24 NOTE — NURSING NOTE
Chief Complaint   Patient presents with     Consult     [facility] NEW PARESTHESIA'S OF FEET, AND LEFT LEG- LUMBOSACRAL RADICULOPATHY       Gianfranco Silvestre, EMT

## 2020-03-24 NOTE — PROGRESS NOTES
"[facility] Neurology Consultation    Sharona Bravo MRN# [id number]   Age: 74 year old YOB: 1945     Requesting physician: Ruslan Puga     Reason for Consultation: bilateral foot pain with paresthesias of hands and feet      History of Presenting Symptoms:   Sharona Bravo is a 74 year old female who presents today for evaluation of bilateral foot pain with paresthesias of hands and feet.    Today, the patient had a physical at the end of 01/2020.  Two weeks after this visit, she had an acute onset of toes tingling as well as tingling in her hands.  The tingling was paroxysmal, and occurred mostly at night.  She indicates also that her right foot (the ball of the foot) for the last year feels like \"her sock is bunched up\" and has become progressively numb. This has been occurring for the last year.    She works out with a  multiple times a week, but a few (3-4) months ago she reported feeling a tight band around her knee and a numbness going down her tibialis anterior.  This is similar to sensations she felt post-knee replacement (tight band around knee) but the numbness into the leg is new.  She also feels that when going to bed sometimes she gets a numbness in to her thigh and groin.  The paresthesias aren't necessarily present or noticed in the day, but she notices them when going to bed (they are numb then).  She has no issues with urination or defecation. For the last few weeks, she hasn't had any further tingling in her feet but has had some tingling in her hands while reading a book in her bed.  There is no weakness reported in the leg.  There is no tripping or falling reported. She has no back injuries to speak of, and no other pain in her back.    Pertinent history of AV heart block (Mobitz II) s/p PPM 6/23/2015, breast cancer (Adenocarcinoma, T10 NO MO right sided) s/p radiation (2/20/1991) and lumpectomy (1990), hypothyroidism and HLD.  After " 4/1/2019 visit with oncology, she was to be referred to genetics given her history of a cousin with ovarian cancer and mother with possible ovarian/or pancreatic cancer.  The patient had polio when she was 8 y/o and did have right leg (calf) weakness which has remained.      Past Medical History:     Patient Active Problem List   Diagnosis     Adenocarcinoma of breast (H)     Allergic rhinitis     Hyperlipidemia LDL goal <100     Acquired hypothyroidism     Heart block     Cardiac pacemaker in situ- Medtronic, dual chamber- NOT dependent (Advisa: MRI conditional)     Diverticulosis of large intestine without hemorrhage     BPPV (benign paroxysmal positional vertigo), unspecified laterality     Primary osteoarthritis of left knee     Tear of medial meniscus of left knee     Patellofemoral syndrome, left     S/P TKR (total knee replacement), left     Medicare annual wellness visit, subsequent      Past Surgical History:     Past Surgical History:   Procedure Laterality Date     JOINT REPLACEMENT  2018    left knee     PACEMAKER/ICD CHECK        Social History:   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 , 2 children.   Tobacco Use     Smoking status: Never Smoker     Smokeless tobacco: Never Used   Substance Use Topics     Alcohol use: Yes     Alcohol/week: 0.0 standard drinks     Frequency: 4 or more times a week     Comment: 10-14 wine a week      Drug use: No      Family History:     Family History   Problem Relation Age of Onset     Cancer (pancreatic or ovarian) Mother      Heart Disease Father    - Cousin had ovarian cancer     Medications:     Current Outpatient Medications   Medication Sig     amoxicillin-clavulanate (AUGMENTIN) 875-125 MG tablet Take 1 tablet by mouth 2 times daily     Ascorbic Acid (VITAMIN C) 500 MG CAPS      aspirin 81 MG tablet Take 81 mg by mouth daily     calcium carbonate (OS-[place] 500 MG [place]) 500 MG tablet Take 600 mg by mouth daily      cetirizine (ZYRTEC) 10 MG tablet Take  "10 mg by mouth daily     estradiol (ESTRACE) 0.1 MG/GM vaginal cream Place 1 g vaginally three times a week Place 1g vaginally nightly x 2 weeks and then 2-3 times per week for maintenance     levothyroxine (SYNTHROID/LEVOTHROID) 50 MCG tablet Take 1 tablet (50 mcg) by mouth daily     Magnesium 500 MG CAPS Take 250 mg by mouth daily      simvastatin (ZOCOR) 20 MG tablet Take 1 tablet (20 mg) by mouth At Bedtime     VITAMIN D, CHOLECALCIFEROL, PO Take 1,000 Units by mouth daily      zolpidem (AMBIEN) 5 MG tablet Take 1 tablet (5 mg) by mouth nightly as needed for sleep      Allergies:     Allergies   Allergen Reactions     Gabapentin Swelling and Rash     Face swelling        Review of Systems:   A comprehensive 10 point review of systems (constitutional, ENT, cardiac, peripheral vascular, lymphatic, respiratory, GI, , Musculoskeletal, skin, Neurological) was performed and found to be negative except as described in this note.      Physical Exam:   Vitals: /87   Pulse 65   Temp 97.7  F (36.5  C) (Oral)   Resp 15   Ht 1.59 m (5' 2.6\")   Wt 61.7 kg (136 lb)   LMP 01/01/2000   SpO2 96%   BMI 24.40 kg/m    General: Seated comfortably in no acute distress.  HEENT: Neck supple with normal range of motion. No paracervical muscle tenderness or tightness.    Heart: Regular rate  Lungs: breathing comfortably  GI: Non tender  Extremities: no edema  Skin: No rashes  Neurologic:     Mental Status: Fully alert, attentive and oriented. Speech clear and fluent, no paraphasic errors.      Cranial Nerves: Visual fields intact. PERRL. EOMI with normal smooth pursuit. Facial sensation intact/symmetric. Facial movements symmetric. Hearing not formally tested but intact to conversation. Palate elevation symmetric, uvula midline. No dysarthria. Shoulder shrug strong bilaterally. Tongue protrusion midline.     Motor: No tremors or other abnormal movements observed. Muscle tone normal throughout. No pronator drift. " Normal/symmetric rapid finger tapping. Strength 5/5 throughout upper and lower extremities.     Deep Tendon Reflexes: 2+/symmetric throughout upper extremities. 0 at left patellar, 1+ right patellar, 1+ b/l achilles. No clonus. Toes downgoing bilaterally.     Sensory: Intact/symmetric to light touch, pinprick, temperature, vibration and proprioception throughout upper and lower extremities. NO specific loss of sensation in any part of body. Negative Romberg.      Coordination: Finger-nose-finger without dysmetria. Rapid alternating movements intact/symmetric with normal speed and rhythm.     Gait: Normal, steady casual gait. Able to walk on toes, heels and tandem without difficulty.         Data: Pertinent prior to visit   Imaging:  Xray lumbar spine: 2/24/2020  1. No acute osseous abnormality.   2. Multilevel degenerative changes most pronounced at L5/S1 with trace retrolisthesis of L2 on L3.    Laboratory:  3/22/2019;  - CMP (normal).  Slightly elevated Cholesterol (207)  - CBC (normal)  - ESR normal    1/27/2020;  Liver enzymes (normal)  TSH 1.7 (normal)         Assessment and Plan:   Assessment:  - paresthesias of left anterior thigh, knee, and leg (L3 and L4 distribution)  - paroxysmal hand and feet paresthesias    The patient has a relatively normal exam (loss of reflexes at left patellar, diminished lower extremity reflexes in general) and had no specific loss of sensation in her body.  Given her Xray, and symptom report of the left lower extremity, it would be beneficial to determine if there is a slight sensory neuropathy or a radiculopathy in the left femoral nerve or L3-4 roots.  MRI lumbar spine and EMG would be the best tests to do so, and EMG would also be able to assess her left upper extremity for possible carpal tunnel or ulnar neuropathy given her report of fluctuating hand paresthesias occurring at night.  Also, given her cancer history and paroxysmal acute onset hand and feet sensory  paresthesias, I would want to look for possible paraneoplastic or immunoglobulin etiologies of her symptoms.  She has had recent TSH, inflammatory, and blood/chem levels checked within the last year, but if the above tests return w/out positive findings, I would expand the blood work to include B-vitamins, vitamin D, vitamin E.     Plan:  - MRI lumbar spine  - EMG left leg and arm  - Paraneoplastic panel  - ELP    Follow up in Neurology clinic in 3 months or earlier as needed should new concerns arise.    ARIA Ruiz D.O.   of Neurology    Greater than 50% of the total time (60 min) in this patient encounter was spent on counseling and/or coordination of care. We reviewed diagnostic results, impressions, and discussed other possible tests if symptoms do not improve. We discussed the implications of the diagnosis, as well as risks and benefits of management options. We reviewed treatment instructions and our scheduled follow-up as specified in the discharge plan. We also discussed the importance of compliance with the chosen course of treatment. The patient is in agreement with this plan and has no further questions.

## 2020-03-25 LAB
ALBUMIN SERPL ELPH-MCNC: 4.7 G/DL (ref 3.7–5.1)
ALPHA1 GLOB SERPL ELPH-MCNC: 0.3 G/DL (ref 0.2–0.4)
ALPHA2 GLOB SERPL ELPH-MCNC: 0.7 G/DL (ref 0.5–0.9)
B-GLOBULIN SERPL ELPH-MCNC: 0.8 G/DL (ref 0.6–1)
GAMMA GLOB SERPL ELPH-MCNC: 1 G/DL (ref 0.7–1.6)
M PROTEIN SERPL ELPH-MCNC: 0 G/DL
PROT PATTERN SERPL ELPH-IMP: NORMAL

## 2020-03-31 ENCOUNTER — VIRTUAL VISIT (OUTPATIENT)
Dept: ORTHOPEDICS | Facility: CLINIC | Age: 75
End: 2020-03-31
Payer: MEDICARE

## 2020-03-31 DIAGNOSIS — M19.079 ARTHRITIS OF FOOT: Primary | ICD-10-CM

## 2020-03-31 NOTE — PROGRESS NOTES
"  Sharona Bravo is a 74 year old female who is being evaluated via a billable telephone visit.      The patient has been notified of following:     \"This telephone visit will be conducted via a call between you and your physician/provider. We have found that certain health care needs can be provided without the need for a physical exam.  This service lets us provide the care you need with a short phone conversation.  If a prescription is necessary we can send it directly to your pharmacy.  If lab work is needed we can place an order for that and you can then stop by our lab to have the test done at a later time.    If during the course of the call the physician/provider feels a telephone visit is not appropriate, you will not be charged for this service.\"     Patient has given verbal consent for Telephone visit?  Yes    Sharona Bravo complains of    Chief Complaint   Patient presents with     foot arthritis         I have reviewed and updated the patient's Past Medical History, Social History, Family History and Medication List.    ALLERGIES  Gabapentin    Additional provider notes: Pat relates that she has been neurology and they are currently working her up for radiculopathy and neuropathy in her hands. She was going to have an MRI of her lumbar spine and an EMG of the hand, however these have been put on hold 2/2 COVID precautions. She relates that her leg is feeling better though. She relates that she did get the Powersteps and these ave been very helpful at reducing the pain in her feet. She relates that she has a few pairs that she uses.     Assessment/Plan:  1. Arthritis of foot  The Powerstep orthotics are working well. Instead of paying for CMOs that are quite expensive, I would recommend that she continue with these and replace them as needed. She agrees to this.       Phone call duration: 11 minutes    Grayson Balderas DPM      "

## 2020-04-02 ENCOUNTER — APPOINTMENT (OUTPATIENT)
Dept: GENERAL RADIOLOGY | Facility: CLINIC | Age: 75
End: 2020-04-02
Attending: EMERGENCY MEDICINE
Payer: MEDICARE

## 2020-04-02 ENCOUNTER — HOSPITAL ENCOUNTER (EMERGENCY)
Facility: CLINIC | Age: 75
Discharge: HOME | End: 2020-04-02
Attending: EMERGENCY MEDICINE | Admitting: EMERGENCY MEDICINE
Payer: MEDICARE

## 2020-04-02 ENCOUNTER — NURSE TRIAGE (OUTPATIENT)
Dept: NURSING | Facility: CLINIC | Age: 75
End: 2020-04-02

## 2020-04-02 VITALS
WEIGHT: 136 LBS | TEMPERATURE: 98.7 F | RESPIRATION RATE: 16 BRPM | OXYGEN SATURATION: 98 % | HEART RATE: 59 BPM | HEIGHT: 63 IN | DIASTOLIC BLOOD PRESSURE: 79 MMHG | SYSTOLIC BLOOD PRESSURE: 121 MMHG | BODY MASS INDEX: 24.1 KG/M2

## 2020-04-02 DIAGNOSIS — R42 LIGHTHEADEDNESS: ICD-10-CM

## 2020-04-02 DIAGNOSIS — R07.89 CHEST DISCOMFORT: ICD-10-CM

## 2020-04-02 LAB
ANION GAP SERPL CALCULATED.3IONS-SCNC: 7 MMOL/L (ref 3–14)
BASOPHILS # BLD AUTO: 0 10E9/L (ref 0–0.2)
BASOPHILS NFR BLD AUTO: 0 %
BUN SERPL-MCNC: 16 MG/DL (ref 7–30)
CALCIUM SERPL-MCNC: 8.7 MG/DL (ref 8.5–10.1)
CHLORIDE SERPL-SCNC: 107 MMOL/L (ref 94–109)
CO2 SERPL-SCNC: 23 MMOL/L (ref 20–32)
CREAT SERPL-MCNC: 0.77 MG/DL (ref 0.52–1.04)
DIFFERENTIAL METHOD BLD: ABNORMAL
EOSINOPHIL # BLD AUTO: 0 10E9/L (ref 0–0.7)
EOSINOPHIL NFR BLD AUTO: 0.3 %
ERYTHROCYTE [DISTWIDTH] IN BLOOD BY AUTOMATED COUNT: 12.7 % (ref 10–15)
GFR SERPL CREATININE-BSD FRML MDRD: 77 ML/MIN/{1.73_M2}
GLUCOSE SERPL-MCNC: 87 MG/DL (ref 70–99)
HCT VFR BLD AUTO: 43.3 % (ref 35–47)
HGB BLD-MCNC: 14.1 G/DL (ref 11.7–15.7)
IMM GRANULOCYTES # BLD: 0 10E9/L (ref 0–0.4)
IMM GRANULOCYTES NFR BLD: 0.3 %
LYMPHOCYTES # BLD AUTO: 0.7 10E9/L (ref 0.8–5.3)
LYMPHOCYTES NFR BLD AUTO: 17.5 %
MCH RBC QN AUTO: 30.5 PG (ref 26.5–33)
MCHC RBC AUTO-ENTMCNC: 32.6 G/DL (ref 31.5–36.5)
MCV RBC AUTO: 94 FL (ref 78–100)
MONOCYTES # BLD AUTO: 0.3 10E9/L (ref 0–1.3)
MONOCYTES NFR BLD AUTO: 7.2 %
NEUTROPHILS # BLD AUTO: 2.9 10E9/L (ref 1.6–8.3)
NEUTROPHILS NFR BLD AUTO: 74.7 %
NRBC # BLD AUTO: 0 10*3/UL
NRBC BLD AUTO-RTO: 0 /100
PLATELET # BLD AUTO: 195 10E9/L (ref 150–450)
POTASSIUM SERPL-SCNC: 3.8 MMOL/L (ref 3.4–5.3)
RBC # BLD AUTO: 4.62 10E12/L (ref 3.8–5.2)
SODIUM SERPL-SCNC: 137 MMOL/L (ref 133–144)
TROPONIN I SERPL-MCNC: <0.015 UG/L (ref 0–0.04)
WBC # BLD AUTO: 3.9 10E9/L (ref 4–11)

## 2020-04-02 PROCEDURE — 71046 X-RAY EXAM CHEST 2 VIEWS: CPT

## 2020-04-02 PROCEDURE — 84484 ASSAY OF TROPONIN QUANT: CPT | Performed by: EMERGENCY MEDICINE

## 2020-04-02 PROCEDURE — 99285 EMERGENCY DEPT VISIT HI MDM: CPT | Mod: 25 | Performed by: EMERGENCY MEDICINE

## 2020-04-02 PROCEDURE — 80048 BASIC METABOLIC PNL TOTAL CA: CPT | Performed by: EMERGENCY MEDICINE

## 2020-04-02 PROCEDURE — 93005 ELECTROCARDIOGRAM TRACING: CPT | Performed by: EMERGENCY MEDICINE

## 2020-04-02 PROCEDURE — 85025 COMPLETE CBC W/AUTO DIFF WBC: CPT | Performed by: EMERGENCY MEDICINE

## 2020-04-02 PROCEDURE — 93010 ELECTROCARDIOGRAM REPORT: CPT | Mod: Z6 | Performed by: EMERGENCY MEDICINE

## 2020-04-02 ASSESSMENT — ENCOUNTER SYMPTOMS
COUGH: 0
ADENOPATHY: 0
EYE REDNESS: 0
NECK STIFFNESS: 0
BRUISES/BLEEDS EASILY: 0
CONFUSION: 0
LIGHT-HEADEDNESS: 1
COLOR CHANGE: 0
DIFFICULTY URINATING: 0
DIAPHORESIS: 0
CHILLS: 0
POLYDIPSIA: 0
ARTHRALGIAS: 0
ABDOMINAL PAIN: 0
HEADACHES: 0
VOMITING: 0
NAUSEA: 0
CHEST TIGHTNESS: 0
SHORTNESS OF BREATH: 0
FEVER: 0

## 2020-04-02 ASSESSMENT — MIFFLIN-ST. JEOR: SCORE: 1096.02

## 2020-04-02 NOTE — TELEPHONE ENCOUNTER
She has a pace maker and wanted to do a test on it instead. I told her no, she still needs to be seen in the ER for them to evaluate her. She has pain across her back and at times rates it at 8 and said there is a tightness in her chest. She said she will go to the ER for evaluation.  She said she hasn't felt normal since the 13th of March when she was seen for a sinus infection.  Malinda Holden RN  [facility]      Additional Information    Negative: Severe difficulty breathing (e.g., struggling for each breath, speaks in single words)    Negative: Passed out (i.e., fainted, collapsed and was not responding)    Negative: Chest pain lasting longer than 5 minutes and ANY of the following:* Over 50 years old* Over 30 years old and at least one cardiac risk factor (i.e., high blood pressure, diabetes, high cholesterol, obesity, smoker or strong family history of heart disease)* Pain is crushing, pressure-like, or heavy * Took nitroglycerin and chest pain was not relieved* History of heart disease (i.e., angina, heart attack, bypass surgery, angioplasty, CHF)    Negative: Visible sweat on face or sweat dripping down face    Negative: Sounds like a life-threatening emergency to the triager    Negative: Followed an injury to chest    Negative: SEVERE chest pain     Burning pain at back, at 8 sometimes. Right now it's at 2    Negative: Pain also present in shoulder(s) or arm(s) or jaw    Negative: Difficulty breathing    Negative: Cocaine use within last 3 days    Negative: History of prior 'blood clot' in leg or lungs (i.e., deep vein thrombosis, pulmonary embolism)    Negative: Recent illness requiring prolonged bed rest (i.e., immobilization)    Negative: Hip or leg fracture in past 2 months (e.g, or had cast on leg or ankle)    Negative: Major surgery in the past month    Negative: Recent long-distance travel with prolonged time in car, bus, plane, or train (i.e., within past 2 weeks; 6 or more hours  duration)    Negative: Heart beating irregularly or very rapidly    Chest pain lasting longer than 5 minutes    Protocols used: CHEST PAIN-A-OH

## 2020-04-02 NOTE — ED TRIAGE NOTES
"Patient presents to triage c/o a few weeks of \"feeling off.\" Symptoms include eye pain, lower back pain, left rib pain, and some chest pressure. Denies SOB, cough, fevers. BP elevated in triage. Pt has Medtronic pacer  "

## 2020-04-02 NOTE — DISCHARGE INSTRUCTIONS
Please make an appointment to follow up with Your Primary Care Provider in 1-2 days for further evaluation and recommendation.  If your symptoms worsen please come back to the emergency department.

## 2020-04-02 NOTE — ED AVS SNAPSHOT
[facility], [place], Emergency Department  [address] [place] [id number]  Phone:  [phone]                                    Sharona Bravo   MRN: [id number]    Department:  [facility], Emergency Department   Date of Visit:  4/2/2020           After Visit Summary Signature Page    I have received my discharge instructions, and my questions have been answered. I have discussed any challenges I see with this plan with the nurse or doctor.    ..........................................................................................................................................  Patient/Patient Representative Signature      ..........................................................................................................................................  Patient Representative Print Name and Relationship to Patient    ..................................................               ................................................  Date                                   Time    ..........................................................................................................................................  Reviewed by Signature/Title    ...................................................              ..............................................  Date                                               Time          22EPIC Rev 08/18

## 2020-04-03 LAB — INTERPRETATION ECG - MUSE: NORMAL

## 2020-04-06 ENCOUNTER — TELEPHONE (OUTPATIENT)
Dept: FAMILY MEDICINE | Facility: CLINIC | Age: 75
End: 2020-04-06

## 2020-04-06 NOTE — TELEPHONE ENCOUNTER
Health Call Center    Phone Message    May a detailed message be left on voicemail: yes     Reason for Call: Other: Pt spoke to a nurse at [facility] and was told to go to the ER for her heart. Pt was seen at the ER on 04/02/2020 and needs a hospital FU with Dr. Jean. Please call pt to let her know if she should come in for if this can be done over the phone. Thank you.     Action Taken: Message routed to:  [facility]: [facility]    Travel Screening: Not Applicable

## 2020-04-07 ENCOUNTER — VIRTUAL VISIT (OUTPATIENT)
Dept: FAMILY MEDICINE | Facility: OTHER | Age: 75
End: 2020-04-07

## 2020-04-07 LAB — PNP ABY SERUM: NORMAL

## 2020-04-07 NOTE — TELEPHONE ENCOUNTER
"Spoke to pt today - did advise she could do hospital followup visit via phone. She then said, \"But I don't feel good\".  States she has dry cough in past several days; has difficulty taking a deep breath. States she has had a temp for past 3 days - range 100 -100.6. States she is fatigued. Pt requests appt in clinic.  Advised her symptoms are possibly consistent with Covid 19. Advised she be evaluated on [email or web address] - she will do this today.   Advised she would need emergency care if she becomes short of breath; otherwise, will probably be told to stay home, with home treatment.   She agrees with jackson,  Annie Mcmanus RN  [facility]  "

## 2020-04-07 NOTE — PROGRESS NOTES
"Date: 2020 10:35:39  Clinician: Dago Thomas  Clinician NPI: [id number]  Patient: Sharona Bravo  Patient : 1945  Patient Address: [address] [place] [id number]  Patient Phone: [phone]  Visit Protocol: URI  Patient Summary:  Sharona is a 74 year old ( : 1945 ) female who initiated a Visit for COVID-19 (Coronavirus) evaluation and screening. When asked the question \"Please sign me up to receive news, health information and promotions. \", Sharona responded \"No\".    Sharona states her symptoms started gradually 3-6 days ago.   Her symptoms consist of a cough, malaise, and a headache. She is experiencing mild difficulty breathing with activities but can speak normally in full sentences.   Symptom details     Cough: Sharona coughs every 5-10 minutes and her cough is not more bothersome at night. Phlegm does not come into her throat when she coughs. She does not believe her cough is caused by post-nasal drip.     Headache: She states the headache is mild (1-3 on a 10 point pain scale).      Sharona denies having rhinitis, facial pain or pressure, myalgias, sore throat, nasal congestion, ear pain, enlarged lymph nodes, chills, diarrhea, vomiting, nausea, teeth pain, fever, and wheezing. She also denies having recent facial or sinus surgery in the past 60 days and double sickening (worsening symptoms after initial improvement).   Precipitating events  She has not recently been exposed to someone with influenza. Sharona has been in close contact with the following high risk individuals: adults 65 or older.   Pertinent COVID-19 (Coronavirus) information  Sharona has not traveled internationally or to the areas where COVID-19 (Coronavirus) is widespread, including cruise ship travel in the last 14 days before the start of her symptoms.   Sharona has not had a close contact with a laboratory-confirmed COVID-19 patient within 14 days of symptom onset. She also has not had a " close contact with a suspected COVID-19 patient within 14 days of symptom onset.   Sharona does not work or volunteer as healthcare worker or a  and does not work or volunteer in a healthcare facility. She does not live with a healthcare worker.   Pertinent medical history  Sharona has taken an antibiotic medication in the past month. Antibiotic details as reported by the patient (free text): Amoxicillin-clav  Sinus infection 3/13-3/23   Sharona does not get yeast infections when she takes antibiotics.   Sharona does not need a return to work/school note.   Weight: 133 lbs   Sharona does not smoke or use smokeless tobacco.   Additional information as reported by the patient (free text): Not felt well for week or so. Pain in left back. Doc office sent me to ER 3/2 to check heart. Ok. Chest X-ray ok. Blood work was ok but WBC numbers were down from previous tests. Saturday started cough occasionally. Only when taking deep breath and air reaches throat causing cough. Fever 100.5 last 3 night for hour. Waiting for paraneoplastic panel results ordered by neurologist two weeks ago.  ELP test was normal. Had tingling in feet and left leg. In office neurological test was normal.   Weight: 133 lbs    MEDICATIONS: levothyroxine oral, simvastatin oral, Estrace vaginal, Ambien oral, ALLERGIES: gabapentin  Clinician Response:  Dear Sharona,   Based on the information you have provided, you do have symptoms that are consistent with Coronavirus (COVID-19).   The coronavirus causes mild to severe respiratory illness with the most common symptoms including fever, cough and difficulty breathing. Unfortunately, many viruses cause similar symptoms and it can be difficult to distinguish between viruses, especially in mild cases, so we are presuming that anyone with cough or fever has coronavirus at this time.  Coronavirus/COVID-19 has reached the point of community spread in [place], meaning that we are finding  the virus in people with no known exposure risk for jim the virus. Given the increasing commonness of coronavirus in the community we are no longer testing patients who are not critically ill.  If you are a health care worker, you should refer to your employee health office for instructions about testing and returning to work.  For everyone else who has cough or fever, you should assume you are infected with coronavirus. Since you will not be tested but have symptoms that may be consistent with coronavirus, the CDC recommends you stay in self-isolation until these three things have happened:    You have had no fever for at least 72 hours (that is three full days of no fever without the use of medicine that reduces fevers)    AND   Other symptoms have improved (for example, when your cough or shortness of breath have improved)   AND   At least 7 days have passed since your symptoms first appeared.   How to Isolate:    Isolate yourself at home.   Do Not allow any visitors  Do Not go to work or school  Do Not go to [employer],  centers, shopping, or other public places.  Do Not shake hands.  Avoid close contact with others (hugging, kissing).   Protect Others:    Cover Your Mouth and Nose with a mask, disposable tissue or wash cloth to avoid spreading germs to others.  Wash your hands and face frequently with soap and water.   Managing Symptoms:    At this time, we primarily recommend Tylenol (Acetaminophen) for fever or pain. If you have liver or kidney problems, contact your primary care provider for instructions on use of tylenol. Adults can take 650 mg (two 325 mg pills) by mouth every 4-6 hours as needed OR 1,000 mg (two 500 mg pills) every 8 hours as needed. MAXIMUM DAILY DOSE: 3,000mg. For children, refer to dosing on bottle based on age or weight.   If you develop significant shortness of breath that prevents you from doing normal activities, please call 911 or proceed to the nearest emergency  room and alert them immediately that you have been in self-isolation for possible coronavirus.   If you have a higher risk medical condition such as cancer, heart failure, end stage renal disease on dialysis or have a transplant, please reach out to your specialist's clinic to advise them of your [facility] visit should you not improve within the next two days.  For more information about COVID19 and options for caring for yourself at home, please visit the CDC website at https://www.cdc.gov/coronavirus/2019-ncov/about/steps-when-sick.htmlFor more options for care at [facility], please visit our website at [email or web address]     Diagnosis: Acute upper respiratory infection, unspecified  Diagnosis ICD: J06.9  Prescription: benzonatate (Tessalon Perles) 100 mg oral capsule 21 capsule, 7 days supply. Take 1 capsule by mouth 3 times per day for 7 days as needed. Refills: 0, Refill as needed: no, Allow substitutions: yes  Pharmacy: [id number] - [phone] - [address], [place] [address]

## 2020-04-17 ENCOUNTER — TELEPHONE (OUTPATIENT)
Dept: NEUROLOGY | Facility: CLINIC | Age: 75
End: 2020-04-17

## 2020-04-17 NOTE — TELEPHONE ENCOUNTER
Due to the COVID-19 virus a risk benefit analysis was performed by the provider and in the best interest of the patient and the facility, the patient's EMG test was rescheduled to 05/20.

## 2020-04-21 ENCOUNTER — VIRTUAL VISIT (OUTPATIENT)
Dept: FAMILY MEDICINE | Facility: CLINIC | Age: 75
End: 2020-04-21
Payer: MEDICARE

## 2020-04-21 VITALS — SYSTOLIC BLOOD PRESSURE: 143 MMHG | TEMPERATURE: 96.8 F | DIASTOLIC BLOOD PRESSURE: 93 MMHG

## 2020-04-21 DIAGNOSIS — U07.1 COVID-19 VIRUS INFECTION: Primary | ICD-10-CM

## 2020-04-21 DIAGNOSIS — G44.209 TENSION HEADACHE: ICD-10-CM

## 2020-04-21 DIAGNOSIS — H53.8 BLURRED VISION: ICD-10-CM

## 2020-04-21 DIAGNOSIS — R42 VERTIGO: ICD-10-CM

## 2020-04-21 NOTE — NURSING NOTE
74 year old  Chief Complaint   Patient presents with     Headache     on left side of head x 1 week     Dizziness     Eye Problem     distance vision is blurry       Temperature 96.8  F (36  C), temperature source Oral, last menstrual period 01/01/2000, not currently breastfeeding. There is no height or weight on file to calculate BMI.  Patient Active Problem List   Diagnosis     Adenocarcinoma of breast (H)     Allergic rhinitis     Hyperlipidemia LDL goal <100     Acquired hypothyroidism     Heart block     Cardiac pacemaker in situ- Medtronic, dual chamber- NOT dependent (Advisa: MRI conditional)     Diverticulosis of large intestine without hemorrhage     BPPV (benign paroxysmal positional vertigo), unspecified laterality     Primary osteoarthritis of left knee     Tear of medial meniscus of left knee     Patellofemoral syndrome, left     S/P TKR (total knee replacement), left     Medicare annual wellness visit, subsequent       Wt Readings from Last 2 Encounters:   04/02/20 61.7 kg (136 lb)   03/24/20 61.7 kg (136 lb)     BP Readings from Last 3 Encounters:   04/02/20 121/79   03/24/20 132/87   03/13/20 126/81         Current Outpatient Medications   Medication     Ascorbic Acid (VITAMIN C) 500 MG CAPS     aspirin 81 MG tablet     calcium carbonate (OS-DALLAS 500 MG [place]. [place]) 500 MG tablet     cetirizine (ZYRTEC) 10 MG tablet     estradiol (ESTRACE) 0.1 MG/GM vaginal cream     levothyroxine (SYNTHROID/LEVOTHROID) 50 MCG tablet     Magnesium 500 MG CAPS     simvastatin (ZOCOR) 20 MG tablet     VITAMIN D, CHOLECALCIFEROL, PO     zolpidem (AMBIEN) 5 MG tablet     amoxicillin-clavulanate (AUGMENTIN) 875-125 MG tablet     No current facility-administered medications for this visit.        Social History     Tobacco Use     Smoking status: Never Smoker     Smokeless tobacco: Never Used   Substance Use Topics     Alcohol use: Yes     Alcohol/week: 0.0 standard drinks     Frequency: 4 or more times a week     Comment:  10-14 wine a week      Drug use: No       Health Maintenance Due   Topic Date Due     COLORECTAL CANCER SCREENING  12/09/2016     ZOSTER IMMUNIZATION (3 of 3) 03/23/2020       Lab Results   Component Value Date    PAP NIL 12/02/2019 April 21, 2020 2:21 PM

## 2020-04-21 NOTE — PROGRESS NOTES
"Sharona Bravo is a 74 year old female who is being evaluated via a billable telephone visit.      The patient has been notified of following:     \"This telephone visit will be conducted via a call between you and your physician/provider. We have found that certain health care needs can be provided without the need for a physical exam.  This service lets us provide the care you need with a short phone conversation.  If a prescription is necessary we can send it directly to your pharmacy.  If lab work is needed we can place an order for that and you can then stop by our lab to have the test done at a later time.    Telephone visits are billed at different rates depending on your insurance coverage. During this emergency period, for some insurers they may be billed the same as an in-person visit.  Please reach out to your insurance provider with any questions.    If during the course of the call the physician/provider feels a telephone visit is not appropriate, you will not be charged for this service.\"    Patient has given verbal consent for Telephone visit?  Yes    Subjective     TELEPHONE VISIT      SUBJECTIVE:  Pat is a 74-year-old who has been dealing with COVID-19 infection for a few weeks.  The week of 04/02 she developed some pain in her back and called our triage nurses and after answering several questions it was determined that she should go to the emergency room to be checked out.  She did and she had an EKG and some blood work and everything was normal.  Two days later she started coughing.  She developed a fever to 101.5 generally for an hour a day for 3 days in a row.  She felt short of breath.  At this point, she called the clinic and was referred [facility].  Her symptoms were consistent with COVIC-19 infection and she contacted a friend whose daughter works at [facility] [employer] ([facility]), and both Kristal and her  were advised to go over there and get tested.  They got tested on 04/09 " "and the next day, 04/10, they both were told that the results were positive.      Kristal has had no fever since 04/06.  She has had no significant coughing since then.  She has a pulse oximeter at home and O2 sats are between 95% and 97%.        That being said, she has had a headache on the left side of her head for about a week.  It is above the ear and below the temple.  When she gets up, she can feel a little bit off as though she is \"tilting\" a bit.  She has not fallen and does not feel lightheaded or as though she might pass out.  In addition, her distance vision is a little bit blurry.  Reading is fine with reading glasses.      She had a physical with me back in January.  Everything was fine.  She saw a podiatrist and he took care of the problem at that time but she was also complaining a little bit of tingling in her feet.  She went and saw Neurology, who did a workup that was entirely normal.  Some blood tests were done and those were negative.  Lumbosacral spine x-ray was obtained and at the L5-S1 level there is some disk height loss/narrowing.  An MRI was ordered as was an EEG, and both of those are on hold at this time.      ACTIVE MEDICAL PROBLEMS:  Reviewed.      CURRENT MEDICATIONS:  Reviewed.      OBJECTIVE:  Kristal was on the phone so I did not see her.  However, her voice is very clear and articulate.  Affect seems upbeat.  No coughing was heard over the phone.  No dyspnea or tachypnea.  Previous labs were reviewed and I can see some of them in Care Everywhere.      ASSESSMENT AND PLAN:   1.  COVID-19 infection that was officially known as of 04/10.  She is coming up on 2 weeks since that official diagnosis, but she has been sick longer.  She and her  (also on the phone) had some questions about how much they should avoid other people.  We had a very long and robust conversation about the fact that there is so much we do not know about this virus.  They should be immune and should be in a " position and not spreading it to anyone, but we do not know that for sure.  Therefore, if they are out in public they should observe 6 foot distances and should wear a mask to prevent spread.  They are very good about washing their hands and they have been very socially isolated.   2.  Kristal is going to increase her fluid intake.  Though she is not feverish and has not been coughing, she probably has still had a fair amount of insensible fluid loss and might be running a little dehydrated.  Therefore, early next week she is going to reach out to me through [employer] and let me know how she is doing.  If she is not better and the headache persists as does the vertigo and visual changes, then I would like to see her for a face-to-face visit here in the clinic.  She completely agrees with the above plan.      Total time spent with Kristal was over 20 minutes.

## 2020-04-22 ENCOUNTER — DOCUMENTATION ONLY (OUTPATIENT)
Dept: CARE COORDINATION | Facility: CLINIC | Age: 75
End: 2020-04-22

## 2020-04-28 ENCOUNTER — VIRTUAL VISIT (OUTPATIENT)
Dept: FAMILY MEDICINE | Facility: CLINIC | Age: 75
End: 2020-04-28
Payer: MEDICARE

## 2020-04-28 DIAGNOSIS — G44.211 INTRACTABLE EPISODIC TENSION-TYPE HEADACHE: Primary | ICD-10-CM

## 2020-04-28 DIAGNOSIS — U07.1 COVID-19 VIRUS INFECTION: Primary | ICD-10-CM

## 2020-04-28 DIAGNOSIS — U07.1 COVID-19 VIRUS INFECTION: ICD-10-CM

## 2020-04-28 RX ORDER — ZOLPIDEM TARTRATE 5 MG/1
5 TABLET ORAL
Qty: 30 TABLET | Refills: 0 | Status: CANCELLED | OUTPATIENT
Start: 2020-04-28

## 2020-04-28 RX ORDER — HYDROCODONE BITARTRATE AND ACETAMINOPHEN 5; 325 MG/1; MG/1
TABLET ORAL
Qty: 10 TABLET | Refills: 0 | Status: SHIPPED | OUTPATIENT
Start: 2020-04-28 | End: 2021-06-07

## 2020-04-28 NOTE — NURSING NOTE
74 year old  Chief Complaint   Patient presents with     Headache     x 2 weeks, left side above ear, also ear pain       Last menstrual period 01/01/2000, not currently breastfeeding. There is no height or weight on file to calculate BMI.  Patient Active Problem List   Diagnosis     Adenocarcinoma of breast (H)     Allergic rhinitis     Hyperlipidemia LDL goal <100     Acquired hypothyroidism     Heart block     Cardiac pacemaker in situ- Medtronic, dual chamber- NOT dependent (Advisa: MRI conditional)     Diverticulosis of large intestine without hemorrhage     BPPV (benign paroxysmal positional vertigo), unspecified laterality     Primary osteoarthritis of left knee     Tear of medial meniscus of left knee     Patellofemoral syndrome, left     S/P TKR (total knee replacement), left     Medicare annual wellness visit, subsequent       Wt Readings from Last 2 Encounters:   04/02/20 61.7 kg (136 lb)   03/24/20 61.7 kg (136 lb)     BP Readings from Last 3 Encounters:   04/21/20 (!) 143/93   04/02/20 121/79   03/24/20 132/87         Current Outpatient Medications   Medication     Ascorbic Acid (VITAMIN C) 500 MG CAPS     aspirin 81 MG tablet     calcium carbonate (OS-DALLAS 500 MG [place]. [place]) 500 MG tablet     cetirizine (ZYRTEC) 10 MG tablet     estradiol (ESTRACE) 0.1 MG/GM vaginal cream     levothyroxine (SYNTHROID/LEVOTHROID) 50 MCG tablet     Magnesium 500 MG CAPS     simvastatin (ZOCOR) 20 MG tablet     VITAMIN D, CHOLECALCIFEROL, PO     zolpidem (AMBIEN) 5 MG tablet     amoxicillin-clavulanate (AUGMENTIN) 875-125 MG tablet     No current facility-administered medications for this visit.        Social History     Tobacco Use     Smoking status: Never Smoker     Smokeless tobacco: Never Used   Substance Use Topics     Alcohol use: Yes     Alcohol/week: 0.0 standard drinks     Frequency: 4 or more times a week     Comment: 10-14 wine a week      Drug use: No       Health Maintenance Due   Topic Date Due     COLORECTAL  CANCER SCREENING  12/09/2016     ZOSTER IMMUNIZATION (3 of 3) 03/23/2020       Lab Results   Component Value Date    PAP NIL 12/02/2019 April 28, 2020 3:01 PM

## 2020-04-28 NOTE — PROGRESS NOTES
Patient needs COVID PCR test, she tested positive for COVID a few weeks ago. She is having concerning symptoms and needs to be seen Primary Care clinic or Neurology. However, need to confirm negative test if going to clinic.    Kayla Sosa RN  04/28/20  3:37 PM

## 2020-04-28 NOTE — PROGRESS NOTES
"Family Medicine Telephone Visit Note    Subjective     Sharona Bravo is a 74 year old female who is being evaluated via a billable telephone visit.      The patient has been notified of following:     \"This telephone visit will be conducted via a call between you and your physician/provider. We have found that certain health care needs can be provided without the need for a physical exam.  This service lets us provide the care you need with a short phone conversation.  If a prescription is necessary we can send it directly to your pharmacy.  If lab work is needed we can place an order for that and you can then stop by our lab to have the test done at a later time.    Telephone visits are billed at different rates depending on your insurance coverage. During this emergency period, for some insurers they may be billed the same as an in-person visit.  Please reach out to your insurance provider with any questions.    If during the course of the call the physician/provider feels a telephone visit is not appropriate, you will not be charged for this service.\"    Patient has given verbal consent for Telephone visit?  Yes    TELEPHONE VISIT    Pat is a 74-year-old who is following up with me regarding her headaches.  We had talked about this previously and nothing that we had tried provided any relief.  The fact that it has now been there for 2 weeks on the left side of her head, above the ear is a bit concerning.  I am also concerned in that she has had some visual changes associated with this.  I really need to see her.      Complicating factors include the fact that she had a positive COVID-19 test on 04/09.  Headaches are now included as one of the diagnostic criteria for this viral infection.  She has been fatigued.  No sore throat, chest tightness, fever, chills or shaking chills, etc.  All that being said, I would feel much more reassured knowing that she is not currently viremic and will therefore arrange " "for her to get testing again.  I would like to know for myself, but also if I need to refer her to Ophthalmology.      Over the phone I asked to palpate her temples and see if she felt anything \"ropey\" there.  She did not.  She has no symptoms of jaw claudication if she chews something tough.  No jaw weakness over time.  In short, other than the unilateral ever present headache along with some blurred vision bilaterally she has no current symptoms.      ACTIVE MEDICAL PROBLEMS:  Reviewed.      CURRENT MEDICATIONS:  Reviewed.      OBJECTIVE:  This is a telephone visit (we try to do a video visit but that did not work) so there is no face-to-face component on this.  She sounded very alert.  No dysarthria or aphasia detected.  No coughing.  I did not detect any dyspnea or tachypnea.  She is certainly alert and oriented x3.      ASSESSMENT AND PLAN:   1.  History of positive COVID-19 infection going back to 04/09.  She is likely in convalescence, but we are hearing more reports that people continue to shed and be viremic after days of being ill.  Therefore, we have arranged to have her testing performed.   2.  Ongoing headache and visual changes.  Once I know that she is not actively ill, we will likely have her come in and I will do an exam.  I will likely check a sed rate and a CRP inflammatory marker and potentially other labs depending how she is doing.  We will consider Neuro followup and also Ophthalmology, of course, given the slightly blurred vision.   3.  She continues to have significant pain that is quite disruptive.  Before we go ahead and prescribe more Ambien, I am going to go ahead and give her hydrocodone/acetaminophen 5/325 mg, quantity 10 pills 1-2 every 6 hours as needed.  She will primarily just try this at bedtime.  We will see what that does.  If that does not help at all, I will be seeing her by that point and we will decide next steps.       "

## 2020-04-29 ENCOUNTER — OFFICE VISIT (OUTPATIENT)
Dept: URGENT CARE | Facility: URGENT CARE | Age: 75
End: 2020-04-29
Payer: MEDICARE

## 2020-04-29 DIAGNOSIS — U07.1 COVID-19 VIRUS INFECTION: ICD-10-CM

## 2020-04-29 PROCEDURE — 99207 ZZC NO BILLABLE SERVICE THIS VISIT: CPT

## 2020-04-29 PROCEDURE — 99000 SPECIMEN HANDLING OFFICE-LAB: CPT | Performed by: FAMILY MEDICINE

## 2020-04-29 PROCEDURE — 87635 SARS-COV-2 COVID-19 AMP PRB: CPT | Performed by: FAMILY MEDICINE

## 2020-04-30 LAB
SARS-COV-2 RNA SPEC QL NAA+PROBE: NOT DETECTED
SPECIMEN SOURCE: NORMAL

## 2020-05-04 ENCOUNTER — OFFICE VISIT (OUTPATIENT)
Dept: FAMILY MEDICINE | Facility: CLINIC | Age: 75
End: 2020-05-04
Payer: MEDICARE

## 2020-05-04 VITALS
TEMPERATURE: 97.4 F | HEART RATE: 62 BPM | SYSTOLIC BLOOD PRESSURE: 109 MMHG | DIASTOLIC BLOOD PRESSURE: 66 MMHG | RESPIRATION RATE: 16 BRPM | BODY MASS INDEX: 23.65 KG/M2 | OXYGEN SATURATION: 98 % | WEIGHT: 133.5 LBS

## 2020-05-04 DIAGNOSIS — U07.1 COVID-19 VIRUS INFECTION: ICD-10-CM

## 2020-05-04 DIAGNOSIS — R51.9 CHRONIC INTRACTABLE HEADACHE, UNSPECIFIED HEADACHE TYPE: Primary | ICD-10-CM

## 2020-05-04 DIAGNOSIS — G47.09 SECONDARY INSOMNIA: ICD-10-CM

## 2020-05-04 DIAGNOSIS — H61.22 IMPACTED CERUMEN OF LEFT EAR: ICD-10-CM

## 2020-05-04 DIAGNOSIS — G89.29 CHRONIC INTRACTABLE HEADACHE, UNSPECIFIED HEADACHE TYPE: Primary | ICD-10-CM

## 2020-05-04 LAB
CRP SERPL-MCNC: <2.9 MG/L (ref 0–8)
ERYTHROCYTE [SEDIMENTATION RATE] IN BLOOD BY WESTERGREN METHOD: 11 MM/H (ref 0–30)

## 2020-05-04 RX ORDER — ZOLPIDEM TARTRATE 5 MG/1
5 TABLET ORAL
Qty: 30 TABLET | Refills: 1 | Status: SHIPPED | OUTPATIENT
Start: 2020-05-04 | End: 2021-06-07

## 2020-05-04 NOTE — PROGRESS NOTES
CHIEF COMPLAINT:  Ongoing head pain in the context of positive COVID diagnosis.      HISTORY OF PRESENT ILLNESS:  Kristal is a 74-year-old who was diagnosed with COVID-19 infection on 04/09 at [facility].  Throughout this time, she has had some degree of pressure and pain on the left side of her skull.  If she presses above the ear, she can find a spot that hurts.  The same is not true on the right side.  Occasionally, her left temple and occasionally her right temple might hurt as well.  Coupled with this is the fact that her vision has been just a little bit blurry.  If she walks right up to the mirror she can see things rather clearly, but when she backs off, it is not as clear.  There is no portion of her vision that is sharply cut off.  She wears cheaters and her last eye exam was in 09/2019.  She has a history of a left astigmatism.  She sees Dr. Lane in [place] for her eye care.      Before coming in today, we had her get a COVID-19 PCR test which was negative.  I did this as we are starting to see patients who continue shed weeks after the initial diagnosis, especially those who continue to have symptoms.  I am happy to report that test was negative.  Since she is here today, we will check her antibodies to see if she has converted and has measurable immune response.      ACTIVE MEDICAL PROBLEMS:  Reviewed.      CURRENT MEDICATIONS:  Reviewed.      Of note, when she was here to see Dr. Harding, she was told that she has some significant cerumen impaction of the left ear and left it so that could be removed.  I will take a look today and see if we should do it.        OBJECTIVE:  Kristal is in no distress.  She is breathing comfortably.  Affect is upbeat.  She is wearing a mask.  BP is 109/66 with a heart rate of 62 and regular.  Temperature 97.4.  She is 133 pounds 8 ounces and has an O2 sat of 98%.  Body mass index is 23.65.  Examination reveals no visible or palpable temporal artery on either side.  She does have  reproducible pain of the skull on the left side above the ear.  No blister or change to the scalp or skin noted.  Examination of the left ear reveals that there is significant cerumen in it and I cannot see any portion of the drum.  The right ear looks completely normal.  Neurologically, she is completely nonfocal.      ASSESSMENT:   1.  Chronic head pain/head ache of unclear etiology.  Given that she occasionally has pain in the left temple and right temple, I simply need to make sure that we are not dealing with giant cell arteritis.  Therefore, ESR and CRP inflammatory markers will be drawn today.   2.  If either of those are positive, she needs to see our ophthalmologist immediately to consider doing a temporal artery biopsy.  She would need to then be started on prednisone and we would refer to Rheumatology.   3.  If those tests are normal/negative, I would still like to have her see our ophthalmologist as a next step to evaluate her blurred vision.   4.  Once we have that out of the way, we will consider doing a CT scan or MRI of the brain and skull as she is worried she has a brain tumor.  However, she is having no slurred speech, seizure-like activity, foot drop, etc.  Therefore, I think it is very likely that she has a brain tumor.   5. Impacted cerumen of the left ear.  This will be irrigated.  We will see if this has any bearing on her pain as well.   6.  Finally, she has some secondary insomnia and uses zolpidem for that.  30 tablets sent in with 1 refill.  Call with any problems or questions.

## 2020-05-04 NOTE — NURSING NOTE
74 year old  Chief Complaint   Patient presents with     Headache     x 3 weeks       Blood pressure 109/66, pulse 62, temperature 97.4  F (36.3  C), temperature source Oral, resp. rate 16, weight 60.6 kg (133 lb 8 oz), last menstrual period 01/01/2000, SpO2 98 %, not currently breastfeeding. Body mass index is 23.65 kg/m .  Patient Active Problem List   Diagnosis     Adenocarcinoma of breast (H)     Allergic rhinitis     Hyperlipidemia LDL goal <100     Acquired hypothyroidism     Heart block     Cardiac pacemaker in situ- Medtronic, dual chamber- NOT dependent (Advisa: MRI conditional)     Diverticulosis of large intestine without hemorrhage     BPPV (benign paroxysmal positional vertigo), unspecified laterality     Primary osteoarthritis of left knee     Tear of medial meniscus of left knee     Patellofemoral syndrome, left     S/P TKR (total knee replacement), left     Medicare annual wellness visit, subsequent       Wt Readings from Last 2 Encounters:   05/04/20 60.6 kg (133 lb 8 oz)   04/02/20 61.7 kg (136 lb)     BP Readings from Last 3 Encounters:   05/04/20 109/66   04/21/20 (!) 143/93   04/02/20 121/79         Current Outpatient Medications   Medication     Ascorbic Acid (VITAMIN C) 500 MG CAPS     aspirin 81 MG tablet     calcium carbonate (OS-DALLAS 500 MG [place]. [place]) 500 MG tablet     cetirizine (ZYRTEC) 10 MG tablet     estradiol (ESTRACE) 0.1 MG/GM vaginal cream     levothyroxine (SYNTHROID/LEVOTHROID) 50 MCG tablet     Magnesium 500 MG CAPS     simvastatin (ZOCOR) 20 MG tablet     VITAMIN D, CHOLECALCIFEROL, PO     zolpidem (AMBIEN) 5 MG tablet     amoxicillin-clavulanate (AUGMENTIN) 875-125 MG tablet     HYDROcodone-acetaminophen (NORCO) 5-325 MG tablet     No current facility-administered medications for this visit.        Social History     Tobacco Use     Smoking status: Never Smoker     Smokeless tobacco: Never Used   Substance Use Topics     Alcohol use: Yes     Alcohol/week: 0.0 standard drinks      Frequency: 4 or more times a week     Comment: 10-14 wine a week      Drug use: No       Health Maintenance Due   Topic Date Due     COLORECTAL CANCER SCREENING  12/09/2016     ZOSTER IMMUNIZATION (3 of 3) 03/23/2020       Lab Results   Component Value Date    PAP NIL 12/02/2019         May 4, 2020 9:04 AM

## 2020-05-06 LAB
COVID-19 SPIKE RBD ABY TITER: NORMAL
COVID-19 SPIKE RBD ABY: POSITIVE

## 2020-05-11 DIAGNOSIS — E78.5 HYPERLIPIDEMIA LDL GOAL <100: ICD-10-CM

## 2020-05-11 RX ORDER — SIMVASTATIN 20 MG
20 TABLET ORAL AT BEDTIME
Qty: 90 TABLET | Refills: 3 | Status: SHIPPED | OUTPATIENT
Start: 2020-05-11 | End: 2021-04-30

## 2020-05-11 NOTE — TELEPHONE ENCOUNTER
Last time prescribed: 8/19/19 , 90 tabs x 2 refills  Last office visit: 5/4/2020  Next appointment: No future appointments    Prescription approved per G Refill Protocol.  Annie Mcmanus RN  [facility]

## 2020-05-13 ENCOUNTER — TELEPHONE (OUTPATIENT)
Dept: NEUROLOGY | Facility: CLINIC | Age: 75
End: 2020-05-13

## 2020-05-13 NOTE — TELEPHONE ENCOUNTER
Due to the COVID-19 virus a risk benefit analysis was performed by the provider and in the best interest of the patient and the facility, the patient's EMG test was rescheduled to 06/15.

## 2020-05-18 ENCOUNTER — ANCILLARY PROCEDURE (OUTPATIENT)
Dept: CARDIOLOGY | Facility: CLINIC | Age: 75
End: 2020-05-18
Attending: INTERNAL MEDICINE
Payer: MEDICARE

## 2020-05-18 DIAGNOSIS — G89.29 CHRONIC INTRACTABLE HEADACHE, UNSPECIFIED HEADACHE TYPE: Primary | ICD-10-CM

## 2020-05-18 DIAGNOSIS — I44.30 ATRIOVENTRICULAR BLOCK: ICD-10-CM

## 2020-05-18 DIAGNOSIS — R51.9 CHRONIC INTRACTABLE HEADACHE, UNSPECIFIED HEADACHE TYPE: Primary | ICD-10-CM

## 2020-05-18 PROCEDURE — 93294 REM INTERROG EVL PM/LDLS PM: CPT | Mod: ZP | Performed by: INTERNAL MEDICINE

## 2020-05-18 PROCEDURE — 93296 REM INTERROG EVL PM/IDS: CPT | Mod: ZF

## 2020-05-20 ENCOUNTER — VIRTUAL VISIT (OUTPATIENT)
Dept: CARDIOLOGY | Facility: CLINIC | Age: 75
End: 2020-05-20
Attending: NURSE PRACTITIONER
Payer: MEDICARE

## 2020-05-20 ENCOUNTER — ANCILLARY PROCEDURE (OUTPATIENT)
Dept: CT IMAGING | Facility: CLINIC | Age: 75
End: 2020-05-20
Attending: FAMILY MEDICINE
Payer: MEDICARE

## 2020-05-20 VITALS — SYSTOLIC BLOOD PRESSURE: 121 MMHG | DIASTOLIC BLOOD PRESSURE: 80 MMHG

## 2020-05-20 DIAGNOSIS — G89.29 CHRONIC INTRACTABLE HEADACHE, UNSPECIFIED HEADACHE TYPE: ICD-10-CM

## 2020-05-20 DIAGNOSIS — Z95.0 CARDIAC PACEMAKER IN SITU: ICD-10-CM

## 2020-05-20 DIAGNOSIS — I44.39 HIGH DEGREE ATRIOVENTRICULAR BLOCK: Primary | ICD-10-CM

## 2020-05-20 DIAGNOSIS — R51.9 CHRONIC INTRACTABLE HEADACHE, UNSPECIFIED HEADACHE TYPE: ICD-10-CM

## 2020-05-20 LAB
CREAT BLD-MCNC: 0.9 MG/DL (ref 0.52–1.04)
GFR SERPL CREATININE-BSD FRML MDRD: 62 ML/MIN/{1.73_M2}

## 2020-05-20 PROCEDURE — 99214 OFFICE O/P EST MOD 30 MIN: CPT | Mod: 95 | Performed by: NURSE PRACTITIONER

## 2020-05-20 RX ORDER — IOPAMIDOL 755 MG/ML
75 INJECTION, SOLUTION INTRAVASCULAR ONCE
Status: COMPLETED | OUTPATIENT
Start: 2020-05-20 | End: 2020-05-20

## 2020-05-20 RX ADMIN — IOPAMIDOL 75 ML: 755 INJECTION, SOLUTION INTRAVASCULAR at 16:06

## 2020-05-20 ASSESSMENT — PAIN SCALES - GENERAL: PAINLEVEL: NO PAIN (0)

## 2020-05-20 NOTE — PROGRESS NOTES
"Electrophysiology Clinic Video Virtual Visit    Sharona Bravo is a 74 year old female who is being evaluated via a billable video visit.      The patient has been notified of following:     \"This video visit will be conducted via a call between you and your physician/provider. We have found that certain health care needs can be provided without the need for an in-person physical exam.  This service lets us provide the care you need with a video conversation.  If a prescription is necessary we can send it directly to your pharmacy.  If lab work is needed we can place an order for that and you can then stop by our lab to have the test done at a later time.    If during the course of the call the physician/provider feels a video visit is not appropriate, you will not be charged for this service.\"     Physician/provider has received verbal consent for a Video Visit from the patient? Yes      HPI:   Ms. Bravo is a 74 year old female who has a past medical history significant for breast cancer s/p radiation and lumpectomy, hypothyroidism, HLD, and symptomatic bradycardia (Mobitz II) s/p PPM 6/23/2015.     She was admitted from clinic in 6/2015 with bradycardia. She had been feeling fatigue especially when she exerted herself and even climbing a flight of stairs was more difficult for her. She was found to be in Mobitz II Block and was subsequently transferred here for admission. Telemetry showed intermittent episodes of SB 50's and episodes of Mobitz II 30's. ECG from 2013 showed baseline bifascicular block. Echo Showed LVEF hyperkinetic with an EF of 65-70%. Right ventricular function, chamber size, wall motion, and thickness are normal. She ultimately underwent a dual chamber PPM implant on 6/23/15.     EP Visit 4/2019: She presents today for follow up. She reports feeling well and has no complaints. She is active without limitation. She denies any chest pain/pressures, dizziness, lightheadedness, worsening " "shortness of breath, leg/ankle swelling, PND, orthopnea, palpitations, or syncopal symptoms. Device interrogation shows normal pacemaker function. No episodes/arrhythmias recorded. Intrinsic rhythm = SB 56 bpm with CHB no R waves @ VVI 30 bpm. AP= 37.7%.  = 99.8%. No short v-v intervals recorded. Lead trends appear stable. Last echo from 4/2018 showed LVEF 55-60%, normal RV size and function, and no valvular issues. Current cardiac medications include: ASA and Zocor.     She is following up today. She reports feeling well today. She had COVID19 in April and has made great recovery. She is continuing to struggle with headaches which her PCP is workup up. She denies any chest pain/pressures, dizziness, lightheadedness, worsening shortness of breath, leg/ankle swelling, PND, orthopnea, palpitations, or syncopal symptoms. Device check shows normal pacemaker function. 1 asymptomatic NSVT episode recorded lasting 7 seconds @ 181 bpm, EGM displays V>A, unable to see the start of the episode, pt reports she had COVID 19 at that time but does not recall any \"heart symptoms\". 6 AT/AF episodes recorded, < 1 minute. Presenting EGM = AS/ @ 80 bpm. PVC burden has increased to 21/hr from 7/hr. AP = 49.4%.  = 99.5%. Estimated battery longevity to JYOTI = 4 years. No short v-v intervals recorded. Lead trends appear stable. Patient notified of interrogation results. Current cardiac medications include: ASA and Zocor.     PAST MEDICAL HISTORY:  Past Medical History:   Diagnosis Date     Malignant neoplasm (H)        CURRENT MEDICATIONS:  Current Outpatient Medications   Medication Sig Dispense Refill     amoxicillin-clavulanate (AUGMENTIN) 875-125 MG tablet Take 1 tablet by mouth 2 times daily (Patient not taking: Reported on 3/24/2020) 20 tablet 0     Ascorbic Acid (VITAMIN C) 500 MG CAPS        aspirin 81 MG tablet Take 81 mg by mouth daily       calcium carbonate (OS-DALLAS 500 MG [place]. [place]) 500 MG tablet Take 600 mg by mouth daily "        cetirizine (ZYRTEC) 10 MG tablet Take 10 mg by mouth daily       estradiol (ESTRACE) 0.1 MG/GM vaginal cream Place 1 g vaginally three times a week Place 1g vaginally nightly x 2 weeks and then 2-3 times per week for maintenance 42.5 g 4     HYDROcodone-acetaminophen (NORCO) 5-325 MG tablet Take 1-2 po Q6 hour prn severe headache (Patient not taking: Reported on 5/4/2020) 10 tablet 0     levothyroxine (SYNTHROID/LEVOTHROID) 50 MCG tablet Take 1 tablet (50 mcg) by mouth daily 90 tablet 3     Magnesium 500 MG CAPS Take 250 mg by mouth daily        simvastatin (ZOCOR) 20 MG tablet Take 1 tablet (20 mg) by mouth At Bedtime 90 tablet 3     VITAMIN D, CHOLECALCIFEROL, PO Take 1,000 Units by mouth daily        zolpidem (AMBIEN) 5 MG tablet Take 1 tablet (5 mg) by mouth nightly as needed for sleep 30 tablet 1       PAST SURGICAL HISTORY:  Past Surgical History:   Procedure Laterality Date     JOINT REPLACEMENT  2018    left knee     PACEMAKER/ICD CHECK         ALLERGIES:     Allergies   Allergen Reactions     Gabapentin Swelling and Rash     Face swelling       FAMILY HISTORY:  Family History   Problem Relation Age of Onset     Cancer Mother      Heart Disease Father        SOCIAL HISTORY:  Social History     Tobacco Use     Smoking status: Never Smoker     Smokeless tobacco: Never Used   Substance Use Topics     Alcohol use: Yes     Alcohol/week: 0.0 standard drinks     Frequency: 4 or more times a week     Comment: 10-14 wine a week      Drug use: No       ROS:  10 point ROS neg other than the symptoms noted above in the HPI.    Exam:  The rest of a comprehensive physical examination is deferred due to public health emergency video visit restrictions.  CONSITUTIONAL: no acute distress  HEENT: no icterus, no redness or discharge, neck supple  CV: no visible edema of visualized extremities. No JVD.   RESPIRATORY: respirations nonlabored, no cough  NEURO: AA&Ox3, speech fluent/appropriate, motor grossly  "nonfocal  PSYCH: cooperative, affect appropriate  DERM: no rashes on visualized face/neck/upper extremities    Labs:  Reviewed.     Testing/Procedures:  4/2018 ECHOCARDIOGRAM:   Interpretation Summary  Left ventricular function is normal.The EF is 55-60%.  The right ventricle is normal size. Global right ventricular function is  normal.  No significant valvular dysfunction noted.  Pulmonary artery systolic pressure is normal.  The inferior vena cava was normal in size with preserved respiratory  variability.  Estimated mean right atrial pressure is 3 mmHg.     This study was compared with the study from 6/23/15 . There has been no  change.        Assessment and Plan:   Ms. Bravo is a 74 year old female who has a past medical history significant for breast cancer s/p radiation and lumpectomy, hypothyroidism, HLD, and symptomatic bradycardia (Mobitz II) s/p PPM 6/23/2015. She is following up today. She reports feeling well today. She had COVID19 in April and has made great recovery. She is continuing to struggle with headaches which her PCP is workup up. She denies any chest pain/pressures, dizziness, lightheadedness, worsening shortness of breath, leg/ankle swelling, PND, orthopnea, palpitations, or syncopal symptoms. Device check shows normal pacemaker function. 1 asymptomatic NSVT episode recorded lasting 7 seconds @ 181 bpm, EGM displays V>A, unable to see the start of the episode, pt reports she had COVID 19 at that time but does not recall any \"heart symptoms\". 6 AT/AF episodes recorded, < 1 minute. Presenting EGM = AS/ @ 80 bpm. PVC burden has increased to 21/hr from 7/hr. AP = 49.4%.  = 99.5%. Estimated battery longevity to JYOTI = 4 years. No short v-v intervals recorded. Lead trends appear stable. Patient notified of interrogation results. Current cardiac medications include: ASA and Zocor.      She is doing well from an EP standpoint. Device is functioning well with stable lead parameters. She has been " 100% RV paced, so would like to get an echo at next follow up to ensure stability. Otherwise, no changes are required.     Follow up in 1 year with echo.     Video-Visit Details    Type of service:  Video Visit    Video Visit Duration: 24 minutes.     Originating Location (pt. Location): Home    Distant Location (provider location):  [facility] HEART [facility]     Mode of Communication:  Video Conference via [employer]    I have reviewed the note as documented above.  This accurately captures the substance of my virtual visit with the patient. The patient states understanding and is agreeable with the plan.     MARIN Zhang CNP  Electrophysiology Consult Service  Pager: [phone]        CC  CANDELARIO FRANKLIN

## 2020-05-26 ENCOUNTER — VIRTUAL VISIT (OUTPATIENT)
Dept: NEUROLOGY | Facility: CLINIC | Age: 75
End: 2020-05-26
Payer: MEDICARE

## 2020-05-26 DIAGNOSIS — R20.2 PARESTHESIAS: Primary | ICD-10-CM

## 2020-05-26 DIAGNOSIS — R51.9 CHRONIC DAILY HEADACHE: ICD-10-CM

## 2020-05-26 ASSESSMENT — PAIN SCALES - GENERAL: PAINLEVEL: MILD PAIN (2)

## 2020-05-26 NOTE — LETTER
5/26/2020     RE: Sharona Bravo  [address]  [place] [address]     Dear Colleague,    Thank you for referring your patient, Sharona Bravo, to the [facility] NEUROLOGY at [facility]. Please see a copy of my visit note below.    [facility] Neurology Follow Up Visit    Sharona Bravo MRN# [id number]   Age: 74 year old YOB: 1945     Brief history of symptoms: The patient was initially seen in neurologic consultation on 3/24/2020 for evaluation of paresthesias of hands and feet. Please see the comprehensive neurologic consultation note from that date in the Epic records for details.  The patient had L3-4 distribution paraesthesias, and was to have MRI lumbar spine, EMG, and paraneoplastic panel with immunoglobulin testing.      Interval history: She was seen 4/2/2020 in the ED for chest discomfort and light-headedness.  No major findings were seen with laboratory testing, CXR, or EKG.  [place] [facility] [place] develop headaches following/during this illness.  Upon follow up 5/4/2020 with her PCP, she had a negative COVID-19 PCR test.  She was to see an ophthalmologist for blurred vision with headache.  Her EMG was moved to 6/15/2020, and wasn't done prior to this visit.  She was to reach out to her neurologist for further guidance on pain control for daily headache.    The patient reports having headaches on the left temple primarily but can be bilateral, and worsen over the course of the day.  The patient's headaches started 2 weeks after COVID19 testing was positive.  Initially her headaches were intermittent, and now they are daily.  She is trying aleve, but this hasn't helped her sometimes 6/10 pain. There is no sensitivity to light, sound, or associated nausea.  She has tried Vicodin, but this hasn't helped.  No positional component, no exercise component, no behavioral component.         Past Medical History:      Patient Active Problem List   Diagnosis     Adenocarcinoma of breast (H)     Allergic rhinitis     Hyperlipidemia LDL goal <100     Acquired hypothyroidism     Heart block     Cardiac pacemaker in situ- Medtronic, dual chamber- NOT dependent (Advisa: MRI conditional)     Diverticulosis of large intestine without hemorrhage     BPPV (benign paroxysmal positional vertigo), unspecified laterality     Primary osteoarthritis of left knee     Tear of medial meniscus of left knee     Patellofemoral syndrome, left     S/P TKR (total knee replacement), left     Medicare annual wellness visit, subsequent      Medications:     Current Outpatient Medications   Medication Sig     amoxicillin-clavulanate (AUGMENTIN) 875-125 MG tablet Take 1 tablet by mouth 2 times daily (Patient not taking: Reported on 3/24/2020)     Ascorbic Acid (VITAMIN C) 500 MG CAPS      aspirin 81 MG tablet Take 81 mg by mouth daily     calcium carbonate (OS-DALLAS 500 MG [place]. [place]) 500 MG tablet Take 600 mg by mouth daily      cetirizine (ZYRTEC) 10 MG tablet Take 10 mg by mouth daily     estradiol (ESTRACE) 0.1 MG/GM vaginal cream Place 1 g vaginally three times a week Place 1g vaginally nightly x 2 weeks and then 2-3 times per week for maintenance     HYDROcodone-acetaminophen (NORCO) 5-325 MG tablet Take 1-2 po Q6 hour prn severe headache (Patient not taking: Reported on 5/4/2020)     levothyroxine (SYNTHROID/LEVOTHROID) 50 MCG tablet Take 1 tablet (50 mcg) by mouth daily     Magnesium 500 MG CAPS Take 250 mg by mouth daily      simvastatin (ZOCOR) 20 MG tablet Take 1 tablet (20 mg) by mouth At Bedtime     VITAMIN D, CHOLECALCIFEROL, PO Take 1,000 Units by mouth daily      zolpidem (AMBIEN) 5 MG tablet Take 1 tablet (5 mg) by mouth nightly as needed for sleep      Review of Systems:   A comprehensive 10 point review of systems (constitutional, ENT, cardiac, peripheral vascular, lymphatic, respiratory, GI, , Musculoskeletal, skin, Neurological) was  performed and found to be negative except as described in this note.     Pertinent Investigations since last visit:   CT head: 5/20/2020  No intracranial hemorrhage, mass effect, or midline shift. The  ventricles are proportionate to the cerebral sulci. The gray to white  matter differentiation of the cerebral hemispheres is preserved. The  basal cisterns are patent. No abnormal intracranial enhancement.          Assessment and Plan:   Assessment:  - chronic daily headache, tension type 2/2 prior infection but persistent  - continued paresthesias w/out specific weakness    The patient has ongoing tension type headaches that have only occurred since infection with COVID19, but continue following resolution of infection.  Possible etiology are muscle tension, poor sleep, stress but she may benefit from headache prophylaxis given the continued nature (15+days/month) of the symptoms without a migrainous quality.  I am unsure if she would benefit from botox given these headaches don't seem cervicogenic or migrainous, but if she should fail amitriptyline she may benefit from headache specialty for referral and guidance going forward.  Her MRI lumbar spine wasn't done, and it should be give her continued symptoms, and I will re-order this test.     Plan:  - MRI lumbar spine w/wout contrast  - Headache specialist referral, possible Botox?  - Amitriptyline 25 mg QHS    Follow up in Neurology clinic in 2 months or earlier as needed should new concerns arise.    ARIA Ruiz D.O.   of Neurology    Greater than 50% of the total time (30 min) in this patient encounter was spent on counseling and/or coordination of care. We reviewed diagnostic results, impressions, and discussed other possible tests if symptoms do not improve. We discussed the implications of the diagnosis, as well as risks and benefits of management options. We reviewed treatment instructions and our scheduled follow-up as specified in  "the discharge plan. We also discussed the importance of compliance with the chosen course of treatment. The patient is in agreement with this plan and has no further questions.      Sharona Bravo is a 74 year old female who is being evaluated via a billable video visit.      The patient has been notified of following:     \"This video visit will be conducted via a call between you and your physician/provider. We have found that certain health care needs can be provided without the need for an in-person physical exam.  This service lets us provide the care you need with a video conversation.  If a prescription is necessary we can send it directly to your pharmacy.  If lab work is needed we can place an order for that and you can then stop by our lab to have the test done at a later time.    Video visits are billed at different rates depending on your insurance coverage.  Please reach out to your insurance provider with any questions.    If during the course of the call the physician/provider feels a video visit is not appropriate, you will not be charged for this service.\"    Patient has given verbal consent for Video visit? Yes    How would you like to obtain your AVS? [place]    Patient would like the video invitation sent by: [phone]    Will anyone else be joining your video visit? No        Video-Visit Details    Type of service:  Video Visit    Video Start Time: 1030  Video End Time: 11:00 AM    Originating Location (pt. Location): Home    Distant Location (provider location):  [facility] NEUROLOGY     Platform used for Video Visit: Blas Covarrubias, EMT      "

## 2020-05-26 NOTE — PROGRESS NOTES
[facility] Neurology Follow Up Visit    Sharona Bravo MRN# [id number]   Age: 74 year old YOB: 1945     Brief history of symptoms: The patient was initially seen in neurologic consultation on 3/24/2020 for evaluation of paresthesias of hands and feet. Please see the comprehensive neurologic consultation note from that date in the Epic records for details.  The patient had L3-4 distribution paraesthesias, and was to have MRI lumbar spine, EMG, and paraneoplastic panel with immunoglobulin testing.      Interval history: She was seen 4/2/2020 in the ED for chest discomfort and light-headedness.  No major findings were seen with laboratory testing, CXR, or EKG.  [place] [facility] [place] following/during this illness.  Upon follow up 5/4/2020 with her PCP, she had a negative COVID-19 PCR test.  She was to see an ophthalmologist for blurred vision with headache.  Her EMG was moved to 6/15/2020, and wasn't done prior to this visit.  She was to reach out to her neurologist for further guidance on pain control for daily headache.    The patient reports having headaches on the left temple primarily but can be bilateral, and worsen over the course of the day.  The patient's headaches started 2 weeks after COVID19 testing was positive.  Initially her headaches were intermittent, and now they are daily.  She is trying aleve, but this hasn't helped her sometimes 6/10 pain. There is no sensitivity to light, sound, or associated nausea.  She has tried Vicodin, but this hasn't helped.  No positional component, no exercise component, no behavioral component.         Past Medical History:     Patient Active Problem List   Diagnosis     Adenocarcinoma of breast (H)     Allergic rhinitis     Hyperlipidemia LDL goal <100     Acquired hypothyroidism     Heart block     Cardiac pacemaker in situ- Medtronic, dual chamber- NOT dependent (Advisa: MRI conditional)     Diverticulosis of  large intestine without hemorrhage     BPPV (benign paroxysmal positional vertigo), unspecified laterality     Primary osteoarthritis of left knee     Tear of medial meniscus of left knee     Patellofemoral syndrome, left     S/P TKR (total knee replacement), left     Medicare annual wellness visit, subsequent      Medications:     Current Outpatient Medications   Medication Sig     amoxicillin-clavulanate (AUGMENTIN) 875-125 MG tablet Take 1 tablet by mouth 2 times daily (Patient not taking: Reported on 3/24/2020)     Ascorbic Acid (VITAMIN C) 500 MG CAPS      aspirin 81 MG tablet Take 81 mg by mouth daily     calcium carbonate (OS-DALLAS 500 MG [place]. [place]) 500 MG tablet Take 600 mg by mouth daily      cetirizine (ZYRTEC) 10 MG tablet Take 10 mg by mouth daily     estradiol (ESTRACE) 0.1 MG/GM vaginal cream Place 1 g vaginally three times a week Place 1g vaginally nightly x 2 weeks and then 2-3 times per week for maintenance     HYDROcodone-acetaminophen (NORCO) 5-325 MG tablet Take 1-2 po Q6 hour prn severe headache (Patient not taking: Reported on 5/4/2020)     levothyroxine (SYNTHROID/LEVOTHROID) 50 MCG tablet Take 1 tablet (50 mcg) by mouth daily     Magnesium 500 MG CAPS Take 250 mg by mouth daily      simvastatin (ZOCOR) 20 MG tablet Take 1 tablet (20 mg) by mouth At Bedtime     VITAMIN D, CHOLECALCIFEROL, PO Take 1,000 Units by mouth daily      zolpidem (AMBIEN) 5 MG tablet Take 1 tablet (5 mg) by mouth nightly as needed for sleep      Review of Systems:   A comprehensive 10 point review of systems (constitutional, ENT, cardiac, peripheral vascular, lymphatic, respiratory, GI, , Musculoskeletal, skin, Neurological) was performed and found to be negative except as described in this note.     Pertinent Investigations since last visit:   CT head: 5/20/2020  No intracranial hemorrhage, mass effect, or midline shift. The  ventricles are proportionate to the cerebral sulci. The gray to white  matter differentiation  of the cerebral hemispheres is preserved. The  basal cisterns are patent. No abnormal intracranial enhancement.          Assessment and Plan:   Assessment:  - chronic daily headache, tension type 2/2 prior infection but persistent  - continued paresthesias w/out specific weakness    The patient has ongoing tension type headaches that have only occurred since infection with COVID19, but continue following resolution of infection.  Possible etiology are muscle tension, poor sleep, stress but she may benefit from headache prophylaxis given the continued nature (15+days/month) of the symptoms without a migrainous quality.  I am unsure if she would benefit from botox given these headaches don't seem cervicogenic or migrainous, but if she should fail amitriptyline she may benefit from headache specialty for referral and guidance going forward.  Her MRI lumbar spine wasn't done, and it should be give her continued symptoms, and I will re-order this test.     Plan:  - MRI lumbar spine w/wout contrast  - Headache specialist referral, possible Botox?  - Amitriptyline 25 mg QHS    Follow up in Neurology clinic in 2 months or earlier as needed should new concerns arise.    ARIA Ruiz D.O.   of Neurology    Greater than 50% of the total time (30 min) in this patient encounter was spent on counseling and/or coordination of care. We reviewed diagnostic results, impressions, and discussed other possible tests if symptoms do not improve. We discussed the implications of the diagnosis, as well as risks and benefits of management options. We reviewed treatment instructions and our scheduled follow-up as specified in the discharge plan. We also discussed the importance of compliance with the chosen course of treatment. The patient is in agreement with this plan and has no further questions.

## 2020-05-26 NOTE — PROGRESS NOTES
"Sharona Bravo is a 74 year old female who is being evaluated via a billable video visit.      The patient has been notified of following:     \"This video visit will be conducted via a call between you and your physician/provider. We have found that certain health care needs can be provided without the need for an in-person physical exam.  This service lets us provide the care you need with a video conversation.  If a prescription is necessary we can send it directly to your pharmacy.  If lab work is needed we can place an order for that and you can then stop by our lab to have the test done at a later time.    Video visits are billed at different rates depending on your insurance coverage.  Please reach out to your insurance provider with any questions.    If during the course of the call the physician/provider feels a video visit is not appropriate, you will not be charged for this service.\"    Patient has given verbal consent for Video visit? Yes    How would you like to obtain your AVS? [facility]    Patient would like the video invitation sent by: [phone]    Will anyone else be joining your video visit? No        Video-Visit Details    Type of service:  Video Visit    Video Start Time: 1030  Video End Time: 11:00 AM    Originating Location (pt. Location): Home    Distant Location (provider location):  [facility] NEUROLOGY     Platform used for Video Visit: Blas Covarrubias, EMT        "

## 2020-06-09 ENCOUNTER — HOSPITAL ENCOUNTER (OUTPATIENT)
Dept: MRI IMAGING | Facility: CLINIC | Age: 75
End: 2020-06-09
Attending: PSYCHIATRY & NEUROLOGY
Payer: MEDICARE

## 2020-06-09 ENCOUNTER — ANCILLARY PROCEDURE (OUTPATIENT)
Dept: CARDIOLOGY | Facility: CLINIC | Age: 75
End: 2020-06-09
Attending: INTERNAL MEDICINE
Payer: MEDICARE

## 2020-06-09 ENCOUNTER — ANCILLARY PROCEDURE (OUTPATIENT)
Dept: CARDIOLOGY | Facility: CLINIC | Age: 75
End: 2020-06-09
Attending: NURSE PRACTITIONER
Payer: MEDICARE

## 2020-06-09 DIAGNOSIS — I44.30 ATRIOVENTRICULAR BLOCK: ICD-10-CM

## 2020-06-09 DIAGNOSIS — R20.2 PARESTHESIAS: ICD-10-CM

## 2020-06-09 DIAGNOSIS — R51.9 CHRONIC DAILY HEADACHE: ICD-10-CM

## 2020-06-09 PROCEDURE — A9585 GADOBUTROL INJECTION: HCPCS | Performed by: PSYCHIATRY & NEUROLOGY

## 2020-06-09 PROCEDURE — 93286 PERI-PX EVAL PM/LDLS PM IP: CPT | Mod: 26 | Performed by: INTERNAL MEDICINE

## 2020-06-09 PROCEDURE — 25500064 ZZH RX 255 OP 636: Performed by: PSYCHIATRY & NEUROLOGY

## 2020-06-09 PROCEDURE — 99207 CARDIAC DEVICE CHECK - INPATIENT: CPT | Mod: 26 | Performed by: INTERNAL MEDICINE

## 2020-06-09 PROCEDURE — 93286 PERI-PX EVAL PM/LDLS PM IP: CPT

## 2020-06-09 PROCEDURE — 72158 MRI LUMBAR SPINE W/O & W/DYE: CPT

## 2020-06-09 RX ORDER — GADOBUTROL 604.72 MG/ML
7.5 INJECTION INTRAVENOUS ONCE
Status: COMPLETED | OUTPATIENT
Start: 2020-06-09 | End: 2020-06-09

## 2020-06-09 RX ADMIN — GADOBUTROL 7.5 ML: 604.72 INJECTION INTRAVENOUS at 14:34

## 2020-06-10 LAB
MDC_IDC_EPISODE_DTM: NORMAL
MDC_IDC_EPISODE_DTM: NORMAL
MDC_IDC_EPISODE_DURATION: 1 S
MDC_IDC_EPISODE_DURATION: 48 S
MDC_IDC_EPISODE_ID: 2
MDC_IDC_EPISODE_ID: 3
MDC_IDC_EPISODE_TYPE: NORMAL
MDC_IDC_EPISODE_TYPE: NORMAL
MDC_IDC_LEAD_IMPLANT_DT: NORMAL
MDC_IDC_LEAD_LOCATION: NORMAL
MDC_IDC_LEAD_LOCATION_DETAIL_1: NORMAL
MDC_IDC_LEAD_MFG: NORMAL
MDC_IDC_LEAD_MODEL: NORMAL
MDC_IDC_LEAD_POLARITY_TYPE: NORMAL
MDC_IDC_LEAD_SERIAL: NORMAL
MDC_IDC_MSMT_BATTERY_DTM: NORMAL
MDC_IDC_MSMT_BATTERY_REMAINING_LONGEVITY: 53 MO
MDC_IDC_MSMT_BATTERY_RRT_TRIGGER: 2.83
MDC_IDC_MSMT_BATTERY_STATUS: NORMAL
MDC_IDC_MSMT_BATTERY_VOLTAGE: 2.99 V
MDC_IDC_MSMT_LEADCHNL_RA_IMPEDANCE_VALUE: 361 OHM
MDC_IDC_MSMT_LEADCHNL_RA_IMPEDANCE_VALUE: 361 OHM
MDC_IDC_MSMT_LEADCHNL_RA_IMPEDANCE_VALUE: 380 OHM
MDC_IDC_MSMT_LEADCHNL_RA_IMPEDANCE_VALUE: 418 OHM
MDC_IDC_MSMT_LEADCHNL_RA_PACING_THRESHOLD_AMPLITUDE: 0.5 V
MDC_IDC_MSMT_LEADCHNL_RA_PACING_THRESHOLD_AMPLITUDE: 0.5 V
MDC_IDC_MSMT_LEADCHNL_RA_PACING_THRESHOLD_AMPLITUDE: 0.62 V
MDC_IDC_MSMT_LEADCHNL_RA_PACING_THRESHOLD_PULSEWIDTH: 0.4 MS
MDC_IDC_MSMT_LEADCHNL_RA_SENSING_INTR_AMPL: 1.5 MV
MDC_IDC_MSMT_LEADCHNL_RA_SENSING_INTR_AMPL: 3.12 MV
MDC_IDC_MSMT_LEADCHNL_RA_SENSING_INTR_AMPL: 3.25 MV
MDC_IDC_MSMT_LEADCHNL_RA_SENSING_INTR_AMPL: 4 MV
MDC_IDC_MSMT_LEADCHNL_RA_SENSING_INTR_AMPL: 4 MV
MDC_IDC_MSMT_LEADCHNL_RV_IMPEDANCE_VALUE: 437 OHM
MDC_IDC_MSMT_LEADCHNL_RV_IMPEDANCE_VALUE: 456 OHM
MDC_IDC_MSMT_LEADCHNL_RV_IMPEDANCE_VALUE: 475 OHM
MDC_IDC_MSMT_LEADCHNL_RV_IMPEDANCE_VALUE: 494 OHM
MDC_IDC_MSMT_LEADCHNL_RV_IMPEDANCE_VALUE: 532 OHM
MDC_IDC_MSMT_LEADCHNL_RV_IMPEDANCE_VALUE: 532 OHM
MDC_IDC_MSMT_LEADCHNL_RV_PACING_THRESHOLD_AMPLITUDE: 0.75 V
MDC_IDC_MSMT_LEADCHNL_RV_PACING_THRESHOLD_AMPLITUDE: 0.75 V
MDC_IDC_MSMT_LEADCHNL_RV_PACING_THRESHOLD_AMPLITUDE: 1.12 V
MDC_IDC_MSMT_LEADCHNL_RV_PACING_THRESHOLD_PULSEWIDTH: 0.4 MS
MDC_IDC_MSMT_LEADCHNL_RV_SENSING_INTR_AMPL: 19.75 MV
MDC_IDC_MSMT_LEADCHNL_RV_SENSING_INTR_AMPL: 19.75 MV
MDC_IDC_PG_IMPLANT_DTM: NORMAL
MDC_IDC_PG_MFG: NORMAL
MDC_IDC_PG_MODEL: NORMAL
MDC_IDC_PG_SERIAL: NORMAL
MDC_IDC_PG_TYPE: NORMAL
MDC_IDC_SESS_CLINIC_NAME: NORMAL
MDC_IDC_SESS_DTM: NORMAL
MDC_IDC_SESS_TYPE: NORMAL
MDC_IDC_SET_BRADY_AT_MODE_SWITCH_RATE: 171 {BEATS}/MIN
MDC_IDC_SET_BRADY_HYSTRATE: NORMAL
MDC_IDC_SET_BRADY_LOWRATE: 60 {BEATS}/MIN
MDC_IDC_SET_BRADY_MAX_SENSOR_RATE: 130 {BEATS}/MIN
MDC_IDC_SET_BRADY_MAX_TRACKING_RATE: 130 {BEATS}/MIN
MDC_IDC_SET_BRADY_MODE: NORMAL
MDC_IDC_SET_BRADY_PAV_DELAY_LOW: 220 MS
MDC_IDC_SET_BRADY_SAV_DELAY_LOW: 200 MS
MDC_IDC_SET_LEADCHNL_RA_PACING_AMPLITUDE: 1.5 V
MDC_IDC_SET_LEADCHNL_RA_PACING_ANODE_ELECTRODE_1: NORMAL
MDC_IDC_SET_LEADCHNL_RA_PACING_ANODE_LOCATION_1: NORMAL
MDC_IDC_SET_LEADCHNL_RA_PACING_CAPTURE_MODE: NORMAL
MDC_IDC_SET_LEADCHNL_RA_PACING_CATHODE_ELECTRODE_1: NORMAL
MDC_IDC_SET_LEADCHNL_RA_PACING_CATHODE_LOCATION_1: NORMAL
MDC_IDC_SET_LEADCHNL_RA_PACING_POLARITY: NORMAL
MDC_IDC_SET_LEADCHNL_RA_PACING_PULSEWIDTH: 0.4 MS
MDC_IDC_SET_LEADCHNL_RA_SENSING_ANODE_ELECTRODE_1: NORMAL
MDC_IDC_SET_LEADCHNL_RA_SENSING_ANODE_LOCATION_1: NORMAL
MDC_IDC_SET_LEADCHNL_RA_SENSING_CATHODE_ELECTRODE_1: NORMAL
MDC_IDC_SET_LEADCHNL_RA_SENSING_CATHODE_LOCATION_1: NORMAL
MDC_IDC_SET_LEADCHNL_RA_SENSING_POLARITY: NORMAL
MDC_IDC_SET_LEADCHNL_RA_SENSING_SENSITIVITY: 0.6 MV
MDC_IDC_SET_LEADCHNL_RV_PACING_AMPLITUDE: 2.25 V
MDC_IDC_SET_LEADCHNL_RV_PACING_ANODE_ELECTRODE_1: NORMAL
MDC_IDC_SET_LEADCHNL_RV_PACING_ANODE_LOCATION_1: NORMAL
MDC_IDC_SET_LEADCHNL_RV_PACING_CAPTURE_MODE: NORMAL
MDC_IDC_SET_LEADCHNL_RV_PACING_CATHODE_ELECTRODE_1: NORMAL
MDC_IDC_SET_LEADCHNL_RV_PACING_CATHODE_LOCATION_1: NORMAL
MDC_IDC_SET_LEADCHNL_RV_PACING_POLARITY: NORMAL
MDC_IDC_SET_LEADCHNL_RV_PACING_PULSEWIDTH: 0.4 MS
MDC_IDC_SET_LEADCHNL_RV_SENSING_ANODE_ELECTRODE_1: NORMAL
MDC_IDC_SET_LEADCHNL_RV_SENSING_ANODE_LOCATION_1: NORMAL
MDC_IDC_SET_LEADCHNL_RV_SENSING_CATHODE_ELECTRODE_1: NORMAL
MDC_IDC_SET_LEADCHNL_RV_SENSING_CATHODE_LOCATION_1: NORMAL
MDC_IDC_SET_LEADCHNL_RV_SENSING_POLARITY: NORMAL
MDC_IDC_SET_LEADCHNL_RV_SENSING_SENSITIVITY: 0.9 MV
MDC_IDC_SET_ZONE_DETECTION_INTERVAL: 350 MS
MDC_IDC_SET_ZONE_DETECTION_INTERVAL: 400 MS
MDC_IDC_SET_ZONE_TYPE: NORMAL
MDC_IDC_STAT_AT_BURDEN_PERCENT: 0 %
MDC_IDC_STAT_AT_DTM_END: NORMAL
MDC_IDC_STAT_AT_DTM_START: NORMAL
MDC_IDC_STAT_BRADY_AP_VP_PERCENT: 45.53 %
MDC_IDC_STAT_BRADY_AP_VP_PERCENT: 45.53 %
MDC_IDC_STAT_BRADY_AP_VP_PERCENT: 49.42 %
MDC_IDC_STAT_BRADY_AP_VS_PERCENT: 0 %
MDC_IDC_STAT_BRADY_AS_VP_PERCENT: 50.52 %
MDC_IDC_STAT_BRADY_AS_VP_PERCENT: 54.38 %
MDC_IDC_STAT_BRADY_AS_VP_PERCENT: 54.38 %
MDC_IDC_STAT_BRADY_AS_VS_PERCENT: 0.05 %
MDC_IDC_STAT_BRADY_AS_VS_PERCENT: 0.08 %
MDC_IDC_STAT_BRADY_AS_VS_PERCENT: 0.08 %
MDC_IDC_STAT_BRADY_DTM_END: NORMAL
MDC_IDC_STAT_BRADY_DTM_START: NORMAL
MDC_IDC_STAT_BRADY_RA_PERCENT_PACED: 45.32 %
MDC_IDC_STAT_BRADY_RA_PERCENT_PACED: 45.32 %
MDC_IDC_STAT_BRADY_RA_PERCENT_PACED: 49.26 %
MDC_IDC_STAT_BRADY_RV_PERCENT_PACED: 99.43 %
MDC_IDC_STAT_BRADY_RV_PERCENT_PACED: 99.43 %
MDC_IDC_STAT_BRADY_RV_PERCENT_PACED: 99.54 %
MDC_IDC_STAT_EPISODE_RECENT_COUNT: 0
MDC_IDC_STAT_EPISODE_RECENT_COUNT: 1
MDC_IDC_STAT_EPISODE_RECENT_COUNT: 1
MDC_IDC_STAT_EPISODE_RECENT_COUNT_DTM_END: NORMAL
MDC_IDC_STAT_EPISODE_RECENT_COUNT_DTM_START: NORMAL
MDC_IDC_STAT_EPISODE_TOTAL_COUNT: 0
MDC_IDC_STAT_EPISODE_TOTAL_COUNT: 1
MDC_IDC_STAT_EPISODE_TOTAL_COUNT: 2
MDC_IDC_STAT_EPISODE_TOTAL_COUNT_DTM_END: NORMAL
MDC_IDC_STAT_EPISODE_TOTAL_COUNT_DTM_START: NORMAL
MDC_IDC_STAT_EPISODE_TYPE: NORMAL

## 2020-06-15 ENCOUNTER — OFFICE VISIT (OUTPATIENT)
Dept: NEUROLOGY | Facility: CLINIC | Age: 75
End: 2020-06-15
Attending: PSYCHIATRY & NEUROLOGY
Payer: MEDICARE

## 2020-06-15 DIAGNOSIS — R20.2 NUMBNESS AND TINGLING OF BOTH LEGS: ICD-10-CM

## 2020-06-15 DIAGNOSIS — R20.0 NUMBNESS AND TINGLING OF BOTH LEGS: ICD-10-CM

## 2020-06-15 DIAGNOSIS — G56.02 CARPAL TUNNEL SYNDROME OF LEFT WRIST: Primary | ICD-10-CM

## 2020-06-15 DIAGNOSIS — R20.2 PARESTHESIAS: ICD-10-CM

## 2020-06-15 NOTE — PROGRESS NOTES
[facility]  Electrodiagnostic Laboratory    Nerve Conduction & EMG Report          Patient: Sharona Bravo YOB: 1945  Patient ID: [id number] Age: 74 Years 6 Months  Gender: Female      History & Examination:  74 year old woman numbness in both feet as well as left leg around the knee. Also reports numbness in finger tips. As a child she had polio. Evaluate for polyneuropathy vs radiculopathy vs focal neuropathy.     Techniques:  Sensory and motor conduction studies were done with surface recording electrodes. EMG was done with a concentric needle electrode.     Results:  Nerve conduction studies:  1. Left median-D2 sensory response shows normal amplitude and mildly slowed CV.   2. Left ulnar-D5, left sural, and bilateral superficial peroneal sensory responses are normal.   3. Left median-ulnar palmar interlatency difference is prolonged.   4. Left median-APB, left ulnar-ADM, bilateral peroneal-EDB, and left tibial-AH motor responses are normal.   5. Bilateral peroneal-TA motor responses are normal and symmetric.     Needle EMG of selected proximal and distal left lower limb muscles was performed as tabulated below. No abnormal spontaneous activity was observed in the sampled muscles. Motor unit potential morphology and recruitment patterns were normal.     Interpretation:  This is an abnormal study. There is electrophysiologic evidence of a mild left-sided median neuropathy at the wrist (e.g., carpal tunnel syndrome). There is no evidence of a large-fiber polyneuropathy, focal neuropathy, or lumbosacral radiculopathy affecting the left lower limb on the basis of this study. Clinical correlation is recommended.      Hernán Heredia MD  Department of Neurology        Sensory NCS      Nerve / Sites Rec. Site Onset Peak Ref. NP Amp Ref. PP Amp Dist Robin Ref. Temp     ms ms ms  V  V  V cm m/s m/s  C   L MEDIAN - Dig II Anti      Wrist Dig II 2.97 4.22  24.8 10.0 43.9 14 47.2 48.0 32.5   L  ULNAR - Dig V Anti      Wrist Dig V 2.24 3.07  28.4 8.0 38.6 12.5 55.8 48.0 32.5   L SURAL - Lat Mall 60      Calf Ankle 3.39 4.53  17.5 5.0 22.4 14 41.4 38.0 31.4   L SUP PERONEAL      Lat Leg Rodrigues 2.76 3.65  10.3  12.3 12.5 45.3 38.0 31   R SUP PERONEAL      Lat Leg Rodrigues 3.02 3.80  9.8  12.4 12.5 41.4 38.0 31.2   L MEDIAN - Ulnar - Palmar      Median Wrist 1.88 2.71 2.40 11.9  30.2 8 42.7  32.5      Ulnar Wrist 1.25 1.72 2.40 39.6  69.6 8 64.0  32.4       Motor NCS      Nerve / Sites Rec. Site Lat Ref. Amp Ref. Rel Amp Dist Robin Ref. Dur. Area Temp.     ms ms mV mV % cm m/s m/s ms %  C   L MEDIAN - APB      Wrist APB 3.80 4.40 9.8 5.0 100 8   7.03 100 32.5      Elbow APB 7.29  9.8  99.8 19.5 55.9 48.0 6.93 98.7 32.4   L ULNAR - ADM      Wrist ADM 2.66 3.50 10.4 5.0 100 8   7.29 100 32.5      B.Elbow ADM 5.42  9.8  94 16 58.0 48.0 7.14 91.1 32.5      A.Elbow ADM 7.14  9.9  95.3 10 58.2 48.0 7.29 91.8 32.5   L DEEP PERONEAL - EDB 60      Ankle EDB 4.48 6.00 2.9 2.0 100 8   5.26 100       FibHead EDB 10.78  2.6  90.3 26.5 42.0 38.0 5.42 73.2       Pop Fos EDB 12.55  2.0  70.6 8 45.2 38.0 5.16 63.1    R DEEP PERONEAL - EDB 60      Ankle EDB 4.79 6.00 5.5 2.0 100 8   6.82 100 31.4   L TIBIAL - AH      Ankle AH 4.27 6.00 10.7 4.0 100 8   5.89 100 31.5      Pop Fos AH 12.45  5.9  55.4 36 44.0 38.0 7.66 77 31   L PERONEAL - Tib Ant      Fib Head Tib Ant 2.97  6.9  100    13.70 100       Knee Tib Ant 4.11  6.2  89.3 6 52.4  13.91 89.5    R PERONEAL - Tib Ant      Fib Head Tib Ant 3.23  7.1  100    15.16 100        EMG Summary Table     Spontaneous MUAP Recruitment    IA Fib/PSW Fasc H.F. Amp Dur. PPP Pattern   L. VAST LATERALIS N None None None N N None Normal   L. TIB ANTERIOR N None None None N N None Normal   L. GASTROCN (MED) N None None None N N None Normal   L. PERON LONGUS N None None None N N None Normal   L. TIB POSTERIOR N None None None N N None Normal

## 2020-06-15 NOTE — LETTER
6/15/2020       RE: Sharona Bravo  [address]  [facility] [address]     Dear Colleague,    Thank you for referring your patient, Sharona Bravo, to the [facility] EMG at [facility]. Please see a copy of my visit note below.        [facility]  Electrodiagnostic Laboratory    Nerve Conduction & EMG Report          Patient: Sharona Bravo YOB: 1945  Patient ID: [id number] Age: 74 Years 6 Months  Gender: Female      History & Examination:  74 year old woman numbness in both feet as well as left leg around the knee. Also reports numbness in finger tips. As a child she had polio. Evaluate for polyneuropathy vs radiculopathy vs focal neuropathy.     Techniques:  Sensory and motor conduction studies were done with surface recording electrodes. EMG was done with a concentric needle electrode.     Results:  Nerve conduction studies:  1. Left median-D2 sensory response shows normal amplitude and mildly slowed CV.   2. Left ulnar-D5, left sural, and bilateral superficial peroneal sensory responses are normal.   3. Left median-ulnar palmar interlatency difference is prolonged.   4. Left median-APB, left ulnar-ADM, bilateral peroneal-EDB, and left tibial-AH motor responses are normal.   5. Bilateral peroneal-TA motor responses are normal and symmetric.     Needle EMG of selected proximal and distal left lower limb muscles was performed as tabulated below. No abnormal spontaneous activity was observed in the sampled muscles. Motor unit potential morphology and recruitment patterns were normal.     Interpretation:  This is an abnormal study. There is electrophysiologic evidence of a mild left-sided median neuropathy at the wrist (e.g., carpal tunnel syndrome). There is no evidence of a large-fiber polyneuropathy, focal neuropathy, or lumbosacral radiculopathy affecting the left lower limb on the basis of this study. Clinical correlation is  recommended.      Hernán Heredia MD  Department of Neurology        Sensory NCS      Nerve / Sites Rec. Site Onset Peak Ref. NP Amp Ref. PP Amp Dist Robin Ref. Temp     ms ms ms  V  V  V cm m/s m/s  C   L MEDIAN - Dig II Anti      Wrist Dig II 2.97 4.22  24.8 10.0 43.9 14 47.2 48.0 32.5   L ULNAR - Dig V Anti      Wrist Dig V 2.24 3.07  28.4 8.0 38.6 12.5 55.8 48.0 32.5   L SURAL - Lat Mall 60      Calf Ankle 3.39 4.53  17.5 5.0 22.4 14 41.4 38.0 31.4   L SUP PERONEAL      Lat Leg Rodrigues 2.76 3.65  10.3  12.3 12.5 45.3 38.0 31   R SUP PERONEAL      Lat Leg Rodrigues 3.02 3.80  9.8  12.4 12.5 41.4 38.0 31.2   L MEDIAN - Ulnar - Palmar      Median Wrist 1.88 2.71 2.40 11.9  30.2 8 42.7  32.5      Ulnar Wrist 1.25 1.72 2.40 39.6  69.6 8 64.0  32.4       Motor NCS      Nerve / Sites Rec. Site Lat Ref. Amp Ref. Rel Amp Dist Robin Ref. Dur. Area Temp.     ms ms mV mV % cm m/s m/s ms %  C   L MEDIAN - APB      Wrist APB 3.80 4.40 9.8 5.0 100 8   7.03 100 32.5      Elbow APB 7.29  9.8  99.8 19.5 55.9 48.0 6.93 98.7 32.4   L ULNAR - ADM      Wrist ADM 2.66 3.50 10.4 5.0 100 8   7.29 100 32.5      B.Elbow ADM 5.42  9.8  94 16 58.0 48.0 7.14 91.1 32.5      A.Elbow ADM 7.14  9.9  95.3 10 58.2 48.0 7.29 91.8 32.5   L DEEP PERONEAL - EDB 60      Ankle EDB 4.48 6.00 2.9 2.0 100 8   5.26 100       FibHead EDB 10.78  2.6  90.3 26.5 42.0 38.0 5.42 73.2       Pop Fos EDB 12.55  2.0  70.6 8 45.2 38.0 5.16 63.1    R DEEP PERONEAL - EDB 60      Ankle EDB 4.79 6.00 5.5 2.0 100 8   6.82 100 31.4   L TIBIAL - AH      Ankle AH 4.27 6.00 10.7 4.0 100 8   5.89 100 31.5      Pop Fos AH 12.45  5.9  55.4 36 44.0 38.0 7.66 77 31   L PERONEAL - Tib Ant      Fib Head Tib Ant 2.97  6.9  100    13.70 100       Knee Tib Ant 4.11  6.2  89.3 6 52.4  13.91 89.5    R PERONEAL - Tib Ant      Fib Head Tib Ant 3.23  7.1  100    15.16 100        EMG Summary Table     Spontaneous MUAP Recruitment    IA Fib/PSW Fasc H.F. Amp Dur. PPP Pattern   L. VAST LATERALIS N None None  None N N None Normal   L. TIB ANTERIOR N None None None N N None Normal   L. GASTROCN (MED) N None None None N N None Normal   L. PERON LONGUS N None None None N N None Normal   L. TIB POSTERIOR N None None None N N None Normal                                      Again, thank you for allowing me to participate in the care of your patient.      Sincerely,    Hernán Heredia MD

## 2020-06-22 PROBLEM — R20.2 PARESTHESIAS: Chronic | Status: ACTIVE | Noted: 2020-06-22

## 2020-06-24 ENCOUNTER — VIRTUAL VISIT (OUTPATIENT)
Dept: NEUROLOGY | Facility: CLINIC | Age: 75
End: 2020-06-24
Payer: MEDICARE

## 2020-06-24 DIAGNOSIS — G60.9 IDIOPATHIC SMALL FIBER PERIPHERAL NEUROPATHY: Primary | ICD-10-CM

## 2020-06-24 NOTE — LETTER
6/24/2020       RE: Sharona Bravo  [address]  [facility] [address]     Dear Colleague,    Thank you for referring your patient, Sharona Bravo, to the [facility] NEUR[facility]Y at [facility]. Please see a copy of my visit note below.    [facility] Neurology Follow Up Visit    Sharona Bravo MRN# [id number]   Age: 74 year old YOB: 1945     Brief history of symptoms: The patient was initially seen in neurologic consultation on 3/24/2020 and most recently on 5/26/2020 for evaluation of paresthesias of hands/feet. Please see the comprehensive neurologic consultation note from that date in the Epic records for details. During w/up for symptoms, the patient was to have MRI lumbar spine, EMG, paraneoplastic panel, and immunoglobulin testing.  No major findings was seen by laboratory testing. The patient also started to develop headaches (chronic daily headache of left temple or bilateral distribution). She was to start on Amitriptyline 25 mg at bedtime for headache prophylaxis, and be seen through Headache Specialist for consideration of Botox.     Imaging of Lumbar spine 6/9/2020 revealed: a disc bulge over left foraminal protrusion, as well as spinal canal stenosis at L4-5 and multi-level spinal canal stenosis.    EMG on 6/15/2020 revealed mild left sided median neuropathy at wrist.    Interval history: She has not slept well while taking amitriptyline and has stopped the medication.  It was helpful, but she didn't want to continue the medication.  She still has some issues with her feet, specifically when she is walking.  While golfing, she feels like she is walking on bones.  There is no new symptoms relating to shooting pain from her back to her feet, radiating/throbbing back ache, or urinary retention/defecation related concerns.        Past Medical History:     Patient Active Problem List   Diagnosis     Adenocarcinoma of breast (H)     Allergic  rhinitis     Hyperlipidemia LDL goal <100     Acquired hypothyroidism     Heart block     Cardiac pacemaker in situ- Medtronic, dual chamber- NOT dependent (Advisa: MRI conditional)     Diverticulosis of large intestine without hemorrhage     BPPV (benign paroxysmal positional vertigo), unspecified laterality     Primary osteoarthritis of left knee     Tear of medial meniscus of left knee     Patellofemoral syndrome, left     S/P TKR (total knee replacement), left     Medicare annual wellness visit, subsequent     Paresthesias     Past Medical History:   Diagnosis Date     Malignant neoplasm (H)       Medications:     Current Outpatient Medications   Medication Sig     amitriptyline (ELAVIL) 25 MG tablet Take 1 tablet (25 mg) by mouth At Bedtime     amoxicillin-clavulanate (AUGMENTIN) 875-125 MG tablet Take 1 tablet by mouth 2 times daily (Patient not taking: Reported on 3/24/2020)     Ascorbic Acid (VITAMIN C) 500 MG CAPS      aspirin 81 MG tablet Take 81 mg by mouth daily     calcium carbonate ([place] 500 MG [place]) 500 MG tablet Take 600 mg by mouth daily      cetirizine (ZYRTEC) 10 MG tablet Take 10 mg by mouth daily     estradiol (ESTRACE) 0.1 MG/GM vaginal cream Place 1 g vaginally three times a week Place 1g vaginally nightly x 2 weeks and then 2-3 times per week for maintenance     HYDROcodone-acetaminophen (NORCO) 5-325 MG tablet Take 1-2 po Q6 hour prn severe headache (Patient not taking: Reported on 5/4/2020)     levothyroxine (SYNTHROID/LEVOTHROID) 50 MCG tablet Take 1 tablet (50 mcg) by mouth daily     Magnesium 500 MG CAPS Take 250 mg by mouth daily      simvastatin (ZOCOR) 20 MG tablet Take 1 tablet (20 mg) by mouth At Bedtime     VITAMIN D, CHOLECALCIFEROL, PO Take 1,000 Units by mouth daily      zolpidem (AMBIEN) 5 MG tablet Take 1 tablet (5 mg) by mouth nightly as needed for sleep      Allergies:     Allergies   Allergen Reactions     Gabapentin Swelling and Rash     Face swelling     Pertinent  Investigations since last visit:   MRI Lumbar spine: 2020  1. Disc bulge with superimposed left foraminal protrusion, facet arthropathy, and ligamentum flavum hypertrophy cause mild to moderate spinal canal stenosis at L4-5. Additional multilevel lumbar spondylosis.  2. No abnormal enhancement of the lumbar spine.    Head CT: 2020  No intracranial hemorrhage, mass effect, or midline shift. The ventricles are proportionate to the cerebral sulci. The gray to white matter differentiation of the cerebral hemispheres is preserved. The basal cisterns are patent. No abnormal intracranial enhancement.     EM/15/2020  This is an abnormal study. There is electrophysiologic evidence of a mild left-sided median neuropathy at the wrist (e.g., carpal tunnel syndrome). There is no evidence of a large-fiber polyneuropathy, focal neuropathy, or lumbosacral radiculopathy affecting the left lower limb on the basis of this study. Clinical correlation is recommended.   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  Given her prior testing, the most likely etiology is idiopathic and symptomatic treatment may be helpful.  She may also benefit from orthotics for her feet, as there is likely a musculoskeletal component to her exercise induced pain.  Regarding her spinal imaging, she doesn't have  "specific symptoms to warrant a Neurosurgery consultation at this time and given that her EMG wasn't suggestive for ongoing radiculopathy, I would think that continued monitoring with repeated visits and examinations will be helpful so that if lower extremity changes do occur, a referral can be made quickly.  The patient did have positive response with headaches while taking Amitryptline, and this may be a helpful medication to deal with neuropathic pain given that gabapentin has led to allergic issues in the past.  We will try a similar dose as was taken for her headaches and have it given in the AM to avoid reported side-effects of poor sleep.    Plan:  - Amitriptyline 25 mg QAM  - Discuss sypmptoms in 2 weeks through MyChart    Follow up in Neurology clinic in 6 months or earlier as needed should new concerns arise.    ARIA Ruiz D.O.   of Neurology      Greater than 50% of the total time (20 min) in this patient encounter was spent on counseling and/or coordination of care. We reviewed diagnostic results, impressions, and discussed other possible tests if symptoms do not improve. We discussed the implications of the diagnosis, as well as risks and benefits of management options. We reviewed treatment instructions and our scheduled follow-up as specified in the discharge plan. We also discussed the importance of compliance with the chosen course of treatment. The patient is in agreement with this plan and has no further questions.      Sharona Bravo is a 74 year old female who is being evaluated via a billable telephone visit.      The patient has been notified of following:     \"This telephone visit will be conducted via a call between you and your physician/provider. We have found that certain health care needs can be provided without the need for a physical exam.  This service lets us provide the care you need with a short phone conversation.  If a prescription is necessary we " "can send it directly to your pharmacy.  If lab work is needed we can place an order for that and you can then stop by our lab to have the test done at a later time.    Telephone visits are billed at different rates depending on your insurance coverage. During this emergency period, for some insurers they may be billed the same as an in-person visit.  Please reach out to your insurance provider with any questions.    If during the course of the call the physician/provider feels a telephone visit is not appropriate, you will not be charged for this service.\"    Patient has given verbal consent for Telephone visit?  Yes    What phone number would you like to be contacted at? phone    How would you like to obtain your AVS? "[employer]    Phone call duration: 20 minutes    Uri Ruiz, DO          "

## 2020-06-24 NOTE — PROGRESS NOTES
"Sharona Bravo is a 74 year old female who is being evaluated via a billable telephone visit.      The patient has been notified of following:     \"This telephone visit will be conducted via a call between you and your physician/provider. We have found that certain health care needs can be provided without the need for a physical exam.  This service lets us provide the care you need with a short phone conversation.  If a prescription is necessary we can send it directly to your pharmacy.  If lab work is needed we can place an order for that and you can then stop by our lab to have the test done at a later time.    Telephone visits are billed at different rates depending on your insurance coverage. During this emergency period, for some insurers they may be billed the same as an in-person visit.  Please reach out to your insurance provider with any questions.    If during the course of the call the physician/provider feels a telephone visit is not appropriate, you will not be charged for this service.\"    Patient has given verbal consent for Telephone visit?  Yes    What phone number would you like to be contacted at? phone    How would you like to obtain your AVS? [employer]    Phone call duration: 20 minutes    Uri Ruiz, DO        "

## 2020-06-24 NOTE — PROGRESS NOTES
[facility] Neurology Follow Up Visit    Sharona Bravo MRN# [id number]   Age: 74 year old YOB: 1945     Brief history of symptoms: The patient was initially seen in neurologic consultation on 3/24/2020 and most recently on 5/26/2020 for evaluation of paresthesias of hands/feet. Please see the comprehensive neurologic consultation note from that date in the Epic records for details. During w/up for symptoms, the patient was to have MRI lumbar spine, EMG, paraneoplastic panel, and immunoglobulin testing.  No major findings was seen by laboratory testing. The patient also started to develop headaches (chronic daily headache of left temple or bilateral distribution). She was to start on Amitriptyline 25 mg at bedtime for headache prophylaxis, and be seen through Headache Specialist for consideration of Botox.     Imaging of Lumbar spine 6/9/2020 revealed: a disc bulge over left foraminal protrusion, as well as spinal canal stenosis at L4-5 and multi-level spinal canal stenosis.    EMG on 6/15/2020 revealed mild left sided median neuropathy at wrist.    Interval history: She has not slept well while taking amitriptyline and has stopped the medication.  It was helpful, but she didn't want to continue the medication.  She still has some issues with her feet, specifically when she is walking.  While golfing, she feels like she is walking on bones.  There is no new symptoms relating to shooting pain from her back to her feet, radiating/throbbing back ache, or urinary retention/defecation related concerns.        Past Medical History:     Patient Active Problem List   Diagnosis     Adenocarcinoma of breast (H)     Allergic rhinitis     Hyperlipidemia LDL goal <100     Acquired hypothyroidism     Heart block     Cardiac pacemaker in situ- Medtronic, dual chamber- NOT dependent (Advisa: MRI conditional)     Diverticulosis of large intestine without hemorrhage     BPPV (benign paroxysmal positional vertigo),  unspecified laterality     Primary osteoarthritis of left knee     Tear of medial meniscus of left knee     Patellofemoral syndrome, left     S/P TKR (total knee replacement), left     Medicare annual wellness visit, subsequent     Paresthesias     Past Medical History:   Diagnosis Date     Malignant neoplasm (H)       Medications:     Current Outpatient Medications   Medication Sig     amitriptyline (ELAVIL) 25 MG tablet Take 1 tablet (25 mg) by mouth At Bedtime     amoxicillin-clavulanate (AUGMENTIN) 875-125 MG tablet Take 1 tablet by mouth 2 times daily (Patient not taking: Reported on 3/24/2020)     Ascorbic Acid (VITAMIN C) 500 MG CAPS      aspirin 81 MG tablet Take 81 mg by mouth daily     calcium carbonate (OS-DALLAS 500 MG [place]. [place]) 500 MG tablet Take 600 mg by mouth daily      cetirizine (ZYRTEC) 10 MG tablet Take 10 mg by mouth daily     estradiol (ESTRACE) 0.1 MG/GM vaginal cream Place 1 g vaginally three times a week Place 1g vaginally nightly x 2 weeks and then 2-3 times per week for maintenance     HYDROcodone-acetaminophen (NORCO) 5-325 MG tablet Take 1-2 po Q6 hour prn severe headache (Patient not taking: Reported on 5/4/2020)     levothyroxine (SYNTHROID/LEVOTHROID) 50 MCG tablet Take 1 tablet (50 mcg) by mouth daily     Magnesium 500 MG CAPS Take 250 mg by mouth daily      simvastatin (ZOCOR) 20 MG tablet Take 1 tablet (20 mg) by mouth At Bedtime     VITAMIN D, CHOLECALCIFEROL, PO Take 1,000 Units by mouth daily      zolpidem (AMBIEN) 5 MG tablet Take 1 tablet (5 mg) by mouth nightly as needed for sleep      Allergies:     Allergies   Allergen Reactions     Gabapentin Swelling and Rash     Face swelling     Pertinent Investigations since last visit:   MRI Lumbar spine: 6/9/2020  1. Disc bulge with superimposed left foraminal protrusion, facet arthropathy, and ligamentum flavum hypertrophy cause mild to moderate spinal canal stenosis at L4-5. Additional multilevel lumbar spondylosis.  2. No abnormal  enhancement of the lumbar spine.    Head CT: 2020  No intracranial hemorrhage, mass effect, or midline shift. The ventricles are proportionate to the cerebral sulci. The gray to white matter differentiation of the cerebral hemispheres is preserved. The basal cisterns are patent. No abnormal intracranial enhancement.     EM/15/2020  This is an abnormal study. There is electrophysiologic evidence of a mild left-sided median neuropathy at the wrist (e.g., carpal tunnel syndrome). There is no evidence of a large-fiber polyneuropathy, focal neuropathy, or lumbosacral radiculopathy affecting the left lower limb on the basis of this study. Clinical correlation is recommended.   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  Given her prior testing, the most likely etiology is idiopathic and symptomatic treatment may be helpful.  She may also benefit from orthotics for her feet, as there is likely a musculoskeletal component to her exercise induced pain.  Regarding her spinal imaging, she doesn't have specific symptoms to warrant a Neurosurgery consultation at this time and given that her EMG wasn't suggestive for ongoing radiculopathy, I would think that continued monitoring with repeated visits and examinations will be helpful so that if lower extremity changes do occur, a referral can be  made quickly.  The patient did have positive response with headaches while taking Amitryptline, and this may be a helpful medication to deal with neuropathic pain given that gabapentin has led to allergic issues in the past.  We will try a similar dose as was taken for her headaches and have it given in the AM to avoid reported side-effects of poor sleep.    Plan:  - Amitriptyline 25 mg QAM  - Discuss sypmptoms in 2 weeks through MyChart    Follow up in Neurology clinic in 6 months or earlier as needed should new concerns arise.    ARIA Ruiz D.O.   of Neurology      Greater than 50% of the total time (20 min) in this patient encounter was spent on counseling and/or coordination of care. We reviewed diagnostic results, impressions, and discussed other possible tests if symptoms do not improve. We discussed the implications of the diagnosis, as well as risks and benefits of management options. We reviewed treatment instructions and our scheduled follow-up as specified in the discharge plan. We also discussed the importance of compliance with the chosen course of treatment. The patient is in agreement with this plan and has no further questions.

## 2020-07-29 DIAGNOSIS — R20.2 PARESTHESIAS: ICD-10-CM

## 2020-07-29 DIAGNOSIS — R51.9 CHRONIC DAILY HEADACHE: ICD-10-CM

## 2020-09-09 ENCOUNTER — ANCILLARY PROCEDURE (OUTPATIENT)
Dept: CARDIOLOGY | Facility: CLINIC | Age: 75
End: 2020-09-09
Attending: INTERNAL MEDICINE
Payer: MEDICARE

## 2020-09-09 DIAGNOSIS — I44.30 ATRIOVENTRICULAR BLOCK: ICD-10-CM

## 2020-09-09 DIAGNOSIS — Z12.31 VISIT FOR SCREENING MAMMOGRAM: ICD-10-CM

## 2020-09-09 PROCEDURE — 93294 REM INTERROG EVL PM/LDLS PM: CPT | Mod: ZP | Performed by: INTERNAL MEDICINE

## 2020-09-09 PROCEDURE — 93296 REM INTERROG EVL PM/IDS: CPT | Mod: ZF

## 2020-09-16 LAB
MDC_IDC_EPISODE_DTM: NORMAL
MDC_IDC_EPISODE_DURATION: 28 S
MDC_IDC_EPISODE_ID: 4
MDC_IDC_EPISODE_TYPE: NORMAL
MDC_IDC_LEAD_IMPLANT_DT: NORMAL
MDC_IDC_LEAD_IMPLANT_DT: NORMAL
MDC_IDC_LEAD_LOCATION: NORMAL
MDC_IDC_LEAD_LOCATION: NORMAL
MDC_IDC_LEAD_LOCATION_DETAIL_1: NORMAL
MDC_IDC_LEAD_LOCATION_DETAIL_1: NORMAL
MDC_IDC_LEAD_MFG: NORMAL
MDC_IDC_LEAD_MFG: NORMAL
MDC_IDC_LEAD_MODEL: NORMAL
MDC_IDC_LEAD_MODEL: NORMAL
MDC_IDC_LEAD_POLARITY_TYPE: NORMAL
MDC_IDC_LEAD_POLARITY_TYPE: NORMAL
MDC_IDC_LEAD_SERIAL: NORMAL
MDC_IDC_LEAD_SERIAL: NORMAL
MDC_IDC_MSMT_BATTERY_DTM: NORMAL
MDC_IDC_MSMT_BATTERY_REMAINING_LONGEVITY: 47 MO
MDC_IDC_MSMT_BATTERY_RRT_TRIGGER: 2.83
MDC_IDC_MSMT_BATTERY_STATUS: NORMAL
MDC_IDC_MSMT_BATTERY_VOLTAGE: 2.98 V
MDC_IDC_MSMT_LEADCHNL_RA_IMPEDANCE_VALUE: 380 OHM
MDC_IDC_MSMT_LEADCHNL_RA_IMPEDANCE_VALUE: 418 OHM
MDC_IDC_MSMT_LEADCHNL_RA_PACING_THRESHOLD_AMPLITUDE: 0.5 V
MDC_IDC_MSMT_LEADCHNL_RA_PACING_THRESHOLD_PULSEWIDTH: 0.4 MS
MDC_IDC_MSMT_LEADCHNL_RA_SENSING_INTR_AMPL: 1.38 MV
MDC_IDC_MSMT_LEADCHNL_RA_SENSING_INTR_AMPL: 1.38 MV
MDC_IDC_MSMT_LEADCHNL_RV_IMPEDANCE_VALUE: 418 OHM
MDC_IDC_MSMT_LEADCHNL_RV_IMPEDANCE_VALUE: 475 OHM
MDC_IDC_MSMT_LEADCHNL_RV_PACING_THRESHOLD_AMPLITUDE: 0.75 V
MDC_IDC_MSMT_LEADCHNL_RV_PACING_THRESHOLD_PULSEWIDTH: 0.4 MS
MDC_IDC_MSMT_LEADCHNL_RV_SENSING_INTR_AMPL: 25.5 MV
MDC_IDC_MSMT_LEADCHNL_RV_SENSING_INTR_AMPL: 25.5 MV
MDC_IDC_PG_IMPLANT_DTM: NORMAL
MDC_IDC_PG_MFG: NORMAL
MDC_IDC_PG_MODEL: NORMAL
MDC_IDC_PG_SERIAL: NORMAL
MDC_IDC_PG_TYPE: NORMAL
MDC_IDC_SESS_CLINIC_NAME: NORMAL
MDC_IDC_SESS_DTM: NORMAL
MDC_IDC_SESS_TYPE: NORMAL
MDC_IDC_SET_BRADY_AT_MODE_SWITCH_RATE: 171 {BEATS}/MIN
MDC_IDC_SET_BRADY_HYSTRATE: NORMAL
MDC_IDC_SET_BRADY_LOWRATE: 60 {BEATS}/MIN
MDC_IDC_SET_BRADY_MAX_SENSOR_RATE: 130 {BEATS}/MIN
MDC_IDC_SET_BRADY_MAX_TRACKING_RATE: 130 {BEATS}/MIN
MDC_IDC_SET_BRADY_MODE: NORMAL
MDC_IDC_SET_BRADY_PAV_DELAY_LOW: 220 MS
MDC_IDC_SET_BRADY_SAV_DELAY_LOW: 200 MS
MDC_IDC_SET_LEADCHNL_RA_PACING_AMPLITUDE: 1.5 V
MDC_IDC_SET_LEADCHNL_RA_PACING_ANODE_ELECTRODE_1: NORMAL
MDC_IDC_SET_LEADCHNL_RA_PACING_ANODE_LOCATION_1: NORMAL
MDC_IDC_SET_LEADCHNL_RA_PACING_CAPTURE_MODE: NORMAL
MDC_IDC_SET_LEADCHNL_RA_PACING_CATHODE_ELECTRODE_1: NORMAL
MDC_IDC_SET_LEADCHNL_RA_PACING_CATHODE_LOCATION_1: NORMAL
MDC_IDC_SET_LEADCHNL_RA_PACING_POLARITY: NORMAL
MDC_IDC_SET_LEADCHNL_RA_PACING_PULSEWIDTH: 0.4 MS
MDC_IDC_SET_LEADCHNL_RA_SENSING_ANODE_ELECTRODE_1: NORMAL
MDC_IDC_SET_LEADCHNL_RA_SENSING_ANODE_LOCATION_1: NORMAL
MDC_IDC_SET_LEADCHNL_RA_SENSING_CATHODE_ELECTRODE_1: NORMAL
MDC_IDC_SET_LEADCHNL_RA_SENSING_CATHODE_LOCATION_1: NORMAL
MDC_IDC_SET_LEADCHNL_RA_SENSING_POLARITY: NORMAL
MDC_IDC_SET_LEADCHNL_RA_SENSING_SENSITIVITY: 0.6 MV
MDC_IDC_SET_LEADCHNL_RV_PACING_AMPLITUDE: 2.25 V
MDC_IDC_SET_LEADCHNL_RV_PACING_ANODE_ELECTRODE_1: NORMAL
MDC_IDC_SET_LEADCHNL_RV_PACING_ANODE_LOCATION_1: NORMAL
MDC_IDC_SET_LEADCHNL_RV_PACING_CAPTURE_MODE: NORMAL
MDC_IDC_SET_LEADCHNL_RV_PACING_CATHODE_ELECTRODE_1: NORMAL
MDC_IDC_SET_LEADCHNL_RV_PACING_CATHODE_LOCATION_1: NORMAL
MDC_IDC_SET_LEADCHNL_RV_PACING_POLARITY: NORMAL
MDC_IDC_SET_LEADCHNL_RV_PACING_PULSEWIDTH: 0.4 MS
MDC_IDC_SET_LEADCHNL_RV_SENSING_ANODE_ELECTRODE_1: NORMAL
MDC_IDC_SET_LEADCHNL_RV_SENSING_ANODE_LOCATION_1: NORMAL
MDC_IDC_SET_LEADCHNL_RV_SENSING_CATHODE_ELECTRODE_1: NORMAL
MDC_IDC_SET_LEADCHNL_RV_SENSING_CATHODE_LOCATION_1: NORMAL
MDC_IDC_SET_LEADCHNL_RV_SENSING_POLARITY: NORMAL
MDC_IDC_SET_LEADCHNL_RV_SENSING_SENSITIVITY: 0.9 MV
MDC_IDC_SET_ZONE_DETECTION_INTERVAL: 350 MS
MDC_IDC_SET_ZONE_DETECTION_INTERVAL: 400 MS
MDC_IDC_SET_ZONE_TYPE: NORMAL
MDC_IDC_STAT_AT_BURDEN_PERCENT: 0 %
MDC_IDC_STAT_AT_DTM_END: NORMAL
MDC_IDC_STAT_AT_DTM_START: NORMAL
MDC_IDC_STAT_BRADY_AP_VP_PERCENT: 33.73 %
MDC_IDC_STAT_BRADY_AP_VS_PERCENT: 0 %
MDC_IDC_STAT_BRADY_AS_VP_PERCENT: 66.19 %
MDC_IDC_STAT_BRADY_AS_VS_PERCENT: 0.08 %
MDC_IDC_STAT_BRADY_DTM_END: NORMAL
MDC_IDC_STAT_BRADY_DTM_START: NORMAL
MDC_IDC_STAT_BRADY_RA_PERCENT_PACED: 33.71 %
MDC_IDC_STAT_BRADY_RV_PERCENT_PACED: 99.9 %
MDC_IDC_STAT_EPISODE_RECENT_COUNT: 0
MDC_IDC_STAT_EPISODE_RECENT_COUNT: 1
MDC_IDC_STAT_EPISODE_RECENT_COUNT_DTM_END: NORMAL
MDC_IDC_STAT_EPISODE_RECENT_COUNT_DTM_START: NORMAL
MDC_IDC_STAT_EPISODE_TOTAL_COUNT: 0
MDC_IDC_STAT_EPISODE_TOTAL_COUNT: 0
MDC_IDC_STAT_EPISODE_TOTAL_COUNT: 2
MDC_IDC_STAT_EPISODE_TOTAL_COUNT: 2
MDC_IDC_STAT_EPISODE_TOTAL_COUNT_DTM_END: NORMAL
MDC_IDC_STAT_EPISODE_TOTAL_COUNT_DTM_START: NORMAL
MDC_IDC_STAT_EPISODE_TYPE: NORMAL

## 2020-12-17 ENCOUNTER — ANCILLARY PROCEDURE (OUTPATIENT)
Dept: CARDIOLOGY | Facility: CLINIC | Age: 75
End: 2020-12-17
Attending: INTERNAL MEDICINE
Payer: MEDICARE

## 2020-12-17 DIAGNOSIS — I44.30 ATRIOVENTRICULAR BLOCK: ICD-10-CM

## 2020-12-17 PROCEDURE — 93294 REM INTERROG EVL PM/LDLS PM: CPT | Performed by: INTERNAL MEDICINE

## 2020-12-17 PROCEDURE — 93296 REM INTERROG EVL PM/IDS: CPT

## 2020-12-22 LAB
MDC_IDC_LEAD_IMPLANT_DT: NORMAL
MDC_IDC_LEAD_IMPLANT_DT: NORMAL
MDC_IDC_LEAD_LOCATION: NORMAL
MDC_IDC_LEAD_LOCATION: NORMAL
MDC_IDC_LEAD_LOCATION_DETAIL_1: NORMAL
MDC_IDC_LEAD_LOCATION_DETAIL_1: NORMAL
MDC_IDC_LEAD_MFG: NORMAL
MDC_IDC_LEAD_MFG: NORMAL
MDC_IDC_LEAD_MODEL: NORMAL
MDC_IDC_LEAD_MODEL: NORMAL
MDC_IDC_LEAD_POLARITY_TYPE: NORMAL
MDC_IDC_LEAD_POLARITY_TYPE: NORMAL
MDC_IDC_LEAD_SERIAL: NORMAL
MDC_IDC_LEAD_SERIAL: NORMAL
MDC_IDC_MSMT_BATTERY_DTM: NORMAL
MDC_IDC_MSMT_BATTERY_REMAINING_LONGEVITY: 41 MO
MDC_IDC_MSMT_BATTERY_RRT_TRIGGER: 2.83
MDC_IDC_MSMT_BATTERY_STATUS: NORMAL
MDC_IDC_MSMT_BATTERY_VOLTAGE: 2.98 V
MDC_IDC_MSMT_LEADCHNL_RA_IMPEDANCE_VALUE: 380 OHM
MDC_IDC_MSMT_LEADCHNL_RA_IMPEDANCE_VALUE: 418 OHM
MDC_IDC_MSMT_LEADCHNL_RA_PACING_THRESHOLD_AMPLITUDE: 0.62 V
MDC_IDC_MSMT_LEADCHNL_RA_PACING_THRESHOLD_PULSEWIDTH: 0.4 MS
MDC_IDC_MSMT_LEADCHNL_RA_SENSING_INTR_AMPL: 4 MV
MDC_IDC_MSMT_LEADCHNL_RA_SENSING_INTR_AMPL: 4 MV
MDC_IDC_MSMT_LEADCHNL_RV_IMPEDANCE_VALUE: 437 OHM
MDC_IDC_MSMT_LEADCHNL_RV_IMPEDANCE_VALUE: 494 OHM
MDC_IDC_MSMT_LEADCHNL_RV_PACING_THRESHOLD_AMPLITUDE: 0.88 V
MDC_IDC_MSMT_LEADCHNL_RV_PACING_THRESHOLD_PULSEWIDTH: 0.4 MS
MDC_IDC_MSMT_LEADCHNL_RV_SENSING_INTR_AMPL: 26.25 MV
MDC_IDC_MSMT_LEADCHNL_RV_SENSING_INTR_AMPL: 26.25 MV
MDC_IDC_PG_IMPLANT_DTM: NORMAL
MDC_IDC_PG_MFG: NORMAL
MDC_IDC_PG_MODEL: NORMAL
MDC_IDC_PG_SERIAL: NORMAL
MDC_IDC_PG_TYPE: NORMAL
MDC_IDC_SESS_CLINIC_NAME: NORMAL
MDC_IDC_SESS_DTM: NORMAL
MDC_IDC_SESS_TYPE: NORMAL
MDC_IDC_SET_BRADY_AT_MODE_SWITCH_RATE: 171 {BEATS}/MIN
MDC_IDC_SET_BRADY_HYSTRATE: NORMAL
MDC_IDC_SET_BRADY_LOWRATE: 60 {BEATS}/MIN
MDC_IDC_SET_BRADY_MAX_SENSOR_RATE: 130 {BEATS}/MIN
MDC_IDC_SET_BRADY_MAX_TRACKING_RATE: 130 {BEATS}/MIN
MDC_IDC_SET_BRADY_MODE: NORMAL
MDC_IDC_SET_BRADY_PAV_DELAY_LOW: 220 MS
MDC_IDC_SET_BRADY_SAV_DELAY_LOW: 200 MS
MDC_IDC_SET_LEADCHNL_RA_PACING_AMPLITUDE: 1.5 V
MDC_IDC_SET_LEADCHNL_RA_PACING_ANODE_ELECTRODE_1: NORMAL
MDC_IDC_SET_LEADCHNL_RA_PACING_ANODE_LOCATION_1: NORMAL
MDC_IDC_SET_LEADCHNL_RA_PACING_CAPTURE_MODE: NORMAL
MDC_IDC_SET_LEADCHNL_RA_PACING_CATHODE_ELECTRODE_1: NORMAL
MDC_IDC_SET_LEADCHNL_RA_PACING_CATHODE_LOCATION_1: NORMAL
MDC_IDC_SET_LEADCHNL_RA_PACING_POLARITY: NORMAL
MDC_IDC_SET_LEADCHNL_RA_PACING_PULSEWIDTH: 0.4 MS
MDC_IDC_SET_LEADCHNL_RA_SENSING_ANODE_ELECTRODE_1: NORMAL
MDC_IDC_SET_LEADCHNL_RA_SENSING_ANODE_LOCATION_1: NORMAL
MDC_IDC_SET_LEADCHNL_RA_SENSING_CATHODE_ELECTRODE_1: NORMAL
MDC_IDC_SET_LEADCHNL_RA_SENSING_CATHODE_LOCATION_1: NORMAL
MDC_IDC_SET_LEADCHNL_RA_SENSING_POLARITY: NORMAL
MDC_IDC_SET_LEADCHNL_RA_SENSING_SENSITIVITY: 0.6 MV
MDC_IDC_SET_LEADCHNL_RV_PACING_AMPLITUDE: 2.25 V
MDC_IDC_SET_LEADCHNL_RV_PACING_ANODE_ELECTRODE_1: NORMAL
MDC_IDC_SET_LEADCHNL_RV_PACING_ANODE_LOCATION_1: NORMAL
MDC_IDC_SET_LEADCHNL_RV_PACING_CAPTURE_MODE: NORMAL
MDC_IDC_SET_LEADCHNL_RV_PACING_CATHODE_ELECTRODE_1: NORMAL
MDC_IDC_SET_LEADCHNL_RV_PACING_CATHODE_LOCATION_1: NORMAL
MDC_IDC_SET_LEADCHNL_RV_PACING_POLARITY: NORMAL
MDC_IDC_SET_LEADCHNL_RV_PACING_PULSEWIDTH: 0.4 MS
MDC_IDC_SET_LEADCHNL_RV_SENSING_ANODE_ELECTRODE_1: NORMAL
MDC_IDC_SET_LEADCHNL_RV_SENSING_ANODE_LOCATION_1: NORMAL
MDC_IDC_SET_LEADCHNL_RV_SENSING_CATHODE_ELECTRODE_1: NORMAL
MDC_IDC_SET_LEADCHNL_RV_SENSING_CATHODE_LOCATION_1: NORMAL
MDC_IDC_SET_LEADCHNL_RV_SENSING_POLARITY: NORMAL
MDC_IDC_SET_LEADCHNL_RV_SENSING_SENSITIVITY: 0.9 MV
MDC_IDC_SET_ZONE_DETECTION_INTERVAL: 350 MS
MDC_IDC_SET_ZONE_DETECTION_INTERVAL: 400 MS
MDC_IDC_SET_ZONE_TYPE: NORMAL
MDC_IDC_STAT_AT_BURDEN_PERCENT: 0 %
MDC_IDC_STAT_AT_DTM_END: NORMAL
MDC_IDC_STAT_AT_DTM_START: NORMAL
MDC_IDC_STAT_BRADY_AP_VP_PERCENT: 28.4 %
MDC_IDC_STAT_BRADY_AP_VS_PERCENT: 0 %
MDC_IDC_STAT_BRADY_AS_VP_PERCENT: 71.59 %
MDC_IDC_STAT_BRADY_AS_VS_PERCENT: 0.01 %
MDC_IDC_STAT_BRADY_DTM_END: NORMAL
MDC_IDC_STAT_BRADY_DTM_START: NORMAL
MDC_IDC_STAT_BRADY_RA_PERCENT_PACED: 28.38 %
MDC_IDC_STAT_BRADY_RV_PERCENT_PACED: 99.98 %
MDC_IDC_STAT_EPISODE_RECENT_COUNT: 0
MDC_IDC_STAT_EPISODE_RECENT_COUNT_DTM_END: NORMAL
MDC_IDC_STAT_EPISODE_RECENT_COUNT_DTM_START: NORMAL
MDC_IDC_STAT_EPISODE_TOTAL_COUNT: 0
MDC_IDC_STAT_EPISODE_TOTAL_COUNT: 0
MDC_IDC_STAT_EPISODE_TOTAL_COUNT: 2
MDC_IDC_STAT_EPISODE_TOTAL_COUNT: 2
MDC_IDC_STAT_EPISODE_TOTAL_COUNT_DTM_END: NORMAL
MDC_IDC_STAT_EPISODE_TOTAL_COUNT_DTM_START: NORMAL
MDC_IDC_STAT_EPISODE_TYPE: NORMAL

## 2021-03-01 DIAGNOSIS — E03.9 ACQUIRED HYPOTHYROIDISM: ICD-10-CM

## 2021-03-01 NOTE — TELEPHONE ENCOUNTER
Last time prescribed: 3/3/20 , 90 tabs/caps x 3 refills  Last office visit: 5/4/20  Next appointment: No Future Appointment Scheduled    Medication is being filled for 1 time refill only due to:  Patient needs labs TSH. Future labs ordered TSH.   Annie Mcmanus RN  [facility]

## 2021-03-02 RX ORDER — LEVOTHYROXINE SODIUM 50 UG/1
50 TABLET ORAL DAILY
Qty: 90 TABLET | Refills: 0 | Status: SHIPPED | OUTPATIENT
Start: 2021-03-02 | End: 2021-06-07

## 2021-03-20 ENCOUNTER — HEALTH MAINTENANCE LETTER (OUTPATIENT)
Age: 76
End: 2021-03-20

## 2021-03-26 ENCOUNTER — ANCILLARY PROCEDURE (OUTPATIENT)
Dept: CARDIOLOGY | Facility: CLINIC | Age: 76
End: 2021-03-26
Attending: INTERNAL MEDICINE
Payer: MEDICARE

## 2021-03-26 DIAGNOSIS — I44.30 ATRIOVENTRICULAR BLOCK: ICD-10-CM

## 2021-03-26 PROCEDURE — 93294 REM INTERROG EVL PM/LDLS PM: CPT | Performed by: INTERNAL MEDICINE

## 2021-03-26 PROCEDURE — 93296 REM INTERROG EVL PM/IDS: CPT

## 2021-04-17 LAB
MDC_IDC_LEAD_IMPLANT_DT: NORMAL
MDC_IDC_LEAD_IMPLANT_DT: NORMAL
MDC_IDC_LEAD_LOCATION: NORMAL
MDC_IDC_LEAD_LOCATION: NORMAL
MDC_IDC_LEAD_LOCATION_DETAIL_1: NORMAL
MDC_IDC_LEAD_LOCATION_DETAIL_1: NORMAL
MDC_IDC_LEAD_MFG: NORMAL
MDC_IDC_LEAD_MFG: NORMAL
MDC_IDC_LEAD_MODEL: NORMAL
MDC_IDC_LEAD_MODEL: NORMAL
MDC_IDC_LEAD_POLARITY_TYPE: NORMAL
MDC_IDC_LEAD_POLARITY_TYPE: NORMAL
MDC_IDC_LEAD_SERIAL: NORMAL
MDC_IDC_LEAD_SERIAL: NORMAL
MDC_IDC_MSMT_BATTERY_DTM: NORMAL
MDC_IDC_MSMT_BATTERY_REMAINING_LONGEVITY: 38 MO
MDC_IDC_MSMT_BATTERY_RRT_TRIGGER: 2.83
MDC_IDC_MSMT_BATTERY_STATUS: NORMAL
MDC_IDC_MSMT_BATTERY_VOLTAGE: 2.97 V
MDC_IDC_MSMT_LEADCHNL_RA_IMPEDANCE_VALUE: 380 OHM
MDC_IDC_MSMT_LEADCHNL_RA_IMPEDANCE_VALUE: 437 OHM
MDC_IDC_MSMT_LEADCHNL_RA_PACING_THRESHOLD_AMPLITUDE: 0.62 V
MDC_IDC_MSMT_LEADCHNL_RA_PACING_THRESHOLD_PULSEWIDTH: 0.4 MS
MDC_IDC_MSMT_LEADCHNL_RA_SENSING_INTR_AMPL: 3.75 MV
MDC_IDC_MSMT_LEADCHNL_RA_SENSING_INTR_AMPL: 3.75 MV
MDC_IDC_MSMT_LEADCHNL_RV_IMPEDANCE_VALUE: 456 OHM
MDC_IDC_MSMT_LEADCHNL_RV_IMPEDANCE_VALUE: 513 OHM
MDC_IDC_MSMT_LEADCHNL_RV_PACING_THRESHOLD_AMPLITUDE: 0.88 V
MDC_IDC_MSMT_LEADCHNL_RV_PACING_THRESHOLD_PULSEWIDTH: 0.4 MS
MDC_IDC_MSMT_LEADCHNL_RV_SENSING_INTR_AMPL: 14.88 MV
MDC_IDC_MSMT_LEADCHNL_RV_SENSING_INTR_AMPL: 14.88 MV
MDC_IDC_PG_IMPLANT_DTM: NORMAL
MDC_IDC_PG_MFG: NORMAL
MDC_IDC_PG_MODEL: NORMAL
MDC_IDC_PG_SERIAL: NORMAL
MDC_IDC_PG_TYPE: NORMAL
MDC_IDC_SESS_CLINIC_NAME: NORMAL
MDC_IDC_SESS_DTM: NORMAL
MDC_IDC_SESS_TYPE: NORMAL
MDC_IDC_SET_BRADY_AT_MODE_SWITCH_RATE: 171 {BEATS}/MIN
MDC_IDC_SET_BRADY_HYSTRATE: NORMAL
MDC_IDC_SET_BRADY_LOWRATE: 60 {BEATS}/MIN
MDC_IDC_SET_BRADY_MAX_SENSOR_RATE: 130 {BEATS}/MIN
MDC_IDC_SET_BRADY_MAX_TRACKING_RATE: 130 {BEATS}/MIN
MDC_IDC_SET_BRADY_MODE: NORMAL
MDC_IDC_SET_BRADY_PAV_DELAY_LOW: 220 MS
MDC_IDC_SET_BRADY_SAV_DELAY_LOW: 200 MS
MDC_IDC_SET_LEADCHNL_RA_PACING_AMPLITUDE: 1.5 V
MDC_IDC_SET_LEADCHNL_RA_PACING_ANODE_ELECTRODE_1: NORMAL
MDC_IDC_SET_LEADCHNL_RA_PACING_ANODE_LOCATION_1: NORMAL
MDC_IDC_SET_LEADCHNL_RA_PACING_CAPTURE_MODE: NORMAL
MDC_IDC_SET_LEADCHNL_RA_PACING_CATHODE_ELECTRODE_1: NORMAL
MDC_IDC_SET_LEADCHNL_RA_PACING_CATHODE_LOCATION_1: NORMAL
MDC_IDC_SET_LEADCHNL_RA_PACING_POLARITY: NORMAL
MDC_IDC_SET_LEADCHNL_RA_PACING_PULSEWIDTH: 0.4 MS
MDC_IDC_SET_LEADCHNL_RA_SENSING_ANODE_ELECTRODE_1: NORMAL
MDC_IDC_SET_LEADCHNL_RA_SENSING_ANODE_LOCATION_1: NORMAL
MDC_IDC_SET_LEADCHNL_RA_SENSING_CATHODE_ELECTRODE_1: NORMAL
MDC_IDC_SET_LEADCHNL_RA_SENSING_CATHODE_LOCATION_1: NORMAL
MDC_IDC_SET_LEADCHNL_RA_SENSING_POLARITY: NORMAL
MDC_IDC_SET_LEADCHNL_RA_SENSING_SENSITIVITY: 0.6 MV
MDC_IDC_SET_LEADCHNL_RV_PACING_AMPLITUDE: 2 V
MDC_IDC_SET_LEADCHNL_RV_PACING_ANODE_ELECTRODE_1: NORMAL
MDC_IDC_SET_LEADCHNL_RV_PACING_ANODE_LOCATION_1: NORMAL
MDC_IDC_SET_LEADCHNL_RV_PACING_CAPTURE_MODE: NORMAL
MDC_IDC_SET_LEADCHNL_RV_PACING_CATHODE_ELECTRODE_1: NORMAL
MDC_IDC_SET_LEADCHNL_RV_PACING_CATHODE_LOCATION_1: NORMAL
MDC_IDC_SET_LEADCHNL_RV_PACING_POLARITY: NORMAL
MDC_IDC_SET_LEADCHNL_RV_PACING_PULSEWIDTH: 0.4 MS
MDC_IDC_SET_LEADCHNL_RV_SENSING_ANODE_ELECTRODE_1: NORMAL
MDC_IDC_SET_LEADCHNL_RV_SENSING_ANODE_LOCATION_1: NORMAL
MDC_IDC_SET_LEADCHNL_RV_SENSING_CATHODE_ELECTRODE_1: NORMAL
MDC_IDC_SET_LEADCHNL_RV_SENSING_CATHODE_LOCATION_1: NORMAL
MDC_IDC_SET_LEADCHNL_RV_SENSING_POLARITY: NORMAL
MDC_IDC_SET_LEADCHNL_RV_SENSING_SENSITIVITY: 0.9 MV
MDC_IDC_SET_ZONE_DETECTION_INTERVAL: 350 MS
MDC_IDC_SET_ZONE_DETECTION_INTERVAL: 400 MS
MDC_IDC_SET_ZONE_TYPE: NORMAL
MDC_IDC_STAT_AT_BURDEN_PERCENT: 0.1 %
MDC_IDC_STAT_AT_DTM_END: NORMAL
MDC_IDC_STAT_AT_DTM_START: NORMAL
MDC_IDC_STAT_BRADY_AP_VP_PERCENT: 31.3 %
MDC_IDC_STAT_BRADY_AP_VS_PERCENT: 0 %
MDC_IDC_STAT_BRADY_AS_VP_PERCENT: 68.68 %
MDC_IDC_STAT_BRADY_AS_VS_PERCENT: 0.02 %
MDC_IDC_STAT_BRADY_DTM_END: NORMAL
MDC_IDC_STAT_BRADY_DTM_START: NORMAL
MDC_IDC_STAT_BRADY_RA_PERCENT_PACED: 31.28 %
MDC_IDC_STAT_BRADY_RV_PERCENT_PACED: 99.95 %
MDC_IDC_STAT_EPISODE_RECENT_COUNT: 0
MDC_IDC_STAT_EPISODE_RECENT_COUNT: 9
MDC_IDC_STAT_EPISODE_RECENT_COUNT_DTM_END: NORMAL
MDC_IDC_STAT_EPISODE_RECENT_COUNT_DTM_START: NORMAL
MDC_IDC_STAT_EPISODE_TOTAL_COUNT: 0
MDC_IDC_STAT_EPISODE_TOTAL_COUNT: 0
MDC_IDC_STAT_EPISODE_TOTAL_COUNT: 2
MDC_IDC_STAT_EPISODE_TOTAL_COUNT: 2
MDC_IDC_STAT_EPISODE_TOTAL_COUNT_DTM_END: NORMAL
MDC_IDC_STAT_EPISODE_TOTAL_COUNT_DTM_START: NORMAL
MDC_IDC_STAT_EPISODE_TYPE: NORMAL

## 2021-04-29 DIAGNOSIS — E78.5 HYPERLIPIDEMIA LDL GOAL <100: ICD-10-CM

## 2021-04-29 NOTE — TELEPHONE ENCOUNTER
Last office visit was on 05/04/2020, next visit is scheduled foe 06/07/2021 with Dr. Jean.    Medication is being filled for 1 time refill only due to:  Patient needs labs lipid panel plus. Future labs ordered yes.     Nicolle Matamoros RN  April 30, 2021 3:25 PM

## 2021-04-30 RX ORDER — SIMVASTATIN 20 MG
20 TABLET ORAL AT BEDTIME
Qty: 90 TABLET | Refills: 0 | Status: SHIPPED | OUTPATIENT
Start: 2021-04-30 | End: 2021-06-07

## 2021-06-07 ENCOUNTER — OFFICE VISIT (OUTPATIENT)
Dept: FAMILY MEDICINE | Facility: CLINIC | Age: 76
End: 2021-06-07
Payer: MEDICARE

## 2021-06-07 VITALS
WEIGHT: 137.5 LBS | HEART RATE: 60 BPM | HEIGHT: 62 IN | SYSTOLIC BLOOD PRESSURE: 136 MMHG | DIASTOLIC BLOOD PRESSURE: 86 MMHG | BODY MASS INDEX: 25.3 KG/M2 | RESPIRATION RATE: 15 BRPM | TEMPERATURE: 96.8 F | OXYGEN SATURATION: 97 %

## 2021-06-07 DIAGNOSIS — Z12.11 SCREENING FOR COLON CANCER: ICD-10-CM

## 2021-06-07 DIAGNOSIS — Z13.220 SCREENING FOR LIPID DISORDERS: ICD-10-CM

## 2021-06-07 DIAGNOSIS — H90.3 SENSORINEURAL HEARING LOSS, BILATERAL: ICD-10-CM

## 2021-06-07 DIAGNOSIS — E28.39 ESTROGEN DEFICIENCY: ICD-10-CM

## 2021-06-07 DIAGNOSIS — E78.5 HYPERLIPIDEMIA LDL GOAL <100: ICD-10-CM

## 2021-06-07 DIAGNOSIS — E03.9 ACQUIRED HYPOTHYROIDISM: ICD-10-CM

## 2021-06-07 DIAGNOSIS — R73.01 ELEVATED FASTING GLUCOSE: ICD-10-CM

## 2021-06-07 DIAGNOSIS — Z00.00 MEDICARE ANNUAL WELLNESS VISIT, SUBSEQUENT: Primary | ICD-10-CM

## 2021-06-07 DIAGNOSIS — G47.09 SECONDARY INSOMNIA: ICD-10-CM

## 2021-06-07 DIAGNOSIS — N95.2 ATROPHIC VAGINITIS: ICD-10-CM

## 2021-06-07 LAB
ALT SERPL-CCNC: 25 U/L (ref 0–50)
AST SERPL-CCNC: 26 U/L (ref 0–45)
BUN SERPL-MCNC: 19 MG/DL (ref 7–30)
CALCIUM SERPL-MCNC: 9.7 MG/DL (ref 8.5–10.4)
CHLORIDE SERPLBLD-SCNC: 106 MMOL/L (ref 94–109)
CHOLEST SERPL-MCNC: 174 MG/DL (ref 0–200)
CHOLEST/HDLC SERPL: 2.7 {RATIO} (ref 0–5)
CO2 SERPL-SCNC: 28 MMOL/L (ref 20–32)
CREAT SERPL-MCNC: 0.8 MG/DL (ref 0.6–1.3)
EGFR CALCULATED (NON BLACK REFERENCE): 74.7
FASTING SPECIMEN: NO
GLUCOSE SERPL-MCNC: 120 MG/DL (ref 60–99)
GLUCOSE SERPL-MCNC: 121 MG/DL (ref 60–99)
HBA1C MFR BLD: 5.5 % (ref 4.1–5.7)
HDLC SERPL-MCNC: 65 MG/DL
LDLC SERPL CALC-MCNC: 91 MG/DL (ref 0–129)
POTASSIUM SERPL-SCNC: 3.8 MMOL/L (ref 3.4–5.3)
SODIUM SERPL-SCNC: 140 MMOL/L (ref 137.3–146.3)
TRIGL SERPL-MCNC: 91 MG/DL (ref 0–150)
TSH SERPL DL<=0.005 MIU/L-ACNC: 2.3 MU/L (ref 0.4–4)
VLDL-CHOLESTEROL: 18 (ref 7–32)

## 2021-06-07 RX ORDER — CALCIUM POLYCARBOPHIL 625 MG 625 MG/1
TABLET ORAL 2 TIMES DAILY
COMMUNITY
Start: 2021-05-01 | End: 2024-07-26

## 2021-06-07 RX ORDER — ESTRADIOL 0.1 MG/G
1 CREAM VAGINAL
Qty: 42.5 G | Refills: 4 | Status: SHIPPED | OUTPATIENT
Start: 2021-06-07

## 2021-06-07 RX ORDER — SIMVASTATIN 20 MG
20 TABLET ORAL AT BEDTIME
Qty: 90 TABLET | Refills: 4 | Status: SHIPPED | OUTPATIENT
Start: 2021-06-07 | End: 2022-08-08

## 2021-06-07 RX ORDER — LEVOTHYROXINE SODIUM 50 UG/1
50 TABLET ORAL DAILY
Qty: 90 TABLET | Refills: 4 | Status: SHIPPED | OUTPATIENT
Start: 2021-06-07 | End: 2022-07-20 | Stop reason: DRUGHIGH

## 2021-06-07 RX ORDER — ZOLPIDEM TARTRATE 5 MG/1
5 TABLET ORAL
Qty: 30 TABLET | Refills: 1 | Status: SHIPPED | OUTPATIENT
Start: 2021-06-07 | End: 2022-07-13

## 2021-06-07 ASSESSMENT — MIFFLIN-ST. JEOR: SCORE: 1088.33

## 2021-06-07 NOTE — NURSING NOTE
"75 year old  Chief Complaint   Patient presents with     Physical     Gastrointestinal Problem     sick all night, upper quadrant pain     Back Pain     hx of herniated disc, back pain started 2 days ago       Blood pressure 136/86, pulse 60, temperature 96.8  F (36  C), temperature source Skin, resp. rate 15, height 1.585 m (5' 2.4\"), weight 62.4 kg (137 lb 8 oz), last menstrual period 01/01/2000, SpO2 97 %, not currently breastfeeding. Body mass index is 24.83 kg/m .  Patient Active Problem List   Diagnosis     Adenocarcinoma of breast (H)     Allergic rhinitis     Hyperlipidemia LDL goal <100     Acquired hypothyroidism     Heart block     Cardiac pacemaker in situ- Medtronic, dual chamber- NOT dependent (Advisa: MRI conditional)     Diverticulosis of large intestine without hemorrhage     BPPV (benign paroxysmal positional vertigo), unspecified laterality     Primary osteoarthritis of left knee     Tear of medial meniscus of left knee     Patellofemoral syndrome, left     S/P TKR (total knee replacement), left     Medicare annual wellness visit, subsequent     Paresthesias       Wt Readings from Last 2 Encounters:   06/07/21 62.4 kg (137 lb 8 oz)   05/04/20 60.6 kg (133 lb 8 oz)     BP Readings from Last 3 Encounters:   06/07/21 136/86   05/20/20 121/80   05/04/20 109/66         Current Outpatient Medications   Medication     Ascorbic Acid (VITAMIN C) 500 MG CAPS     aspirin 81 MG tablet     calcium carbonate (OS-DALLAS 500 MG [place]. [place]) 500 MG tablet     calcium polycarbophil (FIBERCON) 625 MG tablet     cetirizine (ZYRTEC) 10 MG tablet     estradiol (ESTRACE) 0.1 MG/GM vaginal cream     levothyroxine (SYNTHROID/LEVOTHROID) 50 MCG tablet     Magnesium 500 MG CAPS     simvastatin (ZOCOR) 20 MG tablet     VITAMIN D, CHOLECALCIFEROL, PO     zolpidem (AMBIEN) 5 MG tablet     amitriptyline (ELAVIL) 25 MG tablet     amoxicillin-clavulanate (AUGMENTIN) 875-125 MG tablet     HYDROcodone-acetaminophen (NORCO) 5-325 MG " tablet     No current facility-administered medications for this visit.        Social History     Tobacco Use     Smoking status: Never Smoker     Smokeless tobacco: Never Used   Substance Use Topics     Alcohol use: Yes     Alcohol/week: 0.0 standard drinks     Frequency: 4 or more times a week     Comment: 10-14 wine a week      Drug use: No       Health Maintenance Due   Topic Date Due     COLORECTAL CANCER SCREENING  12/09/2016     FALL RISK ASSESSMENT  12/02/2020     MEDICARE ANNUAL WELLNESS VISIT  01/27/2021     LIPID  01/27/2021       Lab Results   Component Value Date    PAP NIL 12/02/2019 June 7, 2021 8:05 AM

## 2021-06-09 PROBLEM — G47.09 SECONDARY INSOMNIA: Status: ACTIVE | Noted: 2021-06-09

## 2021-06-09 PROBLEM — N95.2 ATROPHIC VAGINITIS: Status: ACTIVE | Noted: 2021-06-09

## 2021-06-09 NOTE — PROGRESS NOTES
"CHIEF COMPLAINT:  Medicare annual wellness visit, subsequent.    HISTORY OF PRESENT ILLNESS:  Kristal is a 75-year-old here for the above.  Overall, she has been doing quite well.  However, last evening, she had some mid-epigastric abdominal pain.  It woke her from sleep at least twice.  It was bad enough that she tried to vomit (gag) at least once.  She had 2 bowel movements this morning.  This winter, she had been somewhat constipated and added some fiber and this has helped.  From a medication standpoint, she is on aspirin 81 mg daily.  Also, just a couple days ago, she developed some significant low back pain and added some ibuprofen to the mix.  She was taking 400 mg at least twice a day.  We discussed the fact that a daily aspirin alone can increase the risk for gastritis or even gastric ulcer.  She was adding ibuprofen on top of that.  She has not tried any kind of antacid medication, but has some Pepcid-AC at home and she was going to try that tonight.    As for the back pain, she knows that she has a history of a herniated disk.  We went back into her chart and reminded ourselves that she had an MRI done on 06/09/2020.  The pain that she had in her back was bad enough that she could not get out of bed, but today it is better.  Her second and third toes on the right foot can feel a little bit numb.    Of important note, she works out \"all the time\" with a .  She has been doing that especially since she had her left total knee arthroplasty.  It has helped a great deal.    Importantly, she has not had any red flag symptoms such as foot drop, falling, incontinence of bowel or bladder, etc.    ACTIVE MEDICAL PROBLEMS:  Reviewed.    CURRENT MEDICATIONS:  Reviewed.    SOCIAL, FAMILY AND PAST SURGICAL HISTORY:  Reviewed.    HEALTHCARE MAINTENANCE:  From an eye care standpoint, she had her eyes checked just about a year ago.  No real changes to that.  She thinks her hearing might be diminished and would " like to have an audiogram. We will go ahead and order that. Dental care is up-to-date.  Blood pressure 136/86.  BMI 24.83.  Working out with a  as mentioned.  From an immunization standpoint, she is 100% up-to-date.  She has had her COVID immunizations.  She had a mammogram in September.  Labs will be ordered.  She had a colonoscopy in 2011 and is, therefore, due for a 10-year followup of colon cancer screening.  We discussed options.  She is very interested in doing a Cologuard test.  She has never had any adenomatous polyps.  No family history of colorectal cancer, no inflammatory bowel disease.  Finally, we discussed getting a bone density scan.  She was on Fosamax at one point for about 3 years.    MEDICARE QUESTIONS:  No problems at home with activities of daily living.  She is able to get up out of a chair easily.  No adaptive equipment.  No falls at home in the last year.  No symptoms of depression.  Finally, no memory loss.    OBJECTIVE:  VITAL SIGNS:  Pat is in no distress.  She is alert and oriented x3.  Blood pressure is 136/86 with a pulse of 60 and regular.  Temperature is 96.8.  She is 5 feet 2.4 inches in height and weighs 137 pounds 8 ounces with a BMI of 24.83.  O2 sats are 97% on room air.  HEENT:  Head is normocephalic, atraumatic.  Ears are completely free of any cerumen.  There is no pain with palpation of the frontal or maxillary sinuses.  Eyes are grossly normal.  She is wearing a mask.  NECK:  Reveals no anterior or posterior chain adenopathy.  No visible or palpable thyromegaly.  No carotid bruits are heard.  Back is smooth and straight.  LUNGS:  Clear to auscultation bilaterally.  HEART:  Reveals a regular rate and rhythm without murmur.  BACK:  She has no reproducible back pain.  No pain with percussion.  Good range of motion.  LOWER EXTREMITIES:  Reveal normal strength.  Excellent pulses bilaterally.  No ankle edema.  SKIN:  Skin looks fine.    Labs will consistent of a metabolic  panel, hemoglobin A1c due to elevated glucose levels, lipid panel plus, and a TSH with cascade.    ASSESSMENT/PLAN:  1.  Medicare annual wellness visit, subsequent, up-to-date with healthcare maintenance strategies.  2. Known hyperlipidemia, on simvastatin.  Labs pending.  3.  Screening for colon cancer with a Cologuard test.  4.  Some bilateral hearing loss and hearing concerns.  No cerumen.  Referred to Audiology at the [place].  5.  History of estrogen deficiency and is at risk for osteoporosis.  DEXA scan pending.  6.  History of secondary insomnia and atrophic vaginitis, as well as acquired hypothyroidism.  Labs have been reviewed and ordered for her.  7.  Significant mid-epigastric pain.  Probably temporary.  Try some Pepcid-AC.  If that does not help long-term, may discontinue the aspirin as its harms might outweigh its benefits.  Also, she will be very careful going forward about using anti-inflammatory medication, in addition to the aspirin, given what just happened.  8.  Flareup of low back pain.  Better now.  Continue working with a .  No update with imaging at this point.    Call with any problems or questions.

## 2021-06-14 LAB — COLOGUARD-ABSTRACT: NEGATIVE

## 2021-06-21 ENCOUNTER — OFFICE VISIT (OUTPATIENT)
Dept: FAMILY MEDICINE | Facility: CLINIC | Age: 76
End: 2021-06-21
Payer: MEDICARE

## 2021-06-21 ENCOUNTER — NURSE TRIAGE (OUTPATIENT)
Dept: FAMILY MEDICINE | Facility: CLINIC | Age: 76
End: 2021-06-21

## 2021-06-21 VITALS
TEMPERATURE: 96.7 F | HEART RATE: 85 BPM | OXYGEN SATURATION: 98 % | DIASTOLIC BLOOD PRESSURE: 79 MMHG | RESPIRATION RATE: 13 BRPM | BODY MASS INDEX: 24.46 KG/M2 | WEIGHT: 135.5 LBS | SYSTOLIC BLOOD PRESSURE: 122 MMHG

## 2021-06-21 DIAGNOSIS — R53.83 OTHER FATIGUE: Primary | ICD-10-CM

## 2021-06-21 LAB
% GRANULOCYTES: 77.1 %G (ref 40–75)
BILIRUBIN UR: NEGATIVE MG/DL
BLOOD UR: ABNORMAL MG/DL
ERYTHROCYTE [DISTWIDTH] IN BLOOD BY AUTOMATED COUNT: 13.9 %
GLUCOSE URINE: NEGATIVE
GRANULOCYTES #: 6.8 K/UL (ref 1.6–8.3)
HCT VFR BLD AUTO: 44.2 % (ref 35–47)
HEMOGLOBIN: 14.3 G/DL (ref 11.7–15.7)
KETONES UR QL: ABNORMAL MG/DL
LEUKOCYTE ESTERASE UR: NEGATIVE
LIPASE SERPL-CCNC: 85 U/L (ref 73–393)
LYMPHOCYTES # BLD AUTO: 1.5 K/UL (ref 0.8–5.3)
LYMPHOCYTES NFR BLD AUTO: 16.8 %L (ref 20–48)
MCH RBC QN AUTO: 29.9 PG (ref 26.5–35)
MCHC RBC AUTO-ENTMCNC: 32.4 G/DL (ref 32–36)
MCV RBC AUTO: 92.3 FL (ref 78–100)
MID #: 0.5 K/UL (ref 0–2.2)
MID %: 6.1 %M (ref 0–20)
NITRITE UR QL STRIP: NEGATIVE MG/DL
PH UR STRIP: 5 [PH] (ref 4.5–8)
PLATELET # BLD AUTO: 271 K/UL (ref 150–450)
PROTEIN UR: NEGATIVE MG/DL
RBC # BLD AUTO: 4.79 M/UL (ref 3.8–5.2)
SP GR UR STRIP: 1.02 (ref 1–1.03)
UROBILINOGEN UR STRIP-ACNC: ABNORMAL E.U./DL
WBC # BLD AUTO: 8.8 K/UL (ref 4–11)

## 2021-06-21 NOTE — NURSING NOTE
75 year old  Chief Complaint   Patient presents with     Abdominal Pain     left side x 2 weeks     Fatigue       Blood pressure (!) 137/90, pulse 87, temperature 96.7  F (35.9  C), temperature source Skin, resp. rate 13, weight 61.5 kg (135 lb 8 oz), last menstrual period 01/01/2000, SpO2 98 %, not currently breastfeeding. Body mass index is 24.46 kg/m .  Patient Active Problem List   Diagnosis     Adenocarcinoma of breast (H)     Allergic rhinitis     Hyperlipidemia LDL goal <100     Acquired hypothyroidism     Heart block     Cardiac pacemaker in situ- Medtronic, dual chamber- NOT dependent (Advisa: MRI conditional)     Diverticulosis of large intestine without hemorrhage     BPPV (benign paroxysmal positional vertigo), unspecified laterality     Primary osteoarthritis of left knee     Tear of medial meniscus of left knee     Patellofemoral syndrome, left     S/P TKR (total knee replacement), left     Medicare annual wellness visit, subsequent     Paresthesias     Atrophic vaginitis     Secondary insomnia       Wt Readings from Last 2 Encounters:   06/21/21 61.5 kg (135 lb 8 oz)   06/07/21 62.4 kg (137 lb 8 oz)     BP Readings from Last 3 Encounters:   06/21/21 (!) 137/90   06/07/21 136/86   05/20/20 121/80         Current Outpatient Medications   Medication     Ascorbic Acid (VITAMIN C) 500 MG CAPS     aspirin 81 MG tablet     calcium carbonate (OS-DALLAS 500 MG [place]. [place]) 500 MG tablet     calcium polycarbophil (FIBERCON) 625 MG tablet     cetirizine (ZYRTEC) 10 MG tablet     estradiol (ESTRACE) 0.1 MG/GM vaginal cream     levothyroxine (SYNTHROID/LEVOTHROID) 50 MCG tablet     Magnesium 500 MG CAPS     simvastatin (ZOCOR) 20 MG tablet     VITAMIN D, CHOLECALCIFEROL, PO     zolpidem (AMBIEN) 5 MG tablet     No current facility-administered medications for this visit.        Social History     Tobacco Use     Smoking status: Never Smoker     Smokeless tobacco: Never Used   Substance Use Topics     Alcohol use: Yes      Alcohol/week: 0.0 standard drinks     Frequency: 4 or more times a week     Comment: 10-14 wine a week      Drug use: No       Health Maintenance Due   Topic Date Due     COLORECTAL CANCER SCREENING  06/15/2021       Lab Results   Component Value Date    PAP NIL 12/02/2019 June 21, 2021 12:55 PM

## 2021-06-21 NOTE — PATIENT INSTRUCTIONS
ASSESSMENT/PLAN:    Pat is a 76 yo who is feeling ill with vague symptoms. Normal exam, labs and ECG. All may be due to some dehydration (it's been over 90 degrees for a few weeks straight. Also with some gastritis  1. Re-start the Pepcid AC  2. Follow bland diet  3. Stay hydrated.   4. Will add a Lipase as she mentioned a concern at the end of the visit re: a sister-in-law who had pancreatic cancer   -Follow up as needed.     --Yang Aceves MD

## 2021-06-21 NOTE — TELEPHONE ENCOUNTER
"Pain in left side of abdomen at waistline x 1 week, worsening symptoms. Pain is mild-moderate, intermittent. Denies change in bowel movement, blood stools, fever, urinary problems. She reports unintentional weight loss and \"I just don't feel well\".     Reason for Disposition    [1] MODERATE pain (e.g., interferes with normal activities) AND [2] pain comes and goes (cramps) AND [3] present > 24 hours  (Exception: pain with Vomiting or Diarrhea - see that Guideline)    Protocols used: ABDOMINAL PAIN - FEMALE-A-    Kayla Sosa RN  06/21/21  8:51 AM      "

## 2021-06-21 NOTE — PROGRESS NOTES
"OVERVIEW: Sharona Bravo is a 75 year old female who presents to [facility] today for Abdominal Pain (left side x 2 weeks) and Fatigue        ASSESSMENT/PLAN:    Pat is a 74 yo who is feeling ill with vague symptoms. Normal exam, labs and ECG. All may be due to some dehydration (it's been over 90 degrees for a few weeks straight. Also with some gastritis  1. Re-start the Pepcid AC  2. Follow bland diet  3. Stay hydrated.   4. Will add a Lipase as she mentioned a concern at the end of the visit re: a sister-in-law who had pancreatic cancer   -Follow up as needed.     --Yang Aceves MD      SUBJECTIVE:   Had a wellness physical 2 weeks ago with some aching in abdomen. The \"tummyache\" resolved, but now with general feeling of illness. Her eyes feel heavy, and \"just don't feel good.\" Not fatigued. Earlier today walked 2.5 miles. Two and 3 days ago played 18 holes of golf each day in the [facility]. Still, states, \"I have a feeling that I am not well.\"  Returned last night from a vacation in [facility].   Had a power bar and banana earlier today and that's for her. Also, had her normal coffee. Appetite is normal.   Normal bowel and bladder.       Informs me that she had covid in April 2020 with prolonged headaches that resolved with Amitriptyline. No further problems.     Review Of Systems:    See HPI.  No fevers.  No changes in weight.  No changes in urination.  No changes in bowel habits.  No chest pain.  No shortness of breath.    Problem list per EMR:  Patient Active Problem List   Diagnosis     Adenocarcinoma of breast (H)     Allergic rhinitis     Hyperlipidemia LDL goal <100     Acquired hypothyroidism     Heart block     Cardiac pacemaker in situ- Medtronic, dual chamber- NOT dependent (Advisa: MRI conditional)     Diverticulosis of large intestine without hemorrhage     BPPV (benign paroxysmal positional vertigo), unspecified laterality     Primary osteoarthritis of left knee     Tear of " medial meniscus of left knee     Patellofemoral syndrome, left     S/P TKR (total knee replacement), left     Medicare annual wellness visit, subsequent     Paresthesias     Atrophic vaginitis     Secondary insomnia       Current Outpatient Medications   Medication Sig Dispense Refill     Ascorbic Acid (VITAMIN C) 500 MG CAPS        aspirin 81 MG tablet Take 81 mg by mouth daily       calcium carbonate (OS-DALLAS 500 MG [place]. CA) 500 MG tablet Take 600 mg by mouth daily        calcium polycarbophil (FIBERCON) 625 MG tablet 2 times daily        cetirizine (ZYRTEC) 10 MG tablet Take 10 mg by mouth daily       estradiol (ESTRACE) 0.1 MG/GM vaginal cream Place 1 g vaginally three times a week Place 1g vaginally nightly x 2 weeks and then 2-3 times per week for maintenance 42.5 g 4     levothyroxine (SYNTHROID/LEVOTHROID) 50 MCG tablet Take 1 tablet (50 mcg) by mouth daily 90 tablet 4     Magnesium 500 MG CAPS Take 250 mg by mouth daily        simvastatin (ZOCOR) 20 MG tablet Take 1 tablet (20 mg) by mouth At Bedtime 90 tablet 4     VITAMIN D, CHOLECALCIFEROL, PO Take 1,000 Units by mouth daily        zolpidem (AMBIEN) 5 MG tablet Take 1 tablet (5 mg) by mouth nightly as needed for sleep 30 tablet 1       Allergies   Allergen Reactions     Gabapentin Swelling and Rash     Face swelling          OBJECTIVE    Vitals: BP (!) 137/90 (BP Location: Right arm, Patient Position: Sitting, Cuff Size: Adult Regular)   Pulse 87   Temp 96.7  F (35.9  C) (Skin)   Resp 13   Wt 61.5 kg (135 lb 8 oz)   LMP 01/01/2000   SpO2 98%   BMI 24.46 kg/m    BMI= Body mass index is 24.46 kg/m .  She appears in no distress.  Sits comfortably and then arises from a seated position without difficulty and ambulates with a normal gait.  HEENT is unremarkable.   Cardiovascular-there is a regular rhythm with a bit of an atypical S1-S2 beat.  This is consistent with her pacemaker per her report.  No murmurs are heard.  Carotids are without bruits.   Lungs are clear to auscultation.  Abdomen is soft and nontender there is no hepatosplenomegaly.  No rebound or guarding.  Normal bowel sounds.  Her back is examined showing no rashes.  No deformities.  No point tenderness in the flanks or elsewhere.      ECG: Normal paced rhythm (has Ventricular Pacemaker) at 68 bpm    Results for orders placed or performed in visit on 06/21/21   CBC with Diff Plt ([facility])     Status: Abnormal   Result Value Ref Range    WBC 8.8 4.0 - 11.0 K/uL    Lymphocytes # 1.5 0.8 - 5.3 K/uL    % Lymphocytes 16.8 (L) 20.0 - 48.0 %L    Mid # 0.5 0.0 - 2.2 K/uL    Mid % 6.1 0.0 - 20.0 %M    GRANULOCYTES # 6.8 1.6 - 8.3 K/uL    % Granulocytes 77.1 (H) 40.0 - 75.0 %G    RBC 4.79 3.80 - 5.20 M/uL    Hemoglobin 14.3 11.7 - 15.7 g/dL    Hematocrit 44.2 35.0 - 47.0 %    MCV 92.3 78.0 - 100.0 fL    MCH 29.9 26.5 - 35.0 pg    MCHC 32.4 32.0 - 36.0 g/dL    Platelets 271.0 150.0 - 450.0 K/uL    RDW 13.9 %   Urinalysis ([place])     Status: Abnormal   Result Value Ref Range    Leukocyte Esterase UR Negative Negative    Nitrite Urine Negative Negative mg/dL    Protein UR Negative Negative mg/dL    Glucose Urine Negative Negative    Ketones Urine Trace (A) Negative mg/dL    Urobilinogen mg/dL 0.2 E.U./dL 0.2 E.U./dL E.U./dL    Bilirubin UR Negative Negative mg/dL    Blood UR Trace-intact (A) Negative mg/dL    pH Urine 5.0 4.5 - 8.0    Specific Gravity Urine 1.020 1.005 - 1.030       SEE TOP OF NOTE FOR ASSESSMENT AND PLAN  30 minutes spent on the date of the encounter doing chart review, history and exam, documentation and further activities as noted in the note.     --Yang Aceves MD  [facility], Department of Family Medicine and Community Health

## 2021-07-26 ENCOUNTER — OFFICE VISIT (OUTPATIENT)
Dept: AUDIOLOGY | Facility: CLINIC | Age: 76
End: 2021-07-26
Attending: FAMILY MEDICINE
Payer: MEDICARE

## 2021-07-26 DIAGNOSIS — H90.3 SENSORINEURAL HEARING LOSS, BILATERAL: ICD-10-CM

## 2021-07-26 PROCEDURE — 92550 TYMPANOMETRY & REFLEX THRESH: CPT | Performed by: AUDIOLOGIST

## 2021-07-26 PROCEDURE — 92557 COMPREHENSIVE HEARING TEST: CPT | Performed by: AUDIOLOGIST

## 2021-07-26 NOTE — PROGRESS NOTES
AUDIOLOGY REPORT    SUBJECTIVE:  Sharona Bravo is a 75 year old female who was seen in the Audiology Clinic at the [facility] for audiologic evaluation, referred by Ruslan Jean M.D.. The patient has not been seen previously in this clinic for assessment. She reports a decrease in hearing over the last couple of years, with the left ear being slightly worse. She denies otalgia, aural fullness, otorrhea, dizziness/imbalance, history of ear surgeries, or history of noise exposure. Sharona reports that her brother utilizes hearing aids for a noise related hearing loss. Sharona reports difficulty with communication in some but not many listening situations. She was not accompanied by anyone to today's appointment.       OBJECTIVE:  Abuse Screening:  Do you feel unsafe at home or work/school? No  Do you feel threatened by someone? No  Does anyone try to keep you from having contact with others, or doing things outside of your home? No  Physical signs of abuse present? No     Fall Risk Screen:  1. Have you fallen two or more times in the past year? No  2. Have you fallen and had an injury in the past year? No    Otoscopic exam indicates ears are clear of significant cerumen bilaterally.     Pure Tone Thresholds assessed using conventional audiometry with good  reliability from 250-8000 Hz bilaterally using insert earphones and circumaural headphones. It should be noted that there was an initial asymmetry with the left ear being poorer that improved when rechecking with inserts.      RIGHT: Normal sloping to mild sensorineural hearing loss at .5 kHz rising to normal hearing through 2 kHz sloping to moderate presumably sensorineural hearing loss.     LEFT: Normal sloping to mild sensorineural hearing loss at .5 kHz rising to normal hearing through 2 kHz sloping to moderate presumably sensorineural hearing loss. Slight air bone gap noted at 4 kHz.      Tympanogram:    RIGHT: normal eardrum mobility    LEFT:   negative pressure     Reflexes (reported by stimulus ear):  RIGHT: Ipsilateral is absent at frequencies tested  RIGHT: Contralateral is absent at frequencies tested  LEFT:   Ipsilateral is absent at frequencies tested  LEFT:   Contralateral is absent at frequencies tested      Speech Reception Threshold:    RIGHT: 25 dB HL    LEFT:   25 dB HL  Word Recognition Score:     RIGHT: 100% at 75 dB HL using NU-6 recorded word list.    LEFT:   100% at 75 dB HL using NU-6 recorded word list.      ASSESSMENT: Today's results indicate sensorineural hearing loss bilaterally. There are no previous audiograms available for comparison. Today s results were discussed with the patient in detail.     PLAN:  Patient was counseled regarding hearing loss and impact on communication. Patient is a borderline candidate for amplification at this time, pending motivation. Handout on good communication strategies was given to patient. It is recommended that the patient follow up with her primary care provider regarding the negative pressure in her ear if desired. It is also recommended that she recheck hearing is changes are noted or  if concerns are noted. Please call this clinic with questions regarding these results or recommendations.      Ruba Piña. CCC-A  Licensed Audiologist   [place] #[id number]

## 2021-07-28 ENCOUNTER — ANCILLARY PROCEDURE (OUTPATIENT)
Dept: CARDIOLOGY | Facility: CLINIC | Age: 76
End: 2021-07-28
Attending: INTERNAL MEDICINE
Payer: MEDICARE

## 2021-07-28 DIAGNOSIS — I44.30 ATRIOVENTRICULAR BLOCK: ICD-10-CM

## 2021-07-28 PROCEDURE — 93294 REM INTERROG EVL PM/LDLS PM: CPT | Performed by: INTERNAL MEDICINE

## 2021-07-28 PROCEDURE — 93296 REM INTERROG EVL PM/IDS: CPT

## 2021-08-18 LAB
MDC_IDC_LEAD_IMPLANT_DT: NORMAL
MDC_IDC_LEAD_IMPLANT_DT: NORMAL
MDC_IDC_LEAD_LOCATION: NORMAL
MDC_IDC_LEAD_LOCATION: NORMAL
MDC_IDC_LEAD_LOCATION_DETAIL_1: NORMAL
MDC_IDC_LEAD_LOCATION_DETAIL_1: NORMAL
MDC_IDC_LEAD_MFG: NORMAL
MDC_IDC_LEAD_MFG: NORMAL
MDC_IDC_LEAD_MODEL: NORMAL
MDC_IDC_LEAD_MODEL: NORMAL
MDC_IDC_LEAD_POLARITY_TYPE: NORMAL
MDC_IDC_LEAD_POLARITY_TYPE: NORMAL
MDC_IDC_LEAD_SERIAL: NORMAL
MDC_IDC_LEAD_SERIAL: NORMAL
MDC_IDC_MSMT_BATTERY_DTM: NORMAL
MDC_IDC_MSMT_BATTERY_REMAINING_LONGEVITY: 33 MO
MDC_IDC_MSMT_BATTERY_RRT_TRIGGER: 2.83
MDC_IDC_MSMT_BATTERY_STATUS: NORMAL
MDC_IDC_MSMT_BATTERY_VOLTAGE: 2.96 V
MDC_IDC_MSMT_LEADCHNL_RA_IMPEDANCE_VALUE: 361 OHM
MDC_IDC_MSMT_LEADCHNL_RA_IMPEDANCE_VALUE: 418 OHM
MDC_IDC_MSMT_LEADCHNL_RA_PACING_THRESHOLD_AMPLITUDE: 0.62 V
MDC_IDC_MSMT_LEADCHNL_RA_PACING_THRESHOLD_PULSEWIDTH: 0.4 MS
MDC_IDC_MSMT_LEADCHNL_RA_SENSING_INTR_AMPL: 1.38 MV
MDC_IDC_MSMT_LEADCHNL_RV_IMPEDANCE_VALUE: 437 OHM
MDC_IDC_MSMT_LEADCHNL_RV_IMPEDANCE_VALUE: 494 OHM
MDC_IDC_MSMT_LEADCHNL_RV_PACING_THRESHOLD_AMPLITUDE: 0.88 V
MDC_IDC_MSMT_LEADCHNL_RV_PACING_THRESHOLD_PULSEWIDTH: 0.4 MS
MDC_IDC_MSMT_LEADCHNL_RV_SENSING_INTR_AMPL: 8.75 MV
MDC_IDC_PG_IMPLANT_DTM: NORMAL
MDC_IDC_PG_MFG: NORMAL
MDC_IDC_PG_MODEL: NORMAL
MDC_IDC_PG_SERIAL: NORMAL
MDC_IDC_PG_TYPE: NORMAL
MDC_IDC_SESS_CLINIC_NAME: NORMAL
MDC_IDC_SESS_DTM: NORMAL
MDC_IDC_SESS_TYPE: NORMAL
MDC_IDC_SET_BRADY_AT_MODE_SWITCH_RATE: 171 {BEATS}/MIN
MDC_IDC_SET_BRADY_HYSTRATE: NORMAL
MDC_IDC_SET_BRADY_LOWRATE: 60 {BEATS}/MIN
MDC_IDC_SET_BRADY_MAX_SENSOR_RATE: 130 {BEATS}/MIN
MDC_IDC_SET_BRADY_MAX_TRACKING_RATE: 130 {BEATS}/MIN
MDC_IDC_SET_BRADY_MODE: NORMAL
MDC_IDC_SET_BRADY_PAV_DELAY_LOW: 220 MS
MDC_IDC_SET_BRADY_SAV_DELAY_LOW: 200 MS
MDC_IDC_SET_LEADCHNL_RA_PACING_AMPLITUDE: 1.5 V
MDC_IDC_SET_LEADCHNL_RA_PACING_ANODE_ELECTRODE_1: NORMAL
MDC_IDC_SET_LEADCHNL_RA_PACING_ANODE_LOCATION_1: NORMAL
MDC_IDC_SET_LEADCHNL_RA_PACING_CAPTURE_MODE: NORMAL
MDC_IDC_SET_LEADCHNL_RA_PACING_CATHODE_ELECTRODE_1: NORMAL
MDC_IDC_SET_LEADCHNL_RA_PACING_CATHODE_LOCATION_1: NORMAL
MDC_IDC_SET_LEADCHNL_RA_PACING_POLARITY: NORMAL
MDC_IDC_SET_LEADCHNL_RA_PACING_PULSEWIDTH: 0.4 MS
MDC_IDC_SET_LEADCHNL_RA_SENSING_ANODE_ELECTRODE_1: NORMAL
MDC_IDC_SET_LEADCHNL_RA_SENSING_ANODE_LOCATION_1: NORMAL
MDC_IDC_SET_LEADCHNL_RA_SENSING_CATHODE_ELECTRODE_1: NORMAL
MDC_IDC_SET_LEADCHNL_RA_SENSING_CATHODE_LOCATION_1: NORMAL
MDC_IDC_SET_LEADCHNL_RA_SENSING_POLARITY: NORMAL
MDC_IDC_SET_LEADCHNL_RA_SENSING_SENSITIVITY: 0.6 MV
MDC_IDC_SET_LEADCHNL_RV_PACING_AMPLITUDE: 2.25 V
MDC_IDC_SET_LEADCHNL_RV_PACING_ANODE_ELECTRODE_1: NORMAL
MDC_IDC_SET_LEADCHNL_RV_PACING_ANODE_LOCATION_1: NORMAL
MDC_IDC_SET_LEADCHNL_RV_PACING_CAPTURE_MODE: NORMAL
MDC_IDC_SET_LEADCHNL_RV_PACING_CATHODE_ELECTRODE_1: NORMAL
MDC_IDC_SET_LEADCHNL_RV_PACING_CATHODE_LOCATION_1: NORMAL
MDC_IDC_SET_LEADCHNL_RV_PACING_POLARITY: NORMAL
MDC_IDC_SET_LEADCHNL_RV_PACING_PULSEWIDTH: 0.4 MS
MDC_IDC_SET_LEADCHNL_RV_SENSING_ANODE_ELECTRODE_1: NORMAL
MDC_IDC_SET_LEADCHNL_RV_SENSING_ANODE_LOCATION_1: NORMAL
MDC_IDC_SET_LEADCHNL_RV_SENSING_CATHODE_ELECTRODE_1: NORMAL
MDC_IDC_SET_LEADCHNL_RV_SENSING_CATHODE_LOCATION_1: NORMAL
MDC_IDC_SET_LEADCHNL_RV_SENSING_POLARITY: NORMAL
MDC_IDC_SET_LEADCHNL_RV_SENSING_SENSITIVITY: 0.9 MV
MDC_IDC_SET_ZONE_DETECTION_INTERVAL: 350 MS
MDC_IDC_SET_ZONE_DETECTION_INTERVAL: 400 MS
MDC_IDC_SET_ZONE_TYPE: NORMAL
MDC_IDC_STAT_AT_BURDEN_PERCENT: 0 %
MDC_IDC_STAT_AT_DTM_END: NORMAL
MDC_IDC_STAT_AT_DTM_START: NORMAL
MDC_IDC_STAT_BRADY_AP_VP_PERCENT: 48.72 %
MDC_IDC_STAT_BRADY_AP_VS_PERCENT: 0 %
MDC_IDC_STAT_BRADY_AS_VP_PERCENT: 51.24 %
MDC_IDC_STAT_BRADY_AS_VS_PERCENT: 0.03 %
MDC_IDC_STAT_BRADY_DTM_END: NORMAL
MDC_IDC_STAT_BRADY_DTM_START: NORMAL
MDC_IDC_STAT_BRADY_RA_PERCENT_PACED: 48.71 %
MDC_IDC_STAT_BRADY_RV_PERCENT_PACED: 99.96 %
MDC_IDC_STAT_EPISODE_RECENT_COUNT: 0
MDC_IDC_STAT_EPISODE_RECENT_COUNT_DTM_END: NORMAL
MDC_IDC_STAT_EPISODE_RECENT_COUNT_DTM_START: NORMAL
MDC_IDC_STAT_EPISODE_TOTAL_COUNT: 0
MDC_IDC_STAT_EPISODE_TOTAL_COUNT: 0
MDC_IDC_STAT_EPISODE_TOTAL_COUNT: 2
MDC_IDC_STAT_EPISODE_TOTAL_COUNT: 2
MDC_IDC_STAT_EPISODE_TOTAL_COUNT_DTM_END: NORMAL
MDC_IDC_STAT_EPISODE_TOTAL_COUNT_DTM_START: NORMAL
MDC_IDC_STAT_EPISODE_TYPE: NORMAL

## 2021-08-27 ENCOUNTER — OFFICE VISIT (OUTPATIENT)
Dept: ORTHOPEDICS | Facility: CLINIC | Age: 76
End: 2021-08-27
Payer: MEDICARE

## 2021-08-27 DIAGNOSIS — M19.079 ARTHRITIS OF FOOT: Primary | ICD-10-CM

## 2021-08-27 PROCEDURE — 99213 OFFICE O/P EST LOW 20 MIN: CPT | Performed by: PODIATRIST

## 2021-08-27 NOTE — PROGRESS NOTES
Chief Complaint:   Chief Complaint   Patient presents with     RECHECK     bilateral 2 and 3 toe pain during push off and ankle pain L>R          Allergies   Allergen Reactions     Gabapentin Swelling and Rash     Face swelling         Subjective: Sharona is a 75 year old female who presents to the clinic today for a follow up of right arthritic foot pain.  She relates that since last visit, she has gone through multiple iterations of orthotics.  She has tried multiple brands.  She relates that they work for a few months, however then after the foot will start to hurt again.  She also notes that she has some pain in the second and third MTPJ's on the right foot with pushoff.    Objective  Data Unavailable Data Unavailable Data Unavailable Data Unavailable Data Unavailable 0 lbs 0 oz  DP and PT pulses 2/4.  Capillary refill time is instant.  Positive pedal hair.  Gross sensation is intact.  Pain noted in the right foot with palpation of the second metatarsal and the middle cuneiform and the third metatarsal lateral cuneiform.  No pain noted at the base of the fourth or fifth metatarsal.  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  No pain noted today with palpation or range of motion of the right second or third MTPJ's.  No pain with distraction of the joints.    No x-rays indicated during today's visit  Previous films were reviewed today, independent visualization of images was performed, and results were discussed with the patient      Assessment: Kristal is a 75-year-old with right foot arthritis.  Looking at the previous x-rays, this is evident at the base of the second metatarsal and its articulation with the middle cuneiform and at the third metatarsal lateral cuneiform juncture.  She also has some arthritis in the second and third MTPJ's.  I discussed with her that she can continue to try orthotics with different branding to see if they help.  This is only been minimally helpful for her.  Looking at  her x-ray, it does not appear that she has enough joint space to accommodate an injection.  Because of all these factors, I recommend that she see Dr. Combs to talk about arthrodesis of the joints.    Plan:   - Pt seen and evaluated  -Previous x-rays were reviewed.  -She does have Voltaren cream at home.  She can use this for the intermediate term to help with the pain.  -Referral to see Dr. Combs.  - Pt to return to clinic as needed.

## 2021-08-30 NOTE — TELEPHONE ENCOUNTER
RECORDS RECEIVED FROM: right foot arthritis   DATE RECEIVED: Oct 19, 2021     NOTES STATUS DETAILS   OFFICE NOTE from referring provider Internal  Grayson Balderas DPM      OFFICE NOTE from other specialist N/A    DISCHARGE SUMMARY from hospital N/A    DISCHARGE REPORT from the ER N/A    OPERATIVE REPORT N/A    MEDICATION LIST Internal    IMPLANT RECORD/STICKER N/A    LABS     CBC/DIFF N/A    CULTURES N/A    INJECTIONS DONE IN RADIOLOGY N/A    MRI N/A    CT SCAN N/A    XRAYS (IMAGES & REPORTS) Internal    TUMOR     PATHOLOGY  Slides & report N/A      08/30/21   2:41 PM   COMPLETE  Christy Ochoa CMA

## 2021-09-10 ENCOUNTER — ANCILLARY PROCEDURE (OUTPATIENT)
Dept: MAMMOGRAPHY | Facility: CLINIC | Age: 76
End: 2021-09-10
Payer: MEDICARE

## 2021-09-10 DIAGNOSIS — Z12.31 VISIT FOR SCREENING MAMMOGRAM: ICD-10-CM

## 2021-09-10 PROCEDURE — 77063 BREAST TOMOSYNTHESIS BI: CPT | Mod: GC

## 2021-09-10 PROCEDURE — 77067 SCR MAMMO BI INCL CAD: CPT | Mod: GC

## 2021-10-19 ENCOUNTER — PRE VISIT (OUTPATIENT)
Dept: ORTHOPEDICS | Facility: CLINIC | Age: 76
End: 2021-10-19

## 2021-10-24 ENCOUNTER — HEALTH MAINTENANCE LETTER (OUTPATIENT)
Age: 76
End: 2021-10-24

## 2021-11-09 ENCOUNTER — ANCILLARY PROCEDURE (OUTPATIENT)
Dept: CARDIOLOGY | Facility: CLINIC | Age: 76
End: 2021-11-09
Attending: INTERNAL MEDICINE
Payer: MEDICARE

## 2021-11-09 DIAGNOSIS — I44.30 ATRIOVENTRICULAR BLOCK: ICD-10-CM

## 2021-11-09 PROCEDURE — 93296 REM INTERROG EVL PM/IDS: CPT

## 2021-11-09 PROCEDURE — 93294 REM INTERROG EVL PM/LDLS PM: CPT | Performed by: INTERNAL MEDICINE

## 2021-12-09 LAB
MDC_IDC_LEAD_IMPLANT_DT: NORMAL
MDC_IDC_LEAD_IMPLANT_DT: NORMAL
MDC_IDC_LEAD_LOCATION: NORMAL
MDC_IDC_LEAD_LOCATION: NORMAL
MDC_IDC_LEAD_LOCATION_DETAIL_1: NORMAL
MDC_IDC_LEAD_LOCATION_DETAIL_1: NORMAL
MDC_IDC_LEAD_MFG: NORMAL
MDC_IDC_LEAD_MFG: NORMAL
MDC_IDC_LEAD_MODEL: NORMAL
MDC_IDC_LEAD_MODEL: NORMAL
MDC_IDC_LEAD_POLARITY_TYPE: NORMAL
MDC_IDC_LEAD_POLARITY_TYPE: NORMAL
MDC_IDC_LEAD_SERIAL: NORMAL
MDC_IDC_LEAD_SERIAL: NORMAL
MDC_IDC_MSMT_BATTERY_DTM: NORMAL
MDC_IDC_MSMT_BATTERY_REMAINING_LONGEVITY: 32 MO
MDC_IDC_MSMT_BATTERY_RRT_TRIGGER: 2.83
MDC_IDC_MSMT_BATTERY_STATUS: NORMAL
MDC_IDC_MSMT_BATTERY_VOLTAGE: 2.95 V
MDC_IDC_MSMT_LEADCHNL_RA_IMPEDANCE_VALUE: 380 OHM
MDC_IDC_MSMT_LEADCHNL_RA_IMPEDANCE_VALUE: 418 OHM
MDC_IDC_MSMT_LEADCHNL_RA_PACING_THRESHOLD_AMPLITUDE: 0.5 V
MDC_IDC_MSMT_LEADCHNL_RA_PACING_THRESHOLD_PULSEWIDTH: 0.4 MS
MDC_IDC_MSMT_LEADCHNL_RA_SENSING_INTR_AMPL: 3.38 MV
MDC_IDC_MSMT_LEADCHNL_RA_SENSING_INTR_AMPL: 3.38 MV
MDC_IDC_MSMT_LEADCHNL_RV_IMPEDANCE_VALUE: 456 OHM
MDC_IDC_MSMT_LEADCHNL_RV_IMPEDANCE_VALUE: 532 OHM
MDC_IDC_MSMT_LEADCHNL_RV_PACING_THRESHOLD_AMPLITUDE: 1.12 V
MDC_IDC_MSMT_LEADCHNL_RV_PACING_THRESHOLD_PULSEWIDTH: 0.4 MS
MDC_IDC_MSMT_LEADCHNL_RV_SENSING_INTR_AMPL: 13.75 MV
MDC_IDC_MSMT_LEADCHNL_RV_SENSING_INTR_AMPL: 13.75 MV
MDC_IDC_PG_IMPLANT_DTM: NORMAL
MDC_IDC_PG_MFG: NORMAL
MDC_IDC_PG_MODEL: NORMAL
MDC_IDC_PG_SERIAL: NORMAL
MDC_IDC_PG_TYPE: NORMAL
MDC_IDC_SESS_CLINIC_NAME: NORMAL
MDC_IDC_SESS_DTM: NORMAL
MDC_IDC_SESS_TYPE: NORMAL
MDC_IDC_SET_BRADY_AT_MODE_SWITCH_RATE: 171 {BEATS}/MIN
MDC_IDC_SET_BRADY_HYSTRATE: NORMAL
MDC_IDC_SET_BRADY_LOWRATE: 60 {BEATS}/MIN
MDC_IDC_SET_BRADY_MAX_SENSOR_RATE: 130 {BEATS}/MIN
MDC_IDC_SET_BRADY_MAX_TRACKING_RATE: 130 {BEATS}/MIN
MDC_IDC_SET_BRADY_MODE: NORMAL
MDC_IDC_SET_BRADY_PAV_DELAY_LOW: 220 MS
MDC_IDC_SET_BRADY_SAV_DELAY_LOW: 200 MS
MDC_IDC_SET_LEADCHNL_RA_PACING_AMPLITUDE: 1.5 V
MDC_IDC_SET_LEADCHNL_RA_PACING_ANODE_ELECTRODE_1: NORMAL
MDC_IDC_SET_LEADCHNL_RA_PACING_ANODE_LOCATION_1: NORMAL
MDC_IDC_SET_LEADCHNL_RA_PACING_CAPTURE_MODE: NORMAL
MDC_IDC_SET_LEADCHNL_RA_PACING_CATHODE_ELECTRODE_1: NORMAL
MDC_IDC_SET_LEADCHNL_RA_PACING_CATHODE_LOCATION_1: NORMAL
MDC_IDC_SET_LEADCHNL_RA_PACING_POLARITY: NORMAL
MDC_IDC_SET_LEADCHNL_RA_PACING_PULSEWIDTH: 0.4 MS
MDC_IDC_SET_LEADCHNL_RA_SENSING_ANODE_ELECTRODE_1: NORMAL
MDC_IDC_SET_LEADCHNL_RA_SENSING_ANODE_LOCATION_1: NORMAL
MDC_IDC_SET_LEADCHNL_RA_SENSING_CATHODE_ELECTRODE_1: NORMAL
MDC_IDC_SET_LEADCHNL_RA_SENSING_CATHODE_LOCATION_1: NORMAL
MDC_IDC_SET_LEADCHNL_RA_SENSING_POLARITY: NORMAL
MDC_IDC_SET_LEADCHNL_RA_SENSING_SENSITIVITY: 0.6 MV
MDC_IDC_SET_LEADCHNL_RV_PACING_AMPLITUDE: 2.25 V
MDC_IDC_SET_LEADCHNL_RV_PACING_ANODE_ELECTRODE_1: NORMAL
MDC_IDC_SET_LEADCHNL_RV_PACING_ANODE_LOCATION_1: NORMAL
MDC_IDC_SET_LEADCHNL_RV_PACING_CAPTURE_MODE: NORMAL
MDC_IDC_SET_LEADCHNL_RV_PACING_CATHODE_ELECTRODE_1: NORMAL
MDC_IDC_SET_LEADCHNL_RV_PACING_CATHODE_LOCATION_1: NORMAL
MDC_IDC_SET_LEADCHNL_RV_PACING_POLARITY: NORMAL
MDC_IDC_SET_LEADCHNL_RV_PACING_PULSEWIDTH: 0.4 MS
MDC_IDC_SET_LEADCHNL_RV_SENSING_ANODE_ELECTRODE_1: NORMAL
MDC_IDC_SET_LEADCHNL_RV_SENSING_ANODE_LOCATION_1: NORMAL
MDC_IDC_SET_LEADCHNL_RV_SENSING_CATHODE_ELECTRODE_1: NORMAL
MDC_IDC_SET_LEADCHNL_RV_SENSING_CATHODE_LOCATION_1: NORMAL
MDC_IDC_SET_LEADCHNL_RV_SENSING_POLARITY: NORMAL
MDC_IDC_SET_LEADCHNL_RV_SENSING_SENSITIVITY: 0.9 MV
MDC_IDC_SET_ZONE_DETECTION_INTERVAL: 350 MS
MDC_IDC_SET_ZONE_DETECTION_INTERVAL: 400 MS
MDC_IDC_SET_ZONE_TYPE: NORMAL
MDC_IDC_STAT_AT_BURDEN_PERCENT: 0 %
MDC_IDC_STAT_AT_DTM_END: NORMAL
MDC_IDC_STAT_AT_DTM_START: NORMAL
MDC_IDC_STAT_BRADY_AP_VP_PERCENT: 50.13 %
MDC_IDC_STAT_BRADY_AP_VS_PERCENT: 0 %
MDC_IDC_STAT_BRADY_AS_VP_PERCENT: 49.86 %
MDC_IDC_STAT_BRADY_AS_VS_PERCENT: 0.01 %
MDC_IDC_STAT_BRADY_DTM_END: NORMAL
MDC_IDC_STAT_BRADY_DTM_START: NORMAL
MDC_IDC_STAT_BRADY_RA_PERCENT_PACED: 50.12 %
MDC_IDC_STAT_BRADY_RV_PERCENT_PACED: 99.98 %
MDC_IDC_STAT_EPISODE_RECENT_COUNT: 0
MDC_IDC_STAT_EPISODE_RECENT_COUNT_DTM_END: NORMAL
MDC_IDC_STAT_EPISODE_RECENT_COUNT_DTM_START: NORMAL
MDC_IDC_STAT_EPISODE_TOTAL_COUNT: 0
MDC_IDC_STAT_EPISODE_TOTAL_COUNT: 0
MDC_IDC_STAT_EPISODE_TOTAL_COUNT: 2
MDC_IDC_STAT_EPISODE_TOTAL_COUNT: 2
MDC_IDC_STAT_EPISODE_TOTAL_COUNT_DTM_END: NORMAL
MDC_IDC_STAT_EPISODE_TOTAL_COUNT_DTM_START: NORMAL
MDC_IDC_STAT_EPISODE_TYPE: NORMAL

## 2022-01-31 ENCOUNTER — OFFICE VISIT (OUTPATIENT)
Dept: FAMILY MEDICINE | Facility: CLINIC | Age: 77
End: 2022-01-31
Payer: MEDICARE

## 2022-01-31 VITALS
TEMPERATURE: 97.2 F | SYSTOLIC BLOOD PRESSURE: 105 MMHG | BODY MASS INDEX: 24.69 KG/M2 | RESPIRATION RATE: 15 BRPM | WEIGHT: 136.75 LBS | OXYGEN SATURATION: 96 % | HEART RATE: 93 BPM | DIASTOLIC BLOOD PRESSURE: 72 MMHG

## 2022-01-31 DIAGNOSIS — G89.29 ABDOMINAL PAIN, CHRONIC, LEFT LOWER QUADRANT: Primary | ICD-10-CM

## 2022-01-31 DIAGNOSIS — R10.32 ABDOMINAL PAIN, CHRONIC, LEFT LOWER QUADRANT: Primary | ICD-10-CM

## 2022-01-31 LAB
ANION GAP SERPL CALCULATED.3IONS-SCNC: 6 MMOL/L (ref 3–14)
BUN SERPL-MCNC: 30 MG/DL (ref 7–30)
CALCIUM SERPL-MCNC: 9.7 MG/DL (ref 8.5–10.1)
CHLORIDE BLD-SCNC: 109 MMOL/L (ref 94–109)
CO2 SERPL-SCNC: 23 MMOL/L (ref 20–32)
CREAT SERPL-MCNC: 1.03 MG/DL (ref 0.52–1.04)
CRP SERPL-MCNC: <2.9 MG/L (ref 0–8)
GFR SERPL CREATININE-BSD FRML MDRD: 57 ML/MIN/1.73M2
GLUCOSE BLD-MCNC: 98 MG/DL (ref 70–99)
POTASSIUM BLD-SCNC: 4.3 MMOL/L (ref 3.4–5.3)
SODIUM SERPL-SCNC: 138 MMOL/L (ref 133–144)

## 2022-01-31 PROCEDURE — 80048 BASIC METABOLIC PNL TOTAL CA: CPT | Performed by: FAMILY MEDICINE

## 2022-01-31 PROCEDURE — 86140 C-REACTIVE PROTEIN: CPT | Performed by: FAMILY MEDICINE

## 2022-01-31 NOTE — PROGRESS NOTES
"CHIEF COMPLAINT: F/u from 6/21/21      HISTORY OF PRESENT ILLNESS:  Sharona Bravo is a 76 year old female with a history of paresthesias, BPPV, allergic rhinitis, diverticulosis, hyperlipidemia, acquired hypothyroidism, cardiac pacemaker in situ, and secondary insomnia who presents for a follow-up from her 6/21/21 visit with Dr. Aceves.    Kristal saw Dr. Aceves on 6/21/21, at which time she reported generally feeling ill with vague symptoms. She reported some abdominal pain at our annual wellness visit on 6/7/21, which had resolved by that time. She had been staying active outside and denied fatigued, but her eyes felt \"heavy\". Kristal had a COVID-19 infection in April 2020 with prolonged headaches that resolved with Amitriptyline, no further problems. Appetite was normal. Normal bowel and bladder function. Her exam, labs, and EKG were normal at that time. Lipase was checked due to a family history of pancreatic cancer in her sister, normal. Dr. Aceves suspected that dehydration could have been a factor as well as some gastritis. He recommended that she restart Pepcid AC, follow a bland diet, and stay well-hydrated.     Today, Kristal reports a recurrence of her symptoms as above around the beginning of the year. She had some sore throat attributed to acid reflux and took Pepcid AC for three weeks, but did not find this helpful. COVID testing was negative at that time. She now has a 2 week history of intermittent LUQ and LLQ abdominal pain. When the pain starts, it just \"builds and builds\". She has had some nausea with this, no fevers. She thought this could be due to constipation but is now having regular BMs and is still having this pain. No hematochezia. Pain is not triggered with eating or impacted by exercise. She has a history of diverticulosis and we discussed that her symptoms could be due to diverticulosis.     Over the past couple of weeks, Kristal has been getting a headache \"along the bone\" on the left side " "of her head. This headache first started after her previous COVID infection. She thinks it is now being triggered by stress related to her daughter's health. She used a mouthguard at night many years ago and found this \"weird\". Her dentist has not mentioned any wear or grinding patterns on her teeth recently.       MEDICATIONS:   Carefully Reviewed      REVIEW OF SYSTEMS:  10-point review of systems negative unless otherwise stated in the HPI.       OBJECTIVE:    EXAM:  GENERAL: Pat is in no distress.  Affect is upbeat.   Alert and oriented x3  /72   Pulse 93   Temp 97.2  F (36.2  C) (Skin)   Resp 15   Wt 62 kg (136 lb 12 oz)   LMP 01/01/2000   SpO2 96%   BMI 24.69 kg/m    HEENT:  Head is normocephalic, atraumatic.  No temple arteritis.  Some muscle extension/expansion on the left with jaw clenching.  Eyes are grossly normal.  Nose and mouth masked.  ABDOMEN:  No pain with palpation over mid-epigastric region, RUQ, or RLQ.  Tenderness along the border between the LUQ and LLQ, consistent with the location of the sigmoid colon.   SKIN:  Warm and dry.       LABORATORY DATA:   No results found for any visits on 01/31/22.      ASSESSMENT AND PLAN:   1. Abdominal pain, chronic, left lower quadrant: Kristal's current symptoms are consistent with mild diverticulitis. She will resume taking one FiberCon tablet daily to increase the bulk of her BMs and prevent smaller stool particles from getting lodged in her diverticula. No need for oral antibiotics at this time.  2. We will complete a CRP inflammation marker and BMP today in anticipation of a future abdominal CT scan with contrast if needed. Results are pending. I will order the CT scan for her if she is in [place], otherwise she can complete this while in [place].       I, Alina Berger, am serving as a scribe to document services personally performed by Dr. Ruslan Jean, based on data collection and the provider's statements to me. Dr. Jean has reviewed, " edited, and approved the above note.     I, Dr. Ruslan Jean, have interviewed and examined this patient, and I have thoroughly reviewed and edited this note and agree with its contents.

## 2022-01-31 NOTE — NURSING NOTE
76 year old  Chief Complaint   Patient presents with     Abdominal Pain     x 2 weeks of abd pain, LUQ and LLQ, intermittent      Headache       Blood pressure 105/72, pulse 93, temperature 97.2  F (36.2  C), temperature source Skin, resp. rate 15, weight 62 kg (136 lb 12 oz), last menstrual period 01/01/2000, SpO2 96 %, not currently breastfeeding. Body mass index is 24.69 kg/m .  Patient Active Problem List   Diagnosis     Adenocarcinoma of breast (H)     Allergic rhinitis     Hyperlipidemia LDL goal <100     Acquired hypothyroidism     Heart block     Cardiac pacemaker in situ- Medtronic, dual chamber- NOT dependent (Advisa: MRI conditional)     Diverticulosis of large intestine without hemorrhage     BPPV (benign paroxysmal positional vertigo), unspecified laterality     Primary osteoarthritis of left knee     Tear of medial meniscus of left knee     Patellofemoral syndrome, left     S/P TKR (total knee replacement), left     Medicare annual wellness visit, subsequent     Paresthesias     Atrophic vaginitis     Secondary insomnia       Wt Readings from Last 2 Encounters:   01/31/22 62 kg (136 lb 12 oz)   06/21/21 61.5 kg (135 lb 8 oz)     BP Readings from Last 3 Encounters:   01/31/22 105/72   06/21/21 122/79   06/07/21 136/86         Current Outpatient Medications   Medication     Ascorbic Acid (VITAMIN C) 500 MG CAPS     aspirin 81 MG tablet     calcium carbonate (OS-DALLAS 500 MG [place]. CA) 500 MG tablet     calcium polycarbophil (FIBERCON) 625 MG tablet     cetirizine (ZYRTEC) 10 MG tablet     estradiol (ESTRACE) 0.1 MG/GM vaginal cream     levothyroxine (SYNTHROID/LEVOTHROID) 50 MCG tablet     Magnesium 500 MG CAPS     simvastatin (ZOCOR) 20 MG tablet     VITAMIN D, CHOLECALCIFEROL, PO     zolpidem (AMBIEN) 5 MG tablet     No current facility-administered medications for this visit.       Social History     Tobacco Use     Smoking status: Never Smoker     Smokeless tobacco: Never Used   Substance Use Topics      Alcohol use: Yes     Alcohol/week: 0.0 standard drinks     Comment: 10-14 wine a week      Drug use: No       Health Maintenance Due   Topic Date Due     COLORECTAL CANCER SCREENING  06/15/2021       Lab Results   Component Value Date    PAP NIL 12/02/2019 January 31, 2022 10:36 AM

## 2022-02-16 ENCOUNTER — ANCILLARY PROCEDURE (OUTPATIENT)
Dept: CARDIOLOGY | Facility: CLINIC | Age: 77
End: 2022-02-16
Attending: INTERNAL MEDICINE
Payer: MEDICARE

## 2022-02-16 DIAGNOSIS — I44.30 ATRIOVENTRICULAR BLOCK: ICD-10-CM

## 2022-02-16 PROCEDURE — 93296 REM INTERROG EVL PM/IDS: CPT

## 2022-02-16 PROCEDURE — 93294 REM INTERROG EVL PM/LDLS PM: CPT | Performed by: INTERNAL MEDICINE

## 2022-04-04 LAB
MDC_IDC_EPISODE_DTM: NORMAL
MDC_IDC_EPISODE_DURATION: 1 S
MDC_IDC_EPISODE_ID: 5
MDC_IDC_EPISODE_TYPE: NORMAL
MDC_IDC_LEAD_IMPLANT_DT: NORMAL
MDC_IDC_LEAD_IMPLANT_DT: NORMAL
MDC_IDC_LEAD_LOCATION: NORMAL
MDC_IDC_LEAD_LOCATION: NORMAL
MDC_IDC_LEAD_LOCATION_DETAIL_1: NORMAL
MDC_IDC_LEAD_LOCATION_DETAIL_1: NORMAL
MDC_IDC_LEAD_MFG: NORMAL
MDC_IDC_LEAD_MFG: NORMAL
MDC_IDC_LEAD_MODEL: NORMAL
MDC_IDC_LEAD_MODEL: NORMAL
MDC_IDC_LEAD_POLARITY_TYPE: NORMAL
MDC_IDC_LEAD_POLARITY_TYPE: NORMAL
MDC_IDC_LEAD_SERIAL: NORMAL
MDC_IDC_LEAD_SERIAL: NORMAL
MDC_IDC_MSMT_BATTERY_DTM: NORMAL
MDC_IDC_MSMT_BATTERY_REMAINING_LONGEVITY: 32 MO
MDC_IDC_MSMT_BATTERY_RRT_TRIGGER: 2.83
MDC_IDC_MSMT_BATTERY_STATUS: NORMAL
MDC_IDC_MSMT_BATTERY_VOLTAGE: 2.95 V
MDC_IDC_MSMT_LEADCHNL_RA_IMPEDANCE_VALUE: 361 OHM
MDC_IDC_MSMT_LEADCHNL_RA_IMPEDANCE_VALUE: 418 OHM
MDC_IDC_MSMT_LEADCHNL_RA_PACING_THRESHOLD_AMPLITUDE: 0.62 V
MDC_IDC_MSMT_LEADCHNL_RA_PACING_THRESHOLD_PULSEWIDTH: 0.4 MS
MDC_IDC_MSMT_LEADCHNL_RA_SENSING_INTR_AMPL: 3.5 MV
MDC_IDC_MSMT_LEADCHNL_RA_SENSING_INTR_AMPL: 3.5 MV
MDC_IDC_MSMT_LEADCHNL_RV_IMPEDANCE_VALUE: 456 OHM
MDC_IDC_MSMT_LEADCHNL_RV_IMPEDANCE_VALUE: 513 OHM
MDC_IDC_MSMT_LEADCHNL_RV_PACING_THRESHOLD_AMPLITUDE: 1.12 V
MDC_IDC_MSMT_LEADCHNL_RV_PACING_THRESHOLD_PULSEWIDTH: 0.4 MS
MDC_IDC_MSMT_LEADCHNL_RV_SENSING_INTR_AMPL: 17.5 MV
MDC_IDC_MSMT_LEADCHNL_RV_SENSING_INTR_AMPL: 17.5 MV
MDC_IDC_PG_IMPLANT_DTM: NORMAL
MDC_IDC_PG_MFG: NORMAL
MDC_IDC_PG_MODEL: NORMAL
MDC_IDC_PG_SERIAL: NORMAL
MDC_IDC_PG_TYPE: NORMAL
MDC_IDC_SESS_CLINIC_NAME: NORMAL
MDC_IDC_SESS_DTM: NORMAL
MDC_IDC_SESS_TYPE: NORMAL
MDC_IDC_SET_BRADY_AT_MODE_SWITCH_RATE: 171 {BEATS}/MIN
MDC_IDC_SET_BRADY_HYSTRATE: NORMAL
MDC_IDC_SET_BRADY_LOWRATE: 60 {BEATS}/MIN
MDC_IDC_SET_BRADY_MAX_SENSOR_RATE: 130 {BEATS}/MIN
MDC_IDC_SET_BRADY_MAX_TRACKING_RATE: 130 {BEATS}/MIN
MDC_IDC_SET_BRADY_MODE: NORMAL
MDC_IDC_SET_BRADY_PAV_DELAY_LOW: 220 MS
MDC_IDC_SET_BRADY_SAV_DELAY_LOW: 200 MS
MDC_IDC_SET_LEADCHNL_RA_PACING_AMPLITUDE: 1.5 V
MDC_IDC_SET_LEADCHNL_RA_PACING_ANODE_ELECTRODE_1: NORMAL
MDC_IDC_SET_LEADCHNL_RA_PACING_ANODE_LOCATION_1: NORMAL
MDC_IDC_SET_LEADCHNL_RA_PACING_CAPTURE_MODE: NORMAL
MDC_IDC_SET_LEADCHNL_RA_PACING_CATHODE_ELECTRODE_1: NORMAL
MDC_IDC_SET_LEADCHNL_RA_PACING_CATHODE_LOCATION_1: NORMAL
MDC_IDC_SET_LEADCHNL_RA_PACING_POLARITY: NORMAL
MDC_IDC_SET_LEADCHNL_RA_PACING_PULSEWIDTH: 0.4 MS
MDC_IDC_SET_LEADCHNL_RA_SENSING_ANODE_ELECTRODE_1: NORMAL
MDC_IDC_SET_LEADCHNL_RA_SENSING_ANODE_LOCATION_1: NORMAL
MDC_IDC_SET_LEADCHNL_RA_SENSING_CATHODE_ELECTRODE_1: NORMAL
MDC_IDC_SET_LEADCHNL_RA_SENSING_CATHODE_LOCATION_1: NORMAL
MDC_IDC_SET_LEADCHNL_RA_SENSING_POLARITY: NORMAL
MDC_IDC_SET_LEADCHNL_RA_SENSING_SENSITIVITY: 0.6 MV
MDC_IDC_SET_LEADCHNL_RV_PACING_AMPLITUDE: 2.25 V
MDC_IDC_SET_LEADCHNL_RV_PACING_ANODE_ELECTRODE_1: NORMAL
MDC_IDC_SET_LEADCHNL_RV_PACING_ANODE_LOCATION_1: NORMAL
MDC_IDC_SET_LEADCHNL_RV_PACING_CAPTURE_MODE: NORMAL
MDC_IDC_SET_LEADCHNL_RV_PACING_CATHODE_ELECTRODE_1: NORMAL
MDC_IDC_SET_LEADCHNL_RV_PACING_CATHODE_LOCATION_1: NORMAL
MDC_IDC_SET_LEADCHNL_RV_PACING_POLARITY: NORMAL
MDC_IDC_SET_LEADCHNL_RV_PACING_PULSEWIDTH: 0.4 MS
MDC_IDC_SET_LEADCHNL_RV_SENSING_ANODE_ELECTRODE_1: NORMAL
MDC_IDC_SET_LEADCHNL_RV_SENSING_ANODE_LOCATION_1: NORMAL
MDC_IDC_SET_LEADCHNL_RV_SENSING_CATHODE_ELECTRODE_1: NORMAL
MDC_IDC_SET_LEADCHNL_RV_SENSING_CATHODE_LOCATION_1: NORMAL
MDC_IDC_SET_LEADCHNL_RV_SENSING_POLARITY: NORMAL
MDC_IDC_SET_LEADCHNL_RV_SENSING_SENSITIVITY: 0.9 MV
MDC_IDC_SET_ZONE_DETECTION_INTERVAL: 350 MS
MDC_IDC_SET_ZONE_DETECTION_INTERVAL: 400 MS
MDC_IDC_SET_ZONE_TYPE: NORMAL
MDC_IDC_STAT_AT_BURDEN_PERCENT: 0 %
MDC_IDC_STAT_AT_DTM_END: NORMAL
MDC_IDC_STAT_AT_DTM_START: NORMAL
MDC_IDC_STAT_BRADY_AP_VP_PERCENT: 48.53 %
MDC_IDC_STAT_BRADY_AP_VS_PERCENT: 0 %
MDC_IDC_STAT_BRADY_AS_VP_PERCENT: 51.45 %
MDC_IDC_STAT_BRADY_AS_VS_PERCENT: 0.02 %
MDC_IDC_STAT_BRADY_DTM_END: NORMAL
MDC_IDC_STAT_BRADY_DTM_START: NORMAL
MDC_IDC_STAT_BRADY_RA_PERCENT_PACED: 48.51 %
MDC_IDC_STAT_BRADY_RV_PERCENT_PACED: 99.96 %
MDC_IDC_STAT_EPISODE_RECENT_COUNT: 0
MDC_IDC_STAT_EPISODE_RECENT_COUNT: 1
MDC_IDC_STAT_EPISODE_RECENT_COUNT_DTM_END: NORMAL
MDC_IDC_STAT_EPISODE_RECENT_COUNT_DTM_START: NORMAL
MDC_IDC_STAT_EPISODE_TOTAL_COUNT: 0
MDC_IDC_STAT_EPISODE_TOTAL_COUNT: 0
MDC_IDC_STAT_EPISODE_TOTAL_COUNT: 2
MDC_IDC_STAT_EPISODE_TOTAL_COUNT: 3
MDC_IDC_STAT_EPISODE_TOTAL_COUNT_DTM_END: NORMAL
MDC_IDC_STAT_EPISODE_TOTAL_COUNT_DTM_START: NORMAL
MDC_IDC_STAT_EPISODE_TYPE: NORMAL

## 2022-04-05 ENCOUNTER — TELEPHONE (OUTPATIENT)
Dept: CARDIOLOGY | Facility: CLINIC | Age: 77
End: 2022-04-05

## 2022-04-05 NOTE — TELEPHONE ENCOUNTER
[facility] Call Center    Phone Message    May a detailed message be left on voicemail: yes     Reason for Call: Order(s): Other:   Reason for requested: Device check and provider follow up   Date needed: 04/05/2022  Provider name: Dr.Roukoz Lemus called and spoke with writer, she states she needs to schedule device check and provider follow up. Please place orders and call patient to schedule.       Action Taken: Message routed to:  [facility] cardio     Travel Screening: Not Applicable

## 2022-05-04 ENCOUNTER — TRANSFERRED RECORDS (OUTPATIENT)
Dept: HEALTH INFORMATION MANAGEMENT | Facility: CLINIC | Age: 77
End: 2022-05-04

## 2022-05-18 ENCOUNTER — NURSE TRIAGE (OUTPATIENT)
Dept: FAMILY MEDICINE | Facility: CLINIC | Age: 77
End: 2022-05-18
Payer: MEDICARE

## 2022-05-18 DIAGNOSIS — E03.9 ACQUIRED HYPOTHYROIDISM: Primary | ICD-10-CM

## 2022-05-18 NOTE — TELEPHONE ENCOUNTER
Who is calling? Patient  Reason for Call:Covid treatment and questions - positive on 5/13

## 2022-05-18 NOTE — TELEPHONE ENCOUNTER
Reason for Disposition    [1] COVID-19 diagnosed by positive lab test (e.g., PCR, rapid self-test kit) AND [2] mild symptoms (e.g., cough, fever, others) AND [3] no complications or SOB    Answer Assessment - Initial Assessment Questions  1. COVID-19 DIAGNOSIS: Positive Home COVID-19 Test 5/13/22  2. COVID-19 EXPOSURE: Private plane ride with a friend who was positive  3. ONSET: 5/10/22  4. WORST SYMPTOM: Dry cough  5. COUGH: Dry Cough  6. FEVER: No  7. RESPIRATORY STATUS: Breathing is normal  8. BETTER-SAME-WORSE: Getting better  9. HIGH RISK DISEASE: BMI, CVD, Pacemaker  10. VACCINE: Moderna 1/25/21, 2/22/21  11. BOOSTER: Moderna 11/11/21  12. PREGNANCY: N/A  13. OTHER SYMPTOMS: Body aches, fatigue, congestion, loss of taste  14. O2 SATURATION MONITOR:  No    Protocols used: CORONAVIRUS (COVID-19) DIAGNOSED OR [id number] 1.18.2022    Pat is beyond the treatment window for both Paxlovid and Monoclonal antibodies.  Discussed home remedy treatments and isolation protocols.  Pat is comfortable with her current regimen and is feeling better.  Pat will call with further questions or concerns.  AKIN WickN, RN, [facility]  05/18/22  11:18 AM

## 2022-06-06 ENCOUNTER — ANCILLARY PROCEDURE (OUTPATIENT)
Dept: BONE DENSITY | Facility: CLINIC | Age: 77
End: 2022-06-06
Attending: FAMILY MEDICINE
Payer: MEDICARE

## 2022-06-06 DIAGNOSIS — E28.39 ESTROGEN DEFICIENCY: ICD-10-CM

## 2022-06-06 DIAGNOSIS — Z78.0 ASYMPTOMATIC POSTMENOPAUSAL STATE: ICD-10-CM

## 2022-06-06 PROCEDURE — 77080 DXA BONE DENSITY AXIAL: CPT | Mod: TC | Performed by: RADIOLOGY

## 2022-06-29 ENCOUNTER — OFFICE VISIT (OUTPATIENT)
Dept: CARDIOLOGY | Facility: CLINIC | Age: 77
End: 2022-06-29
Attending: NURSE PRACTITIONER
Payer: MEDICARE

## 2022-06-29 VITALS
OXYGEN SATURATION: 98 % | WEIGHT: 138.6 LBS | DIASTOLIC BLOOD PRESSURE: 74 MMHG | HEART RATE: 64 BPM | BODY MASS INDEX: 25.02 KG/M2 | SYSTOLIC BLOOD PRESSURE: 116 MMHG

## 2022-06-29 DIAGNOSIS — Z95.0 CARDIAC PACEMAKER IN SITU: Primary | ICD-10-CM

## 2022-06-29 DIAGNOSIS — I44.2 COMPLETE HEART BLOCK (H): ICD-10-CM

## 2022-06-29 PROCEDURE — G0463 HOSPITAL OUTPT CLINIC VISIT: HCPCS | Mod: 25

## 2022-06-29 PROCEDURE — 99213 OFFICE O/P EST LOW 20 MIN: CPT | Mod: 25 | Performed by: NURSE PRACTITIONER

## 2022-06-29 RX ORDER — LEVOTHYROXINE SODIUM 75 UG/1
TABLET ORAL
COMMUNITY
Start: 2022-05-09 | End: 2022-07-20

## 2022-06-29 RX ORDER — CHLORAL HYDRATE 500 MG
2 CAPSULE ORAL DAILY
COMMUNITY
End: 2022-09-02

## 2022-06-29 ASSESSMENT — PAIN SCALES - GENERAL: PAINLEVEL: NO PAIN (0)

## 2022-06-29 NOTE — LETTER
6/29/2022      RE: Sharona Bravo  [address]  [facility] [address]       Dear Colleague,    Thank you for the opportunity to participate in the care of your patient, Sharona Bravo, at the [facility] [place] at [facility]. Please see a copy of my visit note below.        ELECTROPHYSIOLOGY CLINIC VISIT    Assessment/Recommendations   Assessment/Plan:    Ms. Bravo is a 76 year old female who has a past medical history significant for breast cancer s/p radiation and lumpectomy, hypothyroidism, HLD, and symptomatic bradycardia (Mobitz II) s/p PPM 6/23/2015.     She is doing well from an EP standpoint. Device is functioning well with stable lead parameters. She has been 100% RV paced, so would like to get an echo. Continue routine device clinic follow-up.     Follow up in 1-2 years or sooner if needed.        History of Present Illness/Subjective    Ms. Sharona Bravo is a 76 year old female who comes in today for EP follow-up of PPM cares.    Ms. Bravo is a 76 year old female who has a past medical history significant for breast cancer s/p radiation and lumpectomy, hypothyroidism, HLD, and symptomatic bradycardia (Mobitz II) s/p PPM 6/23/2015.      She was admitted from clinic in 6/2015 with bradycardia. She had been feeling fatigue especially when she exerted herself and even climbing a flight of stairs was more difficult for her. She was found to be in Mobitz II Block and was subsequently transferred here for admission. Telemetry showed intermittent episodes of SB 50's and episodes of Mobitz II 30's. ECG from 2013 showed baseline bifascicular block. Echo Showed LVEF hyperkinetic with an EF of 65-70%. Right ventricular function, chamber size, wall motion, and thickness are normal. She ultimately underwent a dual chamber PPM implant on 6/23/15.      EP Visit 4/2019: She presents today for follow up. She reports feeling  "well and has no complaints. She is active without limitation. She denies any chest pain/pressures, dizziness, lightheadedness, worsening shortness of breath, leg/ankle swelling, PND, orthopnea, palpitations, or syncopal symptoms. Device interrogation shows normal pacemaker function. No episodes/arrhythmias recorded. Intrinsic rhythm = SB 56 bpm with CHB no R waves @ VVI 30 bpm. AP= 37.7%.  = 99.8%. No short v-v intervals recorded. Lead trends appear stable. Last echo from 4/2018 showed LVEF 55-60%, normal RV size and function, and no valvular issues. Current cardiac medications include: ASA and Zocor.      EP Visit 5/20/20: She is following up today. She reports feeling well today. She had COVID19 in April and has made great recovery. She is continuing to struggle with headaches which her PCP is workup up. She denies any chest pain/pressures, dizziness, lightheadedness, worsening shortness of breath, leg/ankle swelling, PND, orthopnea, palpitations, or syncopal symptoms. Device check shows normal pacemaker function. 1 asymptomatic NSVT episode recorded lasting 7 seconds @ 181 bpm, EGM displays V>A, unable to see the start of the episode, pt reports she had COVID 19 at that time but does not recall any \"heart symptoms\". 6 AT/AF episodes recorded, < 1 minute. Presenting EGM = AS/ @ 80 bpm. PVC burden has increased to 21/hr from 7/hr. AP = 49.4%.  = 99.5%. Estimated battery longevity to JYOTI = 4 years. No short v-v intervals recorded. Lead trends appear stable. Patient notified of interrogation results. Current cardiac medications include: ASA and Zocor.       She presents today for follow up. She reports feeling well. She denies chest discomfort, palpitations, abdominal fullness/bloating or peripheral edema, shortness of breath, paroxysmal nocturnal dyspnea, orthopnea, lightheadedness, dizziness, pre-syncope, or syncope. Device interrogation shows normal device function, stable lead parameters, total AT/AF time " 97 seconds <0.1% burden, no ventricular arrhythmias, intrinsitinic is CHB, and AP 42.7%,  100%. Current cardiac medications include: ASA and Zocor.        I have reviewed and updated the patient's Past Medical History, Social History, Family History and Medication List.     Cardiographics (Personally Reviewed) :   4/2018 ECHOCARDIOGRAM:   Interpretation Summary  Left ventricular function is normal.The EF is 55-60%.  The right ventricle is normal size. Global right ventricular function is  normal.  No significant valvular dysfunction noted.  Pulmonary artery systolic pressure is normal.  The inferior vena cava was normal in size with preserved respiratory  variability.  Estimated mean right atrial pressure is 3 mmHg.     This study was compared with the study from 6/23/15 . There has been no  change.       Physical Examination   /74 (BP Location: Right arm, Patient Position: Sitting, Cuff Size: Adult Regular)   Pulse 64   Wt 62.9 kg (138 lb 9.6 oz)   LMP 01/01/2000   SpO2 98%   BMI 25.02 kg/m    Wt Readings from Last 3 Encounters:   01/31/22 62 kg (136 lb 12 oz)   06/21/21 61.5 kg (135 lb 8 oz)   06/07/21 62.4 kg (137 lb 8 oz)     General Appearance:   Alert, well-appearing and in no acute distress.   HEENT: Atraumatic, normocephalic. PERRL.  MMM.   Chest/Lungs:   Respirations unlabored.  Lungs are clear to auscultation.   Cardiovascular:   Regular rate and rhythm.  S1/S2. No murmur.    Abdomen:  Soft, nontender, nondistended.   Extremities: No cyanosis or clubbing. No edema.    Musculoskeletal: Moves all extremities.  Gait normal.   Skin: Warm, dry, intact.    Neurologic: Mood and affect are appropriate.  Alert and oriented to person, place, time, and situation.          Medications  Allergies   Current Outpatient Medications   Medication Sig Dispense Refill     Ascorbic Acid (VITAMIN C) 500 MG CAPS        aspirin 81 MG tablet Take 81 mg by mouth daily       calcium carbonate (OS-DALLAS 500 MG [place]. [place])  500 MG tablet Take 600 mg by mouth daily        calcium polycarbophil (FIBERCON) 625 MG tablet 2 times daily        cetirizine (ZYRTEC) 10 MG tablet Take 10 mg by mouth daily       estradiol (ESTRACE) 0.1 MG/GM vaginal cream Place 1 g vaginally three times a week Place 1g vaginally nightly x 2 weeks and then 2-3 times per week for maintenance 42.5 g 4     levothyroxine (SYNTHROID/LEVOTHROID) 50 MCG tablet Take 1 tablet (50 mcg) by mouth daily 90 tablet 4     Magnesium 500 MG CAPS Take 250 mg by mouth daily        simvastatin (ZOCOR) 20 MG tablet Take 1 tablet (20 mg) by mouth At Bedtime 90 tablet 4     VITAMIN D, CHOLECALCIFEROL, PO Take 1,000 Units by mouth daily        zolpidem (AMBIEN) 5 MG tablet Take 1 tablet (5 mg) by mouth nightly as needed for sleep 30 tablet 1    Allergies   Allergen Reactions     Gabapentin Swelling and Rash     Face swelling         Lab Results (Personally Reviewed)    Chemistry/lipid CBC Cardiac Enzymes/BNP/TSH/INR   Lab Results   Component Value Date    BUN 30 01/31/2022     01/31/2022    CO2 23 01/31/2022     Creatinine   Date Value Ref Range Status   01/31/2022 1.03 0.52 - 1.04 mg/dL Final   06/07/2021 0.8 0.6 - 1.3 mg/dL Final       Lab Results   Component Value Date    CHOL 174.0 06/07/2021    HDL 65.0 06/07/2021    LDL 91.0 06/07/2021      Lab Results   Component Value Date    WBC 3.9 (L) 04/02/2020    HGB 14.3 06/21/2021    HCT 44.2 06/21/2021    MCV 92.3 06/21/2021     04/02/2020    Lab Results   Component Value Date    TSH 2.30 06/07/2021    INR 0.98 04/30/2018        The patient states understanding and is agreeable with the plan.   MARIN Zhang CNP  Electrophysiology Consult Service  Pager: [phone]

## 2022-06-29 NOTE — NURSING NOTE
Chief Complaint   Patient presents with     Follow Up     2 yr f/u for Mobitz 2 s/p ppm   Vitals were taken and medications reconciled.    Manjit Ohara, EMT  2:07 PM

## 2022-06-29 NOTE — PROGRESS NOTES
ELECTROPHYSIOLOGY CLINIC VISIT    Assessment/Recommendations   Assessment/Plan:    Ms. Bravo is a 76 year old female who has a past medical history significant for breast cancer s/p radiation and lumpectomy, hypothyroidism, HLD, and symptomatic bradycardia (Mobitz II) s/p PPM 6/23/2015.     She is doing well from an EP standpoint. Device is functioning well with stable lead parameters. She has been 100% RV paced, so would like to get an echo. Continue routine device clinic follow-up.     Follow up in 1-2 years or sooner if needed.        History of Present Illness/Subjective    Ms. Sharona Bravo is a 76 year old female who comes in today for EP follow-up of PPM cares.    Ms. Bravo is a 76 year old female who has a past medical history significant for breast cancer s/p radiation and lumpectomy, hypothyroidism, HLD, and symptomatic bradycardia (Mobitz II) s/p PPM 6/23/2015.      She was admitted from clinic in 6/2015 with bradycardia. She had been feeling fatigue especially when she exerted herself and even climbing a flight of stairs was more difficult for her. She was found to be in Mobitz II Block and was subsequently transferred here for admission. Telemetry showed intermittent episodes of SB 50's and episodes of Mobitz II 30's. ECG from 2013 showed baseline bifascicular block. Echo Showed LVEF hyperkinetic with an EF of 65-70%. Right ventricular function, chamber size, wall motion, and thickness are normal. She ultimately underwent a dual chamber PPM implant on 6/23/15.      EP Visit 4/2019: She presents today for follow up. She reports feeling well and has no complaints. She is active without limitation. She denies any chest pain/pressures, dizziness, lightheadedness, worsening shortness of breath, leg/ankle swelling, PND, orthopnea, palpitations, or syncopal symptoms. Device interrogation shows normal pacemaker function. No episodes/arrhythmias recorded. Intrinsic rhythm = SB 56 bpm with CHB  "no R waves @ VVI 30 bpm. AP= 37.7%.  = 99.8%. No short v-v intervals recorded. Lead trends appear stable. Last echo from 4/2018 showed LVEF 55-60%, normal RV size and function, and no valvular issues. Current cardiac medications include: ASA and Zocor.      EP Visit 5/20/20: She is following up today. She reports feeling well today. She had COVID19 in April and has made great recovery. She is continuing to struggle with headaches which her PCP is workup up. She denies any chest pain/pressures, dizziness, lightheadedness, worsening shortness of breath, leg/ankle swelling, PND, orthopnea, palpitations, or syncopal symptoms. Device check shows normal pacemaker function. 1 asymptomatic NSVT episode recorded lasting 7 seconds @ 181 bpm, EGM displays V>A, unable to see the start of the episode, pt reports she had COVID 19 at that time but does not recall any \"heart symptoms\". 6 AT/AF episodes recorded, < 1 minute. Presenting EGM = AS/ @ 80 bpm. PVC burden has increased to 21/hr from 7/hr. AP = 49.4%.  = 99.5%. Estimated battery longevity to JYOTI = 4 years. No short v-v intervals recorded. Lead trends appear stable. Patient notified of interrogation results. Current cardiac medications include: ASA and Zocor.       She presents today for follow up. She reports feeling well. She denies chest discomfort, palpitations, abdominal fullness/bloating or peripheral edema, shortness of breath, paroxysmal nocturnal dyspnea, orthopnea, lightheadedness, dizziness, pre-syncope, or syncope. Device interrogation shows normal device function, stable lead parameters, total AT/AF time 97 seconds <0.1% burden, no ventricular arrhythmias, intrinsitinic is CHB, and AP 42.7%,  100%. Current cardiac medications include: ASA and Zocor.        I have reviewed and updated the patient's Past Medical History, Social History, Family History and Medication List.     Cardiographics (Personally Reviewed) :   4/2018 ECHOCARDIOGRAM:   Interpretation " Summary  Left ventricular function is normal.The EF is 55-60%.  The right ventricle is normal size. Global right ventricular function is  normal.  No significant valvular dysfunction noted.  Pulmonary artery systolic pressure is normal.  The inferior vena cava was normal in size with preserved respiratory  variability.  Estimated mean right atrial pressure is 3 mmHg.     This study was compared with the study from 6/23/15 . There has been no  change.       Physical Examination   /74 (BP Location: Right arm, Patient Position: Sitting, Cuff Size: Adult Regular)   Pulse 64   Wt 62.9 kg (138 lb 9.6 oz)   LMP 01/01/2000   SpO2 98%   BMI 25.02 kg/m    Wt Readings from Last 3 Encounters:   01/31/22 62 kg (136 lb 12 oz)   06/21/21 61.5 kg (135 lb 8 oz)   06/07/21 62.4 kg (137 lb 8 oz)     General Appearance:   Alert, well-appearing and in no acute distress.   HEENT: Atraumatic, normocephalic. PERRL.  MMM.   Chest/Lungs:   Respirations unlabored.  Lungs are clear to auscultation.   Cardiovascular:   Regular rate and rhythm.  S1/S2. No murmur.    Abdomen:  Soft, nontender, nondistended.   Extremities: No cyanosis or clubbing. No edema.    Musculoskeletal: Moves all extremities.  Gait normal.   Skin: Warm, dry, intact.    Neurologic: Mood and affect are appropriate.  Alert and oriented to person, place, time, and situation.          Medications  Allergies   Current Outpatient Medications   Medication Sig Dispense Refill     Ascorbic Acid (VITAMIN C) 500 MG CAPS        aspirin 81 MG tablet Take 81 mg by mouth daily       calcium carbonate (OS-DALLAS 500 MG [place]. [place]) 500 MG tablet Take 600 mg by mouth daily        calcium polycarbophil (FIBERCON) 625 MG tablet 2 times daily        cetirizine (ZYRTEC) 10 MG tablet Take 10 mg by mouth daily       estradiol (ESTRACE) 0.1 MG/GM vaginal cream Place 1 g vaginally three times a week Place 1g vaginally nightly x 2 weeks and then 2-3 times per week for maintenance 42.5 g 4      levothyroxine (SYNTHROID/LEVOTHROID) 50 MCG tablet Take 1 tablet (50 mcg) by mouth daily 90 tablet 4     Magnesium 500 MG CAPS Take 250 mg by mouth daily        simvastatin (ZOCOR) 20 MG tablet Take 1 tablet (20 mg) by mouth At Bedtime 90 tablet 4     VITAMIN D, CHOLECALCIFEROL, PO Take 1,000 Units by mouth daily        zolpidem (AMBIEN) 5 MG tablet Take 1 tablet (5 mg) by mouth nightly as needed for sleep 30 tablet 1    Allergies   Allergen Reactions     Gabapentin Swelling and Rash     Face swelling         Lab Results (Personally Reviewed)    Chemistry/lipid CBC Cardiac Enzymes/BNP/TSH/INR   Lab Results   Component Value Date    BUN 30 01/31/2022     01/31/2022    CO2 23 01/31/2022     Creatinine   Date Value Ref Range Status   01/31/2022 1.03 0.52 - 1.04 mg/dL Final   06/07/2021 0.8 0.6 - 1.3 mg/dL Final       Lab Results   Component Value Date    CHOL 174.0 06/07/2021    HDL 65.0 06/07/2021    LDL 91.0 06/07/2021      Lab Results   Component Value Date    WBC 3.9 (L) 04/02/2020    HGB 14.3 06/21/2021    HCT 44.2 06/21/2021    MCV 92.3 06/21/2021     04/02/2020    Lab Results   Component Value Date    TSH 2.30 06/07/2021    INR 0.98 04/30/2018        The patient states understanding and is agreeable with the plan.   MARIN Zhang CNP  Electrophysiology Consult Service  Pager: [phone]

## 2022-07-13 ENCOUNTER — OFFICE VISIT (OUTPATIENT)
Dept: FAMILY MEDICINE | Facility: CLINIC | Age: 77
End: 2022-07-13
Payer: MEDICARE

## 2022-07-13 VITALS
BODY MASS INDEX: 25.1 KG/M2 | RESPIRATION RATE: 15 BRPM | TEMPERATURE: 96.5 F | WEIGHT: 139 LBS | SYSTOLIC BLOOD PRESSURE: 123 MMHG | HEART RATE: 82 BPM | DIASTOLIC BLOOD PRESSURE: 83 MMHG | OXYGEN SATURATION: 96 %

## 2022-07-13 DIAGNOSIS — R73.09 ELEVATED GLUCOSE LEVEL: ICD-10-CM

## 2022-07-13 DIAGNOSIS — E03.9 ACQUIRED HYPOTHYROIDISM: ICD-10-CM

## 2022-07-13 DIAGNOSIS — G62.9 POLYNEUROPATHY: Primary | ICD-10-CM

## 2022-07-13 DIAGNOSIS — G47.09 SECONDARY INSOMNIA: ICD-10-CM

## 2022-07-13 LAB
FERRITIN SERPL-MCNC: 88 NG/ML (ref 11–328)
FOLATE SERPL-MCNC: 20.4 NG/ML (ref 4.6–34.8)
HBA1C MFR BLD: 5.4 % (ref 0–5.6)
TSH SERPL DL<=0.005 MIU/L-ACNC: 1.08 UIU/ML (ref 0.3–4.2)
VIT B12 SERPL-MCNC: 487 PG/ML (ref 232–1245)

## 2022-07-13 PROCEDURE — 82607 VITAMIN B-12: CPT | Performed by: FAMILY MEDICINE

## 2022-07-13 PROCEDURE — 82746 ASSAY OF FOLIC ACID SERUM: CPT | Performed by: FAMILY MEDICINE

## 2022-07-13 PROCEDURE — 84443 ASSAY THYROID STIM HORMONE: CPT | Performed by: FAMILY MEDICINE

## 2022-07-13 PROCEDURE — 82728 ASSAY OF FERRITIN: CPT | Performed by: FAMILY MEDICINE

## 2022-07-13 RX ORDER — ZOLPIDEM TARTRATE 5 MG/1
5 TABLET ORAL
Qty: 30 TABLET | Refills: 1 | Status: SHIPPED | OUTPATIENT
Start: 2022-07-13 | End: 2024-08-28

## 2022-07-13 NOTE — NURSING NOTE
76 year old  Chief Complaint   Patient presents with     NEUROPATHY     Feet have been numb/tingling/sensitive, mostly at night, travels up to knees/hips at times, notes harder time walking and some balance concerns,  strength question        Blood pressure 123/83, pulse 82, temperature (!) 96.5  F (35.8  C), temperature source Skin, resp. rate 15, weight 63 kg (139 lb), last menstrual period 01/01/2000, SpO2 96 %, not currently breastfeeding. Body mass index is 25.1 kg/m .  Patient Active Problem List   Diagnosis     Adenocarcinoma of breast (H)     Allergic rhinitis     Hyperlipidemia LDL goal <100     Acquired hypothyroidism     Heart block     Cardiac pacemaker in situ- [employer]tronic, dual chamber- NOT dependent (Advisa: MRI conditional)     Diverticulosis of large intestine without hemorrhage     BPPV (benign paroxysmal positional vertigo), unspecified laterality     Primary osteoarthritis of left knee     Tear of medial meniscus of left knee     Patellofemoral syndrome, left     S/P TKR (total knee replacement), left     Medicare annual wellness visit, subsequent     Paresthesias     Atrophic vaginitis     Secondary insomnia       Wt Readings from Last 2 Encounters:   07/13/22 63 kg (139 lb)   06/29/22 62.9 kg (138 lb 9.6 oz)     BP Readings from Last 3 Encounters:   07/13/22 123/83   06/29/22 116/74   01/31/22 105/72         Current Outpatient Medications   Medication     Ascorbic Acid (VITAMIN C) 500 MG CAPS     calcium carbonate (OS-DALLAS 500 MG [place]. [place]) 500 MG tablet     calcium polycarbophil (FIBERCON) 625 MG tablet     cetirizine (ZYRTEC) 10 MG tablet     estradiol (ESTRACE) 0.1 MG/GM vaginal cream     fish oil-omega-3 fatty acids 1000 MG capsule     levothyroxine (SYNTHROID/LEVOTHROID) 50 MCG tablet     levothyroxine (SYNTHROID/LEVOTHROID) 75 MCG tablet     Magnesium 500 MG CAPS     simvastatin (ZOCOR) 20 MG tablet     UNABLE TO FIND     VITAMIN D, CHOLECALCIFEROL, PO     zolpidem (AMBIEN) 5 MG tablet      No current facility-administered medications for this visit.       Social History     Tobacco Use     Smoking status: Never Smoker     Smokeless tobacco: Never Used   Substance Use Topics     Alcohol use: Yes     Alcohol/week: 0.0 standard drinks     Comment: 10-14 wine a week      Drug use: No       Health Maintenance Due   Topic Date Due     COLORECTAL CANCER SCREENING  06/15/2021     COVID-19 Vaccine (4 - Booster for Moderna series) 03/11/2022     MEDICARE ANNUAL WELLNESS VISIT  06/07/2022     LIPID  06/07/2022     FALL RISK ASSESSMENT  06/07/2022       Lab Results   Component Value Date    PAP NIL 12/02/2019 July 13, 2022 3:55 PM

## 2022-07-14 NOTE — PROGRESS NOTES
"CHIEF COMPLAINT:  Neuropathy.    HISTORY OF PRESENT ILLNESS:  Kristal is a 76-year-old who has been having a number of neurologic symptoms going on.  Things started about 2 years ago when she was having an unusual sensation on the bottom of her feet in which she felt that there was \"tissue paper\" there.  She saw a podiatrist who in turn referred her to a neurologist.  She had a significant amount of testing done.  At one point, she went back to the podiatrist who recommended that she have foot surgery.  She did not want to go through with that.    Then, approximately 6 weeks ago, a \"strange feeling\" in her feet and lower legs started again.  She describes it as being numb/tingling/sensitive and this occurs mostly at night.  While she is lying in bed, she can feel it travel up to her knees and even her hip at times.  She has had some difficulty with walking.  There are times when she tips or lists to the left (of note, it is never to the right).  She has never fallen.  She just feels that something is \"off.\"    She works with a  and is able to do some very significant musculoskeletal activities.  The  works with her distantly so has not actually been with her in person.  However, no changes have been noted.    She has a history of hypothyroidism and this was picked up in [place].  She is on medication (levothyroxine 50 mcg alternating with 75 mcg every other day) and needs to have a TSH level checked.  We talked about some additional labs as well.    ACTIVE MEDICAL PROBLEMS:  Reviewed.    CURRENT MEDICATIONS:  Reviewed.    OBJECTIVE:  Kristal is in no distress.  /83 with a heart rate of 82 and regular.  She is afebrile.  BMI 25.1.  O2 sats are 96% on room air.  She is alert and oriented x3.  I did not perform a mini mental status exam today as there was no indication for that.  Instead, I performed a very thorough neurologic exam with the exception of mini mental status.  Muscle strength is 5/5 in her " deltoids, biceps, triceps, forearm and intrinsic hand muscles. She also has 5/5 with hip flexion, abduction, extension and flexion of the lower legs and with plantar and dorsiflexion.  Gait was mostly normal.  She is able to walk on her heels.  However, she has a very difficult time due to some foot pain to walk on her toes.  Deep tendon reflexes are +2/4 at the biceps and brachioradialis tendons.  She is equivocal with patellar and Achilles reflexes.  Light touch sensation is intact.  Specifically, monofilament testing was normal in all 10 toes.  She had normal temperature and vibratory sense in her feet.  Romberg test was essentially negative.    LABORATORY:  Labs today will include the following:  B12, TSH with reflex, hemoglobin A1c and folic acid.  In addition, I will check a ferritin level simply because her symptoms occur mostly at night, and while not classic restless legs like symptoms, there is enough going on that would want me to rule that out.    ASSESSMENT AND PLAN:    1.  A variety of neurologic symptoms that are ill-defined.  Previous neurologic workup over 2 years ago was essentially normal.  I will check for some underlying causes as above.  Neurologic exam today was largely normal with no deficiencies identified.  2.  I will refer her to Neurology for consultation and evaluation.  She will call with any problems or questions in the meantime.

## 2022-07-18 ENCOUNTER — ANCILLARY PROCEDURE (OUTPATIENT)
Dept: CARDIOLOGY | Facility: CLINIC | Age: 77
End: 2022-07-18
Attending: NURSE PRACTITIONER
Payer: MEDICARE

## 2022-07-18 DIAGNOSIS — I44.2 COMPLETE HEART BLOCK (H): ICD-10-CM

## 2022-07-18 LAB — LVEF ECHO: NORMAL

## 2022-07-18 PROCEDURE — 93306 TTE W/DOPPLER COMPLETE: CPT | Performed by: INTERNAL MEDICINE

## 2022-07-20 ENCOUNTER — TELEPHONE (OUTPATIENT)
Dept: FAMILY MEDICINE | Facility: CLINIC | Age: 77
End: 2022-07-20

## 2022-07-20 DIAGNOSIS — E03.9 ACQUIRED HYPOTHYROIDISM: ICD-10-CM

## 2022-07-20 RX ORDER — LEVOTHYROXINE SODIUM 75 UG/1
75 TABLET ORAL EVERY OTHER DAY
Qty: 90 TABLET | Refills: 3 | Status: SHIPPED | OUTPATIENT
Start: 2022-07-20 | End: 2023-10-17

## 2022-07-20 RX ORDER — LEVOTHYROXINE SODIUM 50 UG/1
50 TABLET ORAL EVERY OTHER DAY
Qty: 90 TABLET | Refills: 3 | Status: SHIPPED | OUTPATIENT
Start: 2022-07-20 | End: 2023-10-17

## 2022-07-31 ENCOUNTER — HEALTH MAINTENANCE LETTER (OUTPATIENT)
Age: 77
End: 2022-07-31

## 2022-08-06 ENCOUNTER — MYC MEDICAL ADVICE (OUTPATIENT)
Dept: FAMILY MEDICINE | Facility: CLINIC | Age: 77
End: 2022-08-06

## 2022-08-06 DIAGNOSIS — E78.5 HYPERLIPIDEMIA LDL GOAL <100: ICD-10-CM

## 2022-08-08 RX ORDER — SIMVASTATIN 20 MG
20 TABLET ORAL AT BEDTIME
Qty: 30 TABLET | Refills: 0 | Status: SHIPPED | OUTPATIENT
Start: 2022-08-08 | End: 2022-08-31

## 2022-08-08 NOTE — TELEPHONE ENCOUNTER
Simvastatin (Zocor) 20 mg    Last Office Visit: 7/13/22  [place] [facility] [place]  Medication last refilled: 6/7/21 #90 with with 4 refill(s)    Required labs per protocol:    LAB REF RANGE 1/27/20 6/7/21   LDL < 100 mg/dL 100 91     Medication is being filled for 1 time refill only due to:  Patient needs labs Lipid panel. Has exam scheduled on 9/2/22    AKIN WickN, RN, CCM

## 2022-08-23 NOTE — NURSING NOTE
73 year old  Chief Complaint   Patient presents with     Breast Pain     few months, intermittent before that, Right breast, feels like shrunk        Blood pressure 132/81, pulse 68, temperature 97.4  F (36.3  C), temperature source Oral, resp. rate 18, weight 60.4 kg (133 lb 4 oz), last menstrual period 01/01/2000, SpO2 100 %, not currently breastfeeding. Body mass index is 23.79 kg/m .  Patient Active Problem List   Diagnosis     Adenocarcinoma of breast (H)     Allergic rhinitis     Hyperlipidemia LDL goal <100     Acquired hypothyroidism     Heart block     Cardiac pacemaker in situ- Medtronic, dual chamber- NOT dependent (Advisa: MRI conditional)     Diverticulosis of large intestine without hemorrhage     BPPV (benign paroxysmal positional vertigo), unspecified laterality     Primary osteoarthritis of left knee     Tear of medial meniscus of left knee     Patellofemoral syndrome, left     S/P TKR (total knee replacement), left       Wt Readings from Last 2 Encounters:   03/22/19 60.4 kg (133 lb 4 oz)   02/18/19 61.8 kg (136 lb 4 oz)     BP Readings from Last 3 Encounters:   03/22/19 132/81   02/18/19 126/84   06/28/18 121/74         Current Outpatient Medications   Medication     acetaminophen (TYLENOL) 500 MG tablet     Ascorbic Acid (VITAMIN C) 500 MG CAPS     aspirin 81 MG tablet     atovaquone-proguanil (MALARONE) 250-100 MG tablet     calcium carbonate (OS-DALLAS 500 MG [place]. [place]) 500 MG tablet     cetirizine (ZYRTEC) 10 MG tablet     estradiol (ESTRACE VAGINAL) 0.1 MG/GM cream     fish oil-omega-3 fatty acids 1000 MG capsule     Fluticasone Propionate (FLONASE NA)     levothyroxine (SYNTHROID/LEVOTHROID) 50 MCG tablet     Magnesium 500 MG CAPS     MELOXICAM PO     Methylsulfonylmethane (MSM) 1000 MG TABS     Omeprazole (PRILOSEC PO)     polyethylene glycol (MIRALAX/GLYCOLAX) powder     simvastatin (ZOCOR) 20 MG tablet     UNABLE TO FIND     VITAMIN D, CHOLECALCIFEROL, PO     zolpidem (AMBIEN) 5 MG tablet      [facility]  Establish care visit   2022    Renu Coates. (:  1960) is a 63 y.o. male, here to establish care. Chief Complaint   Patient presents with    Establish Care     Pt is here to establish care. Pt has multiple medical conditions. ASSESSMENT/ PLAN  1. Ischemic cardiomyopathy  -Improved ejection fraction compared to 2019. Last ejection fraction in  was 45 to 50%. In 2019 ejection fraction was 35%  -Status post x5 cardiac stents  -Hypokinesis noted in inferior wall  - Lipid, Fasting; Future    2. Ventricular tachycardia ([address]  -Status post implanted ICD placement  -Follows with EP  -No discharge of cardiac defibrillator    3. Myocardial infarction, nontransmural (HCC)  -Status post x5 cardiac stents  -Noted to have ischemic cardiomyopathy in the past  -Appears stable at this time    4. Coronary artery disease involving native coronary artery of native heart without angina pectoris  -See above  - Lipid, Fasting; Future    5. Status post internal cardiac defibrillator procedure  -For history of V. tach and previous myocardial infarction    6. HTN (hypertension), benign  -Controlled on lisinopril 5 mg daily, Lasix 20 mg daily and Toprol 50 mg daily  -Continue  - Comprehensive Metabolic Panel; Future  - CBC with Auto Differential; Future  - Lipid, Fasting; Future    7. CAD S/P percutaneous coronary angioplasty  -Previous cardiac stents    8. Atrial fibrillation with RVR ([facility])  -Last EKG was normal sinus rhythm  -Apparently he is on Eliquis. 9. Type 2 diabetes mellitus with hyperglycemia, with long-term current use of insulin ([facility])  -Last A1c apparently was around 6.7-6.8  -Recheck A1c in October  -Controlled on metformin, Jardiance, Trulicity, Levemir and Humalog    - Diabetic Foot Exam  - MICROALBUMIN / CREATININE URINE RATIO; Future    10.  ALIN on CPAP  -Not wearing CPAP due to symptoms of claustrophobia and shortness of breath  -I did have an extensive celecoxib (CELEBREX) 200 MG capsule     No current facility-administered medications for this visit.        Social History     Tobacco Use     Smoking status: Never Smoker     Smokeless tobacco: Never Used   Substance Use Topics     Alcohol use: Yes     Alcohol/week: 0.0 oz     Comment: 10-14 wine a week      Drug use: No       Health Maintenance Due   Topic Date Due     ADVANCE DIRECTIVE PLANNING Q5 YRS  12/10/2002     ZOSTER IMMUNIZATION (2 of 3) 01/09/2008     BYRON QUESTIONNAIRE 1 YEAR  06/26/2018     PHQ-9 Q1YR  06/26/2018     MEDICARE ANNUAL WELLNESS VISIT  09/28/2018     LIPID MONITORING Q1 YEAR  02/09/2019       Lab Results   Component Value Date    PAP NIL 06/26/2017 March 22, 2019 10:53 AM     conversation regarding heart health related to obstructive sleep apnea and failure to wear CPAP    11. Chronic obstructive pulmonary disease, unspecified COPD type ([facility]  -History of COPD. Not on any inhalers at this time  -Consider referral to pulmonology    12. Hyperlipidemia with target LDL less than 100  -On Lipitor 80 mg daily  -Continue    13. Obesity, morbid, BMI 40.0-49.9 ([facility]  -Discussed portion control and diabetes management    14. Bipolar disorder with depression ([facility]  -Stable on Cymbalta 60 mg daily and Depakote 500 mg nightly    15. Colon cancer screening  -Colonoscopy screening ordered  - Amb External Referral To Gastroenterology       Preventative Care:  LABS      Imagin2022 ECHO    Summary   This is a limited study afib, ischemic cmp, recurrent pacer firing. LV systolic function appears mildly reduced with EF estimated at 45-50%. There is more prominent HK of the inferior wall. Left ventricular size is decreased. There is mild concentric left ventricular hypertrophy. 2019 35-40% inf basal hypo, inferolateral akinesis, LVE, mild MR      Signature    NM STRESS 2021    Summary    Overall findings represent a high risk scan. Normal LV size with reduced function. EF 49%    Large mostly reversible defect involving the basal inferolateral, basal    inferior, mid inferolateral and mid inferior segments. Findings concerning for ischemia. ECG: Non-diagnostic EKG response due to failure to reach target heart rate. Return in about 8 weeks (around 10/18/2022) for A1C, chronic visit. Vadim IRAHETA  Patient is here to establish care. Previously was being seen at [facility]. Was admitted to [facility] in January for [facility]. Discharged to [facility] for acute rehab and PT.      Medical hx of GERD, HTN, MI in 2001, x5 cardiac stents, a-fib, ischemic cardiomyopathy, systolic congestive heart failure secondary to ischemic cardiomyopathy, and obstructive sleep apnea where he is supposed be wearing CPAP    Follows with Dr. Jeanette Oliver of cardiology. History of diabetes. He is taking Trulicity 3 mg weekly. He is also on Levemir 74 units at nighttime. He states that he is taking Humalog 15 units in the morning and then 20 units with dinner. He denies any issues with low blood sugars or symptoms of lows. He states that his last A1c was around 6.8/6.7. He does follow with podiatry where he does have his nails trimmed. No foot ulcerations he is wearing. He does report some diabetic neuropathy. He states that he does have frequent numbness and tingling in bilateral lower extremities. He does walk with a cane due to this problem. Long history of coronary artery disease in which she has had previous cardiac stents placed. He states that he has x5 cardiac stents placed in the past.  He does have history of reduced ejection fraction according to his echocardiogram in January 2022. Ejection fraction at that time was 45%. This has improved compared to 2019. In 2019 his ejection fraction was 35%. History of obstructive sleep apnea where he has been prescribed CPAP in the past.  He is not wearing CPAP due to feelings of shortness of breath and claustrophobia. He does report a history of bipolar depression and has been on Cymbalta for about 5 years. He is also on Depakote. He states that it is symptoms of depression have improved since medication. History of hyperlipidemia. He is being treated with Lipitor 80 mg    History of atrial fibrillation with implanted ICD. Last EKG showed normal sinus rhythm. Apparently, he is on Eliquis for history of atrial fibrillation       Diabetes  He has type 2 diabetes mellitus. His disease course has been stable. Hypoglycemia symptoms include tremors. Pertinent negatives for hypoglycemia include no dizziness, headaches or nervousness/anxiousness. Associated symptoms include foot paresthesias and weakness.  Pertinent negatives for diabetes include no blurred vision, no chest pain, no polydipsia, no polyphagia and no polyuria. There are no hypoglycemic complications. Symptoms are stable. Diabetic complications include a CVA, heart disease and peripheral neuropathy. Risk factors for coronary artery disease include diabetes mellitus, dyslipidemia, family history, male sex, hypertension, tobacco exposure and obesity. Current diabetic treatment includes oral agent (dual therapy) and insulin injections. He is compliant with treatment all of the time. He has not had a previous visit with a dietitian. He rarely participates in exercise. There is no change in his home blood glucose trend. His breakfast blood glucose is taken between 8-9 am. His breakfast blood glucose range is generally 110-130 mg/dl. An ACE inhibitor/angiotensin II receptor blocker is being taken. He sees a podiatrist.Eye exam is current. Hypertension  This is a chronic problem. The current episode started more than 1 year ago. The problem is unchanged. The problem is controlled. Associated symptoms include shortness of breath. Pertinent negatives include no blurred vision, chest pain, headaches or palpitations. There are no associated agents to hypertension. Risk factors for coronary artery disease include diabetes mellitus, dyslipidemia, family history, obesity, male gender and smoking/tobacco exposure. Past treatments include ACE inhibitors, beta blockers and diuretics. The current treatment provides moderate improvement. There are no compliance problems. Hypertensive end-organ damage includes CAD/MI, CVA and heart failure. There is no history of angina or kidney disease. Identifiable causes of hypertension include sleep apnea. There is no history of chronic renal disease, a hypertension causing med or a thyroid problem. ROS  Review of Systems   Constitutional:  Negative for chills, fever and unexpected weight change.    HENT:  Negative for congestion, dental problem and sore throat. Eyes:  Negative for blurred vision, pain and discharge. Respiratory:  Positive for shortness of breath. Negative for cough and chest tightness. Cardiovascular:  Negative for chest pain, palpitations and leg swelling. Gastrointestinal:  Negative for abdominal distention, abdominal pain, constipation and diarrhea. Endocrine: Negative for polydipsia, polyphagia and polyuria. Genitourinary:  Positive for frequency. Negative for dysuria, flank pain, hematuria and urgency. Musculoskeletal:  Positive for gait problem. Negative for arthralgias, joint swelling and myalgias. Skin:  Negative for color change and rash. Neurological:  Positive for tremors, weakness and numbness. Negative for dizziness, light-headedness and headaches. Psychiatric/Behavioral:  Negative for agitation and behavioral problems. The patient is not nervous/anxious.        HISTORIES  Current Outpatient Medications on File Prior to Visit   Medication Sig Dispense Refill    famotidine (PEPCID) 20 MG tablet Take 20 mg by mouth 2 times daily      metoprolol succinate (TOPROL XL) 50 MG extended release tablet TAKE ONE TABLET BY MOUTH TWICE DAILY 180 tablet 3    furosemide (LASIX) 20 MG tablet TAKE ONE TABLET ON MONDAY and THURSDAY 90 tablet 1    potassium chloride (KLOR-CON M) 20 MEQ extended release tablet TAKE ONE TABLET WITH LASIX ON Monday AND THURSDAYS 90 tablet 1    lisinopril (PRINIVIL;ZESTRIL) 5 MG tablet Take 1 tablet by mouth daily 30 tablet 3    tamsulosin (FLOMAX) 0.4 MG capsule Take 1 capsule by mouth at bedtime 30 capsule 3    clopidogrel (PLAVIX) 75 MG tablet Take 1 tablet by mouth daily 30 tablet 3    apixaban (ELIQUIS) 5 MG TABS tablet Take 1 tablet by mouth 2 times daily 60 tablet 1    hydrOXYzine (ATARAX) 25 MG tablet Take 25 mg by mouth 3 times daily as needed for Itching or Anxiety      Dulaglutide (TRULICITY) 7.57 ZR/3.1WS SOPN Inject 3 mg into the skin once a week       insulin lispro (HUMALOG) 100 UNIT/ML injection vial Inject 15 Units into the skin 2 times daily Indications: 10 units in AM, 10 units at dinner      Omega-3 1000 MG CAPS Take 2 capsules by mouth 4 times daily      PROAIR  (90 Base) MCG/ACT inhaler Inhale 2 puffs into the lungs every 4 hours as needed      Fexofenadine HCl (ALLEGRA PO) Take by mouth      Esomeprazole Magnesium (NEXIUM PO) Take 40 mg by mouth       JARDIANCE 25 MG tablet Take 25 mg by mouth daily       insulin detemir (LEVEMIR) 100 UNIT/ML injection vial Inject 74 Units into the skin nightly      metFORMIN (GLUCOPHAGE) 1000 MG tablet TAKE ONE TABLET BY MOUTH 2 TIMES DAILY WITH MORNING AND EVENING MEALS 60 tablet 0    gabapentin (NEURONTIN) 100 MG capsule TAKE 2 CAPSULES BY MOUTH 3 TIMES DAILY 180 capsule 0    fluticasone (FLONASE) 50 MCG/ACT nasal spray USE 1 SPRAY IN EACH NOSTRIL EVERY DAY (Patient taking differently: 2 sprays daily) 16 g 0    TRUE METRIX BLOOD GLUCOSE TEST strip CHECK SUGARS TWICE A DAY AS DIRECTED, DX: E11.9 100 each 5    atorvastatin (LIPITOR) 80 MG tablet TAKE ONE TABLET BY MOUTH DAILY 30 tablet 11    UNIFINE PENTIPS 31G X 8 MM MISC USE AS DIRECTED FOR INSULIN .00 100 each 5    TRUEPLUS LANCETS 30G MISC CHECK BLOOD SUGAR TWICE A DAY DX E11.9 100 each 11    DULoxetine (CYMBALTA) 60 MG extended release capsule Take 60 mg by mouth daily      divalproex (DEPAKOTE) 500 MG DR tablet Take 1,000 mg by mouth nightly       No current facility-administered medications on file prior to visit.         Allergies   Allergen Reactions    Other      VASCEPA CAUSED RASH AND ITCHING    Pcn [Penicillins] Hives       Past Medical History:   Diagnosis Date    Arthritis     Asthma     CAD (coronary artery disease)     COPD (chronic obstructive pulmonary disease) ([facility])     Dr. Remy Badillo at [facility] told the patient that he did not have COPD, just asthma    Emphysema of lung ([address]     Fatty liver     GERD (gastroesophageal reflux disease)     Hyperlipidemia     Hypertension Medical history reviewed with no changes     MI, old     Sleep apnea     pt wears cpap at night. states does not have cpap    Type II or unspecified type diabetes mellitus without mention of complication, not stated as uncontrolled        Patient Active Problem List   Diagnosis    Ventricular tachycardia ([facility]    Presence of stent in left circumflex coronary artery    Myocardial infarction, nontransmural (HCC)    Myocardial infarction, inferoposterior wall, subsequent care    Automatic implantable cardioverter-defibrillator in situ    CAD (coronary artery disease)    Automatic implantable cardioverter-defibrillator in situ    ALIN on CPAP    Gastroesophageal reflux disease without esophagitis    Hyperlipidemia with target LDL less than 100    Hypertension due to endocrine disorder    Ischemic cardiomyopathy    Obesity, morbid, BMI 40.0-49.9 ([facility]    Weakness    Acute encephalopathy    TIA involving left internal carotid artery    DM (diabetes mellitus), secondary, uncontrolled, w/neurologic complic (HCC)    Dyslipidemia    HTN (hypertension), benign    Lactic acidosis    Head trauma    Abrasion, scalp w/o infection    Unsteady gait    Hyperglycemia    Chronic obstructive pulmonary disease (HCC)    Coarse tremor    Generalized weakness    Type 2 diabetes mellitus with hyperglycemia, with long-term current use of insulin (HCC)    Essential hypertension    CAD S/P percutaneous coronary angioplasty    COVID-19    Unable to ambulate    Implantable cardioverter-defibrillator (ICD) discharge    AICD malfunction    Hypotension    Valproic acid toxicity    PAF (paroxysmal atrial fibrillation) ([facility]    COVID-19 virus infection    Disorder of electrolytes    Atrial fibrillation with RVR (HCC)    Low grade fever    Thrombocytopenia (HCC)    Abnormal chest x-ray    Encephalopathy    Debility    Bipolar disorder with depression [facility])       Past Surgical History:   Procedure Laterality Date    CARDIAC PACEMAKER PLACEMENT  2011 NOT MRI COMPATIABLE OLD LEADS [facility]. pace maker difib    CARPAL TUNNEL RELEASE Bilateral 2013    CATARACT REMOVAL WITH IMPLANT Left 2/28/14    COLONOSCOPY      CORONARY ANGIOPLASTY WITH STENT PLACEMENT  2001,2008    CYST REMOVAL  1982    coccyx area    DIAGNOSTIC CARDIAC CATH LAB PROCEDURE      ENDOSCOPY, COLON, DIAGNOSTIC      ERCP  9/15/2013    ERCP  10/11/13    WITH STENT REMOVAL    FOOT SURGERY Right 07/09/2018     REPAIR CALCANEAL SPUR, REPAIR OF ACHILLES TENDON RIGHT FOOT    NERVE SURGERY Bilateral 12/1/2020    BILATERAL LUMBAR THREE LUMBAR FOUR LUMBAR FIVE DORSAL RAMUS  RADIOFREQUENCY ABLATION SITE CONFIRMED BY FLUOROSCOPY performed by Nazanin Farrar MD at [address]      back stimulator in lower back    PACEMAKER PLACEMENT      ICD    PAIN MANAGEMENT PROCEDURE Bilateral 5/26/2020    BILATERAL LUMBAR THREE, LUMBAR FOUR, LUMBAR FIVE DORSAL RAMUS MEDIAL BRANCH BLOCK SITE CONFIRMED BY FLUOROSCOPY performed by Nazanin Farrar MD at [address] Bilateral 6/9/2020    BILATERAL LUMBAR THREE, LUMBAR FOUR, LUMBAR FIVE DORSAL RAMUS MEDIAL BRANCH BLOCK SITE CONFIRMED BY FLUOROSCOPY performed by Nazanin Farrar MD at [address] Left 1/12/2021    LEFT SACROILIAC JOINT INJECTION SITE CONFIRMED BY FLUOROSCOPY performed by Nazanin Farrar MD at [facility] [address] Right 10/31/2018    GASTROCNEMIUS RECESSION RIGHT FOOT performed by Armando Laurent DPM at [address] Right 10/31/2018    REMOVAL OF CALCANEAL SPUR, ACHILLES TENDON REPAIR RIGHT LOWER EXTREMITY performed by Armando Laurent DPM at [address] [facility]  7/18/13    and colonoscopy with biopsies taken    UPPER GASTROINTESTINAL ENDOSCOPY  8/23/13    EUS    UPPER GASTROINTESTINAL ENDOSCOPY  9/15/2013       Social History     Socioeconomic History    Marital status:   Spouse name: Not on file    Number of children: Not on file    Years of education: Not on file    Highest education level: Not on file   Occupational History    Not on file   Tobacco Use    Smoking status: Former     Packs/day: 2.00     Years: 20.00     Pack years: 40.00     Types: Cigarettes     Quit date: 2001     Years since quittin.2    Smokeless tobacco: Never    Tobacco comments:     PASSIVE SMOKER   Vaping Use    Vaping Use: Never used   Substance and Sexual Activity    Alcohol use: No     Alcohol/week: 0.0 standard drinks    Drug use: No    Sexual activity: Never   Other Topics Concern    Not on file   Social History Narrative    Not on file     Social Determinants of Health     Financial Resource Strain: Not on file   Food Insecurity: Not on file   Transportation Needs: Not on file   Physical Activity: Not on file   Stress: Not on file   Social Connections: Not on file   Intimate Partner Violence: Not on file   Housing Stability: Not on file        Family History   Problem Relation Age of Onset    Heart Disease Mother     Heart Failure Mother     Cancer Father     Diabetes Maternal Grandmother     Heart Failure Maternal Uncle     Hypertension Maternal Uncle     Asthma Neg Hx     Emphysema Neg Hx        PE  Vitals:    22 1351   BP: 138/70   Site: Left Upper Arm   Position: Sitting   Cuff Size: Medium Adult   Pulse: 86   SpO2: 98%   Weight: 226 lb (102.5 kg)   Height: 5' 4\" (1.626 m)     Estimated body mass index is 38.79 kg/m² as calculated from the following:    Height as of this encounter: 5' 4\" (1.626 m). Weight as of this encounter: 226 lb (102.5 kg). Physical Exam  Constitutional:       General: He is not in acute distress. Appearance: Normal appearance. He is not ill-appearing. HENT:      Head: Normocephalic. Right Ear: Tympanic membrane and external ear normal.      Left Ear: Tympanic membrane and external ear normal.      Nose: No congestion or rhinorrhea. Mouth/Throat:      Mouth: Mucous membranes are moist.      Pharynx: Oropharynx is clear. No posterior oropharyngeal erythema. Eyes:      Extraocular Movements: Extraocular movements intact. Conjunctiva/sclera: Conjunctivae normal.      Pupils: Pupils are equal, round, and reactive to light. Cardiovascular:      Rate and Rhythm: Normal rate and regular rhythm. Pulses: Normal pulses. Heart sounds: Normal heart sounds. No murmur heard. Pulmonary:      Effort: Pulmonary effort is normal. No respiratory distress. Breath sounds: Normal breath sounds. No wheezing. Abdominal:      General: Abdomen is flat. Bowel sounds are normal.      Palpations: Abdomen is soft. Tenderness: There is no abdominal tenderness. Hernia: No hernia is present. Musculoskeletal:         General: No swelling. Normal range of motion. Cervical back: Normal range of motion and neck supple. No tenderness. Right lower leg: No edema. Left lower leg: No edema. Feet:      Right foot:      Protective Sensation: 10 sites tested. 6 sites sensed. Left foot:      Protective Sensation: 10 sites tested. 6 sites sensed. Lymphadenopathy:      Cervical: No cervical adenopathy. Skin:     General: Skin is warm and dry. Capillary Refill: Capillary refill takes less than 2 seconds. Neurological:      General: No focal deficit present. Mental Status: He is alert and oriented to person, place, and time. Mental status is at baseline. Cranial Nerves: No cranial nerve deficit.    Psychiatric:         Mood and Affect: Mood normal.         Behavior: Behavior normal.         Judgment: Judgment normal.       Immunization History   Administered Date(s) Administered    Influenza Virus Vaccine 12/12/2008, 10/22/2012, 09/17/2015, 10/01/2018    Influenza, Petey MARS [facility] (age 1 y+), MDV 11/18/2016, 10/02/2017    Pneumococcal Conjugate 7-valent (Prevnar7) 10/22/2012    Pneumococcal Polysaccharide ([id number]) 12/12/2008    Tdap (Boostrix, Adacel) 05/11/2015       Health Maintenance   Topic Date Due    Annual Wellness Visit (AWV)  Never done    COVID-19 Vaccine (1) Never done    Depression Monitoring  Never done    Hepatitis B vaccine (1 of 3 - Risk 3-dose series) Never done    Shingles vaccine (1 of 2) Never done    Pneumococcal 0-64 years Vaccine (2 - PCV) 01/07/2014    Diabetic foot exam  11/01/2017    Diabetic microalbuminuria test  03/18/2022    Diabetic retinal exam  03/30/2022    A1C test (Diabetic or Prediabetic)  05/18/2022    Hepatitis C screen  08/23/2023 (Originally 12/8/1978)    HIV screen  08/23/2023 (Originally 12/8/1975)    Flu vaccine (1) 09/01/2022    Lipids  01/11/2023    DTaP/Tdap/Td vaccine (2 - Td or Tdap) 05/11/2025    Colorectal Cancer Screen  02/12/2026    Hepatitis A vaccine  Aged Out    Hib vaccine  Aged Out    Meningococcal (ACWY) vaccine  Aged Out       PSH, PMH, SH and FH reviewed and noted. Recent and past labs, tests and consults also reviewed. Recent or new meds also reviewed. Sushma Hawkins, ANA - CNP    This dictation was generated by voice recognition computer software. Although all attempts are made to edit the dictation for accuracy, there may be errors in the transcription that are not intended.

## 2022-08-26 ASSESSMENT — ENCOUNTER SYMPTOMS
FREQUENCY: 0
HEADACHES: 0
NERVOUS/ANXIOUS: 0
DIZZINESS: 0
ARTHRALGIAS: 1
FEVER: 0
JOINT SWELLING: 0
DIARRHEA: 0
SORE THROAT: 0
SHORTNESS OF BREATH: 0
ABDOMINAL PAIN: 0
PARESTHESIAS: 1
CONSTIPATION: 0
WEAKNESS: 0
MYALGIAS: 0
HEMATURIA: 0
HEMATOCHEZIA: 0
EYE PAIN: 0
COUGH: 0
BREAST MASS: 0
DYSURIA: 0
NAUSEA: 0
PALPITATIONS: 0
CHILLS: 0
HEARTBURN: 0

## 2022-08-26 ASSESSMENT — ACTIVITIES OF DAILY LIVING (ADL): CURRENT_FUNCTION: NO ASSISTANCE NEEDED

## 2022-08-30 DIAGNOSIS — E78.5 HYPERLIPIDEMIA LDL GOAL <100: ICD-10-CM

## 2022-08-31 RX ORDER — SIMVASTATIN 20 MG
20 TABLET ORAL AT BEDTIME
Qty: 30 TABLET | Refills: 0 | Status: SHIPPED | OUTPATIENT
Start: 2022-08-31 | End: 2022-09-02

## 2022-08-31 NOTE — TELEPHONE ENCOUNTER
Medication requested: simvastatin (ZOCOR) 20 MG tablet  Last office visit: 7/13/2022  [facility] appointments: 9/2/2022  Medication last refilled: 8/8/2022; 30 + 0 refills  Last qualifying labs:       Component      Latest Ref Rng & Units 6/7/2021   LDL Cholesterol Direct      0.0 - 129.0 91.0     Has appt next week, added order for lipid panel.    Prescription approved per [facility] Refill Protocol.    MELISA Ashby, RN  08/31/22, 2:58 PM

## 2022-09-02 ENCOUNTER — OFFICE VISIT (OUTPATIENT)
Dept: FAMILY MEDICINE | Facility: CLINIC | Age: 77
End: 2022-09-02
Payer: MEDICARE

## 2022-09-02 VITALS
WEIGHT: 138 LBS | HEART RATE: 63 BPM | TEMPERATURE: 97.5 F | SYSTOLIC BLOOD PRESSURE: 142 MMHG | BODY MASS INDEX: 25.4 KG/M2 | DIASTOLIC BLOOD PRESSURE: 93 MMHG | RESPIRATION RATE: 15 BRPM | HEIGHT: 62 IN | OXYGEN SATURATION: 96 %

## 2022-09-02 DIAGNOSIS — Z12.11 SCREENING FOR COLON CANCER: ICD-10-CM

## 2022-09-02 DIAGNOSIS — Z95.0 CARDIAC PACEMAKER IN SITU: ICD-10-CM

## 2022-09-02 DIAGNOSIS — I45.9 HEART BLOCK: ICD-10-CM

## 2022-09-02 DIAGNOSIS — E78.5 HYPERLIPIDEMIA LDL GOAL <100: ICD-10-CM

## 2022-09-02 DIAGNOSIS — Z00.00 MEDICARE ANNUAL WELLNESS VISIT, SUBSEQUENT: Primary | ICD-10-CM

## 2022-09-02 LAB
ALT SERPL W P-5'-P-CCNC: 15 U/L (ref 10–35)
ANION GAP SERPL CALCULATED.3IONS-SCNC: 11 MMOL/L (ref 7–15)
BUN SERPL-MCNC: 20.1 MG/DL (ref 8–23)
CALCIUM SERPL-MCNC: 10 MG/DL (ref 8.8–10.2)
CHLORIDE SERPL-SCNC: 104 MMOL/L (ref 98–107)
CHOLEST SERPL-MCNC: 219 MG/DL
CREAT SERPL-MCNC: 0.8 MG/DL (ref 0.51–0.95)
DEPRECATED HCO3 PLAS-SCNC: 25 MMOL/L (ref 22–29)
GFR SERPL CREATININE-BSD FRML MDRD: 77 ML/MIN/1.73M2
GLUCOSE SERPL-MCNC: 98 MG/DL (ref 70–99)
HDLC SERPL-MCNC: 69 MG/DL
LDLC SERPL CALC-MCNC: 133 MG/DL
NONHDLC SERPL-MCNC: 150 MG/DL
POTASSIUM SERPL-SCNC: 4.4 MMOL/L (ref 3.4–5.3)
SODIUM SERPL-SCNC: 140 MMOL/L (ref 136–145)
TRIGL SERPL-MCNC: 83 MG/DL

## 2022-09-02 PROCEDURE — 80048 BASIC METABOLIC PNL TOTAL CA: CPT | Performed by: FAMILY MEDICINE

## 2022-09-02 PROCEDURE — 80061 LIPID PANEL: CPT | Performed by: FAMILY MEDICINE

## 2022-09-02 PROCEDURE — 84460 ALANINE AMINO (ALT) (SGPT): CPT | Performed by: FAMILY MEDICINE

## 2022-09-02 RX ORDER — SIMVASTATIN 20 MG
20 TABLET ORAL AT BEDTIME
Qty: 90 TABLET | Refills: 4 | Status: SHIPPED | OUTPATIENT
Start: 2022-09-02 | End: 2023-10-17

## 2022-09-05 NOTE — PROGRESS NOTES
CHIEF COMPLAINT:  Medicare annual wellness visit, subsequent.    HISTORY OF PRESENT ILLNESS:  Kristal is a 76-year-old here for the above.  Overall, she is doing very well.  She has no significant concerns today.    ACTIVE MEDICAL PROBLEMS:  Reviewed.  She is followed by Cardiology for her heart block and pacemaker placement.  Things are going well there.  She does have some osteoarthritis in her hands.  We talked about that.  Also, she has had some dizziness that may be due to blood pressure at times.  She is being careful about rising too quickly from a lying or sitting position.  All of her medications were reviewed.  She asked which ones she could stop taking.  We discussed vitamin C, magnesium and fish oil.  She will continue with her calcium and vitamin D as well as her FiberCon (which she feels has helped her tremendously in terms of her stools).    SOCIAL, FAMILY AND PAST SURGICAL HISTORIES:  Unchanged.    HEALTHCARE MAINTENANCE:  Eye care is scheduled for October.  She notes that she has to take care of that.  Her hearing is fine.  Dental care is up to date.  Blood pressure 144/80, but upon recheck, it was 142/93.  I will encourage her to check her blood pressure readings at home.  Follow up with Cardiology as indicated.  Body mass index is 24.95.  Immunizations are all up to date.  COVID booster this fall as well as seasonal influenza immunization recommended.  Mammogram is pending for 09/12/2022.  She just did a Cologuard test that was negative 06/14/2021, to be repeated in 2024.    MEDICARE QUESTIONS:  Her Mini-Cog test was 5/5.  She has had no falls at home in the last year.  No symptoms of depression.  No problems at all with any activities of daily living.    She has a Neurology appointment coming up at the [facility], scheduled for 09/20/2022.    Review of systems is negative.    OBJECTIVE:  Pat is in no distress.  Affect upbeat.  Breathing comfortably.  Alert and oriented x3.  BP  "144/80 initially with a pulse of 63.  Temperature is 97.5.  She is 5 feet 2.36 inches in height, weighs 138 pounds with a BMI of 24.95.  O2 sats are 96% on room air.  HEENT:  Head is normocephalic, atraumatic.  Ears are free of any cerumen.  The TMs look fine.  There is no pain with palpation of frontal or maxillary sinuses.  Eyes are grossly normal.  Nose and mouth are masked.  NECK:  Supple without any anterior or posterior chain adenopathy.  No thyromegaly.  No carotid bruits.  BACK:  Smooth and straight.  LUNGS:  Clear to auscultation bilaterally.  HEART:  Reveals a regular rate and rhythm without murmur.  Pacemaker felt under the skin on the left anterior chest wall.  EXTREMITIES:  Ankles are free of any edema.  Good peripheral pulses.    LABORATORY:  Labs will consist of the following:  Lipid panel, ALT and basic metabolic panel.    ASSESSMENT AND PLAN:    1.  Medicare annual wellness visit, subsequent.  Up to date with healthcare maintenance strategies.  2.  COVID vaccine #5 and seasonal influenza immunization recommended for the fall.  3.  Follow up with mammogram as previously scheduled.  4.  Cologuard to be repeated in 2024.  5.  Follow up eye exam as planned for October.  6.  Follow up with Neurology visit as planned for September.  7.  Check blood pressure readings at home given the slightly higher readings here in the office today.  Call with any problems or questions.      Answers for HPI/ROS submitted by the patient on 8/26/2022  In general, how would you rate your overall physical health?: excellent  Frequency of exercise:: 4-5 days/week  Do you usually eat at least 4 servings of fruit and vegetables a day, include whole grains & fiber, and avoid regularly eating high fat or \"junk\" foods? : No  Taking medications regularly:: Yes  Medication side effects:: None  Activities of Daily Living: no assistance needed  Home safety: no safety concerns identified  Hearing Impairment:: no hearing concerns  In " the past 6 months, have you been bothered by leaking of urine?: No  abdominal pain: No  Blood in stool: No  Blood in urine: No  chest pain: No  chills: No  congestion: No  constipation: No  cough: No  diarrhea: No  dizziness: No  ear pain: No  eye pain: No  nervous/anxious: No  fever: No  frequency: No  genital sores: No  headaches: No  hearing loss: No  heartburn: No  arthralgias: Yes  joint swelling: No  peripheral edema: No  mood changes: No  myalgias: No  nausea: No  dysuria: No  palpitations: No  Skin sensation changes: Yes  sore throat: No  urgency: No  rash: No  shortness of breath: No  visual disturbance: No  weakness: No  pelvic pain: No  vaginal bleeding: No  vaginal discharge: No  tenderness: No  breast mass: No  breast discharge: No  In general, how would you rate your overall mental or emotional health?: excellent  Additional concerns today:: No  Duration of exercise:: 45-60 minutes

## 2022-09-12 ENCOUNTER — ANCILLARY PROCEDURE (OUTPATIENT)
Dept: MAMMOGRAPHY | Facility: CLINIC | Age: 77
End: 2022-09-12
Attending: FAMILY MEDICINE
Payer: MEDICARE

## 2022-09-12 DIAGNOSIS — Z12.31 VISIT FOR SCREENING MAMMOGRAM: ICD-10-CM

## 2022-09-12 PROCEDURE — 77067 SCR MAMMO BI INCL CAD: CPT | Mod: GC | Performed by: STUDENT IN AN ORGANIZED HEALTH CARE EDUCATION/TRAINING PROGRAM

## 2022-09-12 PROCEDURE — 77063 BREAST TOMOSYNTHESIS BI: CPT | Mod: GC | Performed by: STUDENT IN AN ORGANIZED HEALTH CARE EDUCATION/TRAINING PROGRAM

## 2022-10-06 ENCOUNTER — ANCILLARY PROCEDURE (OUTPATIENT)
Dept: CARDIOLOGY | Facility: CLINIC | Age: 77
End: 2022-10-06
Attending: INTERNAL MEDICINE
Payer: MEDICARE

## 2022-10-06 ENCOUNTER — TELEPHONE (OUTPATIENT)
Dept: CARE COORDINATION | Facility: CLINIC | Age: 77
End: 2022-10-06

## 2022-10-06 DIAGNOSIS — I44.30 ATRIOVENTRICULAR BLOCK: ICD-10-CM

## 2022-10-06 PROCEDURE — 93296 REM INTERROG EVL PM/IDS: CPT

## 2022-10-06 PROCEDURE — 93294 REM INTERROG EVL PM/LDLS PM: CPT | Performed by: INTERNAL MEDICINE

## 2022-10-06 NOTE — TELEPHONE ENCOUNTER
Telephone Reminder per Remote Device Check.  Pt states she will send in transmission right now    Elzbieta Perales MA, Virtual Facilitator, 10/6/2022

## 2022-10-16 ENCOUNTER — HEALTH MAINTENANCE LETTER (OUTPATIENT)
Age: 77
End: 2022-10-16

## 2022-10-28 LAB
MDC_IDC_EPISODE_DTM: NORMAL
MDC_IDC_EPISODE_DURATION: 1 S
MDC_IDC_EPISODE_ID: 6
MDC_IDC_EPISODE_TYPE: NORMAL
MDC_IDC_LEAD_IMPLANT_DT: NORMAL
MDC_IDC_LEAD_IMPLANT_DT: NORMAL
MDC_IDC_LEAD_LOCATION: NORMAL
MDC_IDC_LEAD_LOCATION: NORMAL
MDC_IDC_LEAD_LOCATION_DETAIL_1: NORMAL
MDC_IDC_LEAD_LOCATION_DETAIL_1: NORMAL
MDC_IDC_LEAD_MFG: NORMAL
MDC_IDC_LEAD_MFG: NORMAL
MDC_IDC_LEAD_MODEL: NORMAL
MDC_IDC_LEAD_MODEL: NORMAL
MDC_IDC_LEAD_POLARITY_TYPE: NORMAL
MDC_IDC_LEAD_POLARITY_TYPE: NORMAL
MDC_IDC_LEAD_SERIAL: NORMAL
MDC_IDC_LEAD_SERIAL: NORMAL
MDC_IDC_MSMT_BATTERY_DTM: NORMAL
MDC_IDC_MSMT_BATTERY_REMAINING_LONGEVITY: 26 MO
MDC_IDC_MSMT_BATTERY_RRT_TRIGGER: 2.83
MDC_IDC_MSMT_BATTERY_STATUS: NORMAL
MDC_IDC_MSMT_BATTERY_VOLTAGE: 2.93 V
MDC_IDC_MSMT_LEADCHNL_RA_IMPEDANCE_VALUE: 361 OHM
MDC_IDC_MSMT_LEADCHNL_RA_IMPEDANCE_VALUE: 418 OHM
MDC_IDC_MSMT_LEADCHNL_RA_PACING_THRESHOLD_AMPLITUDE: 0.5 V
MDC_IDC_MSMT_LEADCHNL_RA_PACING_THRESHOLD_PULSEWIDTH: 0.4 MS
MDC_IDC_MSMT_LEADCHNL_RA_SENSING_INTR_AMPL: 3.3 MV
MDC_IDC_MSMT_LEADCHNL_RV_IMPEDANCE_VALUE: 456 OHM
MDC_IDC_MSMT_LEADCHNL_RV_IMPEDANCE_VALUE: 513 OHM
MDC_IDC_MSMT_LEADCHNL_RV_PACING_THRESHOLD_AMPLITUDE: 0.75 V
MDC_IDC_MSMT_LEADCHNL_RV_PACING_THRESHOLD_PULSEWIDTH: 0.4 MS
MDC_IDC_MSMT_LEADCHNL_RV_SENSING_INTR_AMPL: 17.9 MV
MDC_IDC_PG_IMPLANT_DTM: NORMAL
MDC_IDC_PG_MFG: NORMAL
MDC_IDC_PG_MODEL: NORMAL
MDC_IDC_PG_SERIAL: NORMAL
MDC_IDC_PG_TYPE: NORMAL
MDC_IDC_SESS_CLINIC_NAME: NORMAL
MDC_IDC_SESS_DTM: NORMAL
MDC_IDC_SESS_TYPE: NORMAL
MDC_IDC_SET_BRADY_AT_MODE_SWITCH_RATE: 171 {BEATS}/MIN
MDC_IDC_SET_BRADY_HYSTRATE: NORMAL
MDC_IDC_SET_BRADY_LOWRATE: 60 {BEATS}/MIN
MDC_IDC_SET_BRADY_MAX_SENSOR_RATE: 130 {BEATS}/MIN
MDC_IDC_SET_BRADY_MAX_TRACKING_RATE: 130 {BEATS}/MIN
MDC_IDC_SET_BRADY_MODE: NORMAL
MDC_IDC_SET_BRADY_PAV_DELAY_LOW: 220 MS
MDC_IDC_SET_BRADY_SAV_DELAY_LOW: 200 MS
MDC_IDC_SET_LEADCHNL_RA_PACING_AMPLITUDE: 1.5 V
MDC_IDC_SET_LEADCHNL_RA_PACING_ANODE_ELECTRODE_1: NORMAL
MDC_IDC_SET_LEADCHNL_RA_PACING_ANODE_LOCATION_1: NORMAL
MDC_IDC_SET_LEADCHNL_RA_PACING_CAPTURE_MODE: NORMAL
MDC_IDC_SET_LEADCHNL_RA_PACING_CATHODE_ELECTRODE_1: NORMAL
MDC_IDC_SET_LEADCHNL_RA_PACING_CATHODE_LOCATION_1: NORMAL
MDC_IDC_SET_LEADCHNL_RA_PACING_POLARITY: NORMAL
MDC_IDC_SET_LEADCHNL_RA_PACING_PULSEWIDTH: 0.4 MS
MDC_IDC_SET_LEADCHNL_RA_SENSING_ANODE_ELECTRODE_1: NORMAL
MDC_IDC_SET_LEADCHNL_RA_SENSING_ANODE_LOCATION_1: NORMAL
MDC_IDC_SET_LEADCHNL_RA_SENSING_CATHODE_ELECTRODE_1: NORMAL
MDC_IDC_SET_LEADCHNL_RA_SENSING_CATHODE_LOCATION_1: NORMAL
MDC_IDC_SET_LEADCHNL_RA_SENSING_POLARITY: NORMAL
MDC_IDC_SET_LEADCHNL_RA_SENSING_SENSITIVITY: 0.6 MV
MDC_IDC_SET_LEADCHNL_RV_PACING_AMPLITUDE: 2.25 V
MDC_IDC_SET_LEADCHNL_RV_PACING_ANODE_ELECTRODE_1: NORMAL
MDC_IDC_SET_LEADCHNL_RV_PACING_ANODE_LOCATION_1: NORMAL
MDC_IDC_SET_LEADCHNL_RV_PACING_CAPTURE_MODE: NORMAL
MDC_IDC_SET_LEADCHNL_RV_PACING_CATHODE_ELECTRODE_1: NORMAL
MDC_IDC_SET_LEADCHNL_RV_PACING_CATHODE_LOCATION_1: NORMAL
MDC_IDC_SET_LEADCHNL_RV_PACING_POLARITY: NORMAL
MDC_IDC_SET_LEADCHNL_RV_PACING_PULSEWIDTH: 0.4 MS
MDC_IDC_SET_LEADCHNL_RV_SENSING_ANODE_ELECTRODE_1: NORMAL
MDC_IDC_SET_LEADCHNL_RV_SENSING_ANODE_LOCATION_1: NORMAL
MDC_IDC_SET_LEADCHNL_RV_SENSING_CATHODE_ELECTRODE_1: NORMAL
MDC_IDC_SET_LEADCHNL_RV_SENSING_CATHODE_LOCATION_1: NORMAL
MDC_IDC_SET_LEADCHNL_RV_SENSING_POLARITY: NORMAL
MDC_IDC_SET_LEADCHNL_RV_SENSING_SENSITIVITY: 0.9 MV
MDC_IDC_SET_ZONE_DETECTION_INTERVAL: 350 MS
MDC_IDC_SET_ZONE_DETECTION_INTERVAL: 400 MS
MDC_IDC_SET_ZONE_TYPE: NORMAL
MDC_IDC_STAT_AT_BURDEN_PERCENT: 0.1 %
MDC_IDC_STAT_AT_DTM_END: NORMAL
MDC_IDC_STAT_AT_DTM_START: NORMAL
MDC_IDC_STAT_BRADY_AP_VP_PERCENT: 52.12 %
MDC_IDC_STAT_BRADY_AP_VS_PERCENT: 0 %
MDC_IDC_STAT_BRADY_AS_VP_PERCENT: 47.82 %
MDC_IDC_STAT_BRADY_AS_VS_PERCENT: 0.05 %
MDC_IDC_STAT_BRADY_DTM_END: NORMAL
MDC_IDC_STAT_BRADY_DTM_START: NORMAL
MDC_IDC_STAT_BRADY_RA_PERCENT_PACED: 52.05 %
MDC_IDC_STAT_BRADY_RV_PERCENT_PACED: 99.91 %
MDC_IDC_STAT_EPISODE_RECENT_COUNT: 0
MDC_IDC_STAT_EPISODE_RECENT_COUNT: 0
MDC_IDC_STAT_EPISODE_RECENT_COUNT: 1
MDC_IDC_STAT_EPISODE_RECENT_COUNT: 4
MDC_IDC_STAT_EPISODE_RECENT_COUNT_DTM_END: NORMAL
MDC_IDC_STAT_EPISODE_RECENT_COUNT_DTM_START: NORMAL
MDC_IDC_STAT_EPISODE_TOTAL_COUNT: 0
MDC_IDC_STAT_EPISODE_TOTAL_COUNT: 0
MDC_IDC_STAT_EPISODE_TOTAL_COUNT: 2
MDC_IDC_STAT_EPISODE_TOTAL_COUNT: 4
MDC_IDC_STAT_EPISODE_TOTAL_COUNT_DTM_END: NORMAL
MDC_IDC_STAT_EPISODE_TOTAL_COUNT_DTM_START: NORMAL
MDC_IDC_STAT_EPISODE_TYPE: NORMAL

## 2022-11-06 ENCOUNTER — APPOINTMENT (OUTPATIENT)
Dept: CT IMAGING | Facility: CLINIC | Age: 77
End: 2022-11-06
Attending: PHYSICIAN ASSISTANT
Payer: MEDICARE

## 2022-11-06 ENCOUNTER — HOSPITAL ENCOUNTER (EMERGENCY)
Facility: CLINIC | Age: 77
Discharge: HOME | End: 2022-11-06
Admitting: PHYSICIAN ASSISTANT
Payer: MEDICARE

## 2022-11-06 ENCOUNTER — NURSE TRIAGE (OUTPATIENT)
Dept: NURSING | Facility: CLINIC | Age: 77
End: 2022-11-06

## 2022-11-06 VITALS
RESPIRATION RATE: 18 BRPM | BODY MASS INDEX: 25.4 KG/M2 | WEIGHT: 138.01 LBS | HEIGHT: 62 IN | OXYGEN SATURATION: 97 % | DIASTOLIC BLOOD PRESSURE: 72 MMHG | HEART RATE: 73 BPM | SYSTOLIC BLOOD PRESSURE: 135 MMHG | TEMPERATURE: 97 F
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
## 2022-11-06 DIAGNOSIS — S02.5XXA CLOSED FRACTURE OF TOOTH, INITIAL ENCOUNTER: ICD-10-CM

## 2022-11-06 DIAGNOSIS — S01.81XA CHIN LACERATION, INITIAL ENCOUNTER: ICD-10-CM

## 2022-11-06 PROCEDURE — 12011 RPR F/E/E/N/L/M 2.5 CM/<: CPT | Performed by: PHYSICIAN ASSISTANT

## 2022-11-06 PROCEDURE — G1010 CDSM STANSON: HCPCS | Mod: GC | Performed by: RADIOLOGY

## 2022-11-06 PROCEDURE — 70486 CT MAXILLOFACIAL W/O DYE: CPT | Mod: 26 | Performed by: RADIOLOGY

## 2022-11-06 PROCEDURE — 70450 CT HEAD/BRAIN W/O DYE: CPT | Mod: 26 | Performed by: RADIOLOGY

## 2022-11-06 PROCEDURE — G1010 CDSM STANSON: HCPCS

## 2022-11-06 PROCEDURE — 250N000013 HC RX MED GY IP 250 OP 250 PS 637: Performed by: PHYSICIAN ASSISTANT

## 2022-11-06 PROCEDURE — 99284 EMERGENCY DEPT VISIT MOD MDM: CPT | Mod: 25 | Performed by: PHYSICIAN ASSISTANT

## 2022-11-06 RX ORDER — ACETAMINOPHEN 500 MG
1000 TABLET ORAL ONCE
Status: COMPLETED | OUTPATIENT
Start: 2022-11-06 | End: 2022-11-06

## 2022-11-06 RX ORDER — CEPHALEXIN 500 MG/1
500 CAPSULE ORAL 4 TIMES DAILY
Qty: 20 CAPSULE | Refills: 0 | Status: SHIPPED | OUTPATIENT
Start: 2022-11-06 | End: 2022-11-11

## 2022-11-06 RX ORDER — LIDOCAINE HYDROCHLORIDE AND EPINEPHRINE 10; 10 MG/ML; UG/ML
10 INJECTION, SOLUTION INFILTRATION; PERINEURAL ONCE
Status: DISCONTINUED | OUTPATIENT
Start: 2022-11-06 | End: 2022-11-06 | Stop reason: HOSPADM

## 2022-11-06 RX ADMIN — ACETAMINOPHEN 1000 MG: 500 TABLET ORAL at 13:31

## 2022-11-06 ASSESSMENT — ACTIVITIES OF DAILY LIVING (ADL): ADLS_ACUITY_SCORE: 35

## 2022-11-06 NOTE — DISCHARGE INSTRUCTIONS
Here in the emergency department, we did obtain imaging of your head and face, showing no findings to suggest bleed or fracture including any nasal fracture.  I placed a stitch across her chin today.  We discussed keeping the area clean, daily dressing changes with triple antibiotic ointment, and return to primary care or urgent care in 5 days for suture removal.    Prescription for Keflex antibiotic was sent to your preferred pharmacy and I recommend starting this right away today.    With your damage teeth, it is important that you follow-up very closely with your dentist.  I recommend calling them first thing tomorrow morning when they open.    We discussed Tylenol for pain 1000 mg 3 times daily, ice to the affected areas, as you may develop some swelling in the next 24 hours.  We discussed findings for infection for which she would need to return, or if you develop severe headache vomiting changes in vision, etc.    Patient has no other questions or concerns at this time.  Red flag signs were addressed, and they were in agreement with the patient care plan provided.

## 2022-11-06 NOTE — TELEPHONE ENCOUNTER
"Patient calling reporting falling tripping, landing on pavement 20 minutes ago.    Reporting top tooth shattered crown going through lower lip \"1/4 inch\" below lip.    Laceration >1/4 inch \"feels like a deep hole.     Patient reporting removing pieces of tooth from area.    Denies any LOC.     Ice applied.    Disposition per triage guidelines see in ED now.    Patient agrees to go to [facility] Emergency Room now.    Lalitha Carbajal RN  [facility]        Reason for Disposition    Injury mainly to the mouth    [1] Deep cut AND [2] can see bone or tendons    Additional Information    Negative: [1] Major bleeding (e.g., actively dripping or spurting) AND [2] can't be stopped    Negative: Bullet wound, knife wound, or other penetrating object    Negative: Difficulty breathing or choking    Negative: Knocked out (unconscious) > 1 minute    Negative: Difficult to awaken or acting confused (e.g., disoriented, slurred speech)    Negative: Severe neck pain    Negative: Sounds like a life-threatening emergency to the triager    Negative: [1] Major bleeding (e.g., actively dripping or spurting) AND [2] can't be stopped    Negative: Amputation    Negative: Shock suspected (e.g., cold/pale/clammy skin, too weak to stand, low BP, rapid pulse)    Negative: [1] Knife wound (or other possibly deep cut) AND [2] to chest, abdomen, back, neck, or head    Negative: [1] Self-injury (e.g., cutting, self-harm) AND [2] suicidal or out-of-control    Negative: Sounds like a life-threatening emergency to the triager    Negative: [1] Animal bite AND [2] broken skin    Negative: [1] Human bite AND [2] broken skin    Negative: Puncture wound    Negative: [1] Bleeding AND [2] won't stop after 10 minutes of direct pressure (using correct technique)    Negative: Skin is split open or gaping (or length > 1/2 inch or 12 mm on the skin, 1/4 inch or 6 mm on the face)    Protocols used: FACE INJURY-A-AH, CUTS AND " [id number]

## 2022-11-06 NOTE — ED TRIAGE NOTES
Pt presents to triage with complaints of a facial laceration post mechanical fall. Pt denies blood thinners or loss of consciousness. Pt denies vision changes.

## 2022-11-06 NOTE — ED NOTES
Pt placed back in ED lobby due to ED max capacity. Pt instructed to inform writer of any worsening symptoms or changes

## 2022-11-06 NOTE — ED PROVIDER NOTES
ED Provider Note  [facility]      History     Chief Complaint   Patient presents with     Laceration     HPI  Sharona Renata Bravo is a 76 year old female who presents the emergency department today with concerns for fall.  Patient presents alone.  She states that just prior to coming to the emergency department, she was out walking her dog.  While walking, she subsequently tripped over a railroad tie, fell forward onto her face.  She notes that she did sustain a laceration to the right anterior aspect of her chin, and did sustain a through and through laceration to this area.  She also notes that she chipped her 2 front teeth with the fall.  She reports some right-sided frontal headache with the fall, denies any loss of consciousness, change in vision, nausea or vomiting.  Patient is not anticoagulated.  She denies any pain in the neck or back.  She denies any associated chest pain, shortness of breath, abdominal pain.  She denies any other pain to her extremities, she did sustain a superficial abrasion to the right knee without any other injury.  She has not had any medication for pain so far.  Last tetanus 2017.    Past Medical History  Past Medical History:   Diagnosis Date     Malignant neoplasm (H)      Past Surgical History:   Procedure Laterality Date     JOINT REPLACEMENT  2018    left knee     PACEMAKER/ICD CHECK       cephALEXin (KEFLEX) 500 MG capsule  calcium carbonate (OS-D[place] 500 MG [place]) 500 MG tablet  calcium polycarbophil (FIBERCON) 625 MG tablet  cetirizine (ZYRTEC) 10 MG tablet  estradiol (ESTRACE) 0.1 MG/GM vaginal cream  levothyroxine (SYNTHROID/LEVOTHROID) 50 MCG tablet  levothyroxine (SYNTHROID/LEVOTHROID) 75 MCG tablet  simvastatin (ZOCOR) 20 MG tablet  UNABLE TO FIND  VITAMIN D, CHOLECALCIFEROL, PO  zolpidem (AMBIEN) 5 MG tablet      Allergies   Allergen Reactions     Gabapentin Swelling and Rash     Face swelling     Family History  Family History   Problem  "Relation Age of Onset     Cancer Mother      Heart Disease Father      Social History   Social History     Tobacco Use     Smoking status: Never     Smokeless tobacco: Never   Substance Use Topics     Alcohol use: Yes     Comment: 1 per night     Drug use: No      Past medical history, past surgical history, medications, allergies, family history, and social history were reviewed with the patient. No additional pertinent items.       Review of Systems  A complete review of systems was performed with pertinent positives and negatives noted in the HPI, and all other systems negative.    Physical Exam   BP: 135/72  Pulse: 73  Temp: 97  F (36.1  C)  Resp: 18  Height: 157.5 cm (5' 2\")  Weight: 62.6 kg (138 lb 0.1 oz)  SpO2: 97 %  Physical Exam    GENERAL APPEARANCE: The patient is well developed, well appearing, and in no acute distress.  HEAD:  Normocephalic and atraumatic.  No raccoon eyes or garcia sign.  Patient does have some ecchymosis across the nasal bridge with some mild tenderness to palpation.  Do not appreciate significant deformity.  Patient has no septal hematoma on exam.  Patient has no tenderness to palpation over the maxillary region bilaterally, frontal sinuses, remainder of the face.  No TMJ tenderness bilaterally.  No tenderness to palpation of the jaw.  EENT: Voice normal.  Pupils equally round reactive to light.  H test negative.  No hemotympanum bilaterally.  Oral mucosa shows visible cracked teeth to teeth number 8 and 9.  She does not have significant tenderness in this area.  Remainder of the teeth without any tenderness or other obvious findings for trauma.  Patient did sustain visible laceration to the chin with a through and through laceration about 2 mm in length running horizontally in the vicinity of tooth number 26/27.  NECK: Trachea is midline.No lymphadenopathy or tenderness.  No tenderness to palpation at midline of cervical thoracic and lumbar spine.  LUNGS: Breath sounds are equal " and clear bilaterally. No wheezes, rhonchi, or rales.  HEART: Regular rate and normal rhythm.  Radial pulses 2+ bilaterally.  No tenderness to palpation of chest wall bilaterally.  ABDOMEN: Soft, flat, and benign. No mass, tenderness, guarding, or rebound.Bowel sounds are present.  EXTREMITIES: No cyanosis, clubbing, or edema.  Patient has no tenderness to palpation of the upper extremities including hands wrists elbows shoulders bilaterally.  No tenderness to palpation of the feet ankles remainder of the lower extremities bilaterally.  Patient did sustain superficial abrasion to the right anterior knee.  No other visible skin findings to suggest of trauma or injury.  NEUROLOGIC: No focal sensory or motor deficits are noted.  Cranial nerves II through XII intact.  Rapid alternating movements, finger-nose testing intact.   strength 5 out of 5 bilaterally.  Resisted plantar flexion dorsiflexion 5 out of 5 bilaterally.  PSYCHIATRIC: The patient is awake, alert.  Appropriate mood and affect.  SKIN: Warm, dry, and well perfused. Good turgor.  Patient has laceration about 2 mm in length horizontally in the vicinity of tooth #26/27 through and through laceration.  Well approximated.    ED [facility]    -Laceration Repair    Date/Time: 11/6/2022 2:50 PM  Performed by: Aspen Saldana PA-C  Authorized by: Aspen Saldana PA-C     Risks, benefits and alternatives discussed.      ANESTHESIA (see MAR for exact dosages):     Anesthesia method:  Local infiltration    Local anesthetic:  Lidocaine 1% WITH epi  LACERATION DETAILS     Location: chin.    Length (cm):  0.2    Depth (mm):  0.1  EXPLORATION:     Hemostasis achieved with:  Epinephrine    Wound exploration: entire depth of wound probed and visualized      Wound extent: no foreign body, no tendon damage and no underlying fracture      Contaminated: no      TREATMENT:     Area cleansed with:  Saline     Amount of cleaning:  Standard    Irrigation solution:  Sterile saline    Irrigation method:  Pressure wash    Visualized foreign bodies/material removed: no      SKIN REPAIR     Repair method:  Sutures    Suture size:  5-0    Suture material:  Nylon    Suture technique:  Simple interrupted    Number of sutures:  1    APPROXIMATION     Approximation:  Close    POST-PROCEDURE DETAILS     Dressing:  Antibiotic ointment and non-adherent dressing        PROCEDURE    Patient Tolerance:  Patient tolerated the procedure well with no immediate complications         Results for orders placed or performed during the hospital encounter of 11/06/22   CT Head w/o Contrast     Status: None    Narrative    CT HEAD W/O CONTRAST 11/6/2022 2:11 PM    History: fall from standing, headache     Comparison: Head CT 5/20/2020    Technique: Using multidetector thin collimation helical acquisition  technique, axial, coronal and sagittal CT images from the skull base  to the vertex were obtained without intravenous contrast.   (topogram) image(s) also obtained and reviewed.    Findings: There is no intracranial hemorrhage, mass effect, or midline  shift. Gray/white matter differentiation in both cerebral hemispheres  is preserved. Ventricles are proportionate to the cerebral sulci. The  basal cisterns are clear.    The bony calvaria and the bones of the skull base are normal. The  visualized portions of the paranasal sinuses and mastoid air cells are  clear. The orbits are unremarkable.      Impression    Impression: No acute intracranial pathology.    I have personally reviewed the examination and initial interpretation  and I agree with the findings.    MELISSA ORTEGA MD         SYSTEM ID:  [id number]   CT Facial Bones without Contrast     Status: None    Narrative    CT FACIAL BONES WITHOUT CONTRAST 11/6/2022 2:11 PM    History:  fall from standing, pain and bruising across nasal bridge    Comparison:  None      Technique: Using thin  collimation multidetector helical acquisition  technique, axial and coronal thin section CT images were reconstructed  through the facial bones. Images were reviewed in bone and soft tissue  windows.    Findings:  There is no significant soft tissue swelling of the face.  There is no evident fracture of the facial bones. The cribriform plate  appears intact. Alignment of the facial bones appears normal.     There is no hematoma, soft tissue mass or gas visualized within the  orbits. Polypoid mucosal thickening in the left maxillary sinus.       Impression    Impression: No facial bone fracture.    I have personally reviewed the examination and initial interpretation  and I agree with the findings.    MELISSA ORTEGA MD         SYSTEM ID:  [id number]     Medications   lidocaine 1% with EPINEPHrine 1:100,000 injection 10 mL (has no administration in time range)   acetaminophen (TYLENOL) tablet 1,000 mg (1,000 mg Oral Given 11/6/22 1331)        Assessments & Plan (with Medical Decision Making)   This is a 76-year-old female presenting with concerns for mechanical fall and subsequent laceration to the chin, damage to the 2 top front teeth.  Patient has findings on exam also of some ecchymosis across the nasal bridge, also mild tenderness in this area.  With patient's age, I did recommend obtaining imaging of the head as well as maxillofacial images to evaluate for possible nasal fracture.  Patient agreeable.  With the nature of patient's laceration, I did discuss with her recommendations for closure, which she was agreeable to here in the ED.    CT head noncontrast as well as maxillofacial CT was ordered, reviewed, and shows no findings for acute intracranial process or facial fracture.  I was able to close patient's wound with copious irrigation at bedside with a single stitch across the outer aspect of the wound.  The mucosal aspect of the wound is well approximated, does not require closure at this time.  I discussed  with her wound care, returning in 5 days for suture removal to urgent care/primary care, and recommendations to follow-up closely with her dentist tomorrow morning.  Patient will be placed on a short course of Keflex for prophylactic coverage.  Patient is up-to-date with tetanus.  We discussed findings for infection, headache red flag signs for which patient would need to return to the ED.  Patient has no other questions or concerns at this time.  Red flag signs were addressed, and they were in agreement with the patient care plan provided.    I have reviewed the nursing notes. I have reviewed the findings, diagnosis, plan and need for follow up with the patient.    Discharge Medication List as of 11/6/2022  3:14 PM      START taking these medications    Details   cephALEXin (KEFLEX) 500 MG capsule Take 1 capsule (500 mg) by mouth 4 times daily for 5 days, Disp-20 capsule, R-0, E-Prescribe             Final diagnoses:   Fall from standing, initial encounter   Chin laceration, initial encounter   Closed fracture of tooth, initial encounter       --  Aspen Shana, PAC  [facility] EMERGENCY DEPARTMENT  11/6/2022

## 2022-11-10 NOTE — PROGRESS NOTES
Assessment & Plan   Problem List Items Addressed This Visit    None  Visit Diagnoses     Visit for suture removal    -  Primary    Relevant Orders    REMOVAL OF SUTURES    Fall, subsequent encounter        Facial laceration, subsequent encounter            Sharona Bravo presents to the clinic for removal of suture and sutures,staples, steri strips. The patient has had sutures and suture in place for 5 days. There has been no patient reported signs or symptoms of infection or drainage. 1  suture and sutures,staples, staple, steri strips are seen and located on the chin. Tetanus status is up to date. All suture and sutures,staples, steri strips were easily removed today. Routine wound care discussed by the provider. The patient will follow up as needed.       Dago Thomas MD  [facility]    Subjective   Pat is a 76 year old presenting for the following health issues:  Derm Problem (1 stitch removal )      HPI   Suture removal from lip  - seen at [facility] ED on 11/6 for a fall  - tripped over a railroad tie while walking her dog  - denies LOC  - ED placed 1 interrupted suture  - started on a short course of keflex  - last tetanus shot in 2017      Review of Systems   Constitutional, HEENT, cardiovascular, pulmonary, gi and gu systems are negative, except as otherwise noted.      Objective    LMP 01/01/2000   There is no height or weight on file to calculate BMI.  Physical Exam   GENERAL: healthy, alert and no distress  NECK: no adenopathy, no asymmetry, masses, or scars and thyroid normal to palpation  RESP: lungs clear to auscultation - no rales, rhonchi or wheezes  CV: regular rate and rhythm, normal S1 S2, no S3 or S4, no murmur, click or rub, no peripheral edema and peripheral pulses strong  ABDOMEN: soft, nontender, no hepatosplenomegaly, no masses and bowel sounds normal  MS: no gross musculoskeletal defects noted, no edema

## 2022-11-11 ENCOUNTER — OFFICE VISIT (OUTPATIENT)
Dept: FAMILY MEDICINE | Facility: CLINIC | Age: 77
End: 2022-11-11
Payer: MEDICARE

## 2022-11-11 VITALS
SYSTOLIC BLOOD PRESSURE: 127 MMHG | OXYGEN SATURATION: 97 % | WEIGHT: 142 LBS | HEART RATE: 66 BPM | RESPIRATION RATE: 15 BRPM | BODY MASS INDEX: 25.97 KG/M2 | DIASTOLIC BLOOD PRESSURE: 82 MMHG | TEMPERATURE: 96.6 F

## 2022-11-11 DIAGNOSIS — Z48.02 VISIT FOR SUTURE REMOVAL: Primary | ICD-10-CM

## 2022-11-11 DIAGNOSIS — S01.81XD FACIAL LACERATION, SUBSEQUENT ENCOUNTER: ICD-10-CM
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
## 2022-11-11 NOTE — NURSING NOTE
76 year old  Chief Complaint   Patient presents with     Derm Problem     1 stitch removal        Blood pressure 127/82, pulse 66, temperature (!) 96.6  F (35.9  C), temperature source Skin, resp. rate 15, weight 64.4 kg (142 lb), last menstrual period 01/01/2000, SpO2 97 %, not currently breastfeeding. Body mass index is 25.97 kg/m .  Patient Active Problem List   Diagnosis     Adenocarcinoma of breast (H)     Allergic rhinitis     Hyperlipidemia LDL goal <100     Acquired hypothyroidism     Heart block     Cardiac pacemaker in situ- Medtronic, dual chamber- NOT dependent (Advisa: MRI conditional)     Diverticulosis of large intestine without hemorrhage     BPPV (benign paroxysmal positional vertigo), unspecified laterality     Primary osteoarthritis of left knee     Tear of medial meniscus of left knee     Patellofemoral syndrome, left     S/P TKR (total knee replacement), left     Medicare annual wellness visit, subsequent     Paresthesias     Atrophic vaginitis     Secondary insomnia     Polyneuropathy       Wt Readings from Last 2 Encounters:   11/11/22 64.4 kg (142 lb)   11/06/22 62.6 kg (138 lb 0.1 oz)     BP Readings from Last 3 Encounters:   11/11/22 127/82   11/06/22 135/72   09/02/22 (!) 142/93         Current Outpatient Medications   Medication     calcium carbonate (OS-DALLAS 500 MG [place]. [place]) 500 MG tablet     calcium polycarbophil (FIBERCON) 625 MG tablet     cephALEXin (KEFLEX) 500 MG capsule     cetirizine (ZYRTEC) 10 MG tablet     estradiol (ESTRACE) 0.1 MG/GM vaginal cream     levothyroxine (SYNTHROID/LEVOTHROID) 50 MCG tablet     levothyroxine (SYNTHROID/LEVOTHROID) 75 MCG tablet     simvastatin (ZOCOR) 20 MG tablet     UNABLE TO FIND     VITAMIN D, CHOLECALCIFEROL, PO     zolpidem (AMBIEN) 5 MG tablet     No current facility-administered medications for this visit.       Social History     Tobacco Use     Smoking status: Never     Smokeless tobacco: Never   Substance Use Topics     Alcohol use: Yes      Comment: 1 per night     Drug use: No       There are no preventive care reminders to display for this patient.    Lab Results   Component Value Date    PAP NIL 12/02/2019 November 11, 2022 1:15 PM

## 2022-11-21 ENCOUNTER — TELEPHONE (OUTPATIENT)
Dept: FAMILY MEDICINE | Facility: CLINIC | Age: 77
End: 2022-11-21

## 2022-11-21 NOTE — TELEPHONE ENCOUNTER
Pt fell on her face a few weeks ago while walking her dog and went to ER for irrigation and stitch below her lip.  Dr. Thomas removed stitch, but pt still has a pea-sized firm lump below lip where stitch was. Has tried icing to decrease the size. She says it is not getting bigger or smaller but staying the same. She is wondering if there could be a foreign object inside the lump.  She did mention her dentist took x-ray of lip (and area beneath lip), so she doesn't think there is anything there.  She is going to dentist tomorrow to get new teeth put in and is wondering if she needs to take antibiotic before procedure because of the oral trauma she already had.  She has completed Cephalexin 5-day therapy after fall.  MELISA Ashby, RN  01/09/23, 3:47 PM

## 2023-01-09 ENCOUNTER — ANCILLARY PROCEDURE (OUTPATIENT)
Dept: CARDIOLOGY | Facility: CLINIC | Age: 78
End: 2023-01-09
Attending: INTERNAL MEDICINE
Payer: MEDICARE

## 2023-01-09 DIAGNOSIS — I44.30 ATRIOVENTRICULAR BLOCK: ICD-10-CM

## 2023-01-09 PROCEDURE — 93294 REM INTERROG EVL PM/LDLS PM: CPT | Performed by: INTERNAL MEDICINE

## 2023-01-09 PROCEDURE — 93296 REM INTERROG EVL PM/IDS: CPT

## 2023-01-11 LAB
MDC_IDC_LEAD_IMPLANT_DT: NORMAL
MDC_IDC_LEAD_IMPLANT_DT: NORMAL
MDC_IDC_LEAD_LOCATION: NORMAL
MDC_IDC_LEAD_LOCATION: NORMAL
MDC_IDC_LEAD_LOCATION_DETAIL_1: NORMAL
MDC_IDC_LEAD_LOCATION_DETAIL_1: NORMAL
MDC_IDC_LEAD_MFG: NORMAL
MDC_IDC_LEAD_MFG: NORMAL
MDC_IDC_LEAD_MODEL: NORMAL
MDC_IDC_LEAD_MODEL: NORMAL
MDC_IDC_LEAD_POLARITY_TYPE: NORMAL
MDC_IDC_LEAD_POLARITY_TYPE: NORMAL
MDC_IDC_LEAD_SERIAL: NORMAL
MDC_IDC_LEAD_SERIAL: NORMAL
MDC_IDC_MSMT_BATTERY_DTM: NORMAL
MDC_IDC_MSMT_BATTERY_REMAINING_LONGEVITY: 23 MO
MDC_IDC_MSMT_BATTERY_RRT_TRIGGER: 2.83
MDC_IDC_MSMT_BATTERY_STATUS: NORMAL
MDC_IDC_MSMT_BATTERY_VOLTAGE: 2.92 V
MDC_IDC_MSMT_LEADCHNL_RA_IMPEDANCE_VALUE: 361 OHM
MDC_IDC_MSMT_LEADCHNL_RA_IMPEDANCE_VALUE: 399 OHM
MDC_IDC_MSMT_LEADCHNL_RA_PACING_THRESHOLD_AMPLITUDE: 0.62 V
MDC_IDC_MSMT_LEADCHNL_RA_PACING_THRESHOLD_PULSEWIDTH: 0.4 MS
MDC_IDC_MSMT_LEADCHNL_RA_SENSING_INTR_AMPL: 2.8 MV
MDC_IDC_MSMT_LEADCHNL_RV_IMPEDANCE_VALUE: 456 OHM
MDC_IDC_MSMT_LEADCHNL_RV_IMPEDANCE_VALUE: 513 OHM
MDC_IDC_MSMT_LEADCHNL_RV_PACING_THRESHOLD_AMPLITUDE: 1.12 V
MDC_IDC_MSMT_LEADCHNL_RV_PACING_THRESHOLD_PULSEWIDTH: 0.4 MS
MDC_IDC_MSMT_LEADCHNL_RV_SENSING_INTR_AMPL: 13.1 MV
MDC_IDC_PG_IMPLANT_DTM: NORMAL
MDC_IDC_PG_MFG: NORMAL
MDC_IDC_PG_MODEL: NORMAL
MDC_IDC_PG_SERIAL: NORMAL
MDC_IDC_PG_TYPE: NORMAL
MDC_IDC_SESS_CLINIC_NAME: NORMAL
MDC_IDC_SESS_DTM: NORMAL
MDC_IDC_SESS_TYPE: NORMAL
MDC_IDC_SET_BRADY_AT_MODE_SWITCH_RATE: 171 {BEATS}/MIN
MDC_IDC_SET_BRADY_HYSTRATE: NORMAL
MDC_IDC_SET_BRADY_LOWRATE: 60 {BEATS}/MIN
MDC_IDC_SET_BRADY_MAX_SENSOR_RATE: 130 {BEATS}/MIN
MDC_IDC_SET_BRADY_MAX_TRACKING_RATE: 130 {BEATS}/MIN
MDC_IDC_SET_BRADY_MODE: NORMAL
MDC_IDC_SET_BRADY_PAV_DELAY_LOW: 220 MS
MDC_IDC_SET_BRADY_SAV_DELAY_LOW: 200 MS
MDC_IDC_SET_LEADCHNL_RA_PACING_AMPLITUDE: 1.5 V
MDC_IDC_SET_LEADCHNL_RA_PACING_ANODE_ELECTRODE_1: NORMAL
MDC_IDC_SET_LEADCHNL_RA_PACING_ANODE_LOCATION_1: NORMAL
MDC_IDC_SET_LEADCHNL_RA_PACING_CAPTURE_MODE: NORMAL
MDC_IDC_SET_LEADCHNL_RA_PACING_CATHODE_ELECTRODE_1: NORMAL
MDC_IDC_SET_LEADCHNL_RA_PACING_CATHODE_LOCATION_1: NORMAL
MDC_IDC_SET_LEADCHNL_RA_PACING_POLARITY: NORMAL
MDC_IDC_SET_LEADCHNL_RA_PACING_PULSEWIDTH: 0.4 MS
MDC_IDC_SET_LEADCHNL_RA_SENSING_ANODE_ELECTRODE_1: NORMAL
MDC_IDC_SET_LEADCHNL_RA_SENSING_ANODE_LOCATION_1: NORMAL
MDC_IDC_SET_LEADCHNL_RA_SENSING_CATHODE_ELECTRODE_1: NORMAL
MDC_IDC_SET_LEADCHNL_RA_SENSING_CATHODE_LOCATION_1: NORMAL
MDC_IDC_SET_LEADCHNL_RA_SENSING_POLARITY: NORMAL
MDC_IDC_SET_LEADCHNL_RA_SENSING_SENSITIVITY: 0.6 MV
MDC_IDC_SET_LEADCHNL_RV_PACING_AMPLITUDE: 2.25 V
MDC_IDC_SET_LEADCHNL_RV_PACING_ANODE_ELECTRODE_1: NORMAL
MDC_IDC_SET_LEADCHNL_RV_PACING_ANODE_LOCATION_1: NORMAL
MDC_IDC_SET_LEADCHNL_RV_PACING_CAPTURE_MODE: NORMAL
MDC_IDC_SET_LEADCHNL_RV_PACING_CATHODE_ELECTRODE_1: NORMAL
MDC_IDC_SET_LEADCHNL_RV_PACING_CATHODE_LOCATION_1: NORMAL
MDC_IDC_SET_LEADCHNL_RV_PACING_POLARITY: NORMAL
MDC_IDC_SET_LEADCHNL_RV_PACING_PULSEWIDTH: 0.4 MS
MDC_IDC_SET_LEADCHNL_RV_SENSING_ANODE_ELECTRODE_1: NORMAL
MDC_IDC_SET_LEADCHNL_RV_SENSING_ANODE_LOCATION_1: NORMAL
MDC_IDC_SET_LEADCHNL_RV_SENSING_CATHODE_ELECTRODE_1: NORMAL
MDC_IDC_SET_LEADCHNL_RV_SENSING_CATHODE_LOCATION_1: NORMAL
MDC_IDC_SET_LEADCHNL_RV_SENSING_POLARITY: NORMAL
MDC_IDC_SET_LEADCHNL_RV_SENSING_SENSITIVITY: 0.9 MV
MDC_IDC_SET_ZONE_DETECTION_INTERVAL: 350 MS
MDC_IDC_SET_ZONE_DETECTION_INTERVAL: 400 MS
MDC_IDC_SET_ZONE_TYPE: NORMAL
MDC_IDC_STAT_AT_BURDEN_PERCENT: 0.1 %
MDC_IDC_STAT_AT_DTM_END: NORMAL
MDC_IDC_STAT_AT_DTM_START: NORMAL
MDC_IDC_STAT_BRADY_AP_VP_PERCENT: 56.89 %
MDC_IDC_STAT_BRADY_AP_VS_PERCENT: 0.01 %
MDC_IDC_STAT_BRADY_AS_VP_PERCENT: 43.03 %
MDC_IDC_STAT_BRADY_AS_VS_PERCENT: 0.08 %
MDC_IDC_STAT_BRADY_DTM_END: NORMAL
MDC_IDC_STAT_BRADY_DTM_START: NORMAL
MDC_IDC_STAT_BRADY_RA_PERCENT_PACED: 56.83 %
MDC_IDC_STAT_BRADY_RV_PERCENT_PACED: 99.89 %
MDC_IDC_STAT_EPISODE_RECENT_COUNT: 0
MDC_IDC_STAT_EPISODE_RECENT_COUNT_DTM_END: NORMAL
MDC_IDC_STAT_EPISODE_RECENT_COUNT_DTM_START: NORMAL
MDC_IDC_STAT_EPISODE_TOTAL_COUNT: 0
MDC_IDC_STAT_EPISODE_TOTAL_COUNT: 0
MDC_IDC_STAT_EPISODE_TOTAL_COUNT: 2
MDC_IDC_STAT_EPISODE_TOTAL_COUNT: 4
MDC_IDC_STAT_EPISODE_TOTAL_COUNT_DTM_END: NORMAL
MDC_IDC_STAT_EPISODE_TOTAL_COUNT_DTM_START: NORMAL
MDC_IDC_STAT_EPISODE_TYPE: NORMAL

## 2023-04-13 ENCOUNTER — ANCILLARY PROCEDURE (OUTPATIENT)
Dept: CARDIOLOGY | Facility: CLINIC | Age: 78
End: 2023-04-13
Attending: INTERNAL MEDICINE
Payer: MEDICARE

## 2023-04-13 DIAGNOSIS — I44.30 AV BLOCK: ICD-10-CM

## 2023-04-13 PROCEDURE — 93294 REM INTERROG EVL PM/LDLS PM: CPT | Performed by: INTERNAL MEDICINE

## 2023-04-13 PROCEDURE — 93296 REM INTERROG EVL PM/IDS: CPT

## 2023-04-18 LAB
MDC_IDC_EPISODE_DTM: NORMAL
MDC_IDC_EPISODE_DTM: NORMAL
MDC_IDC_EPISODE_DURATION: 1 S
MDC_IDC_EPISODE_DURATION: 3 S
MDC_IDC_EPISODE_ID: 7
MDC_IDC_EPISODE_ID: 8
MDC_IDC_EPISODE_TYPE: NORMAL
MDC_IDC_EPISODE_TYPE: NORMAL
MDC_IDC_LEAD_IMPLANT_DT: NORMAL
MDC_IDC_LEAD_IMPLANT_DT: NORMAL
MDC_IDC_LEAD_LOCATION: NORMAL
MDC_IDC_LEAD_LOCATION: NORMAL
MDC_IDC_LEAD_LOCATION_DETAIL_1: NORMAL
MDC_IDC_LEAD_LOCATION_DETAIL_1: NORMAL
MDC_IDC_LEAD_MFG: NORMAL
MDC_IDC_LEAD_MFG: NORMAL
MDC_IDC_LEAD_MODEL: NORMAL
MDC_IDC_LEAD_MODEL: NORMAL
MDC_IDC_LEAD_POLARITY_TYPE: NORMAL
MDC_IDC_LEAD_POLARITY_TYPE: NORMAL
MDC_IDC_LEAD_SERIAL: NORMAL
MDC_IDC_LEAD_SERIAL: NORMAL
MDC_IDC_MSMT_BATTERY_DTM: NORMAL
MDC_IDC_MSMT_BATTERY_REMAINING_LONGEVITY: 21 MO
MDC_IDC_MSMT_BATTERY_RRT_TRIGGER: 2.83
MDC_IDC_MSMT_BATTERY_STATUS: NORMAL
MDC_IDC_MSMT_BATTERY_VOLTAGE: 2.91 V
MDC_IDC_MSMT_LEADCHNL_RA_IMPEDANCE_VALUE: 361 OHM
MDC_IDC_MSMT_LEADCHNL_RA_IMPEDANCE_VALUE: 399 OHM
MDC_IDC_MSMT_LEADCHNL_RA_PACING_THRESHOLD_AMPLITUDE: 0.62 V
MDC_IDC_MSMT_LEADCHNL_RA_PACING_THRESHOLD_PULSEWIDTH: 0.4 MS
MDC_IDC_MSMT_LEADCHNL_RA_SENSING_INTR_AMPL: 0.88 MV
MDC_IDC_MSMT_LEADCHNL_RV_IMPEDANCE_VALUE: 437 OHM
MDC_IDC_MSMT_LEADCHNL_RV_IMPEDANCE_VALUE: 494 OHM
MDC_IDC_MSMT_LEADCHNL_RV_PACING_THRESHOLD_AMPLITUDE: 0.75 V
MDC_IDC_MSMT_LEADCHNL_RV_PACING_THRESHOLD_PULSEWIDTH: 0.4 MS
MDC_IDC_MSMT_LEADCHNL_RV_SENSING_INTR_AMPL: 21.25 MV
MDC_IDC_PG_IMPLANT_DTM: NORMAL
MDC_IDC_PG_MFG: NORMAL
MDC_IDC_PG_MODEL: NORMAL
MDC_IDC_PG_SERIAL: NORMAL
MDC_IDC_PG_TYPE: NORMAL
MDC_IDC_SESS_CLINIC_NAME: NORMAL
MDC_IDC_SESS_DTM: NORMAL
MDC_IDC_SESS_TYPE: NORMAL
MDC_IDC_SET_BRADY_AT_MODE_SWITCH_RATE: 171 {BEATS}/MIN
MDC_IDC_SET_BRADY_HYSTRATE: NORMAL
MDC_IDC_SET_BRADY_LOWRATE: 60 {BEATS}/MIN
MDC_IDC_SET_BRADY_MAX_SENSOR_RATE: 130 {BEATS}/MIN
MDC_IDC_SET_BRADY_MAX_TRACKING_RATE: 130 {BEATS}/MIN
MDC_IDC_SET_BRADY_MODE: NORMAL
MDC_IDC_SET_BRADY_PAV_DELAY_LOW: 220 MS
MDC_IDC_SET_BRADY_SAV_DELAY_LOW: 200 MS
MDC_IDC_SET_LEADCHNL_RA_PACING_AMPLITUDE: 1.5 V
MDC_IDC_SET_LEADCHNL_RA_PACING_ANODE_ELECTRODE_1: NORMAL
MDC_IDC_SET_LEADCHNL_RA_PACING_ANODE_LOCATION_1: NORMAL
MDC_IDC_SET_LEADCHNL_RA_PACING_CAPTURE_MODE: NORMAL
MDC_IDC_SET_LEADCHNL_RA_PACING_CATHODE_ELECTRODE_1: NORMAL
MDC_IDC_SET_LEADCHNL_RA_PACING_CATHODE_LOCATION_1: NORMAL
MDC_IDC_SET_LEADCHNL_RA_PACING_POLARITY: NORMAL
MDC_IDC_SET_LEADCHNL_RA_PACING_PULSEWIDTH: 0.4 MS
MDC_IDC_SET_LEADCHNL_RA_SENSING_ANODE_ELECTRODE_1: NORMAL
MDC_IDC_SET_LEADCHNL_RA_SENSING_ANODE_LOCATION_1: NORMAL
MDC_IDC_SET_LEADCHNL_RA_SENSING_CATHODE_ELECTRODE_1: NORMAL
MDC_IDC_SET_LEADCHNL_RA_SENSING_CATHODE_LOCATION_1: NORMAL
MDC_IDC_SET_LEADCHNL_RA_SENSING_POLARITY: NORMAL
MDC_IDC_SET_LEADCHNL_RA_SENSING_SENSITIVITY: 0.6 MV
MDC_IDC_SET_LEADCHNL_RV_PACING_AMPLITUDE: 2.5 V
MDC_IDC_SET_LEADCHNL_RV_PACING_ANODE_ELECTRODE_1: NORMAL
MDC_IDC_SET_LEADCHNL_RV_PACING_ANODE_LOCATION_1: NORMAL
MDC_IDC_SET_LEADCHNL_RV_PACING_CAPTURE_MODE: NORMAL
MDC_IDC_SET_LEADCHNL_RV_PACING_CATHODE_ELECTRODE_1: NORMAL
MDC_IDC_SET_LEADCHNL_RV_PACING_CATHODE_LOCATION_1: NORMAL
MDC_IDC_SET_LEADCHNL_RV_PACING_POLARITY: NORMAL
MDC_IDC_SET_LEADCHNL_RV_PACING_PULSEWIDTH: 0.4 MS
MDC_IDC_SET_LEADCHNL_RV_SENSING_ANODE_ELECTRODE_1: NORMAL
MDC_IDC_SET_LEADCHNL_RV_SENSING_ANODE_LOCATION_1: NORMAL
MDC_IDC_SET_LEADCHNL_RV_SENSING_CATHODE_ELECTRODE_1: NORMAL
MDC_IDC_SET_LEADCHNL_RV_SENSING_CATHODE_LOCATION_1: NORMAL
MDC_IDC_SET_LEADCHNL_RV_SENSING_POLARITY: NORMAL
MDC_IDC_SET_LEADCHNL_RV_SENSING_SENSITIVITY: 0.9 MV
MDC_IDC_SET_ZONE_DETECTION_INTERVAL: 350 MS
MDC_IDC_SET_ZONE_DETECTION_INTERVAL: 400 MS
MDC_IDC_SET_ZONE_TYPE: NORMAL
MDC_IDC_STAT_AT_BURDEN_PERCENT: 0.1 %
MDC_IDC_STAT_AT_DTM_END: NORMAL
MDC_IDC_STAT_AT_DTM_START: NORMAL
MDC_IDC_STAT_BRADY_AP_VP_PERCENT: 53.96 %
MDC_IDC_STAT_BRADY_AP_VS_PERCENT: 0 %
MDC_IDC_STAT_BRADY_AS_VP_PERCENT: 46 %
MDC_IDC_STAT_BRADY_AS_VS_PERCENT: 0.03 %
MDC_IDC_STAT_BRADY_DTM_END: NORMAL
MDC_IDC_STAT_BRADY_DTM_START: NORMAL
MDC_IDC_STAT_BRADY_RA_PERCENT_PACED: 53.94 %
MDC_IDC_STAT_BRADY_RV_PERCENT_PACED: 99.94 %
MDC_IDC_STAT_EPISODE_RECENT_COUNT: 0
MDC_IDC_STAT_EPISODE_RECENT_COUNT: 2
MDC_IDC_STAT_EPISODE_RECENT_COUNT_DTM_END: NORMAL
MDC_IDC_STAT_EPISODE_RECENT_COUNT_DTM_START: NORMAL
MDC_IDC_STAT_EPISODE_TOTAL_COUNT: 0
MDC_IDC_STAT_EPISODE_TOTAL_COUNT: 0
MDC_IDC_STAT_EPISODE_TOTAL_COUNT: 2
MDC_IDC_STAT_EPISODE_TOTAL_COUNT: 6
MDC_IDC_STAT_EPISODE_TOTAL_COUNT_DTM_END: NORMAL
MDC_IDC_STAT_EPISODE_TOTAL_COUNT_DTM_START: NORMAL
MDC_IDC_STAT_EPISODE_TYPE: NORMAL

## 2023-04-20 ENCOUNTER — TRANSFERRED RECORDS (OUTPATIENT)
Dept: HEALTH INFORMATION MANAGEMENT | Facility: CLINIC | Age: 78
End: 2023-04-20
Payer: MEDICARE

## 2023-04-20 LAB
ALT SERPL-CCNC: 13 U/L (ref 6–29)
AST SERPL-CCNC: 19 U/L (ref 10–35)
CHOLESTEROL (EXTERNAL): 183 MG/DL
CREATININE (EXTERNAL): 0.98 MG/DL (ref 0.6–1)
GFR ESTIMATED (EXTERNAL): 60 ML/MIN/1.73M2
GLUCOSE (EXTERNAL): 88 MG/DL (ref 65–99)
HDLC SERPL-MCNC: 76 MG/DL
LDL CHOLESTEROL CALCULATED (EXTERNAL): 92 MG/DL
NON HDL CHOLESTEROL (EXTERNAL): 107 MG/DL
POTASSIUM (EXTERNAL): 4.4 MMOL/L (ref 3.5–5.3)
TRIGLYCERIDES (EXTERNAL): 65 MG/DL
TSH SERPL-ACNC: 2.16 MIU/L (ref 0.4–4.5)

## 2023-05-24 ENCOUNTER — TRANSFERRED RECORDS (OUTPATIENT)
Dept: HEALTH INFORMATION MANAGEMENT | Facility: CLINIC | Age: 78
End: 2023-05-24
Payer: MEDICARE

## 2023-07-18 ENCOUNTER — ANCILLARY PROCEDURE (OUTPATIENT)
Dept: CARDIOLOGY | Facility: CLINIC | Age: 78
End: 2023-07-18
Attending: INTERNAL MEDICINE
Payer: MEDICARE

## 2023-07-18 DIAGNOSIS — I44.30 AV BLOCK: ICD-10-CM

## 2023-07-18 PROCEDURE — 93294 REM INTERROG EVL PM/LDLS PM: CPT | Performed by: INTERNAL MEDICINE

## 2023-07-18 PROCEDURE — 93296 REM INTERROG EVL PM/IDS: CPT

## 2023-09-14 ENCOUNTER — ANCILLARY PROCEDURE (OUTPATIENT)
Dept: MAMMOGRAPHY | Facility: CLINIC | Age: 78
End: 2023-09-14
Payer: MEDICARE

## 2023-09-14 DIAGNOSIS — Z12.31 VISIT FOR SCREENING MAMMOGRAM: ICD-10-CM

## 2023-09-14 PROCEDURE — 77063 BREAST TOMOSYNTHESIS BI: CPT | Mod: GC

## 2023-09-14 PROCEDURE — 77067 SCR MAMMO BI INCL CAD: CPT | Mod: GC

## 2023-09-22 LAB
MDC_IDC_EPISODE_DTM: NORMAL
MDC_IDC_EPISODE_DURATION: 1 S
MDC_IDC_EPISODE_ID: 9
MDC_IDC_EPISODE_TYPE: NORMAL
MDC_IDC_LEAD_IMPLANT_DT: NORMAL
MDC_IDC_LEAD_IMPLANT_DT: NORMAL
MDC_IDC_LEAD_LOCATION: NORMAL
MDC_IDC_LEAD_LOCATION: NORMAL
MDC_IDC_LEAD_LOCATION_DETAIL_1: NORMAL
MDC_IDC_LEAD_LOCATION_DETAIL_1: NORMAL
MDC_IDC_LEAD_MFG: NORMAL
MDC_IDC_LEAD_MFG: NORMAL
MDC_IDC_LEAD_MODEL: NORMAL
MDC_IDC_LEAD_MODEL: NORMAL
MDC_IDC_LEAD_POLARITY_TYPE: NORMAL
MDC_IDC_LEAD_POLARITY_TYPE: NORMAL
MDC_IDC_LEAD_SERIAL: NORMAL
MDC_IDC_LEAD_SERIAL: NORMAL
MDC_IDC_MSMT_BATTERY_DTM: NORMAL
MDC_IDC_MSMT_BATTERY_REMAINING_LONGEVITY: 17 MO
MDC_IDC_MSMT_BATTERY_RRT_TRIGGER: 2.83
MDC_IDC_MSMT_BATTERY_STATUS: NORMAL
MDC_IDC_MSMT_BATTERY_VOLTAGE: 2.91 V
MDC_IDC_MSMT_LEADCHNL_RA_IMPEDANCE_VALUE: 361 OHM
MDC_IDC_MSMT_LEADCHNL_RA_IMPEDANCE_VALUE: 399 OHM
MDC_IDC_MSMT_LEADCHNL_RA_PACING_THRESHOLD_AMPLITUDE: 0.5 V
MDC_IDC_MSMT_LEADCHNL_RA_PACING_THRESHOLD_PULSEWIDTH: 0.4 MS
MDC_IDC_MSMT_LEADCHNL_RA_SENSING_INTR_AMPL: 3.25 MV
MDC_IDC_MSMT_LEADCHNL_RV_IMPEDANCE_VALUE: 437 OHM
MDC_IDC_MSMT_LEADCHNL_RV_IMPEDANCE_VALUE: 494 OHM
MDC_IDC_MSMT_LEADCHNL_RV_PACING_THRESHOLD_AMPLITUDE: 1 V
MDC_IDC_MSMT_LEADCHNL_RV_PACING_THRESHOLD_PULSEWIDTH: 0.4 MS
MDC_IDC_MSMT_LEADCHNL_RV_SENSING_INTR_AMPL: 20.38 MV
MDC_IDC_PG_IMPLANT_DTM: NORMAL
MDC_IDC_PG_MFG: NORMAL
MDC_IDC_PG_MODEL: NORMAL
MDC_IDC_PG_SERIAL: NORMAL
MDC_IDC_PG_TYPE: NORMAL
MDC_IDC_SESS_CLINIC_NAME: NORMAL
MDC_IDC_SESS_DTM: NORMAL
MDC_IDC_SESS_TYPE: NORMAL
MDC_IDC_SET_BRADY_AT_MODE_SWITCH_RATE: 171 {BEATS}/MIN
MDC_IDC_SET_BRADY_HYSTRATE: NORMAL
MDC_IDC_SET_BRADY_LOWRATE: 60 {BEATS}/MIN
MDC_IDC_SET_BRADY_MAX_SENSOR_RATE: 130 {BEATS}/MIN
MDC_IDC_SET_BRADY_MAX_TRACKING_RATE: 130 {BEATS}/MIN
MDC_IDC_SET_BRADY_MODE: NORMAL
MDC_IDC_SET_BRADY_PAV_DELAY_LOW: 220 MS
MDC_IDC_SET_BRADY_SAV_DELAY_LOW: 200 MS
MDC_IDC_SET_LEADCHNL_RA_PACING_AMPLITUDE: 1.5 V
MDC_IDC_SET_LEADCHNL_RA_PACING_ANODE_ELECTRODE_1: NORMAL
MDC_IDC_SET_LEADCHNL_RA_PACING_ANODE_LOCATION_1: NORMAL
MDC_IDC_SET_LEADCHNL_RA_PACING_CAPTURE_MODE: NORMAL
MDC_IDC_SET_LEADCHNL_RA_PACING_CATHODE_ELECTRODE_1: NORMAL
MDC_IDC_SET_LEADCHNL_RA_PACING_CATHODE_LOCATION_1: NORMAL
MDC_IDC_SET_LEADCHNL_RA_PACING_POLARITY: NORMAL
MDC_IDC_SET_LEADCHNL_RA_PACING_PULSEWIDTH: 0.4 MS
MDC_IDC_SET_LEADCHNL_RA_SENSING_ANODE_ELECTRODE_1: NORMAL
MDC_IDC_SET_LEADCHNL_RA_SENSING_ANODE_LOCATION_1: NORMAL
MDC_IDC_SET_LEADCHNL_RA_SENSING_CATHODE_ELECTRODE_1: NORMAL
MDC_IDC_SET_LEADCHNL_RA_SENSING_CATHODE_LOCATION_1: NORMAL
MDC_IDC_SET_LEADCHNL_RA_SENSING_POLARITY: NORMAL
MDC_IDC_SET_LEADCHNL_RA_SENSING_SENSITIVITY: 0.6 MV
MDC_IDC_SET_LEADCHNL_RV_PACING_AMPLITUDE: 2.25 V
MDC_IDC_SET_LEADCHNL_RV_PACING_ANODE_ELECTRODE_1: NORMAL
MDC_IDC_SET_LEADCHNL_RV_PACING_ANODE_LOCATION_1: NORMAL
MDC_IDC_SET_LEADCHNL_RV_PACING_CAPTURE_MODE: NORMAL
MDC_IDC_SET_LEADCHNL_RV_PACING_CATHODE_ELECTRODE_1: NORMAL
MDC_IDC_SET_LEADCHNL_RV_PACING_CATHODE_LOCATION_1: NORMAL
MDC_IDC_SET_LEADCHNL_RV_PACING_POLARITY: NORMAL
MDC_IDC_SET_LEADCHNL_RV_PACING_PULSEWIDTH: 0.4 MS
MDC_IDC_SET_LEADCHNL_RV_SENSING_ANODE_ELECTRODE_1: NORMAL
MDC_IDC_SET_LEADCHNL_RV_SENSING_ANODE_LOCATION_1: NORMAL
MDC_IDC_SET_LEADCHNL_RV_SENSING_CATHODE_ELECTRODE_1: NORMAL
MDC_IDC_SET_LEADCHNL_RV_SENSING_CATHODE_LOCATION_1: NORMAL
MDC_IDC_SET_LEADCHNL_RV_SENSING_POLARITY: NORMAL
MDC_IDC_SET_LEADCHNL_RV_SENSING_SENSITIVITY: 0.9 MV
MDC_IDC_SET_ZONE_DETECTION_INTERVAL: 350 MS
MDC_IDC_SET_ZONE_DETECTION_INTERVAL: 400 MS
MDC_IDC_SET_ZONE_TYPE: NORMAL
MDC_IDC_STAT_AT_BURDEN_PERCENT: 0.1 %
MDC_IDC_STAT_AT_DTM_END: NORMAL
MDC_IDC_STAT_AT_DTM_START: NORMAL
MDC_IDC_STAT_BRADY_AP_VP_PERCENT: 54.5 %
MDC_IDC_STAT_BRADY_AP_VS_PERCENT: 0 %
MDC_IDC_STAT_BRADY_AS_VP_PERCENT: 45.47 %
MDC_IDC_STAT_BRADY_AS_VS_PERCENT: 0.02 %
MDC_IDC_STAT_BRADY_DTM_END: NORMAL
MDC_IDC_STAT_BRADY_DTM_START: NORMAL
MDC_IDC_STAT_BRADY_RA_PERCENT_PACED: 54.47 %
MDC_IDC_STAT_BRADY_RV_PERCENT_PACED: 99.95 %
MDC_IDC_STAT_EPISODE_RECENT_COUNT: 0
MDC_IDC_STAT_EPISODE_RECENT_COUNT: 1
MDC_IDC_STAT_EPISODE_RECENT_COUNT_DTM_END: NORMAL
MDC_IDC_STAT_EPISODE_RECENT_COUNT_DTM_START: NORMAL
MDC_IDC_STAT_EPISODE_TOTAL_COUNT: 0
MDC_IDC_STAT_EPISODE_TOTAL_COUNT: 0
MDC_IDC_STAT_EPISODE_TOTAL_COUNT: 2
MDC_IDC_STAT_EPISODE_TOTAL_COUNT: 7
MDC_IDC_STAT_EPISODE_TOTAL_COUNT_DTM_END: NORMAL
MDC_IDC_STAT_EPISODE_TOTAL_COUNT_DTM_START: NORMAL
MDC_IDC_STAT_EPISODE_TYPE: NORMAL

## 2023-10-03 NOTE — PROGRESS NOTES
[facility] [address], [address]  [facility] [address]  Phone: [phone]  Fax: [phone]  Primary Provider: Candelario Jean  Pre-op Performing Provider: CANDELARIO JEAN      PREOPERATIVE EVALUATION:  Today's date: 10/4/2023    Kristal is a 77 year old female who presents for a preoperative evaluation.      Surgical Information:  Surgery/Procedure: RIGHT hollux valgus deformity; bunion surgery  Surgery Location: [facility]  Surgeon: Yinka Rizo MD  Surgery Date: 10/09/2023  Time of Surgery: 0815  Where patient plans to recover: At home with family  Fax number for surgical facility: [phone]    Assessment & Plan     The proposed surgical procedure is considered LOW risk.    Pre-op evaluation  - EKG 12-lead complete w/read; paced, RBBB (old); no change    Heart block; stable  - EKG 12-lead complete w/read - as above    Cardiac pacemaker in situ- Medtronic, dual chamber- NOT dependent (Advisa: MRI conditional)  - EKG 12-lead complete w/read - as above     - No identified additional risk factors other than previously addressed    Antiplatelet or Anticoagulation Medication Instructions:   - Patient is on no antiplatelet or anticoagulation medications.    Additional Medication Instructions:  Patient is to take all scheduled medications on the day of surgery    RECOMMENDATION:  APPROVAL GIVEN to proceed with proposed procedure, without further diagnostic evaluation.    Subjective       HPI related to upcoming procedure: Ongoing discomfort due to bunion.  Looking forward to having this taken care of.        9/28/2023    10:06 AM   Preop Questions   1. Have you ever had a heart attack or stroke? No   2. Have you ever had surgery on your heart or blood vessels, such as a stent placement, a coronary artery bypass, or surgery on an artery in your head, neck, heart, or legs? No   3. Do you have chest pain with activity? No   4. Do you have a  history of  heart failure? No   5. Do you currently have a cold, bronchitis or symptoms of other infection? No   6. Do you have a cough, shortness of breath, or wheezing? No   7. Do you or anyone in your family have previous history of blood clots? No   8. Do you or does anyone in your family have a serious bleeding problem such as prolonged bleeding following surgeries or cuts? No   9. Have you ever had problems with anemia or been told to take iron pills? No   10. Have you had any abnormal blood loss such as black, tarry or bloody stools, or abnormal vaginal bleeding? No   11. Have you ever had a blood transfusion? No   12. Are you willing to have a blood transfusion if it is medically needed before, during, or after your surgery? Yes   13. Have you or any of your relatives ever had problems with anesthesia? No   14. Do you have sleep apnea, excessive snoring or daytime drowsiness? No   15. Do you have any artifical heart valves or other implanted medical devices like a pacemaker, defibrillator, or continuous glucose monitor? YES - Pacemaker   15a. What type of device do you have? Pacemaker   15b. Name of the clinic that manages your device:  [facility]   16. Do you have artificial joints? YES    17. Are you allergic to latex? No       Health Care Directive:  Patient has a Health Care Directive on file    Review of Systems  CONSTITUTIONAL: NEGATIVE for fever, chills, change in weight  INTEGUMENTARY/SKIN: NEGATIVE for worrisome rashes, moles or lesions  EYES: NEGATIVE for vision changes or irritation  ENT/MOUTH: NEGATIVE for ear, mouth and throat problems  RESP: NEGATIVE for significant cough or SOB  CV: NEGATIVE for chest pain, palpitations or peripheral edema  GI: NEGATIVE for nausea, abdominal pain, heartburn, or change in bowel habits  : NEGATIVE for frequency, dysuria, or hematuria  MUSCULOSKELETAL: NEGATIVE for significant arthralgias or myalgia  NEURO: NEGATIVE for weakness, dizziness  or paresthesias  ENDOCRINE: NEGATIVE for temperature intolerance, skin/hair changes  HEME: NEGATIVE for bleeding problems  PSYCHIATRIC: NEGATIVE for changes in mood or affect    Patient Active Problem List    Diagnosis Date Noted    Polyneuropathy 07/13/2022     Priority: Medium    Atrophic vaginitis 06/09/2021     Priority: Medium    Secondary insomnia 06/09/2021     Priority: Medium    Paresthesias 06/22/2020     Priority: Medium     6/15/20 EMG/NCS  Results:  Nerve conduction studies:  1. Left median-D2 sensory response shows normal amplitude and mildly slowed CV.   2. Left ulnar-D5, left sural, and bilateral superficial peroneal sensory responses are normal.   3. Left median-ulnar palmar interlatency difference is prolonged.   4. Left median-APB, left ulnar-ADM, bilateral peroneal-EDB, and left tibial-AH motor responses are normal.   5. Bilateral peroneal-TA motor responses are normal and symmetric.      Needle EMG of selected proximal and distal left lower limb muscles was performed as tabulated below. No abnormal spontaneous activity was observed in the sampled muscles. Motor unit potential morphology and recruitment patterns were normal.      Interpretation:  This is an abnormal study. There is electrophysiologic evidence of a mild left-sided median neuropathy at the wrist (e.g., carpal tunnel syndrome). There is no evidence of a large-fiber polyneuropathy, focal neuropathy, or lumbosacral radiculopathy affecting the left lower limb on the basis of this study. Clinical correlation is recommended.       Medicare annual wellness visit, subsequent 01/28/2020     Priority: Medium    S/P TKR (total knee replacement), left 05/15/2018     Priority: Medium    Primary osteoarthritis of left knee 06/26/2017     Priority: Medium    Diverticulosis of large intestine without hemorrhage 08/01/2016     Priority: Medium    BPPV (benign paroxysmal positional vertigo), unspecified laterality 08/01/2016     Priority: Medium     Tear of medial meniscus of left knee 04/18/2016     Priority: Medium    Cardiac pacemaker in situ- Medtronic, dual chamber- NOT dependent (Advisa: MRI conditional) 10/08/2015     Priority: Medium    Heart block 06/22/2015     Priority: Medium    Patellofemoral syndrome, left 02/20/2015     Priority: Medium    Allergic rhinitis 10/16/2012     Priority: Medium    Hyperlipidemia LDL goal <100 10/16/2012     Priority: Medium    Acquired hypothyroidism 10/16/2012     Priority: Medium    Adenocarcinoma of breast (H) 02/20/2012     Priority: Medium      Past Medical History:   Diagnosis Date    Malignant neoplasm (H)      Past Surgical History:   Procedure Laterality Date    JOINT REPLACEMENT  2018    left knee    PACEMAKER/ICD CHECK       Current Outpatient Medications   Medication Sig Dispense Refill    calcium carbonate (OS-DALLAS 500 MG [place]. [place]) 500 MG tablet Take 600 mg by mouth daily       calcium polycarbophil (FIBERCON) 625 MG tablet 2 times daily       cetirizine (ZYRTEC) 10 MG tablet Take 10 mg by mouth daily      estradiol (ESTRACE) 0.1 MG/GM vaginal cream Place 1 g vaginally three times a week Place 1g vaginally nightly x 2 weeks and then 2-3 times per week for maintenance 42.5 g 4    levothyroxine (SYNTHROID/LEVOTHROID) 50 MCG tablet Take 1 tablet (50 mcg) by mouth every other day Alternating with 75 mcg 90 tablet 3    levothyroxine (SYNTHROID/LEVOTHROID) 75 MCG tablet Take 1 tablet (75 mcg) by mouth every other day Alternating with 50 mcg 90 tablet 3    simvastatin (ZOCOR) 20 MG tablet Take 1 tablet (20 mg) by mouth At Bedtime 90 tablet 4    UNABLE TO FIND MEDICATION NAME: Tumeric 1000mg once daily      VITAMIN D, CHOLECALCIFEROL, PO Take 1,000 Units by mouth daily       zolpidem (AMBIEN) 5 MG tablet Take 1 tablet (5 mg) by mouth nightly as needed for sleep 30 tablet 1       Allergies   Allergen Reactions    Gabapentin Swelling and Rash     Face swelling        Social History     Tobacco Use    Smoking status:  Never    Smokeless tobacco: Never   Substance Use Topics    Alcohol use: Yes     Comment: 1 per night     FH:  No anesthetic complications, no bleeding tendencies.    History   Drug Use No         Objective     LMP 01/01/2000     Physical Exam    GENERAL APPEARANCE: healthy, alert and no distress     EYES: EOMI, PERRL     HENT: ear canals and TM's normal and nose and mouth without ulcers or lesions     NECK: no adenopathy, no asymmetry, masses, or scars and thyroid normal to palpation     RESP: lungs clear to auscultation - no rales, rhonchi or wheezes     CV: regular rates and rhythm, normal S1 S2, no S3 or S4 and no murmur, click or rub.  PM felt/seen in left chest wall     MS: extremities normal- no gross deformities noted, no evidence of inflammation in joints, FROM in all extremities.     SKIN: no suspicious lesions or rashes     NEURO: Normal strength and tone, sensory exam grossly normal, mentation intact and speech normal     PSYCH: mentation appears normal. and affect normal/bright     LYMPHATICS: No cervical adenopathy    Recent Labs   Lab Test 09/02/22  0943 07/13/22  1652 01/31/22  1109     --  138   POTASSIUM 4.4  --  4.3   CR 0.80  --  1.03   A1C  --  5.4  --         Diagnostics:  No labs were ordered during this visit.   EKG: Paced, Right Bundle Branch Block, unchanged from previous tracings    Revised Cardiac Risk Index (RCRI):  The patient has the following serious cardiovascular risks for perioperative complications:   - No serious cardiac risks = 0 points     RCRI Interpretation: 0 points: Class I (very low risk - 0.4% complication rate)         Signed Electronically by: Ruslan Jean MD  Copy of this evaluation report is provided to requesting physician.

## 2023-10-04 ENCOUNTER — OFFICE VISIT (OUTPATIENT)
Dept: FAMILY MEDICINE | Facility: CLINIC | Age: 78
End: 2023-10-04
Payer: MEDICARE

## 2023-10-04 VITALS
TEMPERATURE: 97.1 F | DIASTOLIC BLOOD PRESSURE: 83 MMHG | OXYGEN SATURATION: 97 % | WEIGHT: 141 LBS | HEART RATE: 65 BPM | BODY MASS INDEX: 25.95 KG/M2 | HEIGHT: 62 IN | SYSTOLIC BLOOD PRESSURE: 138 MMHG

## 2023-10-04 DIAGNOSIS — I45.9 HEART BLOCK: ICD-10-CM

## 2023-10-04 DIAGNOSIS — Z01.818 PRE-OP EVALUATION: Primary | ICD-10-CM

## 2023-10-04 DIAGNOSIS — Z95.0 CARDIAC PACEMAKER IN SITU: ICD-10-CM

## 2023-10-04 RX ORDER — VIT C/B6/B5/MAGNESIUM/HERB 173 50-5-6-5MG
500 CAPSULE ORAL 2 TIMES DAILY
COMMUNITY
End: 2024-07-26

## 2023-10-17 DIAGNOSIS — E78.5 HYPERLIPIDEMIA LDL GOAL <100: ICD-10-CM

## 2023-10-17 DIAGNOSIS — E03.9 ACQUIRED HYPOTHYROIDISM: ICD-10-CM

## 2023-10-18 RX ORDER — SIMVASTATIN 20 MG
20 TABLET ORAL AT BEDTIME
Qty: 90 TABLET | Refills: 3 | Status: SHIPPED | OUTPATIENT
Start: 2023-10-18 | End: 2024-09-23

## 2023-10-18 RX ORDER — LEVOTHYROXINE SODIUM 75 UG/1
75 TABLET ORAL EVERY OTHER DAY
Qty: 90 TABLET | Refills: 3 | Status: SHIPPED | OUTPATIENT
Start: 2023-10-18

## 2023-10-18 RX ORDER — LEVOTHYROXINE SODIUM 50 UG/1
50 TABLET ORAL EVERY OTHER DAY
Qty: 90 TABLET | Refills: 3 | Status: SHIPPED | OUTPATIENT
Start: 2023-10-18

## 2023-10-23 ENCOUNTER — ANCILLARY PROCEDURE (OUTPATIENT)
Dept: CARDIOLOGY | Facility: CLINIC | Age: 78
End: 2023-10-23
Attending: INTERNAL MEDICINE
Payer: MEDICARE

## 2023-10-23 DIAGNOSIS — I44.30 AV BLOCK: ICD-10-CM

## 2023-10-23 PROCEDURE — 93296 REM INTERROG EVL PM/IDS: CPT

## 2023-10-23 PROCEDURE — 93294 REM INTERROG EVL PM/LDLS PM: CPT | Performed by: INTERNAL MEDICINE

## 2023-10-27 ENCOUNTER — ALLIED HEALTH/NURSE VISIT (OUTPATIENT)
Dept: FAMILY MEDICINE | Facility: CLINIC | Age: 78
End: 2023-10-27
Payer: MEDICARE

## 2023-10-27 DIAGNOSIS — Z23 NEED FOR PROPHYLACTIC VACCINATION AND INOCULATION AGAINST INFLUENZA: Primary | ICD-10-CM

## 2023-10-27 DIAGNOSIS — Z23 HIGH PRIORITY FOR 2019-NCOV VACCINE: ICD-10-CM

## 2023-10-27 NOTE — PROGRESS NOTES
"Sharona Bravo comes into the clinic for a vaccination(s): COVID-19 and Flu vaccines, as advised by their PCP, Dr. Ruslan Jean  The service provided today was under the supervising provider, Dr. Yang Aceves, who was available if needed.    Patient was instructed to wait in clinic for 15 minutes to monitor for adverse effects.    Nataly Dan LPN  9:46 AM October 27, 2023    Injectable Influenza Immunization Documentation    1.  Has the patient received the information for the injectable influenza vaccine? YES     2. Is the patient 6 months of age or older? YES     3. Does the patient have any of the following contraindications?         Severe allergy to eggs? No     Severe allergic reaction to previous influenza vaccines? No   Severe allergy to latex? No       History of Guillain-[place] syndrome? No     Currently have a temperature greater than 100.4F? No        4.  Severely egg allergic patients should have flu vaccine eligibility assessed by an MD, RN, or pharmacist, and those who received flu vaccine should be observed for 15 min by an MD, RN, Pharmacist, Medical Technician, or member of clinic staff.\": YES    5. Latex-allergic patients should be given latex-free influenza vaccine Yes. Please reference the Vaccine latex table to determine if your clinic s product is latex-containing.       Vaccination given by Nataly Dan LPN on 10/27/2023 at 9:49 AM        "

## 2023-11-04 ENCOUNTER — HEALTH MAINTENANCE LETTER (OUTPATIENT)
Age: 78
End: 2023-11-04

## 2023-11-06 LAB
MDC_IDC_EPISODE_DTM: NORMAL
MDC_IDC_EPISODE_DURATION: 1 S
MDC_IDC_EPISODE_ID: 10
MDC_IDC_EPISODE_TYPE: NORMAL
MDC_IDC_LEAD_CONNECTION_STATUS: NORMAL
MDC_IDC_LEAD_CONNECTION_STATUS: NORMAL
MDC_IDC_LEAD_IMPLANT_DT: NORMAL
MDC_IDC_LEAD_IMPLANT_DT: NORMAL
MDC_IDC_LEAD_LOCATION: NORMAL
MDC_IDC_LEAD_LOCATION: NORMAL
MDC_IDC_LEAD_LOCATION_DETAIL_1: NORMAL
MDC_IDC_LEAD_LOCATION_DETAIL_1: NORMAL
MDC_IDC_LEAD_MFG: NORMAL
MDC_IDC_LEAD_MFG: NORMAL
MDC_IDC_LEAD_MODEL: NORMAL
MDC_IDC_LEAD_MODEL: NORMAL
MDC_IDC_LEAD_POLARITY_TYPE: NORMAL
MDC_IDC_LEAD_POLARITY_TYPE: NORMAL
MDC_IDC_LEAD_SERIAL: NORMAL
MDC_IDC_LEAD_SERIAL: NORMAL
MDC_IDC_MSMT_BATTERY_DTM: NORMAL
MDC_IDC_MSMT_BATTERY_REMAINING_LONGEVITY: 13 MO
MDC_IDC_MSMT_BATTERY_RRT_TRIGGER: 2.83
MDC_IDC_MSMT_BATTERY_STATUS: NORMAL
MDC_IDC_MSMT_BATTERY_VOLTAGE: 2.89 V
MDC_IDC_MSMT_LEADCHNL_RA_IMPEDANCE_VALUE: 399 OHM
MDC_IDC_MSMT_LEADCHNL_RA_IMPEDANCE_VALUE: 437 OHM
MDC_IDC_MSMT_LEADCHNL_RA_PACING_THRESHOLD_AMPLITUDE: 0.5 V
MDC_IDC_MSMT_LEADCHNL_RA_PACING_THRESHOLD_PULSEWIDTH: 0.4 MS
MDC_IDC_MSMT_LEADCHNL_RA_SENSING_INTR_AMPL: 4.25 MV
MDC_IDC_MSMT_LEADCHNL_RV_IMPEDANCE_VALUE: 456 OHM
MDC_IDC_MSMT_LEADCHNL_RV_IMPEDANCE_VALUE: 513 OHM
MDC_IDC_MSMT_LEADCHNL_RV_PACING_THRESHOLD_AMPLITUDE: 0.5 V
MDC_IDC_MSMT_LEADCHNL_RV_PACING_THRESHOLD_PULSEWIDTH: 0.4 MS
MDC_IDC_MSMT_LEADCHNL_RV_SENSING_INTR_AMPL: 17.38 MV
MDC_IDC_PG_IMPLANT_DTM: NORMAL
MDC_IDC_PG_MFG: NORMAL
MDC_IDC_PG_MODEL: NORMAL
MDC_IDC_PG_SERIAL: NORMAL
MDC_IDC_PG_TYPE: NORMAL
MDC_IDC_SESS_CLINIC_NAME: NORMAL
MDC_IDC_SESS_DTM: NORMAL
MDC_IDC_SESS_TYPE: NORMAL
MDC_IDC_SET_BRADY_AT_MODE_SWITCH_RATE: 171 {BEATS}/MIN
MDC_IDC_SET_BRADY_HYSTRATE: NORMAL
MDC_IDC_SET_BRADY_LOWRATE: 60 {BEATS}/MIN
MDC_IDC_SET_BRADY_MAX_SENSOR_RATE: 130 {BEATS}/MIN
MDC_IDC_SET_BRADY_MAX_TRACKING_RATE: 130 {BEATS}/MIN
MDC_IDC_SET_BRADY_MODE: NORMAL
MDC_IDC_SET_BRADY_PAV_DELAY_LOW: 220 MS
MDC_IDC_SET_BRADY_SAV_DELAY_LOW: 200 MS
MDC_IDC_SET_LEADCHNL_RA_PACING_AMPLITUDE: 1.5 V
MDC_IDC_SET_LEADCHNL_RA_PACING_ANODE_ELECTRODE_1: NORMAL
MDC_IDC_SET_LEADCHNL_RA_PACING_ANODE_LOCATION_1: NORMAL
MDC_IDC_SET_LEADCHNL_RA_PACING_CAPTURE_MODE: NORMAL
MDC_IDC_SET_LEADCHNL_RA_PACING_CATHODE_ELECTRODE_1: NORMAL
MDC_IDC_SET_LEADCHNL_RA_PACING_CATHODE_LOCATION_1: NORMAL
MDC_IDC_SET_LEADCHNL_RA_PACING_POLARITY: NORMAL
MDC_IDC_SET_LEADCHNL_RA_PACING_PULSEWIDTH: 0.4 MS
MDC_IDC_SET_LEADCHNL_RA_SENSING_ANODE_ELECTRODE_1: NORMAL
MDC_IDC_SET_LEADCHNL_RA_SENSING_ANODE_LOCATION_1: NORMAL
MDC_IDC_SET_LEADCHNL_RA_SENSING_CATHODE_ELECTRODE_1: NORMAL
MDC_IDC_SET_LEADCHNL_RA_SENSING_CATHODE_LOCATION_1: NORMAL
MDC_IDC_SET_LEADCHNL_RA_SENSING_POLARITY: NORMAL
MDC_IDC_SET_LEADCHNL_RA_SENSING_SENSITIVITY: 0.6 MV
MDC_IDC_SET_LEADCHNL_RV_PACING_AMPLITUDE: 2.25 V
MDC_IDC_SET_LEADCHNL_RV_PACING_ANODE_ELECTRODE_1: NORMAL
MDC_IDC_SET_LEADCHNL_RV_PACING_ANODE_LOCATION_1: NORMAL
MDC_IDC_SET_LEADCHNL_RV_PACING_CAPTURE_MODE: NORMAL
MDC_IDC_SET_LEADCHNL_RV_PACING_CATHODE_ELECTRODE_1: NORMAL
MDC_IDC_SET_LEADCHNL_RV_PACING_CATHODE_LOCATION_1: NORMAL
MDC_IDC_SET_LEADCHNL_RV_PACING_POLARITY: NORMAL
MDC_IDC_SET_LEADCHNL_RV_PACING_PULSEWIDTH: 0.4 MS
MDC_IDC_SET_LEADCHNL_RV_SENSING_ANODE_ELECTRODE_1: NORMAL
MDC_IDC_SET_LEADCHNL_RV_SENSING_ANODE_LOCATION_1: NORMAL
MDC_IDC_SET_LEADCHNL_RV_SENSING_CATHODE_ELECTRODE_1: NORMAL
MDC_IDC_SET_LEADCHNL_RV_SENSING_CATHODE_LOCATION_1: NORMAL
MDC_IDC_SET_LEADCHNL_RV_SENSING_POLARITY: NORMAL
MDC_IDC_SET_LEADCHNL_RV_SENSING_SENSITIVITY: 0.9 MV
MDC_IDC_SET_ZONE_DETECTION_INTERVAL: 350 MS
MDC_IDC_SET_ZONE_DETECTION_INTERVAL: 400 MS
MDC_IDC_SET_ZONE_STATUS: NORMAL
MDC_IDC_SET_ZONE_STATUS: NORMAL
MDC_IDC_SET_ZONE_TYPE: NORMAL
MDC_IDC_SET_ZONE_VENDOR_TYPE: NORMAL
MDC_IDC_STAT_AT_BURDEN_PERCENT: 0 %
MDC_IDC_STAT_AT_DTM_END: NORMAL
MDC_IDC_STAT_AT_DTM_START: NORMAL
MDC_IDC_STAT_BRADY_AP_VP_PERCENT: 58.39 %
MDC_IDC_STAT_BRADY_AP_VS_PERCENT: 0 %
MDC_IDC_STAT_BRADY_AS_VP_PERCENT: 41.59 %
MDC_IDC_STAT_BRADY_AS_VS_PERCENT: 0.02 %
MDC_IDC_STAT_BRADY_DTM_END: NORMAL
MDC_IDC_STAT_BRADY_DTM_START: NORMAL
MDC_IDC_STAT_BRADY_RA_PERCENT_PACED: 58.35 %
MDC_IDC_STAT_BRADY_RV_PERCENT_PACED: 99.94 %
MDC_IDC_STAT_EPISODE_RECENT_COUNT: 0
MDC_IDC_STAT_EPISODE_RECENT_COUNT: 1
MDC_IDC_STAT_EPISODE_RECENT_COUNT_DTM_END: NORMAL
MDC_IDC_STAT_EPISODE_RECENT_COUNT_DTM_START: NORMAL
MDC_IDC_STAT_EPISODE_TOTAL_COUNT: 0
MDC_IDC_STAT_EPISODE_TOTAL_COUNT: 0
MDC_IDC_STAT_EPISODE_TOTAL_COUNT: 2
MDC_IDC_STAT_EPISODE_TOTAL_COUNT: 8
MDC_IDC_STAT_EPISODE_TOTAL_COUNT_DTM_END: NORMAL
MDC_IDC_STAT_EPISODE_TOTAL_COUNT_DTM_START: NORMAL
MDC_IDC_STAT_EPISODE_TYPE: NORMAL
MDC_IDC_STAT_TACHYTHERAPY_RECENT_DTM_END: NORMAL
MDC_IDC_STAT_TACHYTHERAPY_RECENT_DTM_START: NORMAL
MDC_IDC_STAT_TACHYTHERAPY_TOTAL_DTM_END: NORMAL
MDC_IDC_STAT_TACHYTHERAPY_TOTAL_DTM_START: NORMAL

## 2024-01-24 ENCOUNTER — ANCILLARY PROCEDURE (OUTPATIENT)
Dept: CARDIOLOGY | Facility: CLINIC | Age: 79
End: 2024-01-24
Attending: INTERNAL MEDICINE
Payer: MEDICARE

## 2024-01-24 DIAGNOSIS — I44.30 AV BLOCK: ICD-10-CM

## 2024-01-24 PROCEDURE — 93294 REM INTERROG EVL PM/LDLS PM: CPT | Performed by: INTERNAL MEDICINE

## 2024-01-24 PROCEDURE — 93296 REM INTERROG EVL PM/IDS: CPT

## 2024-02-02 LAB
MDC_IDC_LEAD_CONNECTION_STATUS: NORMAL
MDC_IDC_LEAD_CONNECTION_STATUS: NORMAL
MDC_IDC_LEAD_IMPLANT_DT: NORMAL
MDC_IDC_LEAD_IMPLANT_DT: NORMAL
MDC_IDC_LEAD_LOCATION: NORMAL
MDC_IDC_LEAD_LOCATION: NORMAL
MDC_IDC_LEAD_LOCATION_DETAIL_1: NORMAL
MDC_IDC_LEAD_LOCATION_DETAIL_1: NORMAL
MDC_IDC_LEAD_MFG: NORMAL
MDC_IDC_LEAD_MFG: NORMAL
MDC_IDC_LEAD_MODEL: NORMAL
MDC_IDC_LEAD_MODEL: NORMAL
MDC_IDC_LEAD_POLARITY_TYPE: NORMAL
MDC_IDC_LEAD_POLARITY_TYPE: NORMAL
MDC_IDC_LEAD_SERIAL: NORMAL
MDC_IDC_LEAD_SERIAL: NORMAL
MDC_IDC_MSMT_BATTERY_DTM: NORMAL
MDC_IDC_MSMT_BATTERY_REMAINING_LONGEVITY: 10 MO
MDC_IDC_MSMT_BATTERY_RRT_TRIGGER: 2.83
MDC_IDC_MSMT_BATTERY_STATUS: NORMAL
MDC_IDC_MSMT_BATTERY_VOLTAGE: 2.88 V
MDC_IDC_MSMT_LEADCHNL_RA_IMPEDANCE_VALUE: 361 OHM
MDC_IDC_MSMT_LEADCHNL_RA_IMPEDANCE_VALUE: 399 OHM
MDC_IDC_MSMT_LEADCHNL_RA_PACING_THRESHOLD_AMPLITUDE: 0.62 V
MDC_IDC_MSMT_LEADCHNL_RA_PACING_THRESHOLD_PULSEWIDTH: 0.4 MS
MDC_IDC_MSMT_LEADCHNL_RA_SENSING_INTR_AMPL: 3.25 MV
MDC_IDC_MSMT_LEADCHNL_RV_IMPEDANCE_VALUE: 456 OHM
MDC_IDC_MSMT_LEADCHNL_RV_IMPEDANCE_VALUE: 494 OHM
MDC_IDC_MSMT_LEADCHNL_RV_PACING_THRESHOLD_AMPLITUDE: 1 V
MDC_IDC_MSMT_LEADCHNL_RV_PACING_THRESHOLD_PULSEWIDTH: 0.4 MS
MDC_IDC_MSMT_LEADCHNL_RV_SENSING_INTR_AMPL: 12.25 MV
MDC_IDC_PG_IMPLANT_DTM: NORMAL
MDC_IDC_PG_MFG: NORMAL
MDC_IDC_PG_MODEL: NORMAL
MDC_IDC_PG_SERIAL: NORMAL
MDC_IDC_PG_TYPE: NORMAL
MDC_IDC_SESS_CLINIC_NAME: NORMAL
MDC_IDC_SESS_DTM: NORMAL
MDC_IDC_SESS_TYPE: NORMAL
MDC_IDC_SET_BRADY_AT_MODE_SWITCH_RATE: 171 {BEATS}/MIN
MDC_IDC_SET_BRADY_HYSTRATE: NORMAL
MDC_IDC_SET_BRADY_LOWRATE: 60 {BEATS}/MIN
MDC_IDC_SET_BRADY_MAX_SENSOR_RATE: 130 {BEATS}/MIN
MDC_IDC_SET_BRADY_MAX_TRACKING_RATE: 130 {BEATS}/MIN
MDC_IDC_SET_BRADY_MODE: NORMAL
MDC_IDC_SET_BRADY_PAV_DELAY_LOW: 220 MS
MDC_IDC_SET_BRADY_SAV_DELAY_LOW: 200 MS
MDC_IDC_SET_LEADCHNL_RA_PACING_AMPLITUDE: 1.5 V
MDC_IDC_SET_LEADCHNL_RA_PACING_ANODE_ELECTRODE_1: NORMAL
MDC_IDC_SET_LEADCHNL_RA_PACING_ANODE_LOCATION_1: NORMAL
MDC_IDC_SET_LEADCHNL_RA_PACING_CAPTURE_MODE: NORMAL
MDC_IDC_SET_LEADCHNL_RA_PACING_CATHODE_ELECTRODE_1: NORMAL
MDC_IDC_SET_LEADCHNL_RA_PACING_CATHODE_LOCATION_1: NORMAL
MDC_IDC_SET_LEADCHNL_RA_PACING_POLARITY: NORMAL
MDC_IDC_SET_LEADCHNL_RA_PACING_PULSEWIDTH: 0.4 MS
MDC_IDC_SET_LEADCHNL_RA_SENSING_ANODE_ELECTRODE_1: NORMAL
MDC_IDC_SET_LEADCHNL_RA_SENSING_ANODE_LOCATION_1: NORMAL
MDC_IDC_SET_LEADCHNL_RA_SENSING_CATHODE_ELECTRODE_1: NORMAL
MDC_IDC_SET_LEADCHNL_RA_SENSING_CATHODE_LOCATION_1: NORMAL
MDC_IDC_SET_LEADCHNL_RA_SENSING_POLARITY: NORMAL
MDC_IDC_SET_LEADCHNL_RA_SENSING_SENSITIVITY: 0.6 MV
MDC_IDC_SET_LEADCHNL_RV_PACING_AMPLITUDE: 2 V
MDC_IDC_SET_LEADCHNL_RV_PACING_ANODE_ELECTRODE_1: NORMAL
MDC_IDC_SET_LEADCHNL_RV_PACING_ANODE_LOCATION_1: NORMAL
MDC_IDC_SET_LEADCHNL_RV_PACING_CAPTURE_MODE: NORMAL
MDC_IDC_SET_LEADCHNL_RV_PACING_CATHODE_ELECTRODE_1: NORMAL
MDC_IDC_SET_LEADCHNL_RV_PACING_CATHODE_LOCATION_1: NORMAL
MDC_IDC_SET_LEADCHNL_RV_PACING_POLARITY: NORMAL
MDC_IDC_SET_LEADCHNL_RV_PACING_PULSEWIDTH: 0.4 MS
MDC_IDC_SET_LEADCHNL_RV_SENSING_ANODE_ELECTRODE_1: NORMAL
MDC_IDC_SET_LEADCHNL_RV_SENSING_ANODE_LOCATION_1: NORMAL
MDC_IDC_SET_LEADCHNL_RV_SENSING_CATHODE_ELECTRODE_1: NORMAL
MDC_IDC_SET_LEADCHNL_RV_SENSING_CATHODE_LOCATION_1: NORMAL
MDC_IDC_SET_LEADCHNL_RV_SENSING_POLARITY: NORMAL
MDC_IDC_SET_LEADCHNL_RV_SENSING_SENSITIVITY: 0.9 MV
MDC_IDC_SET_ZONE_DETECTION_INTERVAL: 350 MS
MDC_IDC_SET_ZONE_DETECTION_INTERVAL: 400 MS
MDC_IDC_SET_ZONE_STATUS: NORMAL
MDC_IDC_SET_ZONE_STATUS: NORMAL
MDC_IDC_SET_ZONE_TYPE: NORMAL
MDC_IDC_SET_ZONE_VENDOR_TYPE: NORMAL
MDC_IDC_STAT_AT_BURDEN_PERCENT: 0.1 %
MDC_IDC_STAT_AT_DTM_END: NORMAL
MDC_IDC_STAT_AT_DTM_START: NORMAL
MDC_IDC_STAT_BRADY_AP_VP_PERCENT: 53.98 %
MDC_IDC_STAT_BRADY_AP_VS_PERCENT: 0 %
MDC_IDC_STAT_BRADY_AS_VP_PERCENT: 45.99 %
MDC_IDC_STAT_BRADY_AS_VS_PERCENT: 0.02 %
MDC_IDC_STAT_BRADY_DTM_END: NORMAL
MDC_IDC_STAT_BRADY_DTM_START: NORMAL
MDC_IDC_STAT_BRADY_RA_PERCENT_PACED: 53.95 %
MDC_IDC_STAT_BRADY_RV_PERCENT_PACED: 99.95 %
MDC_IDC_STAT_EPISODE_RECENT_COUNT: 0
MDC_IDC_STAT_EPISODE_RECENT_COUNT_DTM_END: NORMAL
MDC_IDC_STAT_EPISODE_RECENT_COUNT_DTM_START: NORMAL
MDC_IDC_STAT_EPISODE_TOTAL_COUNT: 0
MDC_IDC_STAT_EPISODE_TOTAL_COUNT: 0
MDC_IDC_STAT_EPISODE_TOTAL_COUNT: 2
MDC_IDC_STAT_EPISODE_TOTAL_COUNT: 8
MDC_IDC_STAT_EPISODE_TOTAL_COUNT_DTM_END: NORMAL
MDC_IDC_STAT_EPISODE_TOTAL_COUNT_DTM_START: NORMAL
MDC_IDC_STAT_EPISODE_TYPE: NORMAL
MDC_IDC_STAT_TACHYTHERAPY_RECENT_DTM_END: NORMAL
MDC_IDC_STAT_TACHYTHERAPY_RECENT_DTM_START: NORMAL
MDC_IDC_STAT_TACHYTHERAPY_TOTAL_DTM_END: NORMAL
MDC_IDC_STAT_TACHYTHERAPY_TOTAL_DTM_START: NORMAL

## 2024-03-08 ENCOUNTER — TELEPHONE (OUTPATIENT)
Dept: CARDIOLOGY | Facility: CLINIC | Age: 79
End: 2024-03-08
Payer: MEDICARE

## 2024-04-25 ENCOUNTER — ANCILLARY PROCEDURE (OUTPATIENT)
Dept: CARDIOLOGY | Facility: CLINIC | Age: 79
End: 2024-04-25
Attending: INTERNAL MEDICINE
Payer: MEDICARE

## 2024-04-25 DIAGNOSIS — I44.30 AV BLOCK: ICD-10-CM

## 2024-04-25 PROCEDURE — 93296 REM INTERROG EVL PM/IDS: CPT

## 2024-04-25 PROCEDURE — 93294 REM INTERROG EVL PM/LDLS PM: CPT | Performed by: INTERNAL MEDICINE

## 2024-04-30 LAB
MDC_IDC_LEAD_CONNECTION_STATUS: NORMAL
MDC_IDC_LEAD_CONNECTION_STATUS: NORMAL
MDC_IDC_LEAD_IMPLANT_DT: NORMAL
MDC_IDC_LEAD_IMPLANT_DT: NORMAL
MDC_IDC_LEAD_LOCATION: NORMAL
MDC_IDC_LEAD_LOCATION: NORMAL
MDC_IDC_LEAD_LOCATION_DETAIL_1: NORMAL
MDC_IDC_LEAD_LOCATION_DETAIL_1: NORMAL
MDC_IDC_LEAD_MFG: NORMAL
MDC_IDC_LEAD_MFG: NORMAL
MDC_IDC_LEAD_MODEL: NORMAL
MDC_IDC_LEAD_MODEL: NORMAL
MDC_IDC_LEAD_POLARITY_TYPE: NORMAL
MDC_IDC_LEAD_POLARITY_TYPE: NORMAL
MDC_IDC_LEAD_SERIAL: NORMAL
MDC_IDC_LEAD_SERIAL: NORMAL
MDC_IDC_MSMT_BATTERY_DTM: NORMAL
MDC_IDC_MSMT_BATTERY_REMAINING_LONGEVITY: 8 MO
MDC_IDC_MSMT_BATTERY_RRT_TRIGGER: 2.83
MDC_IDC_MSMT_BATTERY_STATUS: NORMAL
MDC_IDC_MSMT_BATTERY_VOLTAGE: 2.87 V
MDC_IDC_MSMT_LEADCHNL_RA_IMPEDANCE_VALUE: 380 OHM
MDC_IDC_MSMT_LEADCHNL_RA_IMPEDANCE_VALUE: 418 OHM
MDC_IDC_MSMT_LEADCHNL_RA_PACING_THRESHOLD_AMPLITUDE: 0.62 V
MDC_IDC_MSMT_LEADCHNL_RA_PACING_THRESHOLD_PULSEWIDTH: 0.4 MS
MDC_IDC_MSMT_LEADCHNL_RA_SENSING_INTR_AMPL: 3 MV
MDC_IDC_MSMT_LEADCHNL_RA_SENSING_INTR_AMPL: 3 MV
MDC_IDC_MSMT_LEADCHNL_RV_IMPEDANCE_VALUE: 418 OHM
MDC_IDC_MSMT_LEADCHNL_RV_IMPEDANCE_VALUE: 475 OHM
MDC_IDC_MSMT_LEADCHNL_RV_PACING_THRESHOLD_AMPLITUDE: 1 V
MDC_IDC_MSMT_LEADCHNL_RV_PACING_THRESHOLD_PULSEWIDTH: 0.4 MS
MDC_IDC_MSMT_LEADCHNL_RV_SENSING_INTR_AMPL: 13.88 MV
MDC_IDC_MSMT_LEADCHNL_RV_SENSING_INTR_AMPL: 13.88 MV
MDC_IDC_PG_IMPLANT_DTM: NORMAL
MDC_IDC_PG_MFG: NORMAL
MDC_IDC_PG_MODEL: NORMAL
MDC_IDC_PG_SERIAL: NORMAL
MDC_IDC_PG_TYPE: NORMAL
MDC_IDC_SESS_CLINIC_NAME: NORMAL
MDC_IDC_SESS_DTM: NORMAL
MDC_IDC_SESS_TYPE: NORMAL
MDC_IDC_SET_BRADY_AT_MODE_SWITCH_RATE: 171 {BEATS}/MIN
MDC_IDC_SET_BRADY_HYSTRATE: NORMAL
MDC_IDC_SET_BRADY_LOWRATE: 60 {BEATS}/MIN
MDC_IDC_SET_BRADY_MAX_SENSOR_RATE: 130 {BEATS}/MIN
MDC_IDC_SET_BRADY_MAX_TRACKING_RATE: 130 {BEATS}/MIN
MDC_IDC_SET_BRADY_MODE: NORMAL
MDC_IDC_SET_BRADY_PAV_DELAY_LOW: 220 MS
MDC_IDC_SET_BRADY_SAV_DELAY_LOW: 200 MS
MDC_IDC_SET_LEADCHNL_RA_PACING_AMPLITUDE: 1.5 V
MDC_IDC_SET_LEADCHNL_RA_PACING_ANODE_ELECTRODE_1: NORMAL
MDC_IDC_SET_LEADCHNL_RA_PACING_ANODE_LOCATION_1: NORMAL
MDC_IDC_SET_LEADCHNL_RA_PACING_CAPTURE_MODE: NORMAL
MDC_IDC_SET_LEADCHNL_RA_PACING_CATHODE_ELECTRODE_1: NORMAL
MDC_IDC_SET_LEADCHNL_RA_PACING_CATHODE_LOCATION_1: NORMAL
MDC_IDC_SET_LEADCHNL_RA_PACING_POLARITY: NORMAL
MDC_IDC_SET_LEADCHNL_RA_PACING_PULSEWIDTH: 0.4 MS
MDC_IDC_SET_LEADCHNL_RA_SENSING_ANODE_ELECTRODE_1: NORMAL
MDC_IDC_SET_LEADCHNL_RA_SENSING_ANODE_LOCATION_1: NORMAL
MDC_IDC_SET_LEADCHNL_RA_SENSING_CATHODE_ELECTRODE_1: NORMAL
MDC_IDC_SET_LEADCHNL_RA_SENSING_CATHODE_LOCATION_1: NORMAL
MDC_IDC_SET_LEADCHNL_RA_SENSING_POLARITY: NORMAL
MDC_IDC_SET_LEADCHNL_RA_SENSING_SENSITIVITY: 0.6 MV
MDC_IDC_SET_LEADCHNL_RV_PACING_AMPLITUDE: 2 V
MDC_IDC_SET_LEADCHNL_RV_PACING_ANODE_ELECTRODE_1: NORMAL
MDC_IDC_SET_LEADCHNL_RV_PACING_ANODE_LOCATION_1: NORMAL
MDC_IDC_SET_LEADCHNL_RV_PACING_CAPTURE_MODE: NORMAL
MDC_IDC_SET_LEADCHNL_RV_PACING_CATHODE_ELECTRODE_1: NORMAL
MDC_IDC_SET_LEADCHNL_RV_PACING_CATHODE_LOCATION_1: NORMAL
MDC_IDC_SET_LEADCHNL_RV_PACING_POLARITY: NORMAL
MDC_IDC_SET_LEADCHNL_RV_PACING_PULSEWIDTH: 0.4 MS
MDC_IDC_SET_LEADCHNL_RV_SENSING_ANODE_ELECTRODE_1: NORMAL
MDC_IDC_SET_LEADCHNL_RV_SENSING_ANODE_LOCATION_1: NORMAL
MDC_IDC_SET_LEADCHNL_RV_SENSING_CATHODE_ELECTRODE_1: NORMAL
MDC_IDC_SET_LEADCHNL_RV_SENSING_CATHODE_LOCATION_1: NORMAL
MDC_IDC_SET_LEADCHNL_RV_SENSING_POLARITY: NORMAL
MDC_IDC_SET_LEADCHNL_RV_SENSING_SENSITIVITY: 0.9 MV
MDC_IDC_SET_ZONE_DETECTION_INTERVAL: 350 MS
MDC_IDC_SET_ZONE_DETECTION_INTERVAL: 400 MS
MDC_IDC_SET_ZONE_STATUS: NORMAL
MDC_IDC_SET_ZONE_STATUS: NORMAL
MDC_IDC_SET_ZONE_TYPE: NORMAL
MDC_IDC_SET_ZONE_VENDOR_TYPE: NORMAL
MDC_IDC_STAT_AT_BURDEN_PERCENT: 0.1 %
MDC_IDC_STAT_AT_DTM_END: NORMAL
MDC_IDC_STAT_AT_DTM_START: NORMAL
MDC_IDC_STAT_BRADY_AP_VP_PERCENT: 54.71 %
MDC_IDC_STAT_BRADY_AP_VS_PERCENT: 0.01 %
MDC_IDC_STAT_BRADY_AS_VP_PERCENT: 45.25 %
MDC_IDC_STAT_BRADY_AS_VS_PERCENT: 0.03 %
MDC_IDC_STAT_BRADY_DTM_END: NORMAL
MDC_IDC_STAT_BRADY_DTM_START: NORMAL
MDC_IDC_STAT_BRADY_RA_PERCENT_PACED: 54.67 %
MDC_IDC_STAT_BRADY_RV_PERCENT_PACED: 99.91 %
MDC_IDC_STAT_EPISODE_RECENT_COUNT: 0
MDC_IDC_STAT_EPISODE_RECENT_COUNT: 1
MDC_IDC_STAT_EPISODE_RECENT_COUNT_DTM_END: NORMAL
MDC_IDC_STAT_EPISODE_RECENT_COUNT_DTM_START: NORMAL
MDC_IDC_STAT_EPISODE_TOTAL_COUNT: 0
MDC_IDC_STAT_EPISODE_TOTAL_COUNT: 0
MDC_IDC_STAT_EPISODE_TOTAL_COUNT: 2
MDC_IDC_STAT_EPISODE_TOTAL_COUNT: 8
MDC_IDC_STAT_EPISODE_TOTAL_COUNT_DTM_END: NORMAL
MDC_IDC_STAT_EPISODE_TOTAL_COUNT_DTM_START: NORMAL
MDC_IDC_STAT_EPISODE_TYPE: NORMAL
MDC_IDC_STAT_TACHYTHERAPY_RECENT_DTM_END: NORMAL
MDC_IDC_STAT_TACHYTHERAPY_RECENT_DTM_START: NORMAL
MDC_IDC_STAT_TACHYTHERAPY_TOTAL_DTM_END: NORMAL
MDC_IDC_STAT_TACHYTHERAPY_TOTAL_DTM_START: NORMAL

## 2024-07-26 ENCOUNTER — ANCILLARY PROCEDURE (OUTPATIENT)
Dept: CARDIOLOGY | Facility: CLINIC | Age: 79
End: 2024-07-26
Attending: INTERNAL MEDICINE
Payer: MEDICARE

## 2024-07-26 VITALS
SYSTOLIC BLOOD PRESSURE: 108 MMHG | WEIGHT: 139 LBS | OXYGEN SATURATION: 97 % | BODY MASS INDEX: 25.26 KG/M2 | DIASTOLIC BLOOD PRESSURE: 70 MMHG | HEART RATE: 60 BPM

## 2024-07-26 DIAGNOSIS — I44.2 COMPLETE HEART BLOCK (H): ICD-10-CM

## 2024-07-26 DIAGNOSIS — Z95.0 CARDIAC PACEMAKER IN SITU: ICD-10-CM

## 2024-07-26 DIAGNOSIS — I44.30 ATRIOVENTRICULAR BLOCK: ICD-10-CM

## 2024-07-26 LAB
MDC_IDC_LEAD_CONNECTION_STATUS: NORMAL
MDC_IDC_LEAD_CONNECTION_STATUS: NORMAL
MDC_IDC_LEAD_IMPLANT_DT: NORMAL
MDC_IDC_LEAD_IMPLANT_DT: NORMAL
MDC_IDC_LEAD_LOCATION: NORMAL
MDC_IDC_LEAD_LOCATION: NORMAL
MDC_IDC_LEAD_LOCATION_DETAIL_1: NORMAL
MDC_IDC_LEAD_LOCATION_DETAIL_1: NORMAL
MDC_IDC_LEAD_MFG: NORMAL
MDC_IDC_LEAD_MFG: NORMAL
MDC_IDC_LEAD_MODEL: NORMAL
MDC_IDC_LEAD_MODEL: NORMAL
MDC_IDC_LEAD_POLARITY_TYPE: NORMAL
MDC_IDC_LEAD_POLARITY_TYPE: NORMAL
MDC_IDC_LEAD_SERIAL: NORMAL
MDC_IDC_LEAD_SERIAL: NORMAL
MDC_IDC_MSMT_BATTERY_DTM: NORMAL
MDC_IDC_MSMT_BATTERY_REMAINING_LONGEVITY: 5 MO
MDC_IDC_MSMT_BATTERY_RRT_TRIGGER: 2.83
MDC_IDC_MSMT_BATTERY_STATUS: NORMAL
MDC_IDC_MSMT_BATTERY_VOLTAGE: 2.86 V
MDC_IDC_MSMT_LEADCHNL_RA_IMPEDANCE_VALUE: 361 OHM
MDC_IDC_MSMT_LEADCHNL_RA_IMPEDANCE_VALUE: 399 OHM
MDC_IDC_MSMT_LEADCHNL_RA_PACING_THRESHOLD_AMPLITUDE: 0.5 V
MDC_IDC_MSMT_LEADCHNL_RA_PACING_THRESHOLD_PULSEWIDTH: 0.4 MS
MDC_IDC_MSMT_LEADCHNL_RA_SENSING_INTR_AMPL: 2.5 MV
MDC_IDC_MSMT_LEADCHNL_RV_IMPEDANCE_VALUE: 456 OHM
MDC_IDC_MSMT_LEADCHNL_RV_IMPEDANCE_VALUE: 532 OHM
MDC_IDC_MSMT_LEADCHNL_RV_PACING_THRESHOLD_AMPLITUDE: 1.25 V
MDC_IDC_MSMT_LEADCHNL_RV_PACING_THRESHOLD_PULSEWIDTH: 0.4 MS
MDC_IDC_PG_IMPLANT_DTM: NORMAL
MDC_IDC_PG_MFG: NORMAL
MDC_IDC_PG_MODEL: NORMAL
MDC_IDC_PG_SERIAL: NORMAL
MDC_IDC_PG_TYPE: NORMAL
MDC_IDC_SESS_CLINIC_NAME: NORMAL
MDC_IDC_SESS_DTM: NORMAL
MDC_IDC_SESS_TYPE: NORMAL
MDC_IDC_SET_BRADY_AT_MODE_SWITCH_RATE: 171 {BEATS}/MIN
MDC_IDC_SET_BRADY_HYSTRATE: NORMAL
MDC_IDC_SET_BRADY_LOWRATE: 60 {BEATS}/MIN
MDC_IDC_SET_BRADY_MAX_SENSOR_RATE: 130 {BEATS}/MIN
MDC_IDC_SET_BRADY_MAX_TRACKING_RATE: 130 {BEATS}/MIN
MDC_IDC_SET_BRADY_MODE: NORMAL
MDC_IDC_SET_BRADY_PAV_DELAY_LOW: 220 MS
MDC_IDC_SET_BRADY_SAV_DELAY_LOW: 200 MS
MDC_IDC_SET_LEADCHNL_RA_PACING_AMPLITUDE: 1.5 V
MDC_IDC_SET_LEADCHNL_RA_PACING_ANODE_ELECTRODE_1: NORMAL
MDC_IDC_SET_LEADCHNL_RA_PACING_ANODE_LOCATION_1: NORMAL
MDC_IDC_SET_LEADCHNL_RA_PACING_CAPTURE_MODE: NORMAL
MDC_IDC_SET_LEADCHNL_RA_PACING_CATHODE_ELECTRODE_1: NORMAL
MDC_IDC_SET_LEADCHNL_RA_PACING_CATHODE_LOCATION_1: NORMAL
MDC_IDC_SET_LEADCHNL_RA_PACING_POLARITY: NORMAL
MDC_IDC_SET_LEADCHNL_RA_PACING_PULSEWIDTH: 0.4 MS
MDC_IDC_SET_LEADCHNL_RA_SENSING_ANODE_ELECTRODE_1: NORMAL
MDC_IDC_SET_LEADCHNL_RA_SENSING_ANODE_LOCATION_1: NORMAL
MDC_IDC_SET_LEADCHNL_RA_SENSING_CATHODE_ELECTRODE_1: NORMAL
MDC_IDC_SET_LEADCHNL_RA_SENSING_CATHODE_LOCATION_1: NORMAL
MDC_IDC_SET_LEADCHNL_RA_SENSING_POLARITY: NORMAL
MDC_IDC_SET_LEADCHNL_RA_SENSING_SENSITIVITY: 0.6 MV
MDC_IDC_SET_LEADCHNL_RV_PACING_AMPLITUDE: 2.5 V
MDC_IDC_SET_LEADCHNL_RV_PACING_ANODE_ELECTRODE_1: NORMAL
MDC_IDC_SET_LEADCHNL_RV_PACING_ANODE_LOCATION_1: NORMAL
MDC_IDC_SET_LEADCHNL_RV_PACING_CAPTURE_MODE: NORMAL
MDC_IDC_SET_LEADCHNL_RV_PACING_CATHODE_ELECTRODE_1: NORMAL
MDC_IDC_SET_LEADCHNL_RV_PACING_CATHODE_LOCATION_1: NORMAL
MDC_IDC_SET_LEADCHNL_RV_PACING_POLARITY: NORMAL
MDC_IDC_SET_LEADCHNL_RV_PACING_PULSEWIDTH: 0.4 MS
MDC_IDC_SET_LEADCHNL_RV_SENSING_ANODE_ELECTRODE_1: NORMAL
MDC_IDC_SET_LEADCHNL_RV_SENSING_ANODE_LOCATION_1: NORMAL
MDC_IDC_SET_LEADCHNL_RV_SENSING_CATHODE_ELECTRODE_1: NORMAL
MDC_IDC_SET_LEADCHNL_RV_SENSING_CATHODE_LOCATION_1: NORMAL
MDC_IDC_SET_LEADCHNL_RV_SENSING_POLARITY: NORMAL
MDC_IDC_SET_LEADCHNL_RV_SENSING_SENSITIVITY: 0.9 MV
MDC_IDC_SET_ZONE_DETECTION_INTERVAL: 350 MS
MDC_IDC_SET_ZONE_DETECTION_INTERVAL: 400 MS
MDC_IDC_SET_ZONE_STATUS: NORMAL
MDC_IDC_SET_ZONE_STATUS: NORMAL
MDC_IDC_SET_ZONE_TYPE: NORMAL
MDC_IDC_SET_ZONE_VENDOR_TYPE: NORMAL
MDC_IDC_STAT_AT_BURDEN_PERCENT: 0 %
MDC_IDC_STAT_AT_DTM_END: NORMAL
MDC_IDC_STAT_AT_DTM_START: NORMAL
MDC_IDC_STAT_BRADY_AP_VP_PERCENT: 54.68 %
MDC_IDC_STAT_BRADY_AP_VS_PERCENT: 0.01 %
MDC_IDC_STAT_BRADY_AS_VP_PERCENT: 45.28 %
MDC_IDC_STAT_BRADY_AS_VS_PERCENT: 0.04 %
MDC_IDC_STAT_BRADY_DTM_END: NORMAL
MDC_IDC_STAT_BRADY_DTM_START: NORMAL
MDC_IDC_STAT_BRADY_RA_PERCENT_PACED: 54.64 %
MDC_IDC_STAT_BRADY_RV_PERCENT_PACED: 99.93 %
MDC_IDC_STAT_EPISODE_RECENT_COUNT: 0
MDC_IDC_STAT_EPISODE_RECENT_COUNT: 5
MDC_IDC_STAT_EPISODE_RECENT_COUNT_DTM_END: NORMAL
MDC_IDC_STAT_EPISODE_RECENT_COUNT_DTM_START: NORMAL
MDC_IDC_STAT_EPISODE_TOTAL_COUNT: 0
MDC_IDC_STAT_EPISODE_TOTAL_COUNT: 0
MDC_IDC_STAT_EPISODE_TOTAL_COUNT: 2
MDC_IDC_STAT_EPISODE_TOTAL_COUNT: 8
MDC_IDC_STAT_EPISODE_TOTAL_COUNT_DTM_END: NORMAL
MDC_IDC_STAT_EPISODE_TOTAL_COUNT_DTM_START: NORMAL
MDC_IDC_STAT_EPISODE_TYPE: NORMAL
MDC_IDC_STAT_TACHYTHERAPY_RECENT_DTM_END: NORMAL
MDC_IDC_STAT_TACHYTHERAPY_RECENT_DTM_START: NORMAL
MDC_IDC_STAT_TACHYTHERAPY_TOTAL_DTM_END: NORMAL
MDC_IDC_STAT_TACHYTHERAPY_TOTAL_DTM_START: NORMAL

## 2024-07-26 PROCEDURE — G0463 HOSPITAL OUTPT CLINIC VISIT: HCPCS | Performed by: NURSE PRACTITIONER

## 2024-07-26 PROCEDURE — 99213 OFFICE O/P EST LOW 20 MIN: CPT | Mod: 25 | Performed by: NURSE PRACTITIONER

## 2024-07-26 PROCEDURE — 93280 PM DEVICE PROGR EVAL DUAL: CPT | Performed by: INTERNAL MEDICINE

## 2024-07-26 RX ORDER — IBUPROFEN 600 MG/1
TABLET, FILM COATED ORAL
COMMUNITY
Start: 2023-10-09

## 2024-07-26 RX ORDER — LATANOPROST 50 UG/ML
SOLUTION/ DROPS OPHTHALMIC
COMMUNITY
Start: 2024-06-26

## 2024-07-26 ASSESSMENT — PAIN SCALES - GENERAL: PAINLEVEL: NO PAIN (0)

## 2024-07-26 NOTE — PATIENT INSTRUCTIONS
You were seen in the Electrophysiology Clinic today by: Abena Gray NP    Plan:     Follow up Visit:  After generator change, Device clinic will monitor battery status.       If you have further questions, please utilize [employer] to contact us.     Your Care Team:    [facility]   Telephone Number     Nurse Line  Marissa Frias, RN   Karla Kwon, RN  Jean Bowers, JENARO   [phone]     For scheduling appointments:   [place]   [phone]   For procedure scheduling:    Alissa Clemente     [phone]   For the Device Clinic (Pacemakers, ICDs, Loop Recorders)    During business hours: [phone]  After business hours:   [phone]- select option 4 and ask for job code [id number].       On-call cardiologist for after hours or on weekends:   [phone], option #4, and ask to speak to the on-call cardiologist.     Cardiovascular Clinic:   [address] [place] [address]. [place] [address]      As always, Thank you for trusting us with your health care needs!

## 2024-07-26 NOTE — NURSING NOTE
Chief Complaint   Patient presents with    Follow Up     2 yr follow-up PPM       Vitals were taken, medications reconciled.     Chivo Berman, Visit Facilitator    4:32 PM

## 2024-07-26 NOTE — LETTER
7/26/2024      RE: Sharona Bravo  [address]  [facility] [id number]       Dear Colleague,    Thank you for the opportunity to participate in the care of your patient, Sharona Bravo, at the [facility] [place] [facility]. Please see a copy of my visit note below.        ELECTROPHYSIOLOGY CLINIC VISIT    Assessment/Recommendations   Assessment/Plan:    Ms. Bravo is a 78 year old female who has a past medical history significant for breast cancer s/p radiation and lumpectomy, hypothyroidism, HLD, and symptomatic bradycardia (Mobitz II) s/p PPM 6/23/2015.     She is doing well from an EP standpoint. Device is functioning well with stable lead parameters. She has been 100% RV paced so will get echos every couple years. Continue routine device clinic follow-up. Device is estimated to reach RRT in about 5 months. Discussed generator change in detail including indications, risks, and benefits. She is agreeable to proceed when RRT is reached.     Follow up 3 months post generator change.        History of Present Illness/Subjective    Ms. Sharona Bravo is a 78 year old female who comes in today for EP follow-up of PPM cares.    Ms. Bravo is a 78 year old female who has a past medical history significant for breast cancer s/p radiation and lumpectomy, hypothyroidism, HLD, and symptomatic bradycardia (Mobitz II) s/p PPM 6/23/2015.      She was admitted from clinic in 6/2015 with bradycardia. She had been feeling fatigue especially when she exerted herself and even climbing a flight of stairs was more difficult for her. She was found to be in Mobitz II Block and was subsequently transferred here for admission. Telemetry showed intermittent episodes of SB 50's and episodes of Mobitz II 30's. ECG from 2013 showed baseline bifascicular block. Echo Showed LVEF hyperkinetic with an EF of 65-70%. Right ventricular function,  "chamber size, wall motion, and thickness are normal. She ultimately underwent a dual chamber PPM implant on 6/23/15.      EP Visit 4/2019: She presents today for follow up. She reports feeling well and has no complaints. She is active without limitation. She denies any chest pain/pressures, dizziness, lightheadedness, worsening shortness of breath, leg/ankle swelling, PND, orthopnea, palpitations, or syncopal symptoms. Device interrogation shows normal pacemaker function. No episodes/arrhythmias recorded. Intrinsic rhythm = SB 56 bpm with CHB no R waves @ VVI 30 bpm. AP= 37.7%.  = 99.8%. No short v-v intervals recorded. Lead trends appear stable. Last echo from 4/2018 showed LVEF 55-60%, normal RV size and function, and no valvular issues. Current cardiac medications include: ASA and Zocor.      EP Visit 5/20/20: She is following up today. She reports feeling well today. She had COVID19 in April and has made great recovery. She is continuing to struggle with headaches which her PCP is workup up. She denies any chest pain/pressures, dizziness, lightheadedness, worsening shortness of breath, leg/ankle swelling, PND, orthopnea, palpitations, or syncopal symptoms. Device check shows normal pacemaker function. 1 asymptomatic NSVT episode recorded lasting 7 seconds @ 181 bpm, EGM displays V>A, unable to see the start of the episode, pt reports she had COVID 19 at that time but does not recall any \"heart symptoms\". 6 AT/AF episodes recorded, < 1 minute. Presenting EGM = AS/ @ 80 bpm. PVC burden has increased to 21/hr from 7/hr. AP = 49.4%.  = 99.5%. Estimated battery longevity to JYOTI = 4 years. No short v-v intervals recorded. Lead trends appear stable. Patient notified of interrogation results. Current cardiac medications include: ASA and Zocor.       EP Visit 6/29/22: She presents today for follow up. She reports feeling well. She denies chest discomfort, palpitations, abdominal fullness/bloating or peripheral " edema, shortness of breath, paroxysmal nocturnal dyspnea, orthopnea, lightheadedness, dizziness, pre-syncope, or syncope. Device interrogation shows normal device function, stable lead parameters, total AT/AF time 97 seconds <0.1% burden, no ventricular arrhythmias, intrinsitinic is CHB, and AP 42.7%,  100%. Current cardiac medications include: ASA and Zocor.    She presents today for follow up. She reports feeling well. She denies chest discomfort, palpitations, abdominal fullness/bloating or peripheral edema, shortness of breath, paroxysmal nocturnal dyspnea, orthopnea, lightheadedness, dizziness, pre-syncope, or syncope. Device interrogation shows normal device function, stable lead parameters, no arrhythmias recorded, RRT estimated in 5 months, and Ap 54.7%,  100%. Current cardiac medications include: ASA and Zocor.       I have reviewed and updated the patient's Past Medical History, Social History, Family History and Medication List.     Cardiographics (Personally Reviewed) :   4/2018 ECHOCARDIOGRAM:   Interpretation Summary  Left ventricular function is normal.The EF is 55-60%.  The right ventricle is normal size. Global right ventricular function is  normal.  No significant valvular dysfunction noted.  Pulmonary artery systolic pressure is normal.  The inferior vena cava was normal in size with preserved respiratory  variability.  Estimated mean right atrial pressure is 3 mmHg.     This study was compared with the study from 6/23/15 . There has been no  change.    7/2022 ECHOCARDIOGRAM:  Interpretation Summary  Global and regional left ventricular function is normal with an EF of 60-65%.  Right ventricular function, chamber size, wall motion, and thickness are  normal.  Pulmonary artery systolic pressure is normal.  The inferior vena cava is normal.  No pericardial effusion is present.     Physical Examination   /70 (BP Location: Right arm, Patient Position: Chair, Cuff Size: Adult Regular)    Pulse 60   Wt 63 kg (139 lb)   LMP 01/01/2000   SpO2 97%   BMI 25.26 kg/m    Wt Readings from Last 3 Encounters:   10/04/23 64 kg (141 lb)   11/11/22 64.4 kg (142 lb)   11/06/22 62.6 kg (138 lb 0.1 oz)     General Appearance:   Alert, well-appearing and in no acute distress.   HEENT: Atraumatic, normocephalic. PERRL.  MMM.   Chest/Lungs:   Respirations unlabored.  Lungs are clear to auscultation.   Cardiovascular:   Regular rate and rhythm.  S1/S2. No murmur.    Abdomen:  Soft, nontender, nondistended.   Extremities: No cyanosis or clubbing. No edema.    Musculoskeletal: Moves all extremities.  Gait normal.   Skin: Warm, dry, intact.    Neurologic: Mood and affect are appropriate.  Alert and oriented to person, place, time, and situation.          Medications  Allergies   Current Outpatient Medications   Medication Sig Dispense Refill     calcium carbonate (OS-DALLAS 500 MG [place]. [place]) 500 MG tablet Take 600 mg by mouth daily        calcium polycarbophil (FIBERCON) 625 MG tablet 2 times daily        cetirizine (ZYRTEC) 10 MG tablet Take 10 mg by mouth daily       Cyanocobalamin (B-12) 1000 MCG TBCR 1,000 mcg       estradiol (ESTRACE) 0.1 MG/GM vaginal cream Place 1 g vaginally three times a week Place 1g vaginally nightly x 2 weeks and then 2-3 times per week for maintenance 42.5 g 4     levothyroxine (SYNTHROID/LEVOTHROID) 50 MCG tablet Take 1 tablet (50 mcg) by mouth every other day Alternating with 75 mcg 90 tablet 3     levothyroxine (SYNTHROID/LEVOTHROID) 75 MCG tablet Take 1 tablet (75 mcg) by mouth every other day Alternating with 50 mcg 90 tablet 3     simvastatin (ZOCOR) 20 MG tablet Take 1 tablet (20 mg) by mouth at bedtime 90 tablet 3     Turmeric 500 MG CAPS Take 500 mg by mouth 2 times daily       VITAMIN D, CHOLECALCIFEROL, PO Take 1,000 Units by mouth daily        zolpidem (AMBIEN) 5 MG tablet Take 1 tablet (5 mg) by mouth nightly as needed for sleep 30 tablet 1    Allergies   Allergen Reactions      Gabapentin Rash and Swelling     Face swelling    Face swelling   Face swelling         Lab Results (Personally Reviewed)    Chemistry/lipid CBC Cardiac Enzymes/BNP/TSH/INR   Lab Results   Component Value Date    BUN 20.1 09/02/2022     09/02/2022    CO2 25 09/02/2022     Creatinine   Date Value Ref Range Status   09/02/2022 0.80 0.51 - 0.95 mg/dL Final   06/07/2021 0.8 0.6 - 1.3 mg/dL Final       Lab Results   Component Value Date    CHOL 219 (H) 09/02/2022    HDL 69 09/02/2022     (H) 09/02/2022      Lab Results   Component Value Date    WBC 3.9 (L) 04/02/2020    HGB 14.3 06/21/2021    HCT 44.2 06/21/2021    MCV 92.3 06/21/2021     04/02/2020    Lab Results   Component Value Date    TSH 1.08 07/13/2022    INR 0.98 04/30/2018        The patient states understanding and is agreeable with the plan.   MARIN Zhang CNP  Electrophysiology Consult Service  Securely message with [employer]   Text page via [facility] Paging/Directory                          Please do not hesitate to contact me if you have any questions/concerns.     Sincerely,     MARIN Burns CNP

## 2024-07-26 NOTE — PROGRESS NOTES
ELECTROPHYSIOLOGY CLINIC VISIT    Assessment/Recommendations   Assessment/Plan:    Ms. Bravo is a 78 year old female who has a past medical history significant for breast cancer s/p radiation and lumpectomy, hypothyroidism, HLD, and symptomatic bradycardia (Mobitz II) s/p PPM 6/23/2015.     She is doing well from an EP standpoint. Device is functioning well with stable lead parameters. She has been 100% RV paced so will get echos every couple years. Continue routine device clinic follow-up. Device is estimated to reach RRT in about 5 months. Discussed generator change in detail including indications, risks, and benefits. She is agreeable to proceed when RRT is reached.     Follow up 3 months post generator change.        History of Present Illness/Subjective    Ms. Sharona Bravo is a 78 year old female who comes in today for EP follow-up of PPM cares.    Ms. Bravo is a 78 year old female who has a past medical history significant for breast cancer s/p radiation and lumpectomy, hypothyroidism, HLD, and symptomatic bradycardia (Mobitz II) s/p PPM 6/23/2015.      She was admitted from clinic in 6/2015 with bradycardia. She had been feeling fatigue especially when she exerted herself and even climbing a flight of stairs was more difficult for her. She was found to be in Mobitz II Block and was subsequently transferred here for admission. Telemetry showed intermittent episodes of SB 50's and episodes of Mobitz II 30's. ECG from 2013 showed baseline bifascicular block. Echo Showed LVEF hyperkinetic with an EF of 65-70%. Right ventricular function, chamber size, wall motion, and thickness are normal. She ultimately underwent a dual chamber PPM implant on 6/23/15.      EP Visit 4/2019: She presents today for follow up. She reports feeling well and has no complaints. She is active without limitation. She denies any chest pain/pressures, dizziness, lightheadedness, worsening shortness of breath, leg/ankle  "swelling, PND, orthopnea, palpitations, or syncopal symptoms. Device interrogation shows normal pacemaker function. No episodes/arrhythmias recorded. Intrinsic rhythm = SB 56 bpm with CHB no R waves @ VVI 30 bpm. AP= 37.7%.  = 99.8%. No short v-v intervals recorded. Lead trends appear stable. Last echo from 4/2018 showed LVEF 55-60%, normal RV size and function, and no valvular issues. Current cardiac medications include: ASA and Zocor.      EP Visit 5/20/20: She is following up today. She reports feeling well today. She had COVID19 in April and has made great recovery. She is continuing to struggle with headaches which her PCP is workup up. She denies any chest pain/pressures, dizziness, lightheadedness, worsening shortness of breath, leg/ankle swelling, PND, orthopnea, palpitations, or syncopal symptoms. Device check shows normal pacemaker function. 1 asymptomatic NSVT episode recorded lasting 7 seconds @ 181 bpm, EGM displays V>A, unable to see the start of the episode, pt reports she had COVID 19 at that time but does not recall any \"heart symptoms\". 6 AT/AF episodes recorded, < 1 minute. Presenting EGM = AS/ @ 80 bpm. PVC burden has increased to 21/hr from 7/hr. AP = 49.4%.  = 99.5%. Estimated battery longevity to JYOTI = 4 years. No short v-v intervals recorded. Lead trends appear stable. Patient notified of interrogation results. Current cardiac medications include: ASA and Zocor.       EP Visit 6/29/22: She presents today for follow up. She reports feeling well. She denies chest discomfort, palpitations, abdominal fullness/bloating or peripheral edema, shortness of breath, paroxysmal nocturnal dyspnea, orthopnea, lightheadedness, dizziness, pre-syncope, or syncope. Device interrogation shows normal device function, stable lead parameters, total AT/AF time 97 seconds <0.1% burden, no ventricular arrhythmias, intrinsitinic is CHB, and AP 42.7%,  100%. Current cardiac medications include: ASA and " Zocor.    She presents today for follow up. She reports feeling well. She denies chest discomfort, palpitations, abdominal fullness/bloating or peripheral edema, shortness of breath, paroxysmal nocturnal dyspnea, orthopnea, lightheadedness, dizziness, pre-syncope, or syncope. Device interrogation shows normal device function, stable lead parameters, no arrhythmias recorded, RRT estimated in 5 months, and Ap 54.7%,  100%. Current cardiac medications include: ASA and Zocor.       I have reviewed and updated the patient's Past Medical History, Social History, Family History and Medication List.     Cardiographics (Personally Reviewed) :   4/2018 ECHOCARDIOGRAM:   Interpretation Summary  Left ventricular function is normal.The EF is 55-60%.  The right ventricle is normal size. Global right ventricular function is  normal.  No significant valvular dysfunction noted.  Pulmonary artery systolic pressure is normal.  The inferior vena cava was normal in size with preserved respiratory  variability.  Estimated mean right atrial pressure is 3 mmHg.     This study was compared with the study from 6/23/15 . There has been no  change.    7/2022 ECHOCARDIOGRAM:  Interpretation Summary  Global and regional left ventricular function is normal with an EF of 60-65%.  Right ventricular function, chamber size, wall motion, and thickness are  normal.  Pulmonary artery systolic pressure is normal.  The inferior vena cava is normal.  No pericardial effusion is present.     Physical Examination   /70 (BP Location: Right arm, Patient Position: Chair, Cuff Size: Adult Regular)   Pulse 60   Wt 63 kg (139 lb)   LMP 01/01/2000   SpO2 97%   BMI 25.26 kg/m    Wt Readings from Last 3 Encounters:   10/04/23 64 kg (141 lb)   11/11/22 64.4 kg (142 lb)   11/06/22 62.6 kg (138 lb 0.1 oz)     General Appearance:   Alert, well-appearing and in no acute distress.   HEENT: Atraumatic, normocephalic. PERRL.  MMM.   Chest/Lungs:   Respirations  unlabored.  Lungs are clear to auscultation.   Cardiovascular:   Regular rate and rhythm.  S1/S2. No murmur.    Abdomen:  Soft, nontender, nondistended.   Extremities: No cyanosis or clubbing. No edema.    Musculoskeletal: Moves all extremities.  Gait normal.   Skin: Warm, dry, intact.    Neurologic: Mood and affect are appropriate.  Alert and oriented to person, place, time, and situation.          Medications  Allergies   Current Outpatient Medications   Medication Sig Dispense Refill    calcium carbonate (OS-DALLAS 500 MG [place]. [place]) 500 MG tablet Take 600 mg by mouth daily       calcium polycarbophil (FIBERCON) 625 MG tablet 2 times daily       cetirizine (ZYRTEC) 10 MG tablet Take 10 mg by mouth daily      Cyanocobalamin (B-12) 1000 MCG TBCR 1,000 mcg      estradiol (ESTRACE) 0.1 MG/GM vaginal cream Place 1 g vaginally three times a week Place 1g vaginally nightly x 2 weeks and then 2-3 times per week for maintenance 42.5 g 4    levothyroxine (SYNTHROID/LEVOTHROID) 50 MCG tablet Take 1 tablet (50 mcg) by mouth every other day Alternating with 75 mcg 90 tablet 3    levothyroxine (SYNTHROID/LEVOTHROID) 75 MCG tablet Take 1 tablet (75 mcg) by mouth every other day Alternating with 50 mcg 90 tablet 3    simvastatin (ZOCOR) 20 MG tablet Take 1 tablet (20 mg) by mouth at bedtime 90 tablet 3    Turmeric 500 MG CAPS Take 500 mg by mouth 2 times daily      VITAMIN D, CHOLECALCIFEROL, PO Take 1,000 Units by mouth daily       zolpidem (AMBIEN) 5 MG tablet Take 1 tablet (5 mg) by mouth nightly as needed for sleep 30 tablet 1    Allergies   Allergen Reactions    Gabapentin Rash and Swelling     Face swelling    Face swelling   Face swelling         Lab Results (Personally Reviewed)    Chemistry/lipid CBC Cardiac Enzymes/BNP/TSH/INR   Lab Results   Component Value Date    BUN 20.1 09/02/2022     09/02/2022    CO2 25 09/02/2022     Creatinine   Date Value Ref Range Status   09/02/2022 0.80 0.51 - 0.95 mg/dL Final    06/07/2021 0.8 0.6 - 1.3 mg/dL Final       Lab Results   Component Value Date    CHOL 219 (H) 09/02/2022    HDL 69 09/02/2022     (H) 09/02/2022      Lab Results   Component Value Date    WBC 3.9 (L) 04/02/2020    HGB 14.3 06/21/2021    HCT 44.2 06/21/2021    MCV 92.3 06/21/2021     04/02/2020    Lab Results   Component Value Date    TSH 1.08 07/13/2022    INR 0.98 04/30/2018        The patient states understanding and is agreeable with the plan.   MARIN Zhang CNP  Electrophysiology Consult Service  Securely message with [employer]   Text page via [facility] Paging/Directory

## 2024-07-31 ENCOUNTER — OFFICE VISIT (OUTPATIENT)
Dept: FAMILY MEDICINE | Facility: CLINIC | Age: 79
End: 2024-07-31
Payer: MEDICARE

## 2024-07-31 VITALS
HEART RATE: 64 BPM | WEIGHT: 140.12 LBS | SYSTOLIC BLOOD PRESSURE: 149 MMHG | HEIGHT: 62 IN | BODY MASS INDEX: 25.79 KG/M2 | OXYGEN SATURATION: 97 % | DIASTOLIC BLOOD PRESSURE: 86 MMHG | TEMPERATURE: 97 F

## 2024-07-31 DIAGNOSIS — A09 DIARRHEA OF INFECTIOUS ORIGIN: Primary | ICD-10-CM

## 2024-07-31 LAB
ANION GAP SERPL CALCULATED.3IONS-SCNC: 10 MMOL/L (ref 7–15)
BASO+EOS+MONOS # BLD AUTO: 0.4 10E3/UL (ref 0–2.2)
BASO+EOS+MONOS NFR BLD AUTO: 7 %
BASOPHILS # BLD AUTO: NORMAL 10*3/UL
BASOPHILS NFR BLD AUTO: NORMAL %
BUN SERPL-MCNC: 20.7 MG/DL (ref 8–23)
CALCIUM SERPL-MCNC: 9.1 MG/DL (ref 8.8–10.4)
CHLORIDE SERPL-SCNC: 109 MMOL/L (ref 98–107)
CREAT SERPL-MCNC: 0.8 MG/DL (ref 0.51–0.95)
EGFRCR SERPLBLD CKD-EPI 2021: 76 ML/MIN/1.73M2
EOSINOPHIL # BLD AUTO: NORMAL 10*3/UL
EOSINOPHIL NFR BLD AUTO: NORMAL %
ERYTHROCYTE [DISTWIDTH] IN BLOOD BY AUTOMATED COUNT: 12.3 % (ref 10–15)
GLUCOSE SERPL-MCNC: 90 MG/DL (ref 70–99)
HCO3 SERPL-SCNC: 23 MMOL/L (ref 22–29)
HCT VFR BLD AUTO: 41 % (ref 35–47)
HGB BLD-MCNC: 13.7 G/DL (ref 11.7–15.7)
IMM GRANULOCYTES # BLD: NORMAL 10*3/UL
IMM GRANULOCYTES NFR BLD: NORMAL %
LYMPHOCYTES # BLD AUTO: 1.2 10E3/UL (ref 0.8–5.3)
LYMPHOCYTES NFR BLD AUTO: 20 %
MCH RBC QN AUTO: 31 PG (ref 26.5–33)
MCHC RBC AUTO-ENTMCNC: 33.4 G/DL (ref 31.5–36.5)
MCV RBC AUTO: 93 FL (ref 78–100)
MONOCYTES # BLD AUTO: NORMAL 10*3/UL
MONOCYTES NFR BLD AUTO: NORMAL %
NEUTROPHILS # BLD AUTO: 4.2 10E3/UL (ref 1.6–8.3)
NEUTROPHILS NFR BLD AUTO: 74 %
PLATELET # BLD AUTO: 276 10E3/UL (ref 150–450)
POTASSIUM SERPL-SCNC: 4.2 MMOL/L (ref 3.4–5.3)
RBC # BLD AUTO: 4.42 10E6/UL (ref 3.8–5.2)
SODIUM SERPL-SCNC: 142 MMOL/L (ref 135–145)
TSH SERPL DL<=0.005 MIU/L-ACNC: 1.81 UIU/ML (ref 0.3–4.2)
WBC # BLD AUTO: 5.8 10E3/UL (ref 4–11)

## 2024-08-09 LAB
ADV 40+41 DNA STL QL NAA+NON-PROBE: NEGATIVE
ASTRO TYP 1-8 RNA STL QL NAA+NON-PROBE: NEGATIVE
C CAYETANENSIS DNA STL QL NAA+NON-PROBE: NEGATIVE
CAMPYLOBACTER DNA SPEC NAA+PROBE: NEGATIVE
CRYPTOSP DNA STL QL NAA+NON-PROBE: NEGATIVE
E COLI O157 DNA STL QL NAA+NON-PROBE: NORMAL
E HISTOLYT DNA STL QL NAA+NON-PROBE: NEGATIVE
EAEC ASTA GENE ISLT QL NAA+PROBE: NEGATIVE
EC STX1+STX2 GENES STL QL NAA+NON-PROBE: NEGATIVE
EPEC EAE GENE STL QL NAA+NON-PROBE: NEGATIVE
ETEC LTA+ST1A+ST1B TOX ST NAA+NON-PROBE: NEGATIVE
G LAMBLIA DNA STL QL NAA+NON-PROBE: NEGATIVE
NOROVIRUS GI+II RNA STL QL NAA+NON-PROBE: NEGATIVE
P SHIGELLOIDES DNA STL QL NAA+NON-PROBE: NEGATIVE
RVA RNA STL QL NAA+NON-PROBE: NEGATIVE
SALMONELLA SP RPOD STL QL NAA+PROBE: NEGATIVE
SAPO I+II+IV+V RNA STL QL NAA+NON-PROBE: NEGATIVE
SHIGELLA SP+EIEC IPAH ST NAA+NON-PROBE: NEGATIVE
V CHOLERAE DNA SPEC QL NAA+PROBE: NEGATIVE
VIBRIO DNA SPEC NAA+PROBE: NEGATIVE
Y ENTEROCOL DNA STL QL NAA+PROBE: NEGATIVE

## 2024-08-12 LAB — O+P STL MICRO: NEGATIVE

## 2024-08-18 NOTE — PROGRESS NOTES
Chief Complaint: Diarrhea    HPI: Kristal is a 78-year-old here today with the above.    Just over a week ago, she had some loose stools but they were not overtly watery.  3 to 4 days later, she was better and then she did not have a bowel movement all weekend.  She had taken some Dulcolax and by the next day was having regular bowel movement.  Then, she was at lunch and had a sudden urge and was incontinent of stool in 2 different occasions.  She was [place] mortified by this.  She was at a club and had to buy some new close to take home with her.  But the time she got home, she had 1 more episode of diarrhea.    She does not feel unwell.  She has not traveled anywhere.  On July 4, she was in [place], at [place].  She is not going camping.  They have a dog at home who is fine.  She has not been exposed to any babies and has not been changing any diapers.    She has not noted any black or tarry stools.  No obvious blood in the stool.    She might get a few cramps at times.  She has been afebrile.  She has not been on antibiotics recently.  She has a history of diverticulosis.    When she was in [place], she had a thyroid level checked that was normal.  However, she is wondering if that might be off given that her medications been changed slightly.    Active medical problems: Reviewed    Current medications: Reviewed    Objective:    Kristal is in no distress.  Vital signs are stable.  She is afebrile.  Systolic and diastolic blood pressures are both slightly elevated.    Examination reveals some positive left lower quadrant pain.  She has bowel sounds but they are quiet.  No appointment Pado splenomegaly.  No rebound or guarding noted.    Laboratory studies: BMP, TSH, CBC with differential, and stool test, all pending      Assessment/Plan:    Kristal has had some diarrhea that really was surprising to her.  She has never been incontinent of stool before.  Been no other significant changes.  She is worried that she has picked up  something significant.    I will make sure that she is not hyperthyroid; better electrolytes are all normal; and that her CBC is normal with a normal differential.  In addition, we will go ahead and get stool testing.    She has had some success with Imodium and can continue using that daily as needed for now.  So additional fiber recommended.    I am fairly comfortable that she is not in any significant trouble at this point.  She is slightly hypertensive, not hypotensive.  Will make sure that her electrolytes are normal and that there is no sign of hyperthyroidism or leukocytosis.    Should be notified when the laboratory studies are back.  For now, no antibiotics will be prescribed.    Pat completely agrees with the above plan.      The longitudinal plan of care for the diagnosis(es)/condition(s) as documented were addressed during this visit. Due to the added complexity in care, I will continue to support Pat in the subsequent management and with ongoing continuity of care.

## 2024-08-26 ENCOUNTER — TRANSFERRED RECORDS (OUTPATIENT)
Dept: HEALTH INFORMATION MANAGEMENT | Facility: CLINIC | Age: 79
End: 2024-08-26
Payer: MEDICARE

## 2024-08-26 ENCOUNTER — ANCILLARY PROCEDURE (OUTPATIENT)
Dept: CARDIOLOGY | Facility: CLINIC | Age: 79
End: 2024-08-26
Attending: INTERNAL MEDICINE
Payer: MEDICARE

## 2024-08-26 DIAGNOSIS — I44.30 AV BLOCK: ICD-10-CM

## 2024-08-26 PROCEDURE — 93296 REM INTERROG EVL PM/IDS: CPT

## 2024-08-26 PROCEDURE — 93294 REM INTERROG EVL PM/LDLS PM: CPT | Performed by: INTERNAL MEDICINE

## 2024-08-27 NOTE — PROGRESS NOTES
Preventive Care Visit  [facility]  Ruslan Jean MD, Family Medicine  Aug 28, 2024    Subjective   Pat is a 78 year old, presenting for the following:  Wellness Visit and Gastrointestinal Problem (Continued looser and odorous stools)    Health Care Directive  Patient has a Health Care Directive on file  Advance care planning document is on file and is current.    HPI    Pat is a 78-year-old here today for her Medicare annual wellness visit, subsequent.  Overall, she is doing quite well.    I saw her recently for diarrhea.  We did extensive stool testing and nothing came back positive.  She continues to have some loose stools that are quite malodorous.  However, things are slowly resolving.  She was having some left lower quadrant pain and we talked about the possibility of diverticulitis.  She never saw any blood.    She has a pacemaker and her current heart rate is set at 60 beats a minute.  It was just checked on Monday and they will continue checking it monthly.  She is doing quite well with it and is grateful for it.    She has some glaucoma now and is using some drops.  Her pressures have been controlled.    From a healthcare maintenance standpoint, she really does not have any significant care gaps.  She will get a COVID-vaccine as well as a flu shot this fall.          8/23/2024   General Health   How would you rate your overall physical health? (!) FAIR   Feel stress (tense, anxious, or unable to sleep) To some extent      (!) STRESS CONCERN      8/23/2024   Nutrition   Diet: Regular (no restrictions)            8/23/2024   Exercise   Days per week of moderate/strenous exercise 4 days   Average minutes spent exercising at this level 40 min            8/23/2024   Social Factors   Frequency of gathering with friends or relatives Three times a week   Worry food won't last until get money to buy more No   Food not last or not have enough money for food? No   Do you have housing? (Housing is defined  as stable permanent housing and does not include staying ouside in a car, in a tent, in an abandoned building, in an overnight shelter, or couch-surfing.) Yes   Are you worried about losing your housing? No   Lack of transportation? No   Unable to get utilities (heat,electricity)? No            8/23/2024   Fall Risk   Fallen 2 or more times in the past year? No   Trouble with walking or balance? No             8/23/2024   Activities of Daily Living- Home Safety   Needs help with the following daily activites None of the above   Safety concerns in the home None of the above            8/23/2024   Dental   Dentist two times every year? Yes            8/23/2024   Hearing Screening   Hearing concerns? None of the above            8/23/2024   Driving Risk Screening   Patient/family members have concerns about driving No            8/23/2024   General Alertness/Fatigue Screening   Have you been more tired than usual lately? No            8/23/2024   Urinary Incontinence Screening   Bothered by leaking urine in past 6 months No            8/23/2024   TB Screening   Were you born outside of the [place]? No        Today's PHQ-2 Score:       8/28/2024     8:16 AM   PHQ-2 ( 1999 Pfizer)   Q1: Little interest or pleasure in doing things 0   Q2: Feeling down, depressed or hopeless 0   PHQ-2 Score 0   Q1: Little interest or pleasure in doing things Not at all   Q2: Feeling down, depressed or hopeless Not at all   PHQ-2 Score 0         8/23/2024   Substance Use   Alcohol more than 3/day or more than 7/wk No   Do you have a current opioid prescription? No   How severe/bad is pain from 1 to 10? 5/10   Do you use any other substances recreationally? No        Social History     Tobacco Use    Smoking status: Never    Smokeless tobacco: Never   Substance Use Topics    Alcohol use: Yes     Comment: 1 per night    Drug use: No     Family History   Problem Relation Age of Onset    Cancer Mother     Heart Disease Father          9/14/2023    LAST FHS-7 RESULTS   1st degree relative breast or ovarian cancer No   Any relative bilateral breast cancer No   Any male have breast cancer No   Any ONE woman have BOTH breast AND ovarian cancer No   Any woman with breast cancer before 50yrs No   2 or more relatives with breast AND/OR ovarian cancer No   2 or more relatives with breast AND/OR bowel cancer No      Mammogram Screening - After age 74- determine frequency with patient based on health status, life expectancy and patient goals    ASCVD Risk   The 10-year ASCVD risk score (Pita DALE, et al., 2019) is: 23.5%    Values used to calculate the score:      Age: 78 years      Sex: Female      Is Non- : No      Diabetic: No      Tobacco smoker: No      Systolic Blood Pressure: 149 mmHg      Is BP treated: No      HDL Cholesterol: 76 mg/dL      Total Cholesterol: 183 mg/dL    The following health maintenance items are reviewed in Epic and correct as of today:  Health Maintenance   Topic Date Due    RSV VACCINE (Pregnancy & 60+) (1 - 1-dose 60+ series) Never done    MEDICARE ANNUAL WELLNESS VISIT  09/02/2023    COVID-19 Vaccine (6 - 2023-24 season) 02/27/2024    LIPID  04/20/2024    MAMMO SCREENING  09/14/2024    INFLUENZA VACCINE (1) 09/01/2024    ADVANCE CARE PLANNING  12/02/2024    TSH W/FREE T4 REFLEX  07/31/2025    FALL RISK ASSESSMENT  08/28/2025    GLUCOSE  07/31/2027    DTAP/TDAP/TD IMMUNIZATION (4 - Td or Tdap) 09/28/2027    DEXA  06/06/2037    HEPATITIS C SCREENING  Completed    PHQ-2 (once per calendar year)  Completed    Pneumococcal Vaccine: 65+ Years  Completed    ZOSTER IMMUNIZATION  Completed    HPV IMMUNIZATION  Aged Out    MENINGITIS IMMUNIZATION  Aged Out    RSV MONOCLONAL ANTIBODY  Aged Out    COLORECTAL CANCER SCREENING  Discontinued       Review of Systems  Constitutional, neuro, ENT, endocrine, pulmonary, cardiac, gastrointestinal, genitourinary, musculoskeletal, integument and psychiatric systems are  "negative, except as otherwise noted.     Objective    Exam  /66 (BP Location: Left arm, Patient Position: Sitting, Cuff Size: Adult Regular)   Pulse 60   Temp 97.1  F (36.2  C) (Skin)   Resp 14   Ht 1.58 m (5' 2.21\")   Wt 63 kg (139 lb)   LMP 01/01/2000   SpO2 98%   BMI 25.26 kg/m       Estimated body mass index is 25.26 kg/m  as calculated from the following:    Height as of this encounter: 1.58 m (5' 2.21\").    Weight as of this encounter: 63 kg (139 lb).    Physical Exam  GENERAL: alert and no distress  EYES: Eyes grossly normal to inspection, PERRL and conjunctivae and sclerae normal  HENT: ear canals completely occluded with cerumen; nose and mouth without ulcers or lesions  NECK: no adenopathy, no asymmetry, masses, or scars  RESP: lungs clear to auscultation - no rales, rhonchi or wheezes  CV: regular rate and rhythm, normal S1 S2, no S3 or S4, no murmur, click or rub, no peripheral edema  MS: no gross musculoskeletal defects noted, no edema  SKIN: no suspicious lesions or rashes  NEURO: Normal strength and tone, mentation intact and speech normal  PSYCH: mentation appears normal, affect normal/bright    Laboratory studies: Lipid panel, ALT, and CRP inflammatory marker      Assessment/Plan:    Kristal is a 78-year-old here today for her Medicare annual wellness visit, subsequent.  She is up-to-date with almost all of her healthcare maintenance strategies.    Mammogram has been ordered.    Bone density scan has been ordered.    Cologuard has been ordered for colon cancer screening purposes    Bilateral cerumen impaction.  Ear wash was carried out today and the wax was removed.    Laboratory results pending.  I will be in touch with her regarding the results.    Zolpidem prescription has been refilled    Recent episodes of diarrhea.  Loose stools continue but are improving.  They continue to be malodorous.  We talked about gas production.  She might want to try some over-the-counter Gas-X.  I will " check her CRP inflammatory marker make sure there is no obvious sign of diverticulitis.  She is up-to-date with colon cancer screening.  She has known diverticulosis.    I look forward to seeing Pat back in approximately 1 year.  She will reach out the meantime with any questions or concerns.      Signed Electronically by: Ruslan Jean MD

## 2024-08-28 ENCOUNTER — OFFICE VISIT (OUTPATIENT)
Dept: FAMILY MEDICINE | Facility: CLINIC | Age: 79
End: 2024-08-28
Payer: MEDICARE

## 2024-08-28 VITALS
BODY MASS INDEX: 25.58 KG/M2 | HEART RATE: 60 BPM | WEIGHT: 139 LBS | OXYGEN SATURATION: 98 % | SYSTOLIC BLOOD PRESSURE: 133 MMHG | DIASTOLIC BLOOD PRESSURE: 66 MMHG | HEIGHT: 62 IN | TEMPERATURE: 97.1 F | RESPIRATION RATE: 14 BRPM

## 2024-08-28 DIAGNOSIS — I45.9 HEART BLOCK: ICD-10-CM

## 2024-08-28 DIAGNOSIS — H61.23 BILATERAL IMPACTED CERUMEN: ICD-10-CM

## 2024-08-28 DIAGNOSIS — Z95.0 CARDIAC PACEMAKER IN SITU: ICD-10-CM

## 2024-08-28 DIAGNOSIS — Z12.11 SCREENING FOR COLON CANCER: ICD-10-CM

## 2024-08-28 DIAGNOSIS — E78.5 HYPERLIPIDEMIA LDL GOAL <100: ICD-10-CM

## 2024-08-28 DIAGNOSIS — G47.09 SECONDARY INSOMNIA: ICD-10-CM

## 2024-08-28 DIAGNOSIS — R10.32 LLQ ABDOMINAL PAIN: ICD-10-CM

## 2024-08-28 DIAGNOSIS — E03.9 ACQUIRED HYPOTHYROIDISM: ICD-10-CM

## 2024-08-28 DIAGNOSIS — E28.39 ESTROGEN DEFICIENCY: ICD-10-CM

## 2024-08-28 DIAGNOSIS — Z12.31 VISIT FOR SCREENING MAMMOGRAM: ICD-10-CM

## 2024-08-28 DIAGNOSIS — Z00.00 MEDICARE ANNUAL WELLNESS VISIT, SUBSEQUENT: Primary | ICD-10-CM

## 2024-08-28 LAB
ALT SERPL W P-5'-P-CCNC: 8 U/L (ref 0–50)
CHOLEST SERPL-MCNC: 195 MG/DL
CRP SERPL-MCNC: <3 MG/L
FASTING STATUS PATIENT QL REPORTED: YES
HDLC SERPL-MCNC: 69 MG/DL
LDLC SERPL CALC-MCNC: 105 MG/DL
NONHDLC SERPL-MCNC: 126 MG/DL
TRIGL SERPL-MCNC: 107 MG/DL

## 2024-08-28 PROCEDURE — 80061 LIPID PANEL: CPT | Mod: ORL | Performed by: FAMILY MEDICINE

## 2024-08-28 PROCEDURE — 84460 ALANINE AMINO (ALT) (SGPT): CPT | Mod: ORL | Performed by: FAMILY MEDICINE

## 2024-08-28 PROCEDURE — 86140 C-REACTIVE PROTEIN: CPT | Mod: ORL | Performed by: FAMILY MEDICINE

## 2024-08-28 RX ORDER — ZOLPIDEM TARTRATE 5 MG/1
5 TABLET ORAL
Qty: 30 TABLET | Refills: 1 | Status: SHIPPED | OUTPATIENT
Start: 2024-08-28

## 2024-08-28 NOTE — NURSING NOTE
"Pat  78 year old    Chief Complaint   Patient presents with    Wellness Visit    Gastrointestinal Problem     Continued looser and odorous stools            Blood pressure 133/66, pulse 60, temperature 97.1  F (36.2  C), temperature source Skin, resp. rate 14, height 1.58 m (5' 2.21\"), weight 63 kg (139 lb), last menstrual period 01/01/2000, SpO2 98%, not currently breastfeeding. Body mass index is 25.26 kg/m .    Patient Active Problem List   Diagnosis    Adenocarcinoma of breast (H)    Allergic rhinitis    Hyperlipidemia LDL goal <100    Acquired hypothyroidism    Heart block    Cardiac pacemaker in situ- Medtronic, dual chamber- NOT dependent (Advisa: MRI conditional)    Diverticulosis of large intestine without hemorrhage    BPPV (benign paroxysmal positional vertigo), unspecified laterality    Primary osteoarthritis of left knee    Tear of medial meniscus of left knee    Patellofemoral syndrome, left    S/P TKR (total knee replacement), left    Medicare annual wellness visit, subsequent    Paresthesias    Atrophic vaginitis    Secondary insomnia    Polyneuropathy              Wt Readings from Last 2 Encounters:   08/28/24 63 kg (139 lb)   07/31/24 63.6 kg (140 lb 1.9 oz)       BP Readings from Last 3 Encounters:   08/28/24 133/66   07/31/24 (!) 149/86   07/26/24 108/70                Current Outpatient Medications   Medication Sig Dispense Refill    calcium carbonate (OS-DALLAS 500 MG [place]. [place]) 500 MG tablet Take 600 mg by mouth daily       cetirizine (ZYRTEC) 10 MG tablet Take 10 mg by mouth daily      Cyanocobalamin (B-12) 1000 MCG TBCR 1,000 mcg      estradiol (ESTRACE) 0.1 MG/GM vaginal cream Place 1 g vaginally three times a week Place 1g vaginally nightly x 2 weeks and then 2-3 times per week for maintenance 42.5 g 4    ibuprofen (ADVIL/MOTRIN) 600 MG tablet TAKE 1 TABLET BY MOUTH BY MOUTH EVERY 6 HOURS AS NEEDED      latanoprost (XALATAN) 0.005 % ophthalmic solution       levothyroxine " (SYNTHROID/LEVOTHROID) 50 MCG tablet Take 1 tablet (50 mcg) by mouth every other day Alternating with 75 mcg 90 tablet 3    levothyroxine (SYNTHROID/LEVOTHROID) 75 MCG tablet Take 1 tablet (75 mcg) by mouth every other day Alternating with 50 mcg 90 tablet 3    simvastatin (ZOCOR) 20 MG tablet Take 1 tablet (20 mg) by mouth at bedtime 90 tablet 3    VITAMIN D, CHOLECALCIFEROL, PO Take 1,000 Units by mouth daily       zolpidem (AMBIEN) 5 MG tablet Take 1 tablet (5 mg) by mouth nightly as needed for sleep 30 tablet 1     No current facility-administered medications for this visit.              Social History     Tobacco Use    Smoking status: Never    Smokeless tobacco: Never   Substance Use Topics    Alcohol use: Yes     Comment: 1 per night    Drug use: No              Health Maintenance Due   Topic Date Due    MEDICARE ANNUAL WELLNESS VISIT  09/02/2023    COVID-19 Vaccine (6 - 2023-24 season) 02/27/2024    LIPID  04/20/2024    MAMMO SCREENING  09/14/2024              Lab Results   Component Value Date    PAP NIL 12/02/2019 August 28, 2024 8:32 AM

## 2024-08-28 NOTE — PATIENT INSTRUCTIONS
Try taking Citrucel caplets, once daily, to help restore normal stool bulk.  Also, keep taking your probiotic until your bowels normalize.

## 2024-08-29 ENCOUNTER — ORDERS ONLY (AUTO-RELEASED) (OUTPATIENT)
Dept: FAMILY MEDICINE | Facility: CLINIC | Age: 79
End: 2024-08-29

## 2024-08-29 DIAGNOSIS — Z12.11 SCREENING FOR COLON CANCER: ICD-10-CM

## 2024-09-03 ENCOUNTER — ALLIED HEALTH/NURSE VISIT (OUTPATIENT)
Dept: FAMILY MEDICINE | Facility: CLINIC | Age: 79
End: 2024-09-03
Payer: MEDICARE

## 2024-09-03 DIAGNOSIS — H61.23 BILATERAL IMPACTED CERUMEN: Primary | ICD-10-CM

## 2024-09-03 NOTE — PROGRESS NOTES
Patient in clinic for ear irrigation. Cerumen impaction noted on 8/28 physical with Dr. Jean, irrigation ordered. Patient states she has been using debrox bilaterally for the past 5 days. On visual examination both ear canals are occluded. Patient tolerated procedure well.    Ceruminosis is noted bilaterally.  Wax is successfully removed by syringing and manual debridement. Instructions for home care to prevent wax buildup are given.     This service provided today was under the supervising provider of the day Dr. Lillian Sanders, who was available if needed.    Marissa Monroy LPN 9:31 AM September 3, 2024

## 2024-09-06 LAB — NONINV COLON CA DNA+OCC BLD SCRN STL QL: NEGATIVE

## 2024-09-10 LAB
MDC_IDC_LEAD_CONNECTION_STATUS: NORMAL
MDC_IDC_LEAD_CONNECTION_STATUS: NORMAL
MDC_IDC_LEAD_IMPLANT_DT: NORMAL
MDC_IDC_LEAD_IMPLANT_DT: NORMAL
MDC_IDC_LEAD_LOCATION: NORMAL
MDC_IDC_LEAD_LOCATION: NORMAL
MDC_IDC_LEAD_LOCATION_DETAIL_1: NORMAL
MDC_IDC_LEAD_LOCATION_DETAIL_1: NORMAL
MDC_IDC_LEAD_MFG: NORMAL
MDC_IDC_LEAD_MFG: NORMAL
MDC_IDC_LEAD_MODEL: NORMAL
MDC_IDC_LEAD_MODEL: NORMAL
MDC_IDC_LEAD_POLARITY_TYPE: NORMAL
MDC_IDC_LEAD_POLARITY_TYPE: NORMAL
MDC_IDC_LEAD_SERIAL: NORMAL
MDC_IDC_LEAD_SERIAL: NORMAL
MDC_IDC_MSMT_BATTERY_DTM: NORMAL
MDC_IDC_MSMT_BATTERY_REMAINING_LONGEVITY: 4 MO
MDC_IDC_MSMT_BATTERY_RRT_TRIGGER: 2.83
MDC_IDC_MSMT_BATTERY_STATUS: NORMAL
MDC_IDC_MSMT_BATTERY_VOLTAGE: 2.85 V
MDC_IDC_MSMT_LEADCHNL_RA_IMPEDANCE_VALUE: 361 OHM
MDC_IDC_MSMT_LEADCHNL_RA_IMPEDANCE_VALUE: 399 OHM
MDC_IDC_MSMT_LEADCHNL_RA_PACING_THRESHOLD_AMPLITUDE: 0.62 V
MDC_IDC_MSMT_LEADCHNL_RA_PACING_THRESHOLD_PULSEWIDTH: 0.4 MS
MDC_IDC_MSMT_LEADCHNL_RA_SENSING_INTR_AMPL: 3.38 MV
MDC_IDC_MSMT_LEADCHNL_RV_IMPEDANCE_VALUE: 418 OHM
MDC_IDC_MSMT_LEADCHNL_RV_IMPEDANCE_VALUE: 475 OHM
MDC_IDC_MSMT_LEADCHNL_RV_PACING_THRESHOLD_AMPLITUDE: 0.88 V
MDC_IDC_MSMT_LEADCHNL_RV_PACING_THRESHOLD_PULSEWIDTH: 0.4 MS
MDC_IDC_PG_IMPLANT_DTM: NORMAL
MDC_IDC_PG_MFG: NORMAL
MDC_IDC_PG_MODEL: NORMAL
MDC_IDC_PG_SERIAL: NORMAL
MDC_IDC_PG_TYPE: NORMAL
MDC_IDC_SESS_CLINIC_NAME: NORMAL
MDC_IDC_SESS_DTM: NORMAL
MDC_IDC_SESS_TYPE: NORMAL
MDC_IDC_SET_BRADY_AT_MODE_SWITCH_RATE: 171 {BEATS}/MIN
MDC_IDC_SET_BRADY_HYSTRATE: NORMAL
MDC_IDC_SET_BRADY_LOWRATE: 60 {BEATS}/MIN
MDC_IDC_SET_BRADY_MAX_SENSOR_RATE: 130 {BEATS}/MIN
MDC_IDC_SET_BRADY_MAX_TRACKING_RATE: 130 {BEATS}/MIN
MDC_IDC_SET_BRADY_MODE: NORMAL
MDC_IDC_SET_BRADY_PAV_DELAY_LOW: 220 MS
MDC_IDC_SET_BRADY_SAV_DELAY_LOW: 200 MS
MDC_IDC_SET_LEADCHNL_RA_PACING_AMPLITUDE: 1.5 V
MDC_IDC_SET_LEADCHNL_RA_PACING_ANODE_ELECTRODE_1: NORMAL
MDC_IDC_SET_LEADCHNL_RA_PACING_ANODE_LOCATION_1: NORMAL
MDC_IDC_SET_LEADCHNL_RA_PACING_CAPTURE_MODE: NORMAL
MDC_IDC_SET_LEADCHNL_RA_PACING_CATHODE_ELECTRODE_1: NORMAL
MDC_IDC_SET_LEADCHNL_RA_PACING_CATHODE_LOCATION_1: NORMAL
MDC_IDC_SET_LEADCHNL_RA_PACING_POLARITY: NORMAL
MDC_IDC_SET_LEADCHNL_RA_PACING_PULSEWIDTH: 0.4 MS
MDC_IDC_SET_LEADCHNL_RA_SENSING_ANODE_ELECTRODE_1: NORMAL
MDC_IDC_SET_LEADCHNL_RA_SENSING_ANODE_LOCATION_1: NORMAL
MDC_IDC_SET_LEADCHNL_RA_SENSING_CATHODE_ELECTRODE_1: NORMAL
MDC_IDC_SET_LEADCHNL_RA_SENSING_CATHODE_LOCATION_1: NORMAL
MDC_IDC_SET_LEADCHNL_RA_SENSING_POLARITY: NORMAL
MDC_IDC_SET_LEADCHNL_RA_SENSING_SENSITIVITY: 0.6 MV
MDC_IDC_SET_LEADCHNL_RV_PACING_AMPLITUDE: 2.75 V
MDC_IDC_SET_LEADCHNL_RV_PACING_ANODE_ELECTRODE_1: NORMAL
MDC_IDC_SET_LEADCHNL_RV_PACING_ANODE_LOCATION_1: NORMAL
MDC_IDC_SET_LEADCHNL_RV_PACING_CAPTURE_MODE: NORMAL
MDC_IDC_SET_LEADCHNL_RV_PACING_CATHODE_ELECTRODE_1: NORMAL
MDC_IDC_SET_LEADCHNL_RV_PACING_CATHODE_LOCATION_1: NORMAL
MDC_IDC_SET_LEADCHNL_RV_PACING_POLARITY: NORMAL
MDC_IDC_SET_LEADCHNL_RV_PACING_PULSEWIDTH: 0.4 MS
MDC_IDC_SET_LEADCHNL_RV_SENSING_ANODE_ELECTRODE_1: NORMAL
MDC_IDC_SET_LEADCHNL_RV_SENSING_ANODE_LOCATION_1: NORMAL
MDC_IDC_SET_LEADCHNL_RV_SENSING_CATHODE_ELECTRODE_1: NORMAL
MDC_IDC_SET_LEADCHNL_RV_SENSING_CATHODE_LOCATION_1: NORMAL
MDC_IDC_SET_LEADCHNL_RV_SENSING_POLARITY: NORMAL
MDC_IDC_SET_LEADCHNL_RV_SENSING_SENSITIVITY: 0.9 MV
MDC_IDC_SET_ZONE_DETECTION_INTERVAL: 350 MS
MDC_IDC_SET_ZONE_DETECTION_INTERVAL: 400 MS
MDC_IDC_SET_ZONE_STATUS: NORMAL
MDC_IDC_SET_ZONE_STATUS: NORMAL
MDC_IDC_SET_ZONE_TYPE: NORMAL
MDC_IDC_SET_ZONE_VENDOR_TYPE: NORMAL
MDC_IDC_STAT_AT_BURDEN_PERCENT: 0 %
MDC_IDC_STAT_AT_DTM_END: NORMAL
MDC_IDC_STAT_AT_DTM_START: NORMAL
MDC_IDC_STAT_BRADY_AP_VP_PERCENT: 50.83 %
MDC_IDC_STAT_BRADY_AP_VS_PERCENT: 0.01 %
MDC_IDC_STAT_BRADY_AS_VP_PERCENT: 49.11 %
MDC_IDC_STAT_BRADY_AS_VS_PERCENT: 0.05 %
MDC_IDC_STAT_BRADY_DTM_END: NORMAL
MDC_IDC_STAT_BRADY_DTM_START: NORMAL
MDC_IDC_STAT_BRADY_RA_PERCENT_PACED: 50.8 %
MDC_IDC_STAT_BRADY_RV_PERCENT_PACED: 99.92 %
MDC_IDC_STAT_EPISODE_RECENT_COUNT: 0
MDC_IDC_STAT_EPISODE_RECENT_COUNT_DTM_END: NORMAL
MDC_IDC_STAT_EPISODE_RECENT_COUNT_DTM_START: NORMAL
MDC_IDC_STAT_EPISODE_TOTAL_COUNT: 0
MDC_IDC_STAT_EPISODE_TOTAL_COUNT: 0
MDC_IDC_STAT_EPISODE_TOTAL_COUNT: 2
MDC_IDC_STAT_EPISODE_TOTAL_COUNT: 8
MDC_IDC_STAT_EPISODE_TOTAL_COUNT_DTM_END: NORMAL
MDC_IDC_STAT_EPISODE_TOTAL_COUNT_DTM_START: NORMAL
MDC_IDC_STAT_EPISODE_TYPE: NORMAL
MDC_IDC_STAT_TACHYTHERAPY_RECENT_DTM_END: NORMAL
MDC_IDC_STAT_TACHYTHERAPY_RECENT_DTM_START: NORMAL
MDC_IDC_STAT_TACHYTHERAPY_TOTAL_DTM_END: NORMAL
MDC_IDC_STAT_TACHYTHERAPY_TOTAL_DTM_START: NORMAL

## 2024-09-16 ENCOUNTER — ANCILLARY PROCEDURE (OUTPATIENT)
Dept: CARDIOLOGY | Facility: CLINIC | Age: 79
End: 2024-09-16
Attending: INTERNAL MEDICINE
Payer: MEDICARE

## 2024-09-16 ENCOUNTER — ANCILLARY PROCEDURE (OUTPATIENT)
Dept: MAMMOGRAPHY | Facility: CLINIC | Age: 79
End: 2024-09-16
Attending: FAMILY MEDICINE
Payer: MEDICARE

## 2024-09-16 DIAGNOSIS — Z12.31 VISIT FOR SCREENING MAMMOGRAM: ICD-10-CM

## 2024-09-16 DIAGNOSIS — I44.30 AV BLOCK: ICD-10-CM

## 2024-09-16 PROCEDURE — 77063 BREAST TOMOSYNTHESIS BI: CPT | Mod: GC | Performed by: RADIOLOGY

## 2024-09-16 PROCEDURE — 99207 CARDIAC DEVICE CHECK - REMOTE: CPT | Performed by: INTERNAL MEDICINE

## 2024-09-16 PROCEDURE — 77067 SCR MAMMO BI INCL CAD: CPT | Mod: GC | Performed by: RADIOLOGY

## 2024-09-17 LAB
MDC_IDC_LEAD_CONNECTION_STATUS: NORMAL
MDC_IDC_LEAD_CONNECTION_STATUS: NORMAL
MDC_IDC_LEAD_IMPLANT_DT: NORMAL
MDC_IDC_LEAD_IMPLANT_DT: NORMAL
MDC_IDC_LEAD_LOCATION: NORMAL
MDC_IDC_LEAD_LOCATION: NORMAL
MDC_IDC_LEAD_LOCATION_DETAIL_1: NORMAL
MDC_IDC_LEAD_LOCATION_DETAIL_1: NORMAL
MDC_IDC_LEAD_MFG: NORMAL
MDC_IDC_LEAD_MFG: NORMAL
MDC_IDC_LEAD_MODEL: NORMAL
MDC_IDC_LEAD_MODEL: NORMAL
MDC_IDC_LEAD_POLARITY_TYPE: NORMAL
MDC_IDC_LEAD_POLARITY_TYPE: NORMAL
MDC_IDC_LEAD_SERIAL: NORMAL
MDC_IDC_LEAD_SERIAL: NORMAL
MDC_IDC_MSMT_BATTERY_DTM: NORMAL
MDC_IDC_MSMT_BATTERY_REMAINING_LONGEVITY: 4 MO
MDC_IDC_MSMT_BATTERY_RRT_TRIGGER: 2.83
MDC_IDC_MSMT_BATTERY_STATUS: NORMAL
MDC_IDC_MSMT_BATTERY_VOLTAGE: 2.84 V
MDC_IDC_MSMT_LEADCHNL_RA_IMPEDANCE_VALUE: 380 OHM
MDC_IDC_MSMT_LEADCHNL_RA_IMPEDANCE_VALUE: 418 OHM
MDC_IDC_MSMT_LEADCHNL_RA_PACING_THRESHOLD_AMPLITUDE: 0.62 V
MDC_IDC_MSMT_LEADCHNL_RA_PACING_THRESHOLD_PULSEWIDTH: 0.4 MS
MDC_IDC_MSMT_LEADCHNL_RA_SENSING_INTR_AMPL: 3.62 MV
MDC_IDC_MSMT_LEADCHNL_RA_SENSING_INTR_AMPL: 3.62 MV
MDC_IDC_MSMT_LEADCHNL_RV_IMPEDANCE_VALUE: 437 OHM
MDC_IDC_MSMT_LEADCHNL_RV_IMPEDANCE_VALUE: 494 OHM
MDC_IDC_MSMT_LEADCHNL_RV_PACING_THRESHOLD_AMPLITUDE: 0.75 V
MDC_IDC_MSMT_LEADCHNL_RV_PACING_THRESHOLD_PULSEWIDTH: 0.4 MS
MDC_IDC_MSMT_LEADCHNL_RV_SENSING_INTR_AMPL: 18.5 MV
MDC_IDC_MSMT_LEADCHNL_RV_SENSING_INTR_AMPL: 18.5 MV
MDC_IDC_PG_IMPLANT_DTM: NORMAL
MDC_IDC_PG_MFG: NORMAL
MDC_IDC_PG_MODEL: NORMAL
MDC_IDC_PG_SERIAL: NORMAL
MDC_IDC_PG_TYPE: NORMAL
MDC_IDC_SESS_CLINIC_NAME: NORMAL
MDC_IDC_SESS_DTM: NORMAL
MDC_IDC_SESS_TYPE: NORMAL
MDC_IDC_SET_BRADY_AT_MODE_SWITCH_RATE: 171 {BEATS}/MIN
MDC_IDC_SET_BRADY_HYSTRATE: NORMAL
MDC_IDC_SET_BRADY_LOWRATE: 60 {BEATS}/MIN
MDC_IDC_SET_BRADY_MAX_SENSOR_RATE: 130 {BEATS}/MIN
MDC_IDC_SET_BRADY_MAX_TRACKING_RATE: 130 {BEATS}/MIN
MDC_IDC_SET_BRADY_MODE: NORMAL
MDC_IDC_SET_BRADY_PAV_DELAY_LOW: 220 MS
MDC_IDC_SET_BRADY_SAV_DELAY_LOW: 200 MS
MDC_IDC_SET_LEADCHNL_RA_PACING_AMPLITUDE: 1.5 V
MDC_IDC_SET_LEADCHNL_RA_PACING_ANODE_ELECTRODE_1: NORMAL
MDC_IDC_SET_LEADCHNL_RA_PACING_ANODE_LOCATION_1: NORMAL
MDC_IDC_SET_LEADCHNL_RA_PACING_CAPTURE_MODE: NORMAL
MDC_IDC_SET_LEADCHNL_RA_PACING_CATHODE_ELECTRODE_1: NORMAL
MDC_IDC_SET_LEADCHNL_RA_PACING_CATHODE_LOCATION_1: NORMAL
MDC_IDC_SET_LEADCHNL_RA_PACING_POLARITY: NORMAL
MDC_IDC_SET_LEADCHNL_RA_PACING_PULSEWIDTH: 0.4 MS
MDC_IDC_SET_LEADCHNL_RA_SENSING_ANODE_ELECTRODE_1: NORMAL
MDC_IDC_SET_LEADCHNL_RA_SENSING_ANODE_LOCATION_1: NORMAL
MDC_IDC_SET_LEADCHNL_RA_SENSING_CATHODE_ELECTRODE_1: NORMAL
MDC_IDC_SET_LEADCHNL_RA_SENSING_CATHODE_LOCATION_1: NORMAL
MDC_IDC_SET_LEADCHNL_RA_SENSING_POLARITY: NORMAL
MDC_IDC_SET_LEADCHNL_RA_SENSING_SENSITIVITY: 0.6 MV
MDC_IDC_SET_LEADCHNL_RV_PACING_AMPLITUDE: 2.25 V
MDC_IDC_SET_LEADCHNL_RV_PACING_ANODE_ELECTRODE_1: NORMAL
MDC_IDC_SET_LEADCHNL_RV_PACING_ANODE_LOCATION_1: NORMAL
MDC_IDC_SET_LEADCHNL_RV_PACING_CAPTURE_MODE: NORMAL
MDC_IDC_SET_LEADCHNL_RV_PACING_CATHODE_ELECTRODE_1: NORMAL
MDC_IDC_SET_LEADCHNL_RV_PACING_CATHODE_LOCATION_1: NORMAL
MDC_IDC_SET_LEADCHNL_RV_PACING_POLARITY: NORMAL
MDC_IDC_SET_LEADCHNL_RV_PACING_PULSEWIDTH: 0.4 MS
MDC_IDC_SET_LEADCHNL_RV_SENSING_ANODE_ELECTRODE_1: NORMAL
MDC_IDC_SET_LEADCHNL_RV_SENSING_ANODE_LOCATION_1: NORMAL
MDC_IDC_SET_LEADCHNL_RV_SENSING_CATHODE_ELECTRODE_1: NORMAL
MDC_IDC_SET_LEADCHNL_RV_SENSING_CATHODE_LOCATION_1: NORMAL
MDC_IDC_SET_LEADCHNL_RV_SENSING_POLARITY: NORMAL
MDC_IDC_SET_LEADCHNL_RV_SENSING_SENSITIVITY: 0.9 MV
MDC_IDC_SET_ZONE_DETECTION_INTERVAL: 350 MS
MDC_IDC_SET_ZONE_DETECTION_INTERVAL: 400 MS
MDC_IDC_SET_ZONE_STATUS: NORMAL
MDC_IDC_SET_ZONE_STATUS: NORMAL
MDC_IDC_SET_ZONE_TYPE: NORMAL
MDC_IDC_SET_ZONE_VENDOR_TYPE: NORMAL
MDC_IDC_STAT_AT_BURDEN_PERCENT: 0 %
MDC_IDC_STAT_AT_DTM_END: NORMAL
MDC_IDC_STAT_AT_DTM_START: NORMAL
MDC_IDC_STAT_BRADY_AP_VP_PERCENT: 56.06 %
MDC_IDC_STAT_BRADY_AP_VS_PERCENT: 0 %
MDC_IDC_STAT_BRADY_AS_VP_PERCENT: 43.89 %
MDC_IDC_STAT_BRADY_AS_VS_PERCENT: 0.04 %
MDC_IDC_STAT_BRADY_DTM_END: NORMAL
MDC_IDC_STAT_BRADY_DTM_START: NORMAL
MDC_IDC_STAT_BRADY_RA_PERCENT_PACED: 56.04 %
MDC_IDC_STAT_BRADY_RV_PERCENT_PACED: 99.93 %
MDC_IDC_STAT_EPISODE_RECENT_COUNT: 0
MDC_IDC_STAT_EPISODE_RECENT_COUNT_DTM_END: NORMAL
MDC_IDC_STAT_EPISODE_RECENT_COUNT_DTM_START: NORMAL
MDC_IDC_STAT_EPISODE_TOTAL_COUNT: 0
MDC_IDC_STAT_EPISODE_TOTAL_COUNT: 0
MDC_IDC_STAT_EPISODE_TOTAL_COUNT: 2
MDC_IDC_STAT_EPISODE_TOTAL_COUNT: 8
MDC_IDC_STAT_EPISODE_TOTAL_COUNT_DTM_END: NORMAL
MDC_IDC_STAT_EPISODE_TOTAL_COUNT_DTM_START: NORMAL
MDC_IDC_STAT_EPISODE_TYPE: NORMAL
MDC_IDC_STAT_TACHYTHERAPY_RECENT_DTM_END: NORMAL
MDC_IDC_STAT_TACHYTHERAPY_RECENT_DTM_START: NORMAL
MDC_IDC_STAT_TACHYTHERAPY_TOTAL_DTM_END: NORMAL
MDC_IDC_STAT_TACHYTHERAPY_TOTAL_DTM_START: NORMAL

## 2024-09-23 DIAGNOSIS — E78.5 HYPERLIPIDEMIA LDL GOAL <100: ICD-10-CM

## 2024-09-24 ENCOUNTER — TRANSFERRED RECORDS (OUTPATIENT)
Dept: HEALTH INFORMATION MANAGEMENT | Facility: CLINIC | Age: 79
End: 2024-09-24
Payer: MEDICARE

## 2024-09-24 RX ORDER — SIMVASTATIN 20 MG
20 TABLET ORAL AT BEDTIME
Qty: 90 TABLET | Refills: 3 | Status: SHIPPED | OUTPATIENT
Start: 2024-09-24

## 2024-09-24 NOTE — TELEPHONE ENCOUNTER
Simvastatin (Zocor) 20 mg    Last Office Visit: 8/28/24  [place] [facility] [place]  Medication last refilled: 10/18/23 #90 with 3 refill(s)    Required labs per protocol:    LAB REF RANGE 4/20/23 8/28/24   LDL < 100 mg/dL 133 High 105 High     Prescription approved per [facility] Refill Protocol.    Aleah Simmons, AKINN, RN, CCM

## 2024-09-27 ENCOUNTER — ANCILLARY PROCEDURE (OUTPATIENT)
Dept: CARDIOLOGY | Facility: CLINIC | Age: 79
End: 2024-09-27
Attending: INTERNAL MEDICINE
Payer: MEDICARE

## 2024-09-27 DIAGNOSIS — I44.30 ATRIOVENTRICULAR BLOCK: ICD-10-CM

## 2024-09-27 PROCEDURE — 93280 PM DEVICE PROGR EVAL DUAL: CPT | Performed by: INTERNAL MEDICINE

## 2024-09-27 NOTE — PATIENT INSTRUCTIONS
It was a pleasure to see you in clinic today, please do not hesitate to call us for questions or concerns.  Your next automatic remote pacemaker check from home is scheduled for 10/14/2024.    Abena Wolfe RN    Electrophysiology Nurse Clinician  [facility]    During Business Hours Please Call:  [phone]  After Hours Please Call:  [phone] - select option #4 and ask for job code [phone]    Odomzo Counseling- I discussed with the patient the risks of Odomzo including but not limited to nausea, vomiting, diarrhea, constipation, weight loss, changes in the sense of taste, decreased appetite, muscle spasms, and hair loss.  The patient verbalized understanding of the proper use and possible adverse effects of Odomzo.  All of the patient's questions and concerns were addressed.

## 2024-09-28 LAB
MDC_IDC_LEAD_CONNECTION_STATUS: NORMAL
MDC_IDC_LEAD_CONNECTION_STATUS: NORMAL
MDC_IDC_LEAD_IMPLANT_DT: NORMAL
MDC_IDC_LEAD_IMPLANT_DT: NORMAL
MDC_IDC_LEAD_LOCATION: NORMAL
MDC_IDC_LEAD_LOCATION: NORMAL
MDC_IDC_LEAD_LOCATION_DETAIL_1: NORMAL
MDC_IDC_LEAD_LOCATION_DETAIL_1: NORMAL
MDC_IDC_LEAD_MFG: NORMAL
MDC_IDC_LEAD_MFG: NORMAL
MDC_IDC_LEAD_MODEL: NORMAL
MDC_IDC_LEAD_MODEL: NORMAL
MDC_IDC_LEAD_POLARITY_TYPE: NORMAL
MDC_IDC_LEAD_POLARITY_TYPE: NORMAL
MDC_IDC_LEAD_SERIAL: NORMAL
MDC_IDC_LEAD_SERIAL: NORMAL
MDC_IDC_MSMT_BATTERY_DTM: NORMAL
MDC_IDC_MSMT_BATTERY_REMAINING_LONGEVITY: 3 MO
MDC_IDC_MSMT_BATTERY_RRT_TRIGGER: 2.83
MDC_IDC_MSMT_BATTERY_STATUS: NORMAL
MDC_IDC_MSMT_BATTERY_VOLTAGE: 2.84 V
MDC_IDC_MSMT_LEADCHNL_RA_IMPEDANCE_VALUE: 361 OHM
MDC_IDC_MSMT_LEADCHNL_RA_IMPEDANCE_VALUE: 399 OHM
MDC_IDC_MSMT_LEADCHNL_RA_PACING_THRESHOLD_AMPLITUDE: 0.5 V
MDC_IDC_MSMT_LEADCHNL_RA_PACING_THRESHOLD_PULSEWIDTH: 0.4 MS
MDC_IDC_MSMT_LEADCHNL_RA_SENSING_INTR_AMPL: 3.38 MV
MDC_IDC_MSMT_LEADCHNL_RV_IMPEDANCE_VALUE: 418 OHM
MDC_IDC_MSMT_LEADCHNL_RV_IMPEDANCE_VALUE: 494 OHM
MDC_IDC_MSMT_LEADCHNL_RV_PACING_THRESHOLD_AMPLITUDE: 0.88 V
MDC_IDC_MSMT_LEADCHNL_RV_PACING_THRESHOLD_PULSEWIDTH: 0.4 MS
MDC_IDC_PG_IMPLANT_DTM: NORMAL
MDC_IDC_PG_MFG: NORMAL
MDC_IDC_PG_MODEL: NORMAL
MDC_IDC_PG_SERIAL: NORMAL
MDC_IDC_PG_TYPE: NORMAL
MDC_IDC_SESS_CLINIC_NAME: NORMAL
MDC_IDC_SESS_DTM: NORMAL
MDC_IDC_SESS_TYPE: NORMAL
MDC_IDC_SET_BRADY_AT_MODE_SWITCH_RATE: 171 {BEATS}/MIN
MDC_IDC_SET_BRADY_HYSTRATE: NORMAL
MDC_IDC_SET_BRADY_LOWRATE: 60 {BEATS}/MIN
MDC_IDC_SET_BRADY_MAX_SENSOR_RATE: 130 {BEATS}/MIN
MDC_IDC_SET_BRADY_MAX_TRACKING_RATE: 130 {BEATS}/MIN
MDC_IDC_SET_BRADY_MODE: NORMAL
MDC_IDC_SET_BRADY_PAV_DELAY_LOW: 220 MS
MDC_IDC_SET_BRADY_SAV_DELAY_LOW: 200 MS
MDC_IDC_SET_LEADCHNL_RA_PACING_AMPLITUDE: 1.5 V
MDC_IDC_SET_LEADCHNL_RA_PACING_ANODE_ELECTRODE_1: NORMAL
MDC_IDC_SET_LEADCHNL_RA_PACING_ANODE_LOCATION_1: NORMAL
MDC_IDC_SET_LEADCHNL_RA_PACING_CAPTURE_MODE: NORMAL
MDC_IDC_SET_LEADCHNL_RA_PACING_CATHODE_ELECTRODE_1: NORMAL
MDC_IDC_SET_LEADCHNL_RA_PACING_CATHODE_LOCATION_1: NORMAL
MDC_IDC_SET_LEADCHNL_RA_PACING_POLARITY: NORMAL
MDC_IDC_SET_LEADCHNL_RA_PACING_PULSEWIDTH: 0.4 MS
MDC_IDC_SET_LEADCHNL_RA_SENSING_ANODE_ELECTRODE_1: NORMAL
MDC_IDC_SET_LEADCHNL_RA_SENSING_ANODE_LOCATION_1: NORMAL
MDC_IDC_SET_LEADCHNL_RA_SENSING_CATHODE_ELECTRODE_1: NORMAL
MDC_IDC_SET_LEADCHNL_RA_SENSING_CATHODE_LOCATION_1: NORMAL
MDC_IDC_SET_LEADCHNL_RA_SENSING_POLARITY: NORMAL
MDC_IDC_SET_LEADCHNL_RA_SENSING_SENSITIVITY: 0.6 MV
MDC_IDC_SET_LEADCHNL_RV_PACING_AMPLITUDE: 3 V
MDC_IDC_SET_LEADCHNL_RV_PACING_ANODE_ELECTRODE_1: NORMAL
MDC_IDC_SET_LEADCHNL_RV_PACING_ANODE_LOCATION_1: NORMAL
MDC_IDC_SET_LEADCHNL_RV_PACING_CAPTURE_MODE: NORMAL
MDC_IDC_SET_LEADCHNL_RV_PACING_CATHODE_ELECTRODE_1: NORMAL
MDC_IDC_SET_LEADCHNL_RV_PACING_CATHODE_LOCATION_1: NORMAL
MDC_IDC_SET_LEADCHNL_RV_PACING_POLARITY: NORMAL
MDC_IDC_SET_LEADCHNL_RV_PACING_PULSEWIDTH: 0.4 MS
MDC_IDC_SET_LEADCHNL_RV_SENSING_ANODE_ELECTRODE_1: NORMAL
MDC_IDC_SET_LEADCHNL_RV_SENSING_ANODE_LOCATION_1: NORMAL
MDC_IDC_SET_LEADCHNL_RV_SENSING_CATHODE_ELECTRODE_1: NORMAL
MDC_IDC_SET_LEADCHNL_RV_SENSING_CATHODE_LOCATION_1: NORMAL
MDC_IDC_SET_LEADCHNL_RV_SENSING_POLARITY: NORMAL
MDC_IDC_SET_LEADCHNL_RV_SENSING_SENSITIVITY: 0.9 MV
MDC_IDC_SET_ZONE_DETECTION_INTERVAL: 350 MS
MDC_IDC_SET_ZONE_DETECTION_INTERVAL: 400 MS
MDC_IDC_SET_ZONE_STATUS: NORMAL
MDC_IDC_SET_ZONE_STATUS: NORMAL
MDC_IDC_SET_ZONE_TYPE: NORMAL
MDC_IDC_SET_ZONE_VENDOR_TYPE: NORMAL
MDC_IDC_STAT_AT_BURDEN_PERCENT: 0 %
MDC_IDC_STAT_AT_DTM_END: NORMAL
MDC_IDC_STAT_AT_DTM_START: NORMAL
MDC_IDC_STAT_BRADY_AP_VP_PERCENT: 52.46 %
MDC_IDC_STAT_BRADY_AP_VS_PERCENT: 0 %
MDC_IDC_STAT_BRADY_AS_VP_PERCENT: 47.49 %
MDC_IDC_STAT_BRADY_AS_VS_PERCENT: 0.05 %
MDC_IDC_STAT_BRADY_DTM_END: NORMAL
MDC_IDC_STAT_BRADY_DTM_START: NORMAL
MDC_IDC_STAT_BRADY_RA_PERCENT_PACED: 52.43 %
MDC_IDC_STAT_BRADY_RV_PERCENT_PACED: 99.93 %
MDC_IDC_STAT_EPISODE_RECENT_COUNT: 0
MDC_IDC_STAT_EPISODE_RECENT_COUNT_DTM_END: NORMAL
MDC_IDC_STAT_EPISODE_RECENT_COUNT_DTM_START: NORMAL
MDC_IDC_STAT_EPISODE_TOTAL_COUNT: 0
MDC_IDC_STAT_EPISODE_TOTAL_COUNT: 0
MDC_IDC_STAT_EPISODE_TOTAL_COUNT: 2
MDC_IDC_STAT_EPISODE_TOTAL_COUNT: 8
MDC_IDC_STAT_EPISODE_TOTAL_COUNT_DTM_END: NORMAL
MDC_IDC_STAT_EPISODE_TOTAL_COUNT_DTM_START: NORMAL
MDC_IDC_STAT_EPISODE_TYPE: NORMAL
MDC_IDC_STAT_TACHYTHERAPY_RECENT_DTM_END: NORMAL
MDC_IDC_STAT_TACHYTHERAPY_RECENT_DTM_START: NORMAL
MDC_IDC_STAT_TACHYTHERAPY_TOTAL_DTM_END: NORMAL
MDC_IDC_STAT_TACHYTHERAPY_TOTAL_DTM_START: NORMAL

## 2024-10-10 ENCOUNTER — ANCILLARY PROCEDURE (OUTPATIENT)
Dept: BONE DENSITY | Facility: CLINIC | Age: 79
End: 2024-10-10
Attending: FAMILY MEDICINE
Payer: MEDICARE

## 2024-10-10 DIAGNOSIS — E28.39 ESTROGEN DEFICIENCY: ICD-10-CM

## 2024-10-10 PROCEDURE — 77080 DXA BONE DENSITY AXIAL: CPT | Performed by: INTERNAL MEDICINE

## 2024-10-10 PROCEDURE — 77081 DXA BONE DENSITY APPENDICULR: CPT | Mod: XU | Performed by: INTERNAL MEDICINE

## 2024-10-14 ENCOUNTER — ANCILLARY PROCEDURE (OUTPATIENT)
Dept: CARDIOLOGY | Facility: CLINIC | Age: 79
End: 2024-10-14
Attending: INTERNAL MEDICINE
Payer: MEDICARE

## 2024-10-14 DIAGNOSIS — I44.30 AV BLOCK: ICD-10-CM

## 2024-10-14 PROCEDURE — 93294 REM INTERROG EVL PM/LDLS PM: CPT | Performed by: INTERNAL MEDICINE

## 2024-10-15 ENCOUNTER — TRANSFERRED RECORDS (OUTPATIENT)
Dept: HEALTH INFORMATION MANAGEMENT | Facility: CLINIC | Age: 79
End: 2024-10-15
Payer: MEDICARE

## 2024-10-15 ENCOUNTER — NURSE TRIAGE (OUTPATIENT)
Dept: FAMILY MEDICINE | Facility: CLINIC | Age: 79
End: 2024-10-15

## 2024-10-15 ENCOUNTER — OFFICE VISIT (OUTPATIENT)
Dept: FAMILY MEDICINE | Facility: CLINIC | Age: 79
End: 2024-10-15
Payer: MEDICARE

## 2024-10-15 VITALS
TEMPERATURE: 96 F | DIASTOLIC BLOOD PRESSURE: 74 MMHG | HEART RATE: 77 BPM | OXYGEN SATURATION: 97 % | RESPIRATION RATE: 16 BRPM | SYSTOLIC BLOOD PRESSURE: 112 MMHG

## 2024-10-15 DIAGNOSIS — R10.32 LLQ ABDOMINAL PAIN: ICD-10-CM

## 2024-10-15 DIAGNOSIS — K62.5 RECTAL BLEEDING: Primary | ICD-10-CM

## 2024-10-15 LAB
BASO+EOS+MONOS # BLD AUTO: 0.6 10E3/UL (ref 0–2.2)
BASO+EOS+MONOS NFR BLD AUTO: 7 %
ERYTHROCYTE [DISTWIDTH] IN BLOOD BY AUTOMATED COUNT: 13.4 % (ref 10–15)
HCT VFR BLD AUTO: 41 % (ref 35–47)
HGB BLD-MCNC: 13.5 G/DL (ref 11.7–15.7)
LYMPHOCYTES # BLD AUTO: 1.4 10E3/UL (ref 0.8–5.3)
LYMPHOCYTES NFR BLD AUTO: 17 %
MCH RBC QN AUTO: 30.5 PG (ref 26.5–33)
MCHC RBC AUTO-ENTMCNC: 32.9 G/DL (ref 31.5–36.5)
MCV RBC AUTO: 93 FL (ref 78–100)
NEUTROPHILS # BLD AUTO: 6.5 10E3/UL (ref 1.6–8.3)
NEUTROPHILS NFR BLD AUTO: 76 %
PLATELET # BLD AUTO: 234 10E3/UL (ref 150–450)
RBC # BLD AUTO: 4.43 10E6/UL (ref 3.8–5.2)
WBC # BLD AUTO: 8.5 10E3/UL (ref 4–11)

## 2024-10-15 NOTE — TELEPHONE ENCOUNTER
"Pt called because she has been experiencing rectal bleeding since yesterday.   She has a history of loose stools and left lower quadrant pain and reports taking a probiotic.   Experienced severe gas pain yesterday, then bowel movements and then very loose stool. The blood and clots were after that/separate from stool.  The abdominal pain (LLQ) is currently 2/10.  Afebrile.  Pt was seen on 8/28/2024 by Dr. Jean who wrote:   \"I saw her recently for diarrhea. We did extensive stool testing and nothing came back positive. She continues to have some loose stools that are quite malodorous. However, things are slowly resolving. She was having some left lower quadrant pain and we talked about the possibility of diverticulitis. She never saw any blood.\"  Scheduled today at 3pm.     Reason for Disposition   MODERATE rectal bleeding (small blood clots, passing blood without stool, or toilet water turns red)    Additional Information   Negative: Shock suspected (e.g., cold/pale/clammy skin, too weak to stand, low BP, rapid pulse)   Negative: Difficult to awaken or acting confused (e.g., disoriented, slurred speech)   Negative: Passed out (i.e., lost consciousness, collapsed and was not responding)   Negative: [1] Vomiting AND [2] contains red blood or black (\"coffee ground\") material  (Exception: Few red streaks in vomit that only happened once.)   Negative: Sounds like a life-threatening emergency to the triager    Answer Assessment - Initial Assessment Questions  1. APPEARANCE of BLOOD: \"What color is it?\" \"Is it passed separately, on the surface of the stool, or mixed in with the stool?\"       Bright red, separate from stool and not during stooling  2. AMOUNT: \"How much blood was passed?\"       Toilet paper is covered in bright red blood, two clots marble size at bottom of toilet at 8am, clots one-time last night as well as well as squirt of blood (not even teaspoon size) separate from stool.  3. FREQUENCY: \"How many " "times has blood been passed with the stools?\"       Not with the stool, it is after the fact. Had severe cramping and was pushing, then a rush of stool, and then blood afterwards.  4. ONSET: \"When was the blood first seen in the stools?\" (Days or weeks)       10/14/2024 morning  5. DIARRHEA: \"Is there also some diarrhea?\" If Yes, ask: \"How many diarrhea stools in the past 24 hours?\"       First was bowel movements followed by very loose stool, also experienced nausea but no vomiting  6. CONSTIPATION: \"Do you have constipation?\" If Yes, ask: \"How bad is it?\"      Yes, and was told to take citrical 2 at am and 2 at night  7. RECURRENT SYMPTOMS: \"Have you had blood in your stools before?\" If Yes, ask: \"When was the last time?\" and \"What happened that time?\"       Only after constipation and lining of rectum was irritated  8. BLOOD THINNERS: \"Do you take any blood thinners?\" (e.g., Coumadin/warfarin, Pradaxa/dabigatran, aspirin)      No  9. OTHER SYMPTOMS: \"Do you have any other symptoms?\"  (e.g., abdomen pain, vomiting, dizziness, fever)      Nausea, abdominal pain at the time \"severe gas cramps\"  Had some gas pains this morning which stopped by 8am.  Lower left side abdomen remains mildly painful, not distended.  10. PREGNANCY: \"Is there any chance you are pregnant?\" \"When was your last menstrual period?\"        no    Protocols used: Rectal Bleeding-KISHA-MELISA Estrada, RN  10/15/24, 10:55 AM    "

## 2024-10-15 NOTE — PROGRESS NOTES
"  [facility]  [address]  [place] [address]  Phone: [phone]  Fax: [phone]    Patient:  Sharona Bravo, Date of birth 1945  Date of Visit:  10/15/2024  Referring Provider Referred Self      Assessment & Plan    (K62.5) Rectal bleeding  (primary encounter diagnosis)  (R10.32) LLQ abdominal pain  Comment: 3-day history of abdominal cramping, left lower quadrant, accompanied by loose stools, rectal bleeding, dampened appetite, suspect diverticulitis.  She has history of diverticulosis on CT scan from 2017.  CBC is reassuring  Plan: CBC with platelets differential,         amoxicillin-clavulanate (AUGMENTIN) 875-125 MG         tablet        Discussed empiric treatment with Augmentin, 875 mg twice daily for 14 days.  Low residue diet, hold on fiber supplement at this time.  Cooked vegetables only, soft diet.  If not improving, contact MD or proceed to ER.  Discussed possible complication with abscess formation or other symptoms with it would require more emergent medical attention.    33 minutes spend on the date of this encounter doing chart review, history and exam, documentation and further activities as noted above.     Bettye Betts MD             Sharona Bravo is a 78 year old female with hx of breast cancer, hyperlipidemia, heart block s/p pacemaker, hypothyroidism, diverticulosis, vertigo, osteoarthritis, insomnia, polyneuropathy. She ishere for the following issues:      Rectal bleeding/LLQ pain  One day hx of severe gas cramping lower abdomen, lasted three hours.   \"filled toilet with soft stools (several times)\"  Sore abdomen throughout day. Last night and today, when I get some cramping and feel urge, bright red blood with some clots come out.    Feeling better today. Occasional cramping.   No fevers  Previous bout in July x 10 days, did a lot of tests  Appetite lessened yesterday  Last stool yesterday 1 " pm  Initially straining with harder stool then loose  Previous hx of hemorrhoids, years ago     Previous workup  Last seen by Dr. Jean in August, for loose stools, had neg stool cultures  Normal CRP and CBC and TSH  Negative Cologuard  Last colonoscopy 2011  CT abdomen and pelvis 2016 for LUQ pain noted scattered diverticulosis, no diverticulitis    Patient Active Problem List   Diagnosis    Adenocarcinoma of breast (H)    Allergic rhinitis    Hyperlipidemia LDL goal <100    Acquired hypothyroidism    Heart block    Cardiac pacemaker in situ- Medtronic, dual chamber- NOT dependent (Advisa: MRI conditional)    Diverticulosis of large intestine without hemorrhage    BPPV (benign paroxysmal positional vertigo), unspecified laterality    Primary osteoarthritis of left knee    Tear of medial meniscus of left knee    Patellofemoral syndrome, left    S/P TKR (total knee replacement), left    Medicare annual wellness visit, subsequent    Paresthesias    Atrophic vaginitis    Secondary insomnia    Polyneuropathy       Current Outpatient Medications   Medication Sig Dispense Refill    calcium carbonate (OS-DALLAS 500 MG [place]. [place]) 500 MG tablet Take 600 mg by mouth daily       cetirizine (ZYRTEC) 10 MG tablet Take 10 mg by mouth daily      Cyanocobalamin (B-12) 1000 MCG TBCR 1,000 mcg      estradiol (ESTRACE) 0.1 MG/GM vaginal cream Place 1 g vaginally three times a week Place 1g vaginally nightly x 2 weeks and then 2-3 times per week for maintenance 42.5 g 4    ibuprofen (ADVIL/MOTRIN) 600 MG tablet TAKE 1 TABLET BY MOUTH BY MOUTH EVERY 6 HOURS AS NEEDED      latanoprost (XALATAN) 0.005 % ophthalmic solution       levothyroxine (SYNTHROID/LEVOTHROID) 50 MCG tablet Take 1 tablet (50 mcg) by mouth every other day Alternating with 75 mcg 90 tablet 3    levothyroxine (SYNTHROID/LEVOTHROID) 75 MCG tablet Take 1 tablet (75 mcg) by mouth every other day Alternating with 50 mcg 90 tablet 3    simvastatin (ZOCOR) 20 MG tablet Take 1  tablet (20 mg) by mouth at bedtime. 90 tablet 3    VITAMIN D, CHOLECALCIFEROL, PO Take 1,000 Units by mouth daily       zolpidem (AMBIEN) 5 MG tablet Take 1 tablet (5 mg) by mouth nightly as needed for sleep. 30 tablet 1       Allergies   Allergen Reactions    Gabapentin Rash and Swelling     Face swelling    Face swelling   Face swelling        EXAM  /74 (BP Location: Right arm, Patient Position: Sitting, Cuff Size: Adult Regular)   Pulse 77   Temp (!) 96  F (35.6  C) (Skin)   Resp 16   LMP 01/01/2000   SpO2 97%   Gen: Alert, pleasant, NAD  COR: S1,S2, no murmur  Lungs: CTA bilaterally, no rhonchi, wheezes or rales  Abdomen: Soft, point tenderness at the lower midline and left lower quadrant.  Active bowel sounds,   Skin: Warm pink dry

## 2024-10-15 NOTE — NURSING NOTE
78 year old  Chief Complaint   Patient presents with    Rectal Problem     Rectal bleeding starting last night after BM during the day.        Blood pressure 112/74, pulse 77, temperature (!) 96  F (35.6  C), temperature source Skin, resp. rate 16, last menstrual period 01/01/2000, SpO2 97%, not currently breastfeeding. There is no height or weight on file to calculate BMI.  Patient Active Problem List   Diagnosis    Adenocarcinoma of breast (H)    Allergic rhinitis    Hyperlipidemia LDL goal <100    Acquired hypothyroidism    Heart block    Cardiac pacemaker in situ- Medtronic, dual chamber- NOT dependent (Advisa: MRI conditional)    Diverticulosis of large intestine without hemorrhage    BPPV (benign paroxysmal positional vertigo), unspecified laterality    Primary osteoarthritis of left knee    Tear of medial meniscus of left knee    Patellofemoral syndrome, left    S/P TKR (total knee replacement), left    Medicare annual wellness visit, subsequent    Paresthesias    Atrophic vaginitis    Secondary insomnia    Polyneuropathy       Wt Readings from Last 2 Encounters:   08/28/24 63 kg (139 lb)   07/31/24 63.6 kg (140 lb 1.9 oz)     BP Readings from Last 3 Encounters:   10/15/24 112/74   08/28/24 133/66   07/31/24 (!) 149/86         Current Outpatient Medications   Medication Sig Dispense Refill    calcium carbonate (OS-DALLAS 500 MG [place]. [place]) 500 MG tablet Take 600 mg by mouth daily       cetirizine (ZYRTEC) 10 MG tablet Take 10 mg by mouth daily      Cyanocobalamin (B-12) 1000 MCG TBCR 1,000 mcg      estradiol (ESTRACE) 0.1 MG/GM vaginal cream Place 1 g vaginally three times a week Place 1g vaginally nightly x 2 weeks and then 2-3 times per week for maintenance 42.5 g 4    ibuprofen (ADVIL/MOTRIN) 600 MG tablet TAKE 1 TABLET BY MOUTH BY MOUTH EVERY 6 HOURS AS NEEDED      latanoprost (XALATAN) 0.005 % ophthalmic solution       levothyroxine (SYNTHROID/LEVOTHROID) 50 MCG tablet Take 1 tablet (50 mcg) by mouth every  other day Alternating with 75 mcg 90 tablet 3    levothyroxine (SYNTHROID/LEVOTHROID) 75 MCG tablet Take 1 tablet (75 mcg) by mouth every other day Alternating with 50 mcg 90 tablet 3    simvastatin (ZOCOR) 20 MG tablet Take 1 tablet (20 mg) by mouth at bedtime. 90 tablet 3    VITAMIN D, CHOLECALCIFEROL, PO Take 1,000 Units by mouth daily       zolpidem (AMBIEN) 5 MG tablet Take 1 tablet (5 mg) by mouth nightly as needed for sleep. 30 tablet 1     No current facility-administered medications for this visit.       Social History     Tobacco Use    Smoking status: Never    Smokeless tobacco: Never   Substance Use Topics    Alcohol use: Yes     Comment: 1 per night    Drug use: No       There are no preventive care reminders to display for this patient.    Lab Results   Component Value Date    PAP NIL 12/02/2019         October 15, 2024 2:53 PM

## 2024-10-16 LAB
MDC_IDC_LEAD_CONNECTION_STATUS: NORMAL
MDC_IDC_LEAD_CONNECTION_STATUS: NORMAL
MDC_IDC_LEAD_IMPLANT_DT: NORMAL
MDC_IDC_LEAD_IMPLANT_DT: NORMAL
MDC_IDC_LEAD_LOCATION: NORMAL
MDC_IDC_LEAD_LOCATION: NORMAL
MDC_IDC_LEAD_LOCATION_DETAIL_1: NORMAL
MDC_IDC_LEAD_LOCATION_DETAIL_1: NORMAL
MDC_IDC_LEAD_MFG: NORMAL
MDC_IDC_LEAD_MFG: NORMAL
MDC_IDC_LEAD_MODEL: NORMAL
MDC_IDC_LEAD_MODEL: NORMAL
MDC_IDC_LEAD_POLARITY_TYPE: NORMAL
MDC_IDC_LEAD_POLARITY_TYPE: NORMAL
MDC_IDC_LEAD_SERIAL: NORMAL
MDC_IDC_LEAD_SERIAL: NORMAL
MDC_IDC_MSMT_BATTERY_DTM: NORMAL
MDC_IDC_MSMT_BATTERY_REMAINING_LONGEVITY: 2 MO
MDC_IDC_MSMT_BATTERY_RRT_TRIGGER: 2.83
MDC_IDC_MSMT_BATTERY_STATUS: NORMAL
MDC_IDC_MSMT_BATTERY_VOLTAGE: 2.84 V
MDC_IDC_MSMT_LEADCHNL_RA_IMPEDANCE_VALUE: 380 OHM
MDC_IDC_MSMT_LEADCHNL_RA_IMPEDANCE_VALUE: 399 OHM
MDC_IDC_MSMT_LEADCHNL_RA_PACING_THRESHOLD_AMPLITUDE: 0.5 V
MDC_IDC_MSMT_LEADCHNL_RA_PACING_THRESHOLD_PULSEWIDTH: 0.4 MS
MDC_IDC_MSMT_LEADCHNL_RA_SENSING_INTR_AMPL: 4.12 MV
MDC_IDC_MSMT_LEADCHNL_RV_IMPEDANCE_VALUE: 437 OHM
MDC_IDC_MSMT_LEADCHNL_RV_IMPEDANCE_VALUE: 494 OHM
MDC_IDC_MSMT_LEADCHNL_RV_PACING_THRESHOLD_AMPLITUDE: 1.5 V
MDC_IDC_MSMT_LEADCHNL_RV_PACING_THRESHOLD_PULSEWIDTH: 0.4 MS
MDC_IDC_MSMT_LEADCHNL_RV_SENSING_INTR_AMPL: 19.75 MV
MDC_IDC_PG_IMPLANT_DTM: NORMAL
MDC_IDC_PG_MFG: NORMAL
MDC_IDC_PG_MODEL: NORMAL
MDC_IDC_PG_SERIAL: NORMAL
MDC_IDC_PG_TYPE: NORMAL
MDC_IDC_SESS_CLINIC_NAME: NORMAL
MDC_IDC_SESS_DTM: NORMAL
MDC_IDC_SESS_TYPE: NORMAL
MDC_IDC_SET_BRADY_AT_MODE_SWITCH_RATE: 171 {BEATS}/MIN
MDC_IDC_SET_BRADY_HYSTRATE: NORMAL
MDC_IDC_SET_BRADY_LOWRATE: 60 {BEATS}/MIN
MDC_IDC_SET_BRADY_MAX_SENSOR_RATE: 130 {BEATS}/MIN
MDC_IDC_SET_BRADY_MAX_TRACKING_RATE: 130 {BEATS}/MIN
MDC_IDC_SET_BRADY_MODE: NORMAL
MDC_IDC_SET_BRADY_PAV_DELAY_LOW: 220 MS
MDC_IDC_SET_BRADY_SAV_DELAY_LOW: 200 MS
MDC_IDC_SET_LEADCHNL_RA_PACING_AMPLITUDE: 1.5 V
MDC_IDC_SET_LEADCHNL_RA_PACING_ANODE_ELECTRODE_1: NORMAL
MDC_IDC_SET_LEADCHNL_RA_PACING_ANODE_LOCATION_1: NORMAL
MDC_IDC_SET_LEADCHNL_RA_PACING_CAPTURE_MODE: NORMAL
MDC_IDC_SET_LEADCHNL_RA_PACING_CATHODE_ELECTRODE_1: NORMAL
MDC_IDC_SET_LEADCHNL_RA_PACING_CATHODE_LOCATION_1: NORMAL
MDC_IDC_SET_LEADCHNL_RA_PACING_POLARITY: NORMAL
MDC_IDC_SET_LEADCHNL_RA_PACING_PULSEWIDTH: 0.4 MS
MDC_IDC_SET_LEADCHNL_RA_SENSING_ANODE_ELECTRODE_1: NORMAL
MDC_IDC_SET_LEADCHNL_RA_SENSING_ANODE_LOCATION_1: NORMAL
MDC_IDC_SET_LEADCHNL_RA_SENSING_CATHODE_ELECTRODE_1: NORMAL
MDC_IDC_SET_LEADCHNL_RA_SENSING_CATHODE_LOCATION_1: NORMAL
MDC_IDC_SET_LEADCHNL_RA_SENSING_POLARITY: NORMAL
MDC_IDC_SET_LEADCHNL_RA_SENSING_SENSITIVITY: 0.6 MV
MDC_IDC_SET_LEADCHNL_RV_PACING_AMPLITUDE: 3 V
MDC_IDC_SET_LEADCHNL_RV_PACING_ANODE_ELECTRODE_1: NORMAL
MDC_IDC_SET_LEADCHNL_RV_PACING_ANODE_LOCATION_1: NORMAL
MDC_IDC_SET_LEADCHNL_RV_PACING_CAPTURE_MODE: NORMAL
MDC_IDC_SET_LEADCHNL_RV_PACING_CATHODE_ELECTRODE_1: NORMAL
MDC_IDC_SET_LEADCHNL_RV_PACING_CATHODE_LOCATION_1: NORMAL
MDC_IDC_SET_LEADCHNL_RV_PACING_POLARITY: NORMAL
MDC_IDC_SET_LEADCHNL_RV_PACING_PULSEWIDTH: 0.4 MS
MDC_IDC_SET_LEADCHNL_RV_SENSING_ANODE_ELECTRODE_1: NORMAL
MDC_IDC_SET_LEADCHNL_RV_SENSING_ANODE_LOCATION_1: NORMAL
MDC_IDC_SET_LEADCHNL_RV_SENSING_CATHODE_ELECTRODE_1: NORMAL
MDC_IDC_SET_LEADCHNL_RV_SENSING_CATHODE_LOCATION_1: NORMAL
MDC_IDC_SET_LEADCHNL_RV_SENSING_POLARITY: NORMAL
MDC_IDC_SET_LEADCHNL_RV_SENSING_SENSITIVITY: 0.9 MV
MDC_IDC_SET_ZONE_DETECTION_INTERVAL: 350 MS
MDC_IDC_SET_ZONE_DETECTION_INTERVAL: 400 MS
MDC_IDC_SET_ZONE_STATUS: NORMAL
MDC_IDC_SET_ZONE_STATUS: NORMAL
MDC_IDC_SET_ZONE_TYPE: NORMAL
MDC_IDC_SET_ZONE_VENDOR_TYPE: NORMAL
MDC_IDC_STAT_AT_BURDEN_PERCENT: 0 %
MDC_IDC_STAT_AT_DTM_END: NORMAL
MDC_IDC_STAT_AT_DTM_START: NORMAL
MDC_IDC_STAT_BRADY_AP_VP_PERCENT: 68.03 %
MDC_IDC_STAT_BRADY_AP_VS_PERCENT: 0.01 %
MDC_IDC_STAT_BRADY_AS_VP_PERCENT: 31.93 %
MDC_IDC_STAT_BRADY_AS_VS_PERCENT: 0.03 %
MDC_IDC_STAT_BRADY_DTM_END: NORMAL
MDC_IDC_STAT_BRADY_DTM_START: NORMAL
MDC_IDC_STAT_BRADY_RA_PERCENT_PACED: 68 %
MDC_IDC_STAT_BRADY_RV_PERCENT_PACED: 99.93 %
MDC_IDC_STAT_EPISODE_RECENT_COUNT: 0
MDC_IDC_STAT_EPISODE_RECENT_COUNT_DTM_END: NORMAL
MDC_IDC_STAT_EPISODE_RECENT_COUNT_DTM_START: NORMAL
MDC_IDC_STAT_EPISODE_TOTAL_COUNT: 0
MDC_IDC_STAT_EPISODE_TOTAL_COUNT: 0
MDC_IDC_STAT_EPISODE_TOTAL_COUNT: 2
MDC_IDC_STAT_EPISODE_TOTAL_COUNT: 8
MDC_IDC_STAT_EPISODE_TOTAL_COUNT_DTM_END: NORMAL
MDC_IDC_STAT_EPISODE_TOTAL_COUNT_DTM_START: NORMAL
MDC_IDC_STAT_EPISODE_TYPE: NORMAL
MDC_IDC_STAT_TACHYTHERAPY_RECENT_DTM_END: NORMAL
MDC_IDC_STAT_TACHYTHERAPY_RECENT_DTM_START: NORMAL
MDC_IDC_STAT_TACHYTHERAPY_TOTAL_DTM_END: NORMAL
MDC_IDC_STAT_TACHYTHERAPY_TOTAL_DTM_START: NORMAL

## 2024-10-29 ENCOUNTER — TRANSFERRED RECORDS (OUTPATIENT)
Dept: HEALTH INFORMATION MANAGEMENT | Facility: CLINIC | Age: 79
End: 2024-10-29
Payer: MEDICARE

## 2024-11-11 ENCOUNTER — ANCILLARY PROCEDURE (OUTPATIENT)
Dept: CARDIOLOGY | Facility: CLINIC | Age: 79
End: 2024-11-11
Attending: INTERNAL MEDICINE
Payer: MEDICARE

## 2024-11-11 DIAGNOSIS — I44.30 AV BLOCK: ICD-10-CM

## 2024-11-11 PROCEDURE — 93294 REM INTERROG EVL PM/LDLS PM: CPT | Performed by: INTERNAL MEDICINE

## 2024-11-13 LAB
MDC_IDC_LEAD_CONNECTION_STATUS: NORMAL
MDC_IDC_LEAD_CONNECTION_STATUS: NORMAL
MDC_IDC_LEAD_IMPLANT_DT: NORMAL
MDC_IDC_LEAD_IMPLANT_DT: NORMAL
MDC_IDC_LEAD_LOCATION: NORMAL
MDC_IDC_LEAD_LOCATION: NORMAL
MDC_IDC_LEAD_LOCATION_DETAIL_1: NORMAL
MDC_IDC_LEAD_LOCATION_DETAIL_1: NORMAL
MDC_IDC_LEAD_MFG: NORMAL
MDC_IDC_LEAD_MFG: NORMAL
MDC_IDC_LEAD_MODEL: NORMAL
MDC_IDC_LEAD_MODEL: NORMAL
MDC_IDC_LEAD_POLARITY_TYPE: NORMAL
MDC_IDC_LEAD_POLARITY_TYPE: NORMAL
MDC_IDC_LEAD_SERIAL: NORMAL
MDC_IDC_LEAD_SERIAL: NORMAL
MDC_IDC_MSMT_BATTERY_DTM: NORMAL
MDC_IDC_MSMT_BATTERY_REMAINING_LONGEVITY: 2 MO
MDC_IDC_MSMT_BATTERY_RRT_TRIGGER: 2.83
MDC_IDC_MSMT_BATTERY_STATUS: NORMAL
MDC_IDC_MSMT_BATTERY_VOLTAGE: 2.83 V
MDC_IDC_MSMT_LEADCHNL_RA_IMPEDANCE_VALUE: 361 OHM
MDC_IDC_MSMT_LEADCHNL_RA_IMPEDANCE_VALUE: 399 OHM
MDC_IDC_MSMT_LEADCHNL_RA_SENSING_INTR_AMPL: 3.38 MV
MDC_IDC_MSMT_LEADCHNL_RA_SENSING_INTR_AMPL: 3.38 MV
MDC_IDC_MSMT_LEADCHNL_RV_IMPEDANCE_VALUE: 456 OHM
MDC_IDC_MSMT_LEADCHNL_RV_IMPEDANCE_VALUE: 532 OHM
MDC_IDC_MSMT_LEADCHNL_RV_PACING_THRESHOLD_AMPLITUDE: 1.25 V
MDC_IDC_MSMT_LEADCHNL_RV_PACING_THRESHOLD_PULSEWIDTH: 0.4 MS
MDC_IDC_MSMT_LEADCHNL_RV_SENSING_INTR_AMPL: 18 MV
MDC_IDC_MSMT_LEADCHNL_RV_SENSING_INTR_AMPL: 18 MV
MDC_IDC_PG_IMPLANT_DTM: NORMAL
MDC_IDC_PG_MFG: NORMAL
MDC_IDC_PG_MODEL: NORMAL
MDC_IDC_PG_SERIAL: NORMAL
MDC_IDC_PG_TYPE: NORMAL
MDC_IDC_SESS_CLINIC_NAME: NORMAL
MDC_IDC_SESS_DTM: NORMAL
MDC_IDC_SESS_TYPE: NORMAL
MDC_IDC_SET_BRADY_AT_MODE_SWITCH_RATE: 171 {BEATS}/MIN
MDC_IDC_SET_BRADY_HYSTRATE: NORMAL
MDC_IDC_SET_BRADY_LOWRATE: 60 {BEATS}/MIN
MDC_IDC_SET_BRADY_MAX_SENSOR_RATE: 130 {BEATS}/MIN
MDC_IDC_SET_BRADY_MAX_TRACKING_RATE: 130 {BEATS}/MIN
MDC_IDC_SET_BRADY_MODE: NORMAL
MDC_IDC_SET_BRADY_PAV_DELAY_LOW: 220 MS
MDC_IDC_SET_BRADY_SAV_DELAY_LOW: 200 MS
MDC_IDC_SET_LEADCHNL_RA_PACING_AMPLITUDE: 1.5 V
MDC_IDC_SET_LEADCHNL_RA_PACING_ANODE_ELECTRODE_1: NORMAL
MDC_IDC_SET_LEADCHNL_RA_PACING_ANODE_LOCATION_1: NORMAL
MDC_IDC_SET_LEADCHNL_RA_PACING_CAPTURE_MODE: NORMAL
MDC_IDC_SET_LEADCHNL_RA_PACING_CATHODE_ELECTRODE_1: NORMAL
MDC_IDC_SET_LEADCHNL_RA_PACING_CATHODE_LOCATION_1: NORMAL
MDC_IDC_SET_LEADCHNL_RA_PACING_POLARITY: NORMAL
MDC_IDC_SET_LEADCHNL_RA_PACING_PULSEWIDTH: 0.4 MS
MDC_IDC_SET_LEADCHNL_RA_SENSING_ANODE_ELECTRODE_1: NORMAL
MDC_IDC_SET_LEADCHNL_RA_SENSING_ANODE_LOCATION_1: NORMAL
MDC_IDC_SET_LEADCHNL_RA_SENSING_CATHODE_ELECTRODE_1: NORMAL
MDC_IDC_SET_LEADCHNL_RA_SENSING_CATHODE_LOCATION_1: NORMAL
MDC_IDC_SET_LEADCHNL_RA_SENSING_POLARITY: NORMAL
MDC_IDC_SET_LEADCHNL_RA_SENSING_SENSITIVITY: 0.6 MV
MDC_IDC_SET_LEADCHNL_RV_PACING_AMPLITUDE: 2.5 V
MDC_IDC_SET_LEADCHNL_RV_PACING_ANODE_ELECTRODE_1: NORMAL
MDC_IDC_SET_LEADCHNL_RV_PACING_ANODE_LOCATION_1: NORMAL
MDC_IDC_SET_LEADCHNL_RV_PACING_CAPTURE_MODE: NORMAL
MDC_IDC_SET_LEADCHNL_RV_PACING_CATHODE_ELECTRODE_1: NORMAL
MDC_IDC_SET_LEADCHNL_RV_PACING_CATHODE_LOCATION_1: NORMAL
MDC_IDC_SET_LEADCHNL_RV_PACING_POLARITY: NORMAL
MDC_IDC_SET_LEADCHNL_RV_PACING_PULSEWIDTH: 0.4 MS
MDC_IDC_SET_LEADCHNL_RV_SENSING_ANODE_ELECTRODE_1: NORMAL
MDC_IDC_SET_LEADCHNL_RV_SENSING_ANODE_LOCATION_1: NORMAL
MDC_IDC_SET_LEADCHNL_RV_SENSING_CATHODE_ELECTRODE_1: NORMAL
MDC_IDC_SET_LEADCHNL_RV_SENSING_CATHODE_LOCATION_1: NORMAL
MDC_IDC_SET_LEADCHNL_RV_SENSING_POLARITY: NORMAL
MDC_IDC_SET_LEADCHNL_RV_SENSING_SENSITIVITY: 0.9 MV
MDC_IDC_SET_ZONE_DETECTION_INTERVAL: 350 MS
MDC_IDC_SET_ZONE_DETECTION_INTERVAL: 400 MS
MDC_IDC_SET_ZONE_STATUS: NORMAL
MDC_IDC_SET_ZONE_STATUS: NORMAL
MDC_IDC_SET_ZONE_TYPE: NORMAL
MDC_IDC_SET_ZONE_VENDOR_TYPE: NORMAL
MDC_IDC_STAT_AT_BURDEN_PERCENT: 0.1 %
MDC_IDC_STAT_AT_DTM_END: NORMAL
MDC_IDC_STAT_AT_DTM_START: NORMAL
MDC_IDC_STAT_BRADY_AP_VP_PERCENT: 70.51 %
MDC_IDC_STAT_BRADY_AP_VS_PERCENT: 0.02 %
MDC_IDC_STAT_BRADY_AS_VP_PERCENT: 29.43 %
MDC_IDC_STAT_BRADY_AS_VS_PERCENT: 0.04 %
MDC_IDC_STAT_BRADY_DTM_END: NORMAL
MDC_IDC_STAT_BRADY_DTM_START: NORMAL
MDC_IDC_STAT_BRADY_RA_PERCENT_PACED: 70.49 %
MDC_IDC_STAT_BRADY_RV_PERCENT_PACED: 99.92 %
MDC_IDC_STAT_EPISODE_RECENT_COUNT: 0
MDC_IDC_STAT_EPISODE_RECENT_COUNT: 1
MDC_IDC_STAT_EPISODE_RECENT_COUNT_DTM_END: NORMAL
MDC_IDC_STAT_EPISODE_RECENT_COUNT_DTM_START: NORMAL
MDC_IDC_STAT_EPISODE_TOTAL_COUNT: 0
MDC_IDC_STAT_EPISODE_TOTAL_COUNT: 0
MDC_IDC_STAT_EPISODE_TOTAL_COUNT: 2
MDC_IDC_STAT_EPISODE_TOTAL_COUNT: 8
MDC_IDC_STAT_EPISODE_TOTAL_COUNT_DTM_END: NORMAL
MDC_IDC_STAT_EPISODE_TOTAL_COUNT_DTM_START: NORMAL
MDC_IDC_STAT_EPISODE_TYPE: NORMAL
MDC_IDC_STAT_TACHYTHERAPY_RECENT_DTM_END: NORMAL
MDC_IDC_STAT_TACHYTHERAPY_RECENT_DTM_START: NORMAL
MDC_IDC_STAT_TACHYTHERAPY_TOTAL_DTM_END: NORMAL
MDC_IDC_STAT_TACHYTHERAPY_TOTAL_DTM_START: NORMAL

## 2024-11-14 NOTE — NURSING NOTE
"Pat  77 year old    Chief Complaint   Patient presents with    Pre-Op Exam     RIGHT bunionectomy    Medication Refill     Zolpidem renewal            Blood pressure 138/83, pulse 65, temperature 97.1  F (36.2  C), temperature source Skin, height 1.58 m (5' 2.21\"), weight 64 kg (141 lb), last menstrual period 01/01/2000, SpO2 97 %, not currently breastfeeding. Body mass index is 25.62 kg/m .    Patient Active Problem List   Diagnosis    Adenocarcinoma of breast (H)    Allergic rhinitis    Hyperlipidemia LDL goal <100    Acquired hypothyroidism    Heart block    Cardiac pacemaker in situ- Medtronic, dual chamber- NOT dependent (Advisa: MRI conditional)    Diverticulosis of large intestine without hemorrhage    BPPV (benign paroxysmal positional vertigo), unspecified laterality    Primary osteoarthritis of left knee    Tear of medial meniscus of left knee    Patellofemoral syndrome, left    S/P TKR (total knee replacement), left    Medicare annual wellness visit, subsequent    Paresthesias    Atrophic vaginitis    Secondary insomnia    Polyneuropathy              Wt Readings from Last 2 Encounters:   10/04/23 64 kg (141 lb)   11/11/22 64.4 kg (142 lb)       BP Readings from Last 3 Encounters:   10/04/23 138/83   11/11/22 127/82   11/06/22 135/72                Current Outpatient Medications   Medication    calcium carbonate (OS-DALLAS 500 MG [place]. [place]) 500 MG tablet    calcium polycarbophil (FIBERCON) 625 MG tablet    cetirizine (ZYRTEC) 10 MG tablet    Cyanocobalamin (B-12) 1000 MCG TBCR    estradiol (ESTRACE) 0.1 MG/GM vaginal cream    levothyroxine (SYNTHROID/LEVOTHROID) 50 MCG tablet    levothyroxine (SYNTHROID/LEVOTHROID) 75 MCG tablet    simvastatin (ZOCOR) 20 MG tablet    Turmeric 500 MG CAPS    VITAMIN D, CHOLECALCIFEROL, PO    zolpidem (AMBIEN) 5 MG tablet    UNABLE TO FIND     No current facility-administered medications for this visit.              Social History     Tobacco Use    Smoking status: Never    " Detail Level: Generalized Patient Specific Counseling (Will Not Stick From Patient To Patient): -\\nCounseled pt on Dupixent, pt expresses interest in systemic biologic\\nCounseled it is a very safe \\nCounseled on risk of allergic eye systems and increased herpes simplex, for pts who have it Smokeless tobacco: Never   Substance Use Topics    Alcohol use: Yes     Comment: 1 per night    Drug use: No              Health Maintenance Due   Topic Date Due    INFLUENZA VACCINE (1) 09/01/2023    COVID-19 Vaccine (5 - 2023-24 season) 09/01/2023    MEDICARE ANNUAL WELLNESS VISIT  09/02/2023    FALL RISK ASSESSMENT  09/02/2023              Lab Results   Component Value Date    PAP NIL 12/02/2019 October 4, 2023 2:35 PM

## 2024-12-09 ENCOUNTER — ANCILLARY PROCEDURE (OUTPATIENT)
Dept: CARDIOLOGY | Facility: CLINIC | Age: 79
End: 2024-12-09
Attending: INTERNAL MEDICINE
Payer: MEDICARE

## 2024-12-09 DIAGNOSIS — I44.30 AV BLOCK: ICD-10-CM

## 2024-12-26 LAB
MDC_IDC_LEAD_CONNECTION_STATUS: NORMAL
MDC_IDC_LEAD_CONNECTION_STATUS: NORMAL
MDC_IDC_LEAD_IMPLANT_DT: NORMAL
MDC_IDC_LEAD_IMPLANT_DT: NORMAL
MDC_IDC_LEAD_LOCATION: NORMAL
MDC_IDC_LEAD_LOCATION: NORMAL
MDC_IDC_LEAD_LOCATION_DETAIL_1: NORMAL
MDC_IDC_LEAD_LOCATION_DETAIL_1: NORMAL
MDC_IDC_LEAD_MFG: NORMAL
MDC_IDC_LEAD_MFG: NORMAL
MDC_IDC_LEAD_MODEL: NORMAL
MDC_IDC_LEAD_MODEL: NORMAL
MDC_IDC_LEAD_POLARITY_TYPE: NORMAL
MDC_IDC_LEAD_POLARITY_TYPE: NORMAL
MDC_IDC_LEAD_SERIAL: NORMAL
MDC_IDC_LEAD_SERIAL: NORMAL
MDC_IDC_MSMT_BATTERY_DTM: NORMAL
MDC_IDC_MSMT_BATTERY_REMAINING_LONGEVITY: 1 MO
MDC_IDC_MSMT_BATTERY_RRT_TRIGGER: 2.83
MDC_IDC_MSMT_BATTERY_STATUS: NORMAL
MDC_IDC_MSMT_BATTERY_VOLTAGE: 2.83 V
MDC_IDC_MSMT_LEADCHNL_RA_IMPEDANCE_VALUE: 380 OHM
MDC_IDC_MSMT_LEADCHNL_RA_IMPEDANCE_VALUE: 418 OHM
MDC_IDC_MSMT_LEADCHNL_RA_PACING_THRESHOLD_AMPLITUDE: 0.62 V
MDC_IDC_MSMT_LEADCHNL_RA_PACING_THRESHOLD_PULSEWIDTH: 0.4 MS
MDC_IDC_MSMT_LEADCHNL_RA_SENSING_INTR_AMPL: 3 MV
MDC_IDC_MSMT_LEADCHNL_RV_IMPEDANCE_VALUE: 418 OHM
MDC_IDC_MSMT_LEADCHNL_RV_IMPEDANCE_VALUE: 475 OHM
MDC_IDC_MSMT_LEADCHNL_RV_PACING_THRESHOLD_AMPLITUDE: 1 V
MDC_IDC_MSMT_LEADCHNL_RV_PACING_THRESHOLD_PULSEWIDTH: 0.4 MS
MDC_IDC_MSMT_LEADCHNL_RV_SENSING_INTR_AMPL: 13.62 MV
MDC_IDC_PG_IMPLANT_DTM: NORMAL
MDC_IDC_PG_MFG: NORMAL
MDC_IDC_PG_MODEL: NORMAL
MDC_IDC_PG_SERIAL: NORMAL
MDC_IDC_PG_TYPE: NORMAL
MDC_IDC_SESS_CLINIC_NAME: NORMAL
MDC_IDC_SESS_DTM: NORMAL
MDC_IDC_SESS_TYPE: NORMAL
MDC_IDC_SET_BRADY_AT_MODE_SWITCH_RATE: 171 {BEATS}/MIN
MDC_IDC_SET_BRADY_HYSTRATE: NORMAL
MDC_IDC_SET_BRADY_LOWRATE: 60 {BEATS}/MIN
MDC_IDC_SET_BRADY_MAX_SENSOR_RATE: 130 {BEATS}/MIN
MDC_IDC_SET_BRADY_MAX_TRACKING_RATE: 130 {BEATS}/MIN
MDC_IDC_SET_BRADY_MODE: NORMAL
MDC_IDC_SET_BRADY_PAV_DELAY_LOW: 220 MS
MDC_IDC_SET_BRADY_SAV_DELAY_LOW: 200 MS
MDC_IDC_SET_LEADCHNL_RA_PACING_AMPLITUDE: 1.5 V
MDC_IDC_SET_LEADCHNL_RA_PACING_ANODE_ELECTRODE_1: NORMAL
MDC_IDC_SET_LEADCHNL_RA_PACING_ANODE_LOCATION_1: NORMAL
MDC_IDC_SET_LEADCHNL_RA_PACING_CAPTURE_MODE: NORMAL
MDC_IDC_SET_LEADCHNL_RA_PACING_CATHODE_ELECTRODE_1: NORMAL
MDC_IDC_SET_LEADCHNL_RA_PACING_CATHODE_LOCATION_1: NORMAL
MDC_IDC_SET_LEADCHNL_RA_PACING_POLARITY: NORMAL
MDC_IDC_SET_LEADCHNL_RA_PACING_PULSEWIDTH: 0.4 MS
MDC_IDC_SET_LEADCHNL_RA_SENSING_ANODE_ELECTRODE_1: NORMAL
MDC_IDC_SET_LEADCHNL_RA_SENSING_ANODE_LOCATION_1: NORMAL
MDC_IDC_SET_LEADCHNL_RA_SENSING_CATHODE_ELECTRODE_1: NORMAL
MDC_IDC_SET_LEADCHNL_RA_SENSING_CATHODE_LOCATION_1: NORMAL
MDC_IDC_SET_LEADCHNL_RA_SENSING_POLARITY: NORMAL
MDC_IDC_SET_LEADCHNL_RA_SENSING_SENSITIVITY: 0.6 MV
MDC_IDC_SET_LEADCHNL_RV_PACING_AMPLITUDE: 2.5 V
MDC_IDC_SET_LEADCHNL_RV_PACING_ANODE_ELECTRODE_1: NORMAL
MDC_IDC_SET_LEADCHNL_RV_PACING_ANODE_LOCATION_1: NORMAL
MDC_IDC_SET_LEADCHNL_RV_PACING_CAPTURE_MODE: NORMAL
MDC_IDC_SET_LEADCHNL_RV_PACING_CATHODE_ELECTRODE_1: NORMAL
MDC_IDC_SET_LEADCHNL_RV_PACING_CATHODE_LOCATION_1: NORMAL
MDC_IDC_SET_LEADCHNL_RV_PACING_POLARITY: NORMAL
MDC_IDC_SET_LEADCHNL_RV_PACING_PULSEWIDTH: 0.4 MS
MDC_IDC_SET_LEADCHNL_RV_SENSING_ANODE_ELECTRODE_1: NORMAL
MDC_IDC_SET_LEADCHNL_RV_SENSING_ANODE_LOCATION_1: NORMAL
MDC_IDC_SET_LEADCHNL_RV_SENSING_CATHODE_ELECTRODE_1: NORMAL
MDC_IDC_SET_LEADCHNL_RV_SENSING_CATHODE_LOCATION_1: NORMAL
MDC_IDC_SET_LEADCHNL_RV_SENSING_POLARITY: NORMAL
MDC_IDC_SET_LEADCHNL_RV_SENSING_SENSITIVITY: 0.9 MV
MDC_IDC_SET_ZONE_DETECTION_INTERVAL: 350 MS
MDC_IDC_SET_ZONE_DETECTION_INTERVAL: 400 MS
MDC_IDC_SET_ZONE_STATUS: NORMAL
MDC_IDC_SET_ZONE_STATUS: NORMAL
MDC_IDC_SET_ZONE_TYPE: NORMAL
MDC_IDC_SET_ZONE_VENDOR_TYPE: NORMAL
MDC_IDC_STAT_AT_BURDEN_PERCENT: 0 %
MDC_IDC_STAT_AT_DTM_END: NORMAL
MDC_IDC_STAT_AT_DTM_START: NORMAL
MDC_IDC_STAT_BRADY_AP_VP_PERCENT: 74.85 %
MDC_IDC_STAT_BRADY_AP_VS_PERCENT: 0.02 %
MDC_IDC_STAT_BRADY_AS_VP_PERCENT: 25.11 %
MDC_IDC_STAT_BRADY_AS_VS_PERCENT: 0.02 %
MDC_IDC_STAT_BRADY_DTM_END: NORMAL
MDC_IDC_STAT_BRADY_DTM_START: NORMAL
MDC_IDC_STAT_BRADY_RA_PERCENT_PACED: 74.83 %
MDC_IDC_STAT_BRADY_RV_PERCENT_PACED: 99.93 %
MDC_IDC_STAT_EPISODE_RECENT_COUNT: 0
MDC_IDC_STAT_EPISODE_RECENT_COUNT_DTM_END: NORMAL
MDC_IDC_STAT_EPISODE_RECENT_COUNT_DTM_START: NORMAL
MDC_IDC_STAT_EPISODE_TOTAL_COUNT: 0
MDC_IDC_STAT_EPISODE_TOTAL_COUNT: 0
MDC_IDC_STAT_EPISODE_TOTAL_COUNT: 2
MDC_IDC_STAT_EPISODE_TOTAL_COUNT: 8
MDC_IDC_STAT_EPISODE_TOTAL_COUNT_DTM_END: NORMAL
MDC_IDC_STAT_EPISODE_TOTAL_COUNT_DTM_START: NORMAL
MDC_IDC_STAT_EPISODE_TYPE: NORMAL
MDC_IDC_STAT_TACHYTHERAPY_RECENT_DTM_END: NORMAL
MDC_IDC_STAT_TACHYTHERAPY_RECENT_DTM_START: NORMAL
MDC_IDC_STAT_TACHYTHERAPY_TOTAL_DTM_END: NORMAL
MDC_IDC_STAT_TACHYTHERAPY_TOTAL_DTM_START: NORMAL

## 2025-01-06 ENCOUNTER — ANCILLARY PROCEDURE (OUTPATIENT)
Dept: CARDIOLOGY | Facility: CLINIC | Age: 80
End: 2025-01-06
Attending: INTERNAL MEDICINE
Payer: MEDICARE

## 2025-01-06 DIAGNOSIS — E03.9 ACQUIRED HYPOTHYROIDISM: ICD-10-CM

## 2025-01-06 DIAGNOSIS — I44.30 ATRIOVENTRICULAR BLOCK: ICD-10-CM

## 2025-01-06 LAB
MDC_IDC_LEAD_CONNECTION_STATUS: NORMAL
MDC_IDC_LEAD_CONNECTION_STATUS: NORMAL
MDC_IDC_LEAD_IMPLANT_DT: NORMAL
MDC_IDC_LEAD_IMPLANT_DT: NORMAL
MDC_IDC_LEAD_LOCATION: NORMAL
MDC_IDC_LEAD_LOCATION: NORMAL
MDC_IDC_LEAD_LOCATION_DETAIL_1: NORMAL
MDC_IDC_LEAD_LOCATION_DETAIL_1: NORMAL
MDC_IDC_LEAD_MFG: NORMAL
MDC_IDC_LEAD_MFG: NORMAL
MDC_IDC_LEAD_MODEL: NORMAL
MDC_IDC_LEAD_MODEL: NORMAL
MDC_IDC_LEAD_POLARITY_TYPE: NORMAL
MDC_IDC_LEAD_POLARITY_TYPE: NORMAL
MDC_IDC_LEAD_SERIAL: NORMAL
MDC_IDC_LEAD_SERIAL: NORMAL
MDC_IDC_MSMT_BATTERY_DTM: NORMAL
MDC_IDC_MSMT_BATTERY_REMAINING_LONGEVITY: 2 MO
MDC_IDC_MSMT_BATTERY_RRT_TRIGGER: 2.83
MDC_IDC_MSMT_BATTERY_STATUS: NORMAL
MDC_IDC_MSMT_BATTERY_VOLTAGE: 2.82 V
MDC_IDC_MSMT_LEADCHNL_RA_IMPEDANCE_VALUE: 342 OHM
MDC_IDC_MSMT_LEADCHNL_RA_IMPEDANCE_VALUE: 399 OHM
MDC_IDC_MSMT_LEADCHNL_RA_PACING_THRESHOLD_AMPLITUDE: 0.5 V
MDC_IDC_MSMT_LEADCHNL_RA_PACING_THRESHOLD_PULSEWIDTH: 0.4 MS
MDC_IDC_MSMT_LEADCHNL_RA_SENSING_INTR_AMPL: 3.75 MV
MDC_IDC_MSMT_LEADCHNL_RV_IMPEDANCE_VALUE: 437 OHM
MDC_IDC_MSMT_LEADCHNL_RV_IMPEDANCE_VALUE: 513 OHM
MDC_IDC_MSMT_LEADCHNL_RV_PACING_THRESHOLD_AMPLITUDE: 1 V
MDC_IDC_MSMT_LEADCHNL_RV_PACING_THRESHOLD_PULSEWIDTH: 0.4 MS
MDC_IDC_MSMT_LEADCHNL_RV_SENSING_INTR_AMPL: 17.75 MV
MDC_IDC_PG_IMPLANT_DTM: NORMAL
MDC_IDC_PG_MFG: NORMAL
MDC_IDC_PG_MODEL: NORMAL
MDC_IDC_PG_SERIAL: NORMAL
MDC_IDC_PG_TYPE: NORMAL
MDC_IDC_SESS_CLINIC_NAME: NORMAL
MDC_IDC_SESS_DTM: NORMAL
MDC_IDC_SESS_TYPE: NORMAL
MDC_IDC_SET_BRADY_AT_MODE_SWITCH_RATE: 171 {BEATS}/MIN
MDC_IDC_SET_BRADY_HYSTRATE: NORMAL
MDC_IDC_SET_BRADY_LOWRATE: 60 {BEATS}/MIN
MDC_IDC_SET_BRADY_MAX_SENSOR_RATE: 130 {BEATS}/MIN
MDC_IDC_SET_BRADY_MAX_TRACKING_RATE: 130 {BEATS}/MIN
MDC_IDC_SET_BRADY_MODE: NORMAL
MDC_IDC_SET_BRADY_PAV_DELAY_LOW: 220 MS
MDC_IDC_SET_BRADY_SAV_DELAY_LOW: 200 MS
MDC_IDC_SET_LEADCHNL_RA_PACING_AMPLITUDE: 1.5 V
MDC_IDC_SET_LEADCHNL_RA_PACING_ANODE_ELECTRODE_1: NORMAL
MDC_IDC_SET_LEADCHNL_RA_PACING_ANODE_LOCATION_1: NORMAL
MDC_IDC_SET_LEADCHNL_RA_PACING_CAPTURE_MODE: NORMAL
MDC_IDC_SET_LEADCHNL_RA_PACING_CATHODE_ELECTRODE_1: NORMAL
MDC_IDC_SET_LEADCHNL_RA_PACING_CATHODE_LOCATION_1: NORMAL
MDC_IDC_SET_LEADCHNL_RA_PACING_POLARITY: NORMAL
MDC_IDC_SET_LEADCHNL_RA_PACING_PULSEWIDTH: 0.4 MS
MDC_IDC_SET_LEADCHNL_RA_SENSING_ANODE_ELECTRODE_1: NORMAL
MDC_IDC_SET_LEADCHNL_RA_SENSING_ANODE_LOCATION_1: NORMAL
MDC_IDC_SET_LEADCHNL_RA_SENSING_CATHODE_ELECTRODE_1: NORMAL
MDC_IDC_SET_LEADCHNL_RA_SENSING_CATHODE_LOCATION_1: NORMAL
MDC_IDC_SET_LEADCHNL_RA_SENSING_POLARITY: NORMAL
MDC_IDC_SET_LEADCHNL_RA_SENSING_SENSITIVITY: 0.6 MV
MDC_IDC_SET_LEADCHNL_RV_PACING_AMPLITUDE: 2.25 V
MDC_IDC_SET_LEADCHNL_RV_PACING_ANODE_ELECTRODE_1: NORMAL
MDC_IDC_SET_LEADCHNL_RV_PACING_ANODE_LOCATION_1: NORMAL
MDC_IDC_SET_LEADCHNL_RV_PACING_CAPTURE_MODE: NORMAL
MDC_IDC_SET_LEADCHNL_RV_PACING_CATHODE_ELECTRODE_1: NORMAL
MDC_IDC_SET_LEADCHNL_RV_PACING_CATHODE_LOCATION_1: NORMAL
MDC_IDC_SET_LEADCHNL_RV_PACING_POLARITY: NORMAL
MDC_IDC_SET_LEADCHNL_RV_PACING_PULSEWIDTH: 0.4 MS
MDC_IDC_SET_LEADCHNL_RV_SENSING_ANODE_ELECTRODE_1: NORMAL
MDC_IDC_SET_LEADCHNL_RV_SENSING_ANODE_LOCATION_1: NORMAL
MDC_IDC_SET_LEADCHNL_RV_SENSING_CATHODE_ELECTRODE_1: NORMAL
MDC_IDC_SET_LEADCHNL_RV_SENSING_CATHODE_LOCATION_1: NORMAL
MDC_IDC_SET_LEADCHNL_RV_SENSING_POLARITY: NORMAL
MDC_IDC_SET_LEADCHNL_RV_SENSING_SENSITIVITY: 0.9 MV
MDC_IDC_SET_ZONE_DETECTION_INTERVAL: 350 MS
MDC_IDC_SET_ZONE_DETECTION_INTERVAL: 400 MS
MDC_IDC_SET_ZONE_STATUS: NORMAL
MDC_IDC_SET_ZONE_STATUS: NORMAL
MDC_IDC_SET_ZONE_TYPE: NORMAL
MDC_IDC_SET_ZONE_VENDOR_TYPE: NORMAL
MDC_IDC_STAT_AT_BURDEN_PERCENT: 0 %
MDC_IDC_STAT_AT_DTM_END: NORMAL
MDC_IDC_STAT_AT_DTM_START: NORMAL
MDC_IDC_STAT_BRADY_AP_VP_PERCENT: 77.83 %
MDC_IDC_STAT_BRADY_AP_VS_PERCENT: 0.03 %
MDC_IDC_STAT_BRADY_AS_VP_PERCENT: 22.12 %
MDC_IDC_STAT_BRADY_AS_VS_PERCENT: 0.03 %
MDC_IDC_STAT_BRADY_DTM_END: NORMAL
MDC_IDC_STAT_BRADY_DTM_START: NORMAL
MDC_IDC_STAT_BRADY_RA_PERCENT_PACED: 77.8 %
MDC_IDC_STAT_BRADY_RV_PERCENT_PACED: 99.9 %
MDC_IDC_STAT_EPISODE_RECENT_COUNT: 0
MDC_IDC_STAT_EPISODE_RECENT_COUNT_DTM_END: NORMAL
MDC_IDC_STAT_EPISODE_RECENT_COUNT_DTM_START: NORMAL
MDC_IDC_STAT_EPISODE_TOTAL_COUNT: 0
MDC_IDC_STAT_EPISODE_TOTAL_COUNT: 0
MDC_IDC_STAT_EPISODE_TOTAL_COUNT: 2
MDC_IDC_STAT_EPISODE_TOTAL_COUNT: 8
MDC_IDC_STAT_EPISODE_TOTAL_COUNT_DTM_END: NORMAL
MDC_IDC_STAT_EPISODE_TOTAL_COUNT_DTM_START: NORMAL
MDC_IDC_STAT_EPISODE_TYPE: NORMAL
MDC_IDC_STAT_TACHYTHERAPY_RECENT_DTM_END: NORMAL
MDC_IDC_STAT_TACHYTHERAPY_RECENT_DTM_START: NORMAL
MDC_IDC_STAT_TACHYTHERAPY_TOTAL_DTM_END: NORMAL
MDC_IDC_STAT_TACHYTHERAPY_TOTAL_DTM_START: NORMAL

## 2025-01-06 PROCEDURE — 93294 REM INTERROG EVL PM/LDLS PM: CPT | Performed by: INTERNAL MEDICINE

## 2025-01-07 ENCOUNTER — CARE COORDINATION (OUTPATIENT)
Dept: CARDIOLOGY | Facility: CLINIC | Age: 80
End: 2025-01-07
Payer: MEDICARE

## 2025-01-07 DIAGNOSIS — Z45.010 PACEMAKER AT END OF BATTERY LIFE: Primary | ICD-10-CM

## 2025-01-07 RX ORDER — SODIUM CHLORIDE 9 MG/ML
INJECTION, SOLUTION INTRAVENOUS CONTINUOUS
OUTPATIENT
Start: 2025-01-07

## 2025-01-07 RX ORDER — CEFAZOLIN SODIUM 2 G/50ML
2 SOLUTION INTRAVENOUS
OUTPATIENT
Start: 2025-01-07

## 2025-01-07 RX ORDER — LEVOTHYROXINE SODIUM 75 UG/1
75 TABLET ORAL EVERY OTHER DAY
Qty: 90 TABLET | Refills: 0 | Status: SHIPPED | OUTPATIENT
Start: 2025-01-07

## 2025-01-07 RX ORDER — LEVOTHYROXINE SODIUM 50 UG/1
50 TABLET ORAL EVERY OTHER DAY
Qty: 90 TABLET | Refills: 0 | Status: SHIPPED | OUTPATIENT
Start: 2025-01-07

## 2025-01-07 RX ORDER — LIDOCAINE 40 MG/G
CREAM TOPICAL
OUTPATIENT
Start: 2025-01-07

## 2025-01-07 NOTE — PROGRESS NOTES
EP  spoke to the patient. She will fly up from [place] to have her gen change on 1/28 and fly back. She will get her incision looked at in [place] by her PCP.     Alissa Clemente  Periop Electrophysiology   [phone]     Birth Control Pills Counseling: Birth Control Pill Counseling: I discussed with the patient the potential side effects of OCPs including but not limited to increased risk of stroke, heart attack, thrombophlebitis, deep venous thrombosis, hepatic adenomas, breast changes, GI upset, headaches, and depression. The patient verbalized understanding of the proper use and possible adverse effects of OCPs. All of the patient's questions and concerns were addressed. Doxycycline Pregnancy And Lactation Text: This medication is Pregnancy Category D and not consider safe during pregnancy. It is also excreted in breast milk but is considered safe for shorter treatment courses. Spironolactone Counseling: Patient advised regarding risks of diarrhea, abdominal pain, hyperkalemia, birth defects (for female patients), liver toxicity and renal toxicity. The patient may need blood work to monitor liver and kidney function and potassium levels while on therapy. The patient verbalized understanding of the proper use and possible adverse effects of spironolactone. All of the patient's questions and concerns were addressed. Isotretinoin Pregnancy And Lactation Text: This medication is Pregnancy Category X and is considered extremely dangerous during pregnancy. It is unknown if it is excreted in breast milk. Use Enhanced Medication Counseling?: No Benzoyl Peroxide Pregnancy And Lactation Text: This medication is Pregnancy Category C. It is unknown if benzoyl peroxide is excreted in breast milk. Topical Clindamycin Counseling: Patient counseled that this medication may cause skin irritation or allergic reactions. In the event of skin irritation, the patient was advised to reduce the amount of the drug applied or use it less frequently. The patient verbalized understanding of the proper use and possible adverse effects of clindamycin. All of the patient's questions and concerns were addressed. Spironolactone Pregnancy And Lactation Text: This medication can cause feminization of the male fetus and should be avoided during pregnancy. The active metabolite is also found in breast milk. Erythromycin Counseling:  I discussed with the patient the risks of erythromycin including but not limited to GI upset, allergic reaction, drug rash, diarrhea, increase in liver enzymes, and yeast infections. Topical Retinoid counseling:  Patient advised to apply a pea-sized amount only at bedtime and wait 30 minutes after washing their face before applying. If too drying, patient may add a non-comedogenic moisturizer. The patient verbalized understanding of the proper use and possible adverse effects of retinoids. All of the patient's questions and concerns were addressed. Azithromycin Counseling:  I discussed with the patient the risks of azithromycin including but not limited to GI upset, allergic reaction, drug rash, diarrhea, and yeast infections. High Dose Vitamin A Counseling: Side effects reviewed, pt to contact office should one occur. Birth Control Pills Pregnancy And Lactation Text: This medication should be avoided if pregnant and for the first 30 days post-partum. Azithromycin Pregnancy And Lactation Text: This medication is considered safe during pregnancy and is also secreted in breast milk. High Dose Vitamin A Pregnancy And Lactation Text: High dose vitamin A therapy is contraindicated during pregnancy and breast feeding. Dapsone Counseling: I discussed with the patient the risks of dapsone including but not limited to hemolytic anemia, agranulocytosis, rashes, methemoglobinemia, kidney failure, peripheral neuropathy, headaches, GI upset, and liver toxicity. Patients who start dapsone require monitoring including baseline LFTs and weekly CBCs for the first month, then every month thereafter. The patient verbalized understanding of the proper use and possible adverse effects of dapsone. All of the patient's questions and concerns were addressed. Topical Clindamycin Pregnancy And Lactation Text: This medication is Pregnancy Category B and is considered safe during pregnancy. It is unknown if it is excreted in breast milk. Topical Retinoid Pregnancy And Lactation Text: This medication is Pregnancy Category C. It is unknown if this medication is excreted in breast milk. Topical Sulfur Applications Counseling: Topical Sulfur Counseling: Patient counseled that this medication may cause skin irritation or allergic reactions. In the event of skin irritation, the patient was advised to reduce the amount of the drug applied or use it less frequently. The patient verbalized understanding of the proper use and possible adverse effects of topical sulfur application. All of the patient's questions and concerns were addressed. Tetracycline Counseling: Patient counseled regarding possible photosensitivity and increased risk for sunburn. Patient instructed to avoid sunlight, if possible. When exposed to sunlight, patients should wear protective clothing, sunglasses, and sunscreen. The patient was instructed to call the office immediately if the following severe adverse effects occur:  hearing changes, easy bruising/bleeding, severe headache, or vision changes. The patient verbalized understanding of the proper use and possible adverse effects of tetracycline. All of the patient's questions and concerns were addressed. Patient understands to avoid pregnancy while on therapy due to potential birth defects. Erythromycin Pregnancy And Lactation Text: This medication is Pregnancy Category B and is considered safe during pregnancy. It is also excreted in breast milk. Dapsone Pregnancy And Lactation Text: This medication is Pregnancy Category C and is not considered safe during pregnancy or breast feeding. Tetracycline Pregnancy And Lactation Text: This medication is Pregnancy Category D and not consider safe during pregnancy. It is also excreted in breast milk. Minocycline Counseling: Patient advised regarding possible photosensitivity and discoloration of the teeth, skin, lips, tongue and gums. Patient instructed to avoid sunlight, if possible. When exposed to sunlight, patients should wear protective clothing, sunglasses, and sunscreen. The patient was instructed to call the office immediately if the following severe adverse effects occur:  hearing changes, easy bruising/bleeding, severe headache, or vision changes. The patient verbalized understanding of the proper use and possible adverse effects of minocycline. All of the patient's questions and concerns were addressed. Bactrim Counseling:  I discussed with the patient the risks of sulfa antibiotics including but not limited to GI upset, allergic reaction, drug rash, diarrhea, dizziness, photosensitivity, and yeast infections. Rarely, more serious reactions can occur including but not limited to aplastic anemia, agranulocytosis, methemoglobinemia, blood dyscrasias, liver or kidney failure, lung infiltrates or desquamative/blistering drug rashes. Isotretinoin Counseling: Patient should get monthly blood tests, not donate blood, not drive at night if vision affected, not share medication, and not undergo elective surgery for 6 months after tx completed. Side effects reviewed, pt to contact office should one occur. Tazorac Counseling:  Patient advised that medication is irritating and drying. Patient may need to apply sparingly and wash off after an hour before eventually leaving it on overnight. The patient verbalized understanding of the proper use and possible adverse effects of tazorac. All of the patient's questions and concerns were addressed. Topical Sulfur Applications Pregnancy And Lactation Text: This medication is Pregnancy Category C and has an unknown safety profile during pregnancy. It is unknown if this topical medication is excreted in breast milk. Benzoyl Peroxide Counseling: Patient counseled that medicine may cause skin irritation and bleach clothing. In the event of skin irritation, the patient was advised to reduce the amount of the drug applied or use it less frequently. The patient verbalized understanding of the proper use and possible adverse effects of benzoyl peroxide. All of the patient's questions and concerns were addressed. Tazorac Pregnancy And Lactation Text: This medication is not safe during pregnancy. It is unknown if this medication is excreted in breast milk. Detail Level: Simple Bactrim Pregnancy And Lactation Text: This medication is Pregnancy Category D and is known to cause fetal risk. It is also excreted in breast milk. Doxycycline Counseling:  Patient counseled regarding possible photosensitivity and increased risk for sunburn. Patient instructed to avoid sunlight, if possible. When exposed to sunlight, patients should wear protective clothing, sunglasses, and sunscreen. The patient was instructed to call the office immediately if the following severe adverse effects occur:  hearing changes, easy bruising/bleeding, severe headache, or vision changes. The patient verbalized understanding of the proper use and possible adverse effects of doxycycline. All of the patient's questions and concerns were addressed.

## 2025-01-07 NOTE — TELEPHONE ENCOUNTER
Medication requested: levothyroxine (SYNTHROID/LEVOTHROID) 50 MCG tablet   Last office visit: 7/31/24  [facility] appointments: none  Medication last refilled: 10/18/23; 90 + 3 refills  Last qualifying labs:   Component      Latest Ref Rng 7/31/2024  8:25 AM   TSH      0.30 - 4.20 uIU/mL 1.81      Medication requested: levothyroxine (SYNTHROID/LEVOTHROID) 75 MCG tablet   Medication last refilled: 10/18/23; 90 + 3 refills  Last qualifying labs: see above    Prescription approved per [employer] Refill Protocol.    Zac VINCENT, RN  01/07/25 1:28 PM

## 2025-01-07 NOTE — LETTER
January 7, 2025      TO: Sharona Bravo  [address]  [facility] [address]         Dear Sharona,    You are scheduled for a Pacemaker (PPM) Generator Change at [facility]. The hospital is located at [address] [facility] on the [place] of the campus.  If you need to cancel this procedure, please call [phone].       Visitor Policy: Two visitors.      Date:__January 28, 2025__Time: _12p_____To the [facility] Waiting Room at the [facility]    1. Please review the attached instructions on showering before your procedure at the end of this letter.  2. Your history and physical will be completed by our advanced practice provider when you arrive.  3. Do not eat for 8 hours prior to arrival, you can drink water up until 2 hours prior.  4. Take medications as prescribed.  5. You will likely discharge the same day and need a .      Post-Procedure Instructions  Wound Care  Keep your incision (surgery wound) dry for 3 days.  After 3 days, you may remove the outer bandage.  Keep the strips of tape on.  They will be removed at your clinic visit.  Check for signs of infection each day.  These include increased redness, swelling, drainage or a fever over 101 F (38.3 C).  Call us immediately if you see any of   these signs.  If there are no signs of infection, you may shower in 3 days.  Do not submerge the incision (in a bath tub, hot tub, or swimming pool) until fully healed.  Pain  You may have mild to moderate pain for 3 to 5 days.  Take acetaminophen (Tylenol) or ibuprofen (Advil) for the pain.  Call us if the pain is severe or lasts more than 5 days.  Activity  You should slowly go back to your normal activities after 24 hours.  Healing will take 4 to 6 weeks.  No driving for 24 hours  For 3 days, do not raise your affected arm above your shoulder  For 10 days, do not use your affected arm to push, pull, or lift anything over 10 pounds  Avoid anything that may  cause rough contact or a hard hit to your chest.  This includes football, hockey, and other contact sports.  Follow Up Appointments Date & Time   7-10 day incision check with device clinic  PCP to check in [place]   3 month follow up visit with device check & provider May 21, 2025 at 2pm for device check and 2:40pm with Carolyn Graham PA-C     If you have further questions, please utilize "[facility]t to contact us.   If your question concerns the above instructions, contact:  Karla Kwon RN   Electrophysiology Nurse Coordinator.  [phone]    If your question concerns the schedule/appointment times, contact:  [facility]   [phone]    Device Clinic (Pacemakers, Defibrillators, Loop Recorders)  [phone]          Showering Before Surgery   Your surgeon has asked you to take 2 showers before surgery.  Why is this important?  It is normal for bacteria (germs) to be on your skin. The skin protects us from these germs. When you have surgery, we cut the skin. Sometimes germs get into the cuts and cause infection (illness caused by germs). By following the instructions below and using special soap, you will lower the number of germs on your skin. This decreases your chance of infection.  Special soap  Buy or get 8 ounces of antiseptic surgical soap called 4% CHG. Common name brands of this soap are Hibiclens and Exidine.   You can find it at your local pharmacy, clinic or retail store. If you have trouble, ask your pharmacist to help you find the right substitute.   A note about shaving:  Do not shave within 12 inches of your incision (surgical cut) area for at least 3 days before surgery. Shaving can make small cuts in the skin. This puts you at a higher risk of infection.  Items you will need for each shower:   1 newly washed towel   4 ounces of one of the above soaps  Follow these instructions:  The evening before surgery   1. Wash your hair and body with your regular shampoo and soap.  Make sure you rinse the shampoo and soap from your hair and body.   2. Using clean hands, apply about 2 ounces of soap gently on your skin from the neck to your toes. Use on your groin area last. Do not use this soap on your face or head. If you get any soap in your eyes, ears or mouth, rinse right away.   3. Repeat step 2. It is very important to let the soap stay on your skin for at least 1 minute.   4. Rinse well and dry off using a clean towel.If you feel any tingling, itching or other irritation, rinse right away. It is normal to feel some coolness on the skin after using the antiseptic soap. Your skin may feel a bit dry after the shower, but do not use any lotions, creams or moisturizers. Do not use hair spray or other products in your hair.  5. Dress in freshly washed clothes or pajamas. Use fresh pillowcases and sheets on your bed.  The morning of surgery  1. Wash your hair and body with your regular shampoo and soap. Make sure you rinse the shampoo and soap from your hair and body.   2. Using clean hands, apply about 2 ounces of soap gently on your skin from the neck to your toes. Use on your groin area last. Do not use this soap on your face or head. If you get any soap in your eyes, ears or mouth, rinse right away.   3. Repeat step 2. It is very important to let the soap stay on your skin for at least 1 minute.   4. Rinse well and dry off using a clean towel.If you feel any tingling, itching or other irritation, rinse right away. It is normal to feel some coolness on the skin after using the antiseptic soap. Your skin may feel a bit dry after the shower, but do not use any lotions, creams or moisturizers. Do not use hair spray or other products in your hair.  5. Dress in clean clothes.  If you have any questions about showering or an allergy to CHG soap, please call the [facility] Nursing Department at the hospital where you are having your surgery.  [facility]: [phone]  [facility]:  [phone]  [place]: [phone] or [phone]  [facility]: [phone].  [facility]: [phone]  [place]: [phone]  [facility] ([place]): [phone]  [facility] ([facility]): [phone]  This phone number will be answered between the hours of 8:00 a.m. and 6:30 p.m. Monday through Friday.

## 2025-01-07 NOTE — PROGRESS NOTES
Orders placed, letter started. Routing to EP  to arrange.     FW: RRT reached 12/18/2024  Received: Yesterday  Ashley Pickard, Karla Prieto RN  This pt needs a generator change -    Dr. Durán  Device: Medtronic Pacemaker  RRT reached 12/18/24  No Lead issues  Should have gen change by 3/12/25 at the latest.  No anticoagulant  Pt notified you will be calling to schedule in the near future.  Patient is currently in [place] and would like to come back to [place] for the generator change.    Thank you,  Ashley

## 2025-01-23 ENCOUNTER — PATIENT OUTREACH (OUTPATIENT)
Dept: CARDIOLOGY | Facility: CLINIC | Age: 80
End: 2025-01-23
Payer: MEDICARE

## 2025-01-23 NOTE — PROGRESS NOTES
Called and left message for patient to review pre-procedure instructions for upcoming PPM gen change on 1/28/25. Writer specifically reviewed medication holds. Asked for a call back/MyC message with any questions.

## 2025-01-28 ENCOUNTER — HOSPITAL ENCOUNTER (OUTPATIENT)
Facility: CLINIC | Age: 80
Discharge: HOME | End: 2025-01-28
Attending: INTERNAL MEDICINE | Admitting: INTERNAL MEDICINE
Payer: MEDICARE

## 2025-01-28 ENCOUNTER — LAB (OUTPATIENT)
Dept: LAB | Facility: CLINIC | Age: 80
End: 2025-01-28
Attending: INTERNAL MEDICINE
Payer: MEDICARE

## 2025-01-28 VITALS
DIASTOLIC BLOOD PRESSURE: 71 MMHG | TEMPERATURE: 97.6 F | SYSTOLIC BLOOD PRESSURE: 131 MMHG | RESPIRATION RATE: 12 BRPM | OXYGEN SATURATION: 97 % | BODY MASS INDEX: 25.54 KG/M2 | HEIGHT: 62 IN | WEIGHT: 138.8 LBS | HEART RATE: 60 BPM

## 2025-01-28 DIAGNOSIS — Z45.010 PACEMAKER AT END OF BATTERY LIFE: ICD-10-CM

## 2025-01-28 DIAGNOSIS — Z95.0 S/P CARDIAC PACEMAKER PROCEDURE: Primary | ICD-10-CM

## 2025-01-28 LAB
ANION GAP SERPL CALCULATED.3IONS-SCNC: 8 MMOL/L (ref 7–15)
ATRIAL RATE - MUSE: 62 BPM
BUN SERPL-MCNC: 19.9 MG/DL (ref 8–23)
CALCIUM SERPL-MCNC: 9.8 MG/DL (ref 8.8–10.4)
CHLORIDE SERPL-SCNC: 107 MMOL/L (ref 98–107)
CREAT SERPL-MCNC: 0.91 MG/DL (ref 0.51–0.95)
DIASTOLIC BLOOD PRESSURE - MUSE: NORMAL MMHG
EGFRCR SERPLBLD CKD-EPI 2021: 65 ML/MIN/1.73M2
ERYTHROCYTE [DISTWIDTH] IN BLOOD BY AUTOMATED COUNT: 13.2 % (ref 10–15)
GLUCOSE SERPL-MCNC: 96 MG/DL (ref 70–99)
HCO3 SERPL-SCNC: 25 MMOL/L (ref 22–29)
HCT VFR BLD AUTO: 43 % (ref 35–47)
HGB BLD-MCNC: 14.1 G/DL (ref 11.7–15.7)
INTERPRETATION ECG - MUSE: NORMAL
MCH RBC QN AUTO: 30.8 PG (ref 26.5–33)
MCHC RBC AUTO-ENTMCNC: 32.8 G/DL (ref 31.5–36.5)
MCV RBC AUTO: 94 FL (ref 78–100)
P AXIS - MUSE: NORMAL DEGREES
PATH REPORT.COMMENTS IMP SPEC: NORMAL
PATH REPORT.COMMENTS IMP SPEC: NORMAL
PATH REPORT.FINAL DX SPEC: NORMAL
PATH REPORT.GROSS SPEC: NORMAL
PATH REPORT.MICROSCOPIC SPEC OTHER STN: NORMAL
PATH REPORT.RELEVANT HX SPEC: NORMAL
PHOTO IMAGE: NORMAL
PLATELET # BLD AUTO: 253 10E3/UL (ref 150–450)
POTASSIUM SERPL-SCNC: 4.4 MMOL/L (ref 3.4–5.3)
PR INTERVAL - MUSE: 218 MS
QRS DURATION - MUSE: 152 MS
QT - MUSE: 462 MS
QTC - MUSE: 468 MS
R AXIS - MUSE: 254 DEGREES
RBC # BLD AUTO: 4.58 10E6/UL (ref 3.8–5.2)
SODIUM SERPL-SCNC: 140 MMOL/L (ref 135–145)
SYSTOLIC BLOOD PRESSURE - MUSE: NORMAL MMHG
T AXIS - MUSE: 47 DEGREES
VENTRICULAR RATE- MUSE: 62 BPM
WBC # BLD AUTO: 7.4 10E3/UL (ref 4–11)

## 2025-01-28 PROCEDURE — 85014 HEMATOCRIT: CPT | Performed by: INTERNAL MEDICINE

## 2025-01-28 PROCEDURE — 999N000054 HC STATISTIC EKG NON-CHARGEABLE

## 2025-01-28 PROCEDURE — 99153 MOD SED SAME PHYS/QHP EA: CPT | Performed by: INTERNAL MEDICINE

## 2025-01-28 PROCEDURE — 99152 MOD SED SAME PHYS/QHP 5/>YRS: CPT | Performed by: INTERNAL MEDICINE

## 2025-01-28 PROCEDURE — 33228 REMV&REPLC PM GEN DUAL LEAD: CPT | Performed by: INTERNAL MEDICINE

## 2025-01-28 PROCEDURE — 250N000011 HC RX IP 250 OP 636: Performed by: INTERNAL MEDICINE

## 2025-01-28 PROCEDURE — 36415 COLL VENOUS BLD VENIPUNCTURE: CPT | Performed by: INTERNAL MEDICINE

## 2025-01-28 PROCEDURE — 82435 ASSAY OF BLOOD CHLORIDE: CPT | Performed by: INTERNAL MEDICINE

## 2025-01-28 PROCEDURE — 88300 SURGICAL PATH GROSS: CPT | Mod: TC | Performed by: INTERNAL MEDICINE

## 2025-01-28 PROCEDURE — 93005 ELECTROCARDIOGRAM TRACING: CPT

## 2025-01-28 PROCEDURE — 250N000011 HC RX IP 250 OP 636: Mod: JZ | Performed by: INTERNAL MEDICINE

## 2025-01-28 PROCEDURE — 33228 REMV&REPLC PM GEN DUAL LEAD: CPT | Mod: GC | Performed by: INTERNAL MEDICINE

## 2025-01-28 PROCEDURE — 272N000001 HC OR GENERAL SUPPLY STERILE: Performed by: INTERNAL MEDICINE

## 2025-01-28 PROCEDURE — 88300 SURGICAL PATH GROSS: CPT | Mod: 26 | Performed by: STUDENT IN AN ORGANIZED HEALTH CARE EDUCATION/TRAINING PROGRAM

## 2025-01-28 PROCEDURE — C1785 PMKR, DUAL, RATE-RESP: HCPCS | Performed by: INTERNAL MEDICINE

## 2025-01-28 PROCEDURE — 258N000003 HC RX IP 258 OP 636: Performed by: INTERNAL MEDICINE

## 2025-01-28 PROCEDURE — 99152 MOD SED SAME PHYS/QHP 5/>YRS: CPT | Mod: GC | Performed by: INTERNAL MEDICINE

## 2025-01-28 PROCEDURE — 250N000009 HC RX 250: Performed by: INTERNAL MEDICINE

## 2025-01-28 DEVICE — PACEMAKER AZURE MRI XT DR: Type: IMPLANTABLE DEVICE | Status: FUNCTIONAL

## 2025-01-28 RX ORDER — NALOXONE HYDROCHLORIDE 0.4 MG/ML
0.2 INJECTION, SOLUTION INTRAMUSCULAR; INTRAVENOUS; SUBCUTANEOUS
Status: DISCONTINUED | OUTPATIENT
Start: 2025-01-28 | End: 2025-01-28 | Stop reason: HOSPADM

## 2025-01-28 RX ORDER — CEPHALEXIN 500 MG/1
500 TABLET, FILM COATED ORAL 3 TIMES DAILY
Qty: 15 TABLET | Refills: 0 | Status: SHIPPED | OUTPATIENT
Start: 2025-01-28 | End: 2025-02-02

## 2025-01-28 RX ORDER — CEFAZOLIN SODIUM 2 G/100ML
2 INJECTION, SOLUTION INTRAVENOUS
Status: COMPLETED | OUTPATIENT
Start: 2025-01-28 | End: 2025-01-28

## 2025-01-28 RX ORDER — NALOXONE HYDROCHLORIDE 0.4 MG/ML
0.4 INJECTION, SOLUTION INTRAMUSCULAR; INTRAVENOUS; SUBCUTANEOUS
Status: DISCONTINUED | OUTPATIENT
Start: 2025-01-28 | End: 2025-01-28 | Stop reason: HOSPADM

## 2025-01-28 RX ORDER — LIDOCAINE HYDROCHLORIDE 20 MG/ML
INJECTION, SOLUTION INFILTRATION; PERINEURAL
Status: DISCONTINUED | OUTPATIENT
Start: 2025-01-28 | End: 2025-01-28

## 2025-01-28 RX ORDER — LIDOCAINE 40 MG/G
CREAM TOPICAL
Status: DISCONTINUED | OUTPATIENT
Start: 2025-01-28 | End: 2025-01-28

## 2025-01-28 RX ORDER — OXYCODONE AND ACETAMINOPHEN 5; 325 MG/1; MG/1
1 TABLET ORAL EVERY 4 HOURS PRN
Status: DISCONTINUED | OUTPATIENT
Start: 2025-01-28 | End: 2025-01-28 | Stop reason: HOSPADM

## 2025-01-28 RX ORDER — FENTANYL CITRATE 50 UG/ML
INJECTION, SOLUTION INTRAMUSCULAR; INTRAVENOUS
Status: DISCONTINUED | OUTPATIENT
Start: 2025-01-28 | End: 2025-01-28

## 2025-01-28 RX ORDER — SODIUM CHLORIDE 9 MG/ML
INJECTION, SOLUTION INTRAVENOUS CONTINUOUS
Status: DISCONTINUED | OUTPATIENT
Start: 2025-01-28 | End: 2025-01-28

## 2025-01-28 RX ADMIN — SODIUM CHLORIDE: 9 INJECTION, SOLUTION INTRAVENOUS at 13:47

## 2025-01-28 RX ADMIN — CEFAZOLIN SODIUM 2 G: 2 INJECTION, SOLUTION INTRAVENOUS at 13:47

## 2025-01-28 ASSESSMENT — ACTIVITIES OF DAILY LIVING (ADL)
ADLS_ACUITY_SCORE: 41

## 2025-01-28 NOTE — H&P
Electrophysiology Pre-Procedure History and Physical    Sharona Bravo MRN# [id number]   Age: 79 year old YOB: 1945      Date of Procedure: 1/28/2025 [facility]      Date of Exam 1/28/2025 Facility (Same day)       HPI:  Ms. Bravo is a 78 year old female who has a past medical history significant for breast cancer s/p radiation and lumpectomy, hypothyroidism, HLD, and symptomatic bradycardia (Mobitz II) s/p PPM 6/23/2015. Her PPM has reached RRT and she now presents today for generator change.        Active problem list:     Patient Active Problem List    Diagnosis Date Noted    Polyneuropathy 07/13/2022     Priority: Medium    Atrophic vaginitis 06/09/2021     Priority: Medium    Secondary insomnia 06/09/2021     Priority: Medium    Paresthesias 06/22/2020     Priority: Medium     6/15/20 EMG/NCS  Results:  Nerve conduction studies:  1. Left median-D2 sensory response shows normal amplitude and mildly slowed CV.   2. Left ulnar-D5, left sural, and bilateral superficial peroneal sensory responses are normal.   3. Left median-ulnar palmar interlatency difference is prolonged.   4. Left median-APB, left ulnar-ADM, bilateral peroneal-EDB, and left tibial-AH motor responses are normal.   5. Bilateral peroneal-TA motor responses are normal and symmetric.      Needle EMG of selected proximal and distal left lower limb muscles was performed as tabulated below. No abnormal spontaneous activity was observed in the sampled muscles. Motor unit potential morphology and recruitment patterns were normal.      Interpretation:  This is an abnormal study. There is electrophysiologic evidence of a mild left-sided median neuropathy at the wrist (e.g., carpal tunnel syndrome). There is no evidence of a large-fiber polyneuropathy, focal neuropathy, or lumbosacral radiculopathy affecting the left lower limb on the basis of this study. Clinical correlation is  recommended.       Medicare annual wellness visit, subsequent 01/28/2020     Priority: Medium    S/P TKR (total knee replacement), left 05/15/2018     Priority: Medium    Primary osteoarthritis of left knee 06/26/2017     Priority: Medium    Diverticulosis of large intestine without hemorrhage 08/01/2016     Priority: Medium    BPPV (benign paroxysmal positional vertigo), unspecified laterality 08/01/2016     Priority: Medium    Tear of medial meniscus of left knee 04/18/2016     Priority: Medium    Cardiac pacemaker in situ- Medtronic, dual chamber- NOT dependent (Advisa: MRI conditional) 10/08/2015     Priority: Medium    Heart block 06/22/2015     Priority: Medium    Patellofemoral syndrome, left 02/20/2015     Priority: Medium    Allergic rhinitis 10/16/2012     Priority: Medium    Hyperlipidemia LDL goal <100 10/16/2012     Priority: Medium    Acquired hypothyroidism 10/16/2012     Priority: Medium    Adenocarcinoma of breast (H) 02/20/2012     Priority: Medium            Medications (include herbals and vitamins):      No current facility-administered medications for this encounter.     Current Outpatient Medications   Medication Sig Dispense Refill    calcium carbonate (OS-DALLAS 500 MG [place]. [place]) 500 MG tablet Take 600 mg by mouth daily       cetirizine (ZYRTEC) 10 MG tablet Take 10 mg by mouth daily      Cyanocobalamin (B-12) 1000 MCG TBCR 1,000 mcg      estradiol (ESTRACE) 0.1 MG/GM vaginal cream Place 1 g vaginally three times a week Place 1g vaginally nightly x 2 weeks and then 2-3 times per week for maintenance 42.5 g 4    ibuprofen (ADVIL/MOTRIN) 600 MG tablet TAKE 1 TABLET BY MOUTH BY MOUTH EVERY 6 HOURS AS NEEDED      latanoprost (XALATAN) 0.005 % ophthalmic solution       levothyroxine (SYNTHROID/LEVOTHROID) 50 MCG tablet Take 1 tablet (50 mcg) by mouth every other day. Alternating with 75 mcg 90 tablet 0    levothyroxine (SYNTHROID/LEVOTHROID) 75 MCG tablet Take 1 tablet (75 mcg) by mouth every other  day. Alternating with 50 mcg 90 tablet 0    simvastatin (ZOCOR) 20 MG tablet Take 1 tablet (20 mg) by mouth at bedtime. 90 tablet 3    VITAMIN D, CHOLECALCIFEROL, PO Take 1,000 Units by mouth daily       zolpidem (AMBIEN) 5 MG tablet Take 1 tablet (5 mg) by mouth nightly as needed for sleep. 30 tablet 1           Medication List         Notice    Cannot display discharge medications because the patient has not yet been admitted.              Allergies:      Allergies   Allergen Reactions    Gabapentin Rash and Swelling     Face swelling    Face swelling   Face swelling             Social History:     Social History     Tobacco Use    Smoking status: Never    Smokeless tobacco: Never   Substance Use Topics    Alcohol use: Yes     Comment: 1 per night            Physical Exam:   All vitals have been reviewed  No data found.  No intake/output data recorded.  Airway assessment:   Patient is able to open mouth wide  Patient is able to stick out tongue}      ENT:   Normocephalic, without obvious abnormality, atraumatic, sinuses nontender on palpation, external ears without lesions, oral pharynx with moist mucous membranes, tonsils without erythema or exudates, gums normal and good dentition.     Neck:   Supple, symmetrical, trachea midline, no adenopathy, thyroid symmetric, not enlarged and no tenderness, skin normal     Lungs:   No increased work of breathing, good air exchange, clear to auscultation bilaterally, no crackles or wheezing     Cardiovascular:   Normal apical impulse, regular rate and rhythm, normal S1 and S2, no S3 or S4, and no murmur noted             Lab / Radiology Results:     Lab Results   Component Value Date    WBC 8.5 10/15/2024    WBC 3.9 04/02/2020    RBC 4.43 10/15/2024    RBC 4.62 04/02/2020    HGB 13.5 10/15/2024    HGB 14.3 06/21/2021    HCT 41.0 10/15/2024    HCT 44.2 06/21/2021    MCV 93 10/15/2024    MCV 92.3 06/21/2021    RDW 13.4 10/15/2024    RDW 13.9 06/21/2021     10/15/2024      04/02/2020      Lab Results   Component Value Date    WBC 8.5 10/15/2024    WBC 3.9 04/02/2020     Lab Results   Component Value Date     10/15/2024     04/02/2020     Lab Results   Component Value Date    HGB 13.5 10/15/2024    HGB 14.3 06/21/2021    HCT 41.0 10/15/2024    HCT 44.2 06/21/2021     Lab Results   Component Value Date     07/31/2024    .0 06/07/2021    CO2 23 07/31/2024    CO2 23 01/31/2022    CO2 28.0 06/07/2021    BUN 20.7 07/31/2024    BUN 30 01/31/2022    BUN 19.0 06/07/2021    CREAT 0.9 05/20/2020     Lab Results   Component Value Date     07/31/2024    .0 06/07/2021    CO2 23 07/31/2024    CO2 23 01/31/2022    CO2 28.0 06/07/2021    BUN 20.7 07/31/2024    BUN 30 01/31/2022    BUN 19.0 06/07/2021    CREAT 0.9 05/20/2020     Lab Results   Component Value Date    TSH 1.81 07/31/2024    TSH 2.30 06/07/2021             Plan:   Patient's active problems diagnostically and therapeutically optimized for the planned procedure. Patient here for PPM generator change. Procedure explained in detail to patient including indications, risks, and benefits. Moderate sedation is required for this procedure and the risks, benefits and alternatives to moderate sedation were discussed. Patient also understands risks and complications of the procedure which include but are not limited to bruising/swelling around the incision site, infection, bleeding, allergic reaction to local anesthetic, air embolism, arterial puncture, stroke, heart attack, need for emergency surgery, death. Patient verbalized understanding of risks and benefits and has elected to proceed with pacemaker generator change.     MARIN Zhang CNP  Electrophysiology Consult Service  Securely message with Flo[employer]   Text page via [facility] Paging/Directory

## 2025-01-28 NOTE — DISCHARGE SUMMARY
Pt discharged to home. VSS on RA. Left chest site C/D/I, negative for a hematoma. Up ambulating w/o difficulty. PIV access removed. Tolerating PO intake. Discharge instructions reviewed and all questions answered. Prescription filled and picked up at discharge pharmacy. Pt ambulated to the front entrance with family.

## 2025-01-28 NOTE — PRE-PROCEDURE
GENERAL PRE-PROCEDURE:   Procedure:  Generator change  Date/Time:  1/28/2025 2:02 PM    Verbal consent obtained?: Yes    Written consent obtained?: Yes    Risks and benefits: Risks, benefits and alternatives were discussed    Consent given by:  Patient  Patient states understanding of procedure being performed: Yes    Patient's understanding of procedure matches consent: Yes    Procedure consent matches procedure scheduled: Yes    Expected level of sedation:  Moderate  Appropriately NPO:  Yes  Mallampati  :  Grade 3- soft palate visible, posterior pharyngeal wall not visible  Lungs:  Lungs clear with good breath sounds bilaterally  Heart:  Normal heart sounds and rate  History & Physical reviewed:  History and physical reviewed and no updates needed  Statement of review:  I have reviewed the lab findings, diagnostic data, medications, and the plan for sedation

## 2025-01-28 NOTE — PROGRESS NOTES
Pt arrived post pacemaker generator change. VSS on RA, denies pain. AV dual paced pacemaker. Device rep at the bedside. Tolerating PO intake. Left chest site C/D/I, negative for a hematoma.

## 2025-01-28 NOTE — DISCHARGE INSTRUCTIONS
Home Care after a Pacemaker Implant    Wound care:  Keep your incision (surgery wound) dry for 3 days.  After 3 days, you may remove the outer bandage.  Keep the strips of tape on.  They will be removed at your clinic visit.  Check for signs of infection each day.  These include increased redness, swelling, drainage, bleeding or a fever over 101 F (38.3 C).  Call us immediately if you see any of these signs.  If there are no signs of infection, you may shower in 3 days.  Do not submerge the incision (in a bath tub, hot tub, or swimming pool) until fully healed.    Pain:   You may have mild to moderate pain for 3 to 5 days.  Take acetaminophen (Tylenol) or ibuprofen (Advil) for the pain.  Call us if the pain is severe or lasts more than 5 days.  Activity:  You should slowly go back to your normal activities after 24 hours.  Healing will take 4 to 6 weeks.  No driving for 24 hours  For 3 days, do not raise your affected arm above your shoulder  For 10 days, do not use your affected arm to push, pull, or lift anything over 10 pounds  Avoid anything that may cause rough contact or a hard hit to your chest.  This includes football, hockey, and other contact sports.    Telling others about your device:  Before you have any medical tests or treatments, tell the doctors, dentists, and other care providers about your device.  There are a few tests and treatments that may interfere with your device.  (These include MRI, radiation therapy, electrocautery, and others.)  Your care team may need to take special steps to keep you safe.  Before you leave the hospital, you will receive a temporary ID card.  A permanent card will be mailed to you about 6 to 8 weeks later.  Always carry the ID card with you.  It has important details about your device.  You should also get a MedicAlert ID.  Please ask us for a MedicAlert brochure, or go to [email or web address].    Safety near electrical equipment:  All of these are safe to use when in  good repair:  Microwaves  Radios  Cordless phone  Remote controls  Small electrical tools  Cell phones: Keep cell phones at least 6 inches from your device.  Do not carry the phone in a pocket near your device.  Security julio: It is okay to walk through security julio at the airports and department stores.  Tell airport security that you have a pacemaker.  They should keep the screening wand at least 6 inches from your device.  Full-body scanners are safe.  Avoid the following:   MRI tests in the hospital unless you have a MRI safe pacemaker.          Arc welding, chain saws and high-powered industrial or commercial tools.   Power lines, power plants and large power generators.   Electric body fat scales.   Magnetic mattress pads or pillow.    Follow-Up Visits:  Return to the clinic in 7 to 10 days to have your device and wound checked.    Device follow-up after your initial clinic visit will take place every 3 months and as needed until your battery reaches end of service. The device follow up will take place in person or remotely utilizing your home monitor.  Follow Up Appointments Date & Time   7-10 day incision check with device clinic  PCP to check in [place]   3 month follow up visit with device check & provider May 21, 2025 at 2pm for device check and 2:40pm with Carolyn Graham PA-C     Questions?  Please call [facility].   Device Nurse:          Business Hours:  [phone]                       After Hours:  [phone]   Choose option 4, then ask for the on-call device nurse at job code [phone].

## 2025-02-04 ENCOUNTER — ANCILLARY PROCEDURE (OUTPATIENT)
Dept: CARDIOLOGY | Facility: CLINIC | Age: 80
End: 2025-02-04
Attending: INTERNAL MEDICINE
Payer: MEDICARE

## 2025-02-04 DIAGNOSIS — I44.30 ATRIOVENTRICULAR BLOCK: ICD-10-CM

## 2025-02-04 PROCEDURE — 93294 REM INTERROG EVL PM/LDLS PM: CPT | Performed by: INTERNAL MEDICINE

## 2025-02-12 LAB
MDC_IDC_LEAD_CONNECTION_STATUS: NORMAL
MDC_IDC_LEAD_CONNECTION_STATUS: NORMAL
MDC_IDC_LEAD_IMPLANT_DT: NORMAL
MDC_IDC_LEAD_IMPLANT_DT: NORMAL
MDC_IDC_LEAD_LOCATION: NORMAL
MDC_IDC_LEAD_LOCATION: NORMAL
MDC_IDC_LEAD_LOCATION_DETAIL_1: NORMAL
MDC_IDC_LEAD_LOCATION_DETAIL_1: NORMAL
MDC_IDC_LEAD_MFG: NORMAL
MDC_IDC_LEAD_MFG: NORMAL
MDC_IDC_LEAD_MODEL: NORMAL
MDC_IDC_LEAD_MODEL: NORMAL
MDC_IDC_LEAD_POLARITY_TYPE: NORMAL
MDC_IDC_LEAD_POLARITY_TYPE: NORMAL
MDC_IDC_LEAD_SERIAL: NORMAL
MDC_IDC_LEAD_SERIAL: NORMAL
MDC_IDC_MSMT_BATTERY_DTM: NORMAL
MDC_IDC_MSMT_BATTERY_REMAINING_LONGEVITY: 137 MO
MDC_IDC_MSMT_BATTERY_RRT_TRIGGER: 2.62
MDC_IDC_MSMT_BATTERY_STATUS: NORMAL
MDC_IDC_MSMT_BATTERY_VOLTAGE: 3.23 V
MDC_IDC_MSMT_LEADCHNL_RA_IMPEDANCE_VALUE: 323 OHM
MDC_IDC_MSMT_LEADCHNL_RA_IMPEDANCE_VALUE: 399 OHM
MDC_IDC_MSMT_LEADCHNL_RA_PACING_THRESHOLD_AMPLITUDE: 0.62 V
MDC_IDC_MSMT_LEADCHNL_RA_PACING_THRESHOLD_PULSEWIDTH: 0.4 MS
MDC_IDC_MSMT_LEADCHNL_RA_SENSING_INTR_AMPL: 1.25 MV
MDC_IDC_MSMT_LEADCHNL_RA_SENSING_INTR_AMPL: 1.25 MV
MDC_IDC_MSMT_LEADCHNL_RV_IMPEDANCE_VALUE: 437 OHM
MDC_IDC_MSMT_LEADCHNL_RV_IMPEDANCE_VALUE: 532 OHM
MDC_IDC_MSMT_LEADCHNL_RV_PACING_THRESHOLD_AMPLITUDE: 1.12 V
MDC_IDC_MSMT_LEADCHNL_RV_PACING_THRESHOLD_PULSEWIDTH: 0.4 MS
MDC_IDC_PG_IMPLANT_DTM: NORMAL
MDC_IDC_PG_MFG: NORMAL
MDC_IDC_PG_MODEL: NORMAL
MDC_IDC_PG_SERIAL: NORMAL
MDC_IDC_PG_TYPE: NORMAL
MDC_IDC_SESS_CLINIC_NAME: NORMAL
MDC_IDC_SESS_DTM: NORMAL
MDC_IDC_SESS_TYPE: NORMAL
MDC_IDC_SET_BRADY_AT_MODE_SWITCH_RATE: 171 {BEATS}/MIN
MDC_IDC_SET_BRADY_HYSTRATE: NORMAL
MDC_IDC_SET_BRADY_LOWRATE: 60 {BEATS}/MIN
MDC_IDC_SET_BRADY_MAX_SENSOR_RATE: 130 {BEATS}/MIN
MDC_IDC_SET_BRADY_MAX_TRACKING_RATE: 130 {BEATS}/MIN
MDC_IDC_SET_BRADY_MODE: NORMAL
MDC_IDC_SET_BRADY_PAV_DELAY_LOW: 220 MS
MDC_IDC_SET_BRADY_SAV_DELAY_LOW: 200 MS
MDC_IDC_SET_LEADCHNL_RA_PACING_AMPLITUDE: 1.5 V
MDC_IDC_SET_LEADCHNL_RA_PACING_ANODE_ELECTRODE_1: NORMAL
MDC_IDC_SET_LEADCHNL_RA_PACING_ANODE_LOCATION_1: NORMAL
MDC_IDC_SET_LEADCHNL_RA_PACING_CAPTURE_MODE: NORMAL
MDC_IDC_SET_LEADCHNL_RA_PACING_CATHODE_ELECTRODE_1: NORMAL
MDC_IDC_SET_LEADCHNL_RA_PACING_CATHODE_LOCATION_1: NORMAL
MDC_IDC_SET_LEADCHNL_RA_PACING_POLARITY: NORMAL
MDC_IDC_SET_LEADCHNL_RA_PACING_PULSEWIDTH: 0.4 MS
MDC_IDC_SET_LEADCHNL_RA_SENSING_ANODE_ELECTRODE_1: NORMAL
MDC_IDC_SET_LEADCHNL_RA_SENSING_ANODE_LOCATION_1: NORMAL
MDC_IDC_SET_LEADCHNL_RA_SENSING_CATHODE_ELECTRODE_1: NORMAL
MDC_IDC_SET_LEADCHNL_RA_SENSING_CATHODE_LOCATION_1: NORMAL
MDC_IDC_SET_LEADCHNL_RA_SENSING_POLARITY: NORMAL
MDC_IDC_SET_LEADCHNL_RA_SENSING_SENSITIVITY: 0.6 MV
MDC_IDC_SET_LEADCHNL_RV_PACING_AMPLITUDE: 2.5 V
MDC_IDC_SET_LEADCHNL_RV_PACING_ANODE_ELECTRODE_1: NORMAL
MDC_IDC_SET_LEADCHNL_RV_PACING_ANODE_LOCATION_1: NORMAL
MDC_IDC_SET_LEADCHNL_RV_PACING_CAPTURE_MODE: NORMAL
MDC_IDC_SET_LEADCHNL_RV_PACING_CATHODE_ELECTRODE_1: NORMAL
MDC_IDC_SET_LEADCHNL_RV_PACING_CATHODE_LOCATION_1: NORMAL
MDC_IDC_SET_LEADCHNL_RV_PACING_POLARITY: NORMAL
MDC_IDC_SET_LEADCHNL_RV_PACING_PULSEWIDTH: 0.4 MS
MDC_IDC_SET_LEADCHNL_RV_SENSING_ANODE_ELECTRODE_1: NORMAL
MDC_IDC_SET_LEADCHNL_RV_SENSING_ANODE_LOCATION_1: NORMAL
MDC_IDC_SET_LEADCHNL_RV_SENSING_CATHODE_ELECTRODE_1: NORMAL
MDC_IDC_SET_LEADCHNL_RV_SENSING_CATHODE_LOCATION_1: NORMAL
MDC_IDC_SET_LEADCHNL_RV_SENSING_POLARITY: NORMAL
MDC_IDC_SET_LEADCHNL_RV_SENSING_SENSITIVITY: 0.9 MV
MDC_IDC_SET_ZONE_DETECTION_INTERVAL: 350 MS
MDC_IDC_SET_ZONE_DETECTION_INTERVAL: 400 MS
MDC_IDC_SET_ZONE_STATUS: NORMAL
MDC_IDC_SET_ZONE_STATUS: NORMAL
MDC_IDC_SET_ZONE_TYPE: NORMAL
MDC_IDC_SET_ZONE_VENDOR_TYPE: NORMAL
MDC_IDC_STAT_AT_BURDEN_PERCENT: 0 %
MDC_IDC_STAT_AT_DTM_END: NORMAL
MDC_IDC_STAT_AT_DTM_START: NORMAL
MDC_IDC_STAT_BRADY_AP_VP_PERCENT: 58.9 %
MDC_IDC_STAT_BRADY_AP_VS_PERCENT: 0.01 %
MDC_IDC_STAT_BRADY_AS_VP_PERCENT: 41.03 %
MDC_IDC_STAT_BRADY_AS_VS_PERCENT: 0.05 %
MDC_IDC_STAT_BRADY_DTM_END: NORMAL
MDC_IDC_STAT_BRADY_DTM_START: NORMAL
MDC_IDC_STAT_BRADY_RA_PERCENT_PACED: 58.85 %
MDC_IDC_STAT_BRADY_RV_PERCENT_PACED: 99.93 %
MDC_IDC_STAT_EPISODE_RECENT_COUNT: 0
MDC_IDC_STAT_EPISODE_RECENT_COUNT_DTM_END: NORMAL
MDC_IDC_STAT_EPISODE_RECENT_COUNT_DTM_START: NORMAL
MDC_IDC_STAT_EPISODE_TOTAL_COUNT: 0
MDC_IDC_STAT_EPISODE_TOTAL_COUNT_DTM_END: NORMAL
MDC_IDC_STAT_EPISODE_TOTAL_COUNT_DTM_START: NORMAL
MDC_IDC_STAT_EPISODE_TYPE: NORMAL
MDC_IDC_STAT_TACHYTHERAPY_RECENT_DTM_END: NORMAL
MDC_IDC_STAT_TACHYTHERAPY_RECENT_DTM_START: NORMAL
MDC_IDC_STAT_TACHYTHERAPY_TOTAL_DTM_END: NORMAL
MDC_IDC_STAT_TACHYTHERAPY_TOTAL_DTM_START: NORMAL

## 2025-04-24 ENCOUNTER — OFFICE VISIT (OUTPATIENT)
Dept: FAMILY MEDICINE | Facility: CLINIC | Age: 80
End: 2025-04-24
Payer: MEDICARE

## 2025-04-24 VITALS
DIASTOLIC BLOOD PRESSURE: 83 MMHG | SYSTOLIC BLOOD PRESSURE: 128 MMHG | WEIGHT: 141 LBS | HEIGHT: 62 IN | TEMPERATURE: 97.3 F | BODY MASS INDEX: 25.95 KG/M2 | OXYGEN SATURATION: 97 % | HEART RATE: 56 BPM

## 2025-04-24 DIAGNOSIS — Z01.818 PRE-OP EVALUATION: Primary | ICD-10-CM

## 2025-04-24 DIAGNOSIS — G47.09 SECONDARY INSOMNIA: ICD-10-CM

## 2025-04-24 DIAGNOSIS — E03.9 ACQUIRED HYPOTHYROIDISM: Primary | ICD-10-CM

## 2025-04-24 DIAGNOSIS — Z23 NEED FOR COVID-19 VACCINE: ICD-10-CM

## 2025-04-24 RX ORDER — ZOLPIDEM TARTRATE 5 MG/1
5 TABLET ORAL
Qty: 30 TABLET | Refills: 1 | Status: SHIPPED | OUTPATIENT
Start: 2025-04-24

## 2025-04-24 NOTE — PROGRESS NOTES
Preoperative Evaluation  [facility] [address], [address]  [facility] [address]  Phone: [phone]  Fax: [phone]  Primary Provider: Ruslan Jean MD  Pre-op Performing Provider: Ruslan Jean MD    Apr 24, 2025 4/19/2025   Surgical Information   What procedure is being done? Left Foot surgery (1st proximal phalanx osteotomy with simple bunion, left 2nd hammertoe correction without shortening osteotomy)   Facility or Hospital where procedure/surgery will be performed: [facility]   Who is doing the procedure / surgery? Tanner Velez MD   Date of surgery / procedure: 5/6/25   Time of surgery / procedure: TBD   Where do you plan to recover after surgery? 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    NOTE: Kristal is cleared for surgery.        4/19/2025   Pre-Op Questionnaire   Have you ever had a heart attack or stroke? No   Have you ever had surgery on your heart or blood vessels, such as a stent placement, a coronary artery bypass, or surgery on an artery in your head, neck, heart, or legs? (!) YES (Pacemaker)   Do you have chest pain with activity? No   Do you have a history of heart failure? No   Do you currently have a cold, bronchitis or symptoms of other infection? No   Do you have a cough, shortness of breath, or wheezing? No   Do you or anyone in your family have previous history of blood clots? No   Do you or does anyone in your family have a serious bleeding problem such as prolonged bleeding following surgeries or cuts? No   Have you ever had problems with anemia or been told to take iron pills? No   Have you had any abnormal blood loss such as black, tarry or  bloody stools, or abnormal vaginal bleeding? No   Have you ever had a blood transfusion? No   Are you willing to have a blood transfusion if it is medically needed before, during, or after your surgery? (!) NO    Have you or any of your relatives ever had problems with anesthesia? No   Do you have sleep apnea, excessive snoring or daytime drowsiness? No   Do you have any artifical heart valves or other implanted medical devices like a pacemaker, defibrillator, or continuous glucose monitor? (!) YES   What type of device do you have? Pacemaker   [facility] [place]   Do you have artificial joints? (!) YES   Are you allergic to latex? No     Patient Active Problem List    Diagnosis Date Noted    Polyneuropathy 07/13/2022     Priority: Medium    Atrophic vaginitis 06/09/2021     Priority: Medium    Secondary insomnia 06/09/2021     Priority: Medium    Paresthesias 06/22/2020     Priority: Medium     6/15/20 EMG/NCS  Results:  Nerve conduction studies:  1. Left median-D2 sensory response shows normal amplitude and mildly slowed CV.   2. Left ulnar-D5, left sural, and bilateral superficial peroneal sensory responses are normal.   3. Left median-ulnar palmar interlatency difference is prolonged.   4. Left median-APB, left ulnar-ADM, bilateral peroneal-EDB, and left tibial-AH motor responses are normal.   5. Bilateral peroneal-TA motor responses are normal and symmetric.      Needle EMG of selected proximal and distal left lower limb muscles was performed as tabulated below. No abnormal spontaneous activity was observed in the sampled muscles. Motor unit potential morphology and recruitment patterns were normal.      Interpretation:  This is an abnormal study. There is electrophysiologic evidence of a mild left-sided median neuropathy at the wrist (e.g., carpal tunnel syndrome). There is no evidence of a large-fiber polyneuropathy, focal neuropathy, or lumbosacral radiculopathy  affecting the left lower limb on the basis of this study. Clinical correlation is recommended.       Medicare annual wellness visit, subsequent 01/28/2020     Priority: Medium    S/P TKR (total knee replacement), left 05/15/2018     Priority: Medium    Primary osteoarthritis of left knee 06/26/2017     Priority: Medium    Diverticulosis of large intestine without hemorrhage 08/01/2016     Priority: Medium    BPPV (benign paroxysmal positional vertigo), unspecified laterality 08/01/2016     Priority: Medium    Tear of medial meniscus of left knee 04/18/2016     Priority: Medium    Cardiac pacemaker in situ- Medtronic, dual chamber- NOT dependent (Advisa: MRI conditional) 10/08/2015     Priority: Medium    Heart block 06/22/2015     Priority: Medium    Patellofemoral syndrome, left 02/20/2015     Priority: Medium    Allergic rhinitis 10/16/2012     Priority: Medium    Hyperlipidemia LDL goal <100 10/16/2012     Priority: Medium    Acquired hypothyroidism 10/16/2012     Priority: Medium    Adenocarcinoma of breast (H) 02/20/2012     Priority: Medium      Past Medical History:   Diagnosis Date    Does use hearing aid     Malignant neoplasm (H)      Past Surgical History:   Procedure Laterality Date    EP PACEMAKER GENERATOR REPLACEMENT- DUAL N/A 1/28/2025    Procedure: Pacemaker Generator Replacement Dual;  Surgeon: Nomi Alva MD;  Location:  HEART CARDI[facility] CATH LAB    JOINT REPLACEMENT  2018    left knee    PACEMAKER/ICD CHECK       Right bunion surgery, October 2023    Current Outpatient Medications   Medication Sig Dispense Refill    calcium carbonate (OS-DALLAS 500 MG [place]. [place]) 500 MG tablet Take 600 mg by mouth daily       cetirizine (ZYRTEC) 10 MG tablet Take 10 mg by mouth daily      Cyanocobalamin (B-12) 1000 MCG TBCR 1,000 mcg      estradiol (ESTRACE) 0.1 MG/GM vaginal cream Place 1 g vaginally three times a week Place 1g vaginally nightly x 2 weeks and then 2-3 times per week for maintenance 42.5 g 4     "ibuprofen (ADVIL/MOTRIN) 600 MG tablet TAKE 1 TABLET BY MOUTH BY MOUTH EVERY 6 HOURS AS NEEDED      latanoprost (XALATAN) 0.005 % ophthalmic solution       levothyroxine (SYNTHROID/LEVOTHROID) 75 MCG tablet Take 1 tablet (75 mcg) by mouth every other day. Alternating with 50 mcg 90 tablet 0    simvastatin (ZOCOR) 20 MG tablet Take 1 tablet (20 mg) by mouth at bedtime. 90 tablet 3    VITAMIN D, CHOLECALCIFEROL, PO Take 1,000 Units by mouth daily       zolpidem (AMBIEN) 5 MG tablet Take 1 tablet (5 mg) by mouth nightly as needed for sleep. 30 tablet 1    levothyroxine (SYNTHROID/LEVOTHROID) 50 MCG tablet Take 1 tablet (50 mcg) by mouth every other day. Alternating with 75 mcg (Patient not taking: Reported on 4/24/2025) 90 tablet 0       Allergies   Allergen Reactions    Gabapentin Rash and Swelling     Face swelling    Face swelling   Face swelling        Social History     Tobacco Use    Smoking status: Never    Smokeless tobacco: Never   Substance Use Topics    Alcohol use: Yes     Comment: 1 per night     Family History   Problem Relation Age of Onset    Cancer Mother     Heart Disease Father      History   Drug Use No     Review of Systems  Constitutional, neuro, ENT, endocrine, pulmonary, cardiac, gastrointestinal, genitourinary, musculoskeletal, integument and psychiatric systems are negative, except as otherwise noted.    Objective    /83   Pulse 56   Temp 97.3  F (36.3  C)   Ht 1.583 m (5' 2.32\")   Wt 64 kg (141 lb)   LMP 01/01/2000   SpO2 97%   BMI 25.52 kg/m     Estimated body mass index is 25.52 kg/m  as calculated from the following:    Height as of this encounter: 1.583 m (5' 2.32\").    Weight as of this encounter: 64 kg (141 lb).  Physical Exam  GENERAL: alert and no distress  EYES: Eyes grossly normal to inspection, PERRL and conjunctivae and sclerae normal  HENT: ear canals and TM's normal, nose and mouth without ulcers or lesions  NECK: no adenopathy, no asymmetry, masses, or scars  RESP: " lungs clear to auscultation - no rales, rhonchi or wheezes  CV: regular rate and rhythm, normal S1 S2, no S3 or S4, no murmur, click or rub, no peripheral edema  MS: no gross musculoskeletal defects noted, no edema  SKIN: no suspicious lesions or rashes  NEURO: Normal strength and tone, mentation intact and speech normal  PSYCH: mentation appears normal, affect normal/bright    Recent Labs   Lab Test 01/28/25  1242 10/15/24  1515 07/31/24  0825   HGB 14.1 13.5 13.7    234 276     --  142   POTASSIUM 4.4  --  4.2   CR 0.91  --  0.80      Diagnostics  No results found for this or any previous visit (from the past 240 hours). Note: Outside labs from 4/7/25 including CBC and CMP were normal.  EKG: AV dual-paced rhythm with prolonged AV conduction; no change.    Revised Cardiac Risk Index (RCRI)  The patient has the following serious cardiovascular risks for perioperative complications:   - No serious cardiac risks = 0 points     RCRI Interpretation: 0 points: Class I (very low risk - 0.4% complication rate)       Signed Electronically by: Ruslan Jean MD

## 2025-04-24 NOTE — NURSING NOTE
"Pat  79 year old    Chief Complaint   Patient presents with    Pre-Op Exam     Foot surgery: [facility] in [place] May 6,2025           Blood pressure 128/83, pulse 56, temperature 97.3  F (36.3  C), height 1.583 m (5' 2.32\"), weight 64 kg (141 lb), last menstrual period 01/01/2000, SpO2 97%, not currently breastfeeding. Body mass index is 25.52 kg/m .    Patient Active Problem List   Diagnosis    Adenocarcinoma of breast (H)    Allergic rhinitis    Hyperlipidemia LDL goal <100    Acquired hypothyroidism    Heart block    Cardiac pacemaker in situ- [employer]tronic, dual chamber- NOT dependent (Advisa: MRI conditional)    Diverticulosis of large intestine without hemorrhage    BPPV (benign paroxysmal positional vertigo), unspecified laterality    Primary osteoarthritis of left knee    Tear of medial meniscus of left knee    Patellofemoral syndrome, left    S/P TKR (total knee replacement), left    Medicare annual wellness visit, subsequent    Paresthesias    Atrophic vaginitis    Secondary insomnia    Polyneuropathy             Wt Readings from Last 2 Encounters:   04/24/25 64 kg (141 lb)   01/28/25 63 kg (138 lb 12.8 oz)       BP Readings from Last 3 Encounters:   04/24/25 128/83   01/28/25 131/71   10/15/24 112/74                Current Outpatient Medications   Medication Sig Dispense Refill    calcium carbonate (OS-DALLAS 500 MG [place]. [place]) 500 MG tablet Take 600 mg by mouth daily       cetirizine (ZYRTEC) 10 MG tablet Take 10 mg by mouth daily      Cyanocobalamin (B-12) 1000 MCG TBCR 1,000 mcg      estradiol (ESTRACE) 0.1 MG/GM vaginal cream Place 1 g vaginally three times a week Place 1g vaginally nightly x 2 weeks and then 2-3 times per week for maintenance 42.5 g 4    ibuprofen (ADVIL/MOTRIN) 600 MG tablet TAKE 1 TABLET BY MOUTH BY MOUTH EVERY 6 HOURS AS NEEDED      latanoprost (XALATAN) 0.005 % ophthalmic solution       levothyroxine (SYNTHROID/LEVOTHROID) 75 MCG tablet Take 1 tablet (75 mcg) by mouth " every other day. Alternating with 50 mcg 90 tablet 0    simvastatin (ZOCOR) 20 MG tablet Take 1 tablet (20 mg) by mouth at bedtime. 90 tablet 3    VITAMIN D, CHOLECALCIFEROL, PO Take 1,000 Units by mouth daily       zolpidem (AMBIEN) 5 MG tablet Take 1 tablet (5 mg) by mouth nightly as needed for sleep. 30 tablet 1    levothyroxine (SYNTHROID/LEVOTHROID) 50 MCG tablet Take 1 tablet (50 mcg) by mouth every other day. Alternating with 75 mcg (Patient not taking: Reported on 4/24/2025) 90 tablet 0     No current facility-administered medications for this visit.             Social History     Tobacco Use    Smoking status: Never    Smokeless tobacco: Never   Substance Use Topics    Alcohol use: Yes     Comment: 1 per night    Drug use: No             Health Maintenance Due   Topic Date Due    COVID-19 Vaccine (7 - 2024-25 season) 03/18/2025             Lab Results   Component Value Date    PAP NIL 12/02/2019 April 24, 2025 9:30 AM

## 2025-05-02 ENCOUNTER — OFFICE VISIT (OUTPATIENT)
Dept: FAMILY MEDICINE | Facility: CLINIC | Age: 80
End: 2025-05-02
Payer: MEDICARE

## 2025-05-02 VITALS
OXYGEN SATURATION: 96 % | TEMPERATURE: 97.7 F | HEART RATE: 60 BPM | SYSTOLIC BLOOD PRESSURE: 125 MMHG | DIASTOLIC BLOOD PRESSURE: 81 MMHG

## 2025-05-02 DIAGNOSIS — R51.9 NONINTRACTABLE HEADACHE, UNSPECIFIED CHRONICITY PATTERN, UNSPECIFIED HEADACHE TYPE: Primary | ICD-10-CM

## 2025-05-02 DIAGNOSIS — H69.93 DYSFUNCTION OF BOTH EUSTACHIAN TUBES: ICD-10-CM

## 2025-05-02 RX ORDER — FLUTICASONE PROPIONATE 50 MCG
2 SPRAY, SUSPENSION (ML) NASAL DAILY
Qty: 16 G | Refills: 11 | Status: SHIPPED | OUTPATIENT
Start: 2025-05-02

## 2025-05-02 NOTE — PATIENT INSTRUCTIONS
I think you have some eustachian tube dysfunction causing increased pressure    - try Flonase 2 sprays in each nostril once a day - I'd try it for 2 weeks     - for pain, take extra strength acetaminophen - take 2 of them 3 times a day

## 2025-05-02 NOTE — PROGRESS NOTES
Assessment & Plan   Sharona Bravo is a 79 year old year old female with a history of hypothyroidism who presents to clinic today for 2 weeks of daily left sided headache in setting of increasing seasonal allergies, with serous otitis of left ear on exam.  Hx of left sided headaches intermittently for 5 years, since first COVID infection.  Suspect eustachian tube dysfunction is causing or contributing to left sided headaches. No sign of bacterial infection.  Reassuring neuro exam    Nonintractable headache, unspecified chronicity pattern, unspecified headache type and Dysfunction of both eustachian tubes in setting of allergy   Trial of daily Flonase.  For pain, use acetaminophen with upcoming surgery.  If daily headache does not improve over next 2-3 weeks, patient to follow up.   - fluticasone (FLONASE) 50 MCG/ACT nasal spray; Spray 2 sprays into both nostrils daily.        Subjective   Kristal is a 79 year old, presenting for the following health issues:  Headache (Pt is having surgery next Tuesday and is having a constant headache)    HPI      Headache  - left side, around ear and behind head  - tried 4 Advil at a time, not really helping  - started getting more freq HA a month ago, but went away.  Now daily headache for 2 weeks  - one night had a lot of ear pressure   - generally has been sleeping okay   - wearing hearing hearing aids for a few months -not sure if that is related   - sometimes sees waves in vision, and it goes away   - no nausea  - no fever or chills  - eating okay   - started to have headaches since first COVID infection April 2020, since then pain settles in left side of head   - feels pressure in ear   - took sudafed for a week, didn't help  - has a little mucous in throat, usually gets spring allergies.  Taking zyrtec daily   - hasn't tried Flonase lately    Surgery scheduled   - foot surgery on Tuesday, bunion and toe position         Objective    /81   Pulse 60   Temp 97.7  F  (36.5  C)   LMP 01/01/2000   SpO2 96%   There is no height or weight on file to calculate BMI.  Physical Exam   GENERAL: alert and no distress  HENT: ear canals and TM's normal, has some clear fluid with bubbles behind left TM, nose and mouth without ulcers or lesions  NECK: no adenopathy, no asymmetry, masses, or scars  RESP: lungs clear to auscultation - no rales, rhonchi or wheezes  CV: regular rate and rhythm, normal S1 S2, no S3 or S4, no murmur, click or rub,  MS: no gross musculoskeletal defects noted, no edema  NEURO: Normal strength and tone, sensory exam grossly normal, mentation intact, cranial nerves 2-12 intact, DTR's normal and symmetric, gait normal including heel/toe walking. Difficulty with balance with tandem walking.  Normal rapid alternating movements and finger to nose testing            Signed Electronically by: Lalitha Mas MD

## 2025-05-13 NOTE — PROGRESS NOTES
ELECTROPHYSIOLOGY CLINIC VISIT    Assessment/Recommendations   Assessment/Plan:    Ms. Bravo is a 79 year old female who has a past medical history significant for breast cancer s/p radiation and lumpectomy, hypothyroidism, HLD, and symptomatic bradycardia (Mobitz II) s/p PPM 6/23/2015 s/p generator 1/28/25.      High degree AVB s/p PPM implant 6/23/2015 s/p generator change 1/28/25  She recently underwent generator change on 1/28/25 after device reached RRT 12/18/24. Incision site CDI. Denies issues post gen change. Device interrogation today with normal device function, stable lead parameters, AP 55.4%,  99.9%, AF burden <0.1%, no ventricular arrhythmias.   - She has been 100% RV paced so will continue to get echos every couple years. Most recent echo from 7/2022 with LVEF 60-65%. Will order echo today to reassess heart function.   - Continue routine device clinic follow-up.     Follow up in one year, or sooner as needed.        History of Present Illness/Subjective    Ms. Sharona Bravo is a 79 year old female who comes in today for EP follow-up of PPM cares.    Ms. Bravo is a 79 year old female who has a past medical history significant for breast cancer s/p radiation and lumpectomy, hypothyroidism, HLD, and symptomatic bradycardia (Mobitz II) s/p PPM 6/23/2015.     She was admitted from clinic in 6/2015 with bradycardia. She had been feeling fatigue especially when she exerted herself and even climbing a flight of stairs was more difficult for her. She was found to be in Mobitz II Block and was subsequently transferred here for admission. Telemetry showed intermittent episodes of SB 50's and episodes of Mobitz II 30's. ECG from 2013 showed baseline bifascicular block. Echo Showed LVEF hyperkinetic with an EF of 65-70%. Right ventricular function, chamber size, wall motion, and thickness are normal. She ultimately underwent a dual chamber PPM implant on 6/23/15.      EP Visit 4/2019: She  "presents today for follow up. She reports feeling well and has no complaints. She is active without limitation. She denies any chest pain/pressures, dizziness, lightheadedness, worsening shortness of breath, leg/ankle swelling, PND, orthopnea, palpitations, or syncopal symptoms. Device interrogation shows normal pacemaker function. No episodes/arrhythmias recorded. Intrinsic rhythm = SB 56 bpm with CHB no R waves @ VVI 30 bpm. AP= 37.7%.  = 99.8%. No short v-v intervals recorded. Lead trends appear stable. Last echo from 4/2018 showed LVEF 55-60%, normal RV size and function, and no valvular issues. Current cardiac medications include: ASA and Zocor.      EP Visit 5/20/20: She is following up today. She reports feeling well today. She had COVID19 in April and has made great recovery. She is continuing to struggle with headaches which her PCP is workup up. She denies any chest pain/pressures, dizziness, lightheadedness, worsening shortness of breath, leg/ankle swelling, PND, orthopnea, palpitations, or syncopal symptoms. Device check shows normal pacemaker function. 1 asymptomatic NSVT episode recorded lasting 7 seconds @ 181 bpm, EGM displays V>A, unable to see the start of the episode, pt reports she had COVID 19 at that time but does not recall any \"heart symptoms\". 6 AT/AF episodes recorded, < 1 minute. Presenting EGM = AS/ @ 80 bpm. PVC burden has increased to 21/hr from 7/hr. AP = 49.4%.  = 99.5%. Estimated battery longevity to JYOTI = 4 years. No short v-v intervals recorded. Lead trends appear stable. Patient notified of interrogation results. Current cardiac medications include: ASA and Zocor.      EP Visit 6/29/22: She presents today for follow up. She reports feeling well. She denies chest discomfort, palpitations, abdominal fullness/bloating or peripheral edema, shortness of breath, paroxysmal nocturnal dyspnea, orthopnea, lightheadedness, dizziness, pre-syncope, or syncope. Device interrogation shows " normal device function, stable lead parameters, total AT/AF time 97 seconds <0.1% burden, no ventricular arrhythmias, intrinsitinic is CHB, and AP 42.7%,  100%. Current cardiac medications include: ASA and Zocor.     EP Visit 7/26/24: She presents today for follow up. She reports feeling well. She denies chest discomfort, palpitations, abdominal fullness/bloating or peripheral edema, shortness of breath, paroxysmal nocturnal dyspnea, orthopnea, lightheadedness, dizziness, pre-syncope, or syncope. Device interrogation shows normal device function, stable lead parameters, no arrhythmias recorded, RRT estimated in 5 months, and Ap 54.7%,  100%. Current cardiac medications include: ASA and Zocor.      She presents today in follow up. She reports feeling well. She denies issues with her device and reports her incision site healed well. She denies chest discomfort, palpitations, abdominal fullness/bloating or peripheral edema, shortness of breath, paroxysmal nocturnal dyspnea, orthopnea, lightheadedness, dizziness, pre-syncope, or syncope. She recently had foot surgery that she is recovering from. However, she is normally very active and works out with a  three times per week, walks her dog, and golfs. She denies exertional symptoms prior to surgery. Device interrogation shows normal device function, stable lead parameters, AP 55.4%,  99.9%, AF burden <0.1%, no ventricular arrhythmias. Current cardiac medications include: simvastatin 20 mg.     I have reviewed and updated the patient's Past Medical History, Social History, Family History and Medication List.     Cardiographics (Personally Reviewed) :   7/18/22 Echo:   Interpretation Summary  Global and regional left ventricular function is normal with an EF of 60-65%.  Right ventricular function, chamber size, wall motion, and thickness are  normal.  Pulmonary artery systolic pressure is normal.  The inferior vena cava is normal.  No pericardial effusion is  present.       Physical Examination   /77 (BP Location: Right arm, Patient Position: Chair, Cuff Size: Adult Regular)   Pulse 61   Wt 66.1 kg (145 lb 12.8 oz)   LMP 01/01/2000   SpO2 95%   BMI 26.39 kg/m    Wt Readings from Last 3 Encounters:   05/21/25 66.1 kg (145 lb 12.8 oz)   04/24/25 64 kg (141 lb)   01/28/25 63 kg (138 lb 12.8 oz)     General Appearance:   Alert, well-appearing and in no acute distress.   HEENT: Atraumatic, normocephalic.    Chest/Lungs:   Respirations unlabored.  Lungs are clear to auscultation.   Cardiovascular:   Regular rate and rhythm.  S1/S2. No murmur.    Abdomen:  Soft, nontender, nondistended.   Extremities: No cyanosis or clubbing. No edema.     Musculoskeletal: Moves all extremities.     Skin: Warm, dry, intact.    Neurologic: Mood and affect are appropriate.  Alert and oriented to person, place, time, and situation.          Medications  Allergies   Current Outpatient Medications   Medication Sig Dispense Refill    calcium carbonate (OS-DALLAS 500 MG [place]. [place]) 500 MG tablet Take 600 mg by mouth daily       cetirizine (ZYRTEC) 10 MG tablet Take 10 mg by mouth daily      Cyanocobalamin (B-12) 1000 MCG TBCR 1,000 mcg      estradiol (ESTRACE) 0.1 MG/GM vaginal cream Place 1 g vaginally three times a week Place 1g vaginally nightly x 2 weeks and then 2-3 times per week for maintenance 42.5 g 4    fluticasone (FLONASE) 50 MCG/ACT nasal spray Spray 2 sprays into both nostrils daily. 16 g 11    ibuprofen (ADVIL/MOTRIN) 600 MG tablet TAKE 1 TABLET BY MOUTH BY MOUTH EVERY 6 HOURS AS NEEDED      latanoprost (XALATAN) 0.005 % ophthalmic solution       levothyroxine (SYNTHROID/LEVOTHROID) 75 MCG tablet Take 1 tablet (75 mcg) by mouth every other day. Alternating with 50 mcg 90 tablet 0    simvastatin (ZOCOR) 20 MG tablet Take 1 tablet (20 mg) by mouth at bedtime. 90 tablet 3    VITAMIN D, CHOLECALCIFEROL, PO Take 1,000 Units by mouth daily       zolpidem (AMBIEN) 5 MG tablet Take 1  tablet (5 mg) by mouth nightly as needed for sleep. 30 tablet 1    Allergies   Allergen Reactions    Gabapentin Rash and Swelling     Face swelling    Face swelling   Face swelling         Lab Results (Personally Reviewed)    Chemistry/lipid CBC Cardiac Enzymes/BNP/TSH/INR   Lab Results   Component Value Date    BUN 19.9 01/28/2025     01/28/2025    CO2 25 01/28/2025     Creatinine   Date Value Ref Range Status   01/28/2025 0.91 0.51 - 0.95 mg/dL Final   06/07/2021 0.8 0.6 - 1.3 mg/dL Final       Lab Results   Component Value Date    CHOL 195 08/28/2024    HDL 69 08/28/2024     (H) 08/28/2024      Lab Results   Component Value Date    WBC 7.4 01/28/2025    HGB 14.1 01/28/2025    HCT 43.0 01/28/2025    MCV 94 01/28/2025     01/28/2025    Lab Results   Component Value Date    TSH 1.81 07/31/2024    INR 0.98 04/30/2018        The patient states understanding and is agreeable with the plan.     Carolyn Graham PA-C  [facility]  Electrophysiology Consult Service  Pager #[phone]    I spent a total of 15 minutes face to face with Sharona Bravo during today's office visit. I have spent an additional 15 minutes today on chart review and documentation.

## 2025-05-21 ENCOUNTER — ANCILLARY PROCEDURE (OUTPATIENT)
Dept: CARDIOLOGY | Facility: CLINIC | Age: 80
End: 2025-05-21
Attending: INTERNAL MEDICINE
Payer: MEDICARE

## 2025-05-21 ENCOUNTER — OFFICE VISIT (OUTPATIENT)
Dept: CARDIOLOGY | Facility: CLINIC | Age: 80
End: 2025-05-21
Payer: MEDICARE

## 2025-05-21 VITALS
SYSTOLIC BLOOD PRESSURE: 123 MMHG | OXYGEN SATURATION: 95 % | WEIGHT: 145.8 LBS | BODY MASS INDEX: 26.39 KG/M2 | HEART RATE: 61 BPM | DIASTOLIC BLOOD PRESSURE: 77 MMHG

## 2025-05-21 DIAGNOSIS — I45.9 HEART BLOCK: Primary | ICD-10-CM

## 2025-05-21 DIAGNOSIS — I44.30 ATRIOVENTRICULAR BLOCK: ICD-10-CM

## 2025-05-21 DIAGNOSIS — Z95.0 CARDIAC PACEMAKER IN SITU: ICD-10-CM

## 2025-05-21 PROCEDURE — 93280 PM DEVICE PROGR EVAL DUAL: CPT | Performed by: INTERNAL MEDICINE

## 2025-05-21 PROCEDURE — G0463 HOSPITAL OUTPT CLINIC VISIT: HCPCS

## 2025-05-21 ASSESSMENT — PAIN SCALES - GENERAL: PAINLEVEL_OUTOF10: NO PAIN (0)

## 2025-05-21 NOTE — NURSING NOTE
Chief Complaint   Patient presents with    Follow Up     RETURN EP post PPM gen change         Vitals were taken, medications reconciled     Andres Owen, Clinic Assistant     2:30 PM

## 2025-05-21 NOTE — LETTER
5/21/2025      RE: Sharona Bravo  [address]  [facility] [address]       Dear Colleague,    Thank you for the opportunity to participate in the care of your patient, Sharona Bravo, at the [facility] [place] at [facility]. Please see a copy of my visit note below.        ELECTROPHYSIOLOGY CLINIC VISIT    Assessment/Recommendations   Assessment/Plan:    Ms. Bravo is a 79 year old female who has a past medical history significant for breast cancer s/p radiation and lumpectomy, hypothyroidism, HLD, and symptomatic bradycardia (Mobitz II) s/p PPM 6/23/2015 s/p generator 1/28/25.      High degree AVB s/p PPM implant 6/23/2015 s/p generator change 1/28/25  She recently underwent generator change on 1/28/25 after device reached RRT 12/18/24. Incision site CDI. Denies issues post gen change. Device interrogation today with normal device function, stable lead parameters, AP 55.4%,  99.9%, AF burden <0.1%, no ventricular arrhythmias.   - She has been 100% RV paced so will continue to get echos every couple years. Most recent echo from 7/2022 with LVEF 60-65%. Will order echo today to reassess heart function.   - Continue routine device clinic follow-up.     Follow up in one year, or sooner as needed.        History of Present Illness/Subjective    Ms. Sharona Bravo is a 79 year old female who comes in today for EP follow-up of PPM cares.    Ms. Bravo is a 79 year old female who has a past medical history significant for breast cancer s/p radiation and lumpectomy, hypothyroidism, HLD, and symptomatic bradycardia (Mobitz II) s/p PPM 6/23/2015.     She was admitted from clinic in 6/2015 with bradycardia. She had been feeling fatigue especially when she exerted herself and even climbing a flight of stairs was more difficult for her. She was found to be in Mobitz II Block and was subsequently transferred here for admission.  "Telemetry showed intermittent episodes of SB 50's and episodes of Mobitz II 30's. ECG from 2013 showed baseline bifascicular block. Echo Showed LVEF hyperkinetic with an EF of 65-70%. Right ventricular function, chamber size, wall motion, and thickness are normal. She ultimately underwent a dual chamber PPM implant on 6/23/15.      EP Visit 4/2019: She presents today for follow up. She reports feeling well and has no complaints. She is active without limitation. She denies any chest pain/pressures, dizziness, lightheadedness, worsening shortness of breath, leg/ankle swelling, PND, orthopnea, palpitations, or syncopal symptoms. Device interrogation shows normal pacemaker function. No episodes/arrhythmias recorded. Intrinsic rhythm = SB 56 bpm with CHB no R waves @ VVI 30 bpm. AP= 37.7%.  = 99.8%. No short v-v intervals recorded. Lead trends appear stable. Last echo from 4/2018 showed LVEF 55-60%, normal RV size and function, and no valvular issues. Current cardiac medications include: ASA and Zocor.      EP Visit 5/20/20: She is following up today. She reports feeling well today. She had COVID19 in April and has made great recovery. She is continuing to struggle with headaches which her PCP is workup up. She denies any chest pain/pressures, dizziness, lightheadedness, worsening shortness of breath, leg/ankle swelling, PND, orthopnea, palpitations, or syncopal symptoms. Device check shows normal pacemaker function. 1 asymptomatic NSVT episode recorded lasting 7 seconds @ 181 bpm, EGM displays V>A, unable to see the start of the episode, pt reports she had COVID 19 at that time but does not recall any \"heart symptoms\". 6 AT/AF episodes recorded, < 1 minute. Presenting EGM = AS/ @ 80 bpm. PVC burden has increased to 21/hr from 7/hr. AP = 49.4%.  = 99.5%. Estimated battery longevity to JYOTI = 4 years. No short v-v intervals recorded. Lead trends appear stable. Patient notified of interrogation results. Current " cardiac medications include: ASA and Zocor.      EP Visit 6/29/22: She presents today for follow up. She reports feeling well. She denies chest discomfort, palpitations, abdominal fullness/bloating or peripheral edema, shortness of breath, paroxysmal nocturnal dyspnea, orthopnea, lightheadedness, dizziness, pre-syncope, or syncope. Device interrogation shows normal device function, stable lead parameters, total AT/AF time 97 seconds <0.1% burden, no ventricular arrhythmias, intrinsitinic is CHB, and AP 42.7%,  100%. Current cardiac medications include: ASA and Zocor.     EP Visit 7/26/24: She presents today for follow up. She reports feeling well. She denies chest discomfort, palpitations, abdominal fullness/bloating or peripheral edema, shortness of breath, paroxysmal nocturnal dyspnea, orthopnea, lightheadedness, dizziness, pre-syncope, or syncope. Device interrogation shows normal device function, stable lead parameters, no arrhythmias recorded, RRT estimated in 5 months, and Ap 54.7%,  100%. Current cardiac medications include: ASA and Zocor.      She presents today in follow up. She reports feeling well. She denies issues with her device and reports her incision site healed well. She denies chest discomfort, palpitations, abdominal fullness/bloating or peripheral edema, shortness of breath, paroxysmal nocturnal dyspnea, orthopnea, lightheadedness, dizziness, pre-syncope, or syncope. She recently had foot surgery that she is recovering from. However, she is normally very active and works out with a  three times per week, walks her dog, and golfs. She denies exertional symptoms prior to surgery. Device interrogation shows normal device function, stable lead parameters, AP 55.4%,  99.9%, AF burden <0.1%, no ventricular arrhythmias. Current cardiac medications include: simvastatin 20 mg.     I have reviewed and updated the patient's Past Medical History, Social History, Family History and Medication  List.     Cardiographics (Personally Reviewed) :   7/18/22 Echo:   Interpretation Summary  Global and regional left ventricular function is normal with an EF of 60-65%.  Right ventricular function, chamber size, wall motion, and thickness are  normal.  Pulmonary artery systolic pressure is normal.  The inferior vena cava is normal.  No pericardial effusion is present.       Physical Examination   /77 (BP Location: Right arm, Patient Position: Chair, Cuff Size: Adult Regular)   Pulse 61   Wt 66.1 kg (145 lb 12.8 oz)   LMP 01/01/2000   SpO2 95%   BMI 26.39 kg/m    Wt Readings from Last 3 Encounters:   05/21/25 66.1 kg (145 lb 12.8 oz)   04/24/25 64 kg (141 lb)   01/28/25 63 kg (138 lb 12.8 oz)     General Appearance:   Alert, well-appearing and in no acute distress.   HEENT: Atraumatic, normocephalic.    Chest/Lungs:   Respirations unlabored.  Lungs are clear to auscultation.   Cardiovascular:   Regular rate and rhythm.  S1/S2. No murmur.    Abdomen:  Soft, nontender, nondistended.   Extremities: No cyanosis or clubbing. No edema.     Musculoskeletal: Moves all extremities.     Skin: Warm, dry, intact.    Neurologic: Mood and affect are appropriate.  Alert and oriented to person, place, time, and situation.          Medications  Allergies   Current Outpatient Medications   Medication Sig Dispense Refill     calcium carbonate (OS-DALLAS 500 MG [place]. [place]) 500 MG tablet Take 600 mg by mouth daily        cetirizine (ZYRTEC) 10 MG tablet Take 10 mg by mouth daily       Cyanocobalamin (B-12) 1000 MCG TBCR 1,000 mcg       estradiol (ESTRACE) 0.1 MG/GM vaginal cream Place 1 g vaginally three times a week Place 1g vaginally nightly x 2 weeks and then 2-3 times per week for maintenance 42.5 g 4     fluticasone (FLONASE) 50 MCG/ACT nasal spray Spray 2 sprays into both nostrils daily. 16 g 11     ibuprofen (ADVIL/MOTRIN) 600 MG tablet TAKE 1 TABLET BY MOUTH BY MOUTH EVERY 6 HOURS AS NEEDED       latanoprost (XALATAN) 0.005  % ophthalmic solution        levothyroxine (SYNTHROID/LEVOTHROID) 75 MCG tablet Take 1 tablet (75 mcg) by mouth every other day. Alternating with 50 mcg 90 tablet 0     simvastatin (ZOCOR) 20 MG tablet Take 1 tablet (20 mg) by mouth at bedtime. 90 tablet 3     VITAMIN D, CHOLECALCIFEROL, PO Take 1,000 Units by mouth daily        zolpidem (AMBIEN) 5 MG tablet Take 1 tablet (5 mg) by mouth nightly as needed for sleep. 30 tablet 1    Allergies   Allergen Reactions     Gabapentin Rash and Swelling     Face swelling    Face swelling   Face swelling         Lab Results (Personally Reviewed)    Chemistry/lipid CBC Cardiac Enzymes/BNP/TSH/INR   Lab Results   Component Value Date    BUN 19.9 01/28/2025     01/28/2025    CO2 25 01/28/2025     Creatinine   Date Value Ref Range Status   01/28/2025 0.91 0.51 - 0.95 mg/dL Final   06/07/2021 0.8 0.6 - 1.3 mg/dL Final       Lab Results   Component Value Date    CHOL 195 08/28/2024    HDL 69 08/28/2024     (H) 08/28/2024      Lab Results   Component Value Date    WBC 7.4 01/28/2025    HGB 14.1 01/28/2025    HCT 43.0 01/28/2025    MCV 94 01/28/2025     01/28/2025    Lab Results   Component Value Date    TSH 1.81 07/31/2024    INR 0.98 04/30/2018        The patient states understanding and is agreeable with the plan.     Carolyn Graham PA-C  [facility]  Electrophysiology Consult Service  Pager #[phone]    I spent a total of 15 minutes face to face with Sharona Bravo during today's office visit. I have spent an additional 15 minutes today on chart review and documentation.                   Please do not hesitate to contact me if you have any questions/concerns.     Sincerely,     Carolyn Graham PA-C

## 2025-05-21 NOTE — PATIENT INSTRUCTIONS
You were seen in the Electrophysiology Clinic today by: Carolyn Graham PA-C    Plan:   Medication Changes: None    Labs/Tests Needed: Complete Echocardiogram at earliest convenience    Follow up Visit: 1 year, or sooner as needed    Device Follow up: Remote check in 3 months (8/21/25)      If you have further questions, please utilize [email or web address] to contact us.     Your Care Team:    EP Cardiology   Telephone Number     Nurse Line  Jean Bowers RN    [phone]     For scheduling appointments:   [place]   [phone]   For procedure scheduling:    Alissa Clemente     [phone]   For the Device Clinic (Pacemakers, ICDs, Loop Recorders)    During business hours: [phone]  After business hours:   [phone]- select option 4 and ask for job code [id number].       On-call cardiologist for after hours or on weekends:   [phone], option #4, and ask to speak to the on-call cardiologist.     Cardiovascular Clinic:   [address] [place] [address]. [place] [address]      As always, Thank you for trusting us with your health care needs!

## 2025-05-22 LAB
MDC_IDC_LEAD_CONNECTION_STATUS: NORMAL
MDC_IDC_LEAD_CONNECTION_STATUS: NORMAL
MDC_IDC_LEAD_IMPLANT_DT: NORMAL
MDC_IDC_LEAD_IMPLANT_DT: NORMAL
MDC_IDC_LEAD_LOCATION: NORMAL
MDC_IDC_LEAD_LOCATION: NORMAL
MDC_IDC_LEAD_LOCATION_DETAIL_1: NORMAL
MDC_IDC_LEAD_LOCATION_DETAIL_1: NORMAL
MDC_IDC_LEAD_MFG: NORMAL
MDC_IDC_LEAD_MFG: NORMAL
MDC_IDC_LEAD_MODEL: NORMAL
MDC_IDC_LEAD_MODEL: NORMAL
MDC_IDC_LEAD_POLARITY_TYPE: NORMAL
MDC_IDC_LEAD_POLARITY_TYPE: NORMAL
MDC_IDC_LEAD_SERIAL: NORMAL
MDC_IDC_LEAD_SERIAL: NORMAL
MDC_IDC_MSMT_BATTERY_DTM: NORMAL
MDC_IDC_MSMT_BATTERY_REMAINING_LONGEVITY: 126 MO
MDC_IDC_MSMT_BATTERY_RRT_TRIGGER: 2.62
MDC_IDC_MSMT_BATTERY_STATUS: NORMAL
MDC_IDC_MSMT_BATTERY_VOLTAGE: 3.2 V
MDC_IDC_MSMT_LEADCHNL_RA_IMPEDANCE_VALUE: 418 OHM
MDC_IDC_MSMT_LEADCHNL_RA_PACING_THRESHOLD_AMPLITUDE: 0.5 V
MDC_IDC_MSMT_LEADCHNL_RA_PACING_THRESHOLD_PULSEWIDTH: 0.4 MS
MDC_IDC_MSMT_LEADCHNL_RA_SENSING_INTR_AMPL: 3.1 MV
MDC_IDC_MSMT_LEADCHNL_RV_IMPEDANCE_VALUE: 532 OHM
MDC_IDC_MSMT_LEADCHNL_RV_PACING_THRESHOLD_AMPLITUDE: 1 V
MDC_IDC_MSMT_LEADCHNL_RV_PACING_THRESHOLD_PULSEWIDTH: 0.4 MS
MDC_IDC_PG_IMPLANT_DTM: NORMAL
MDC_IDC_PG_MFG: NORMAL
MDC_IDC_PG_MODEL: NORMAL
MDC_IDC_PG_SERIAL: NORMAL
MDC_IDC_PG_TYPE: NORMAL
MDC_IDC_SESS_CLINIC_NAME: NORMAL
MDC_IDC_SESS_DTM: NORMAL
MDC_IDC_SESS_TYPE: NORMAL
MDC_IDC_SET_BRADY_AT_MODE_SWITCH_RATE: 171 {BEATS}/MIN
MDC_IDC_SET_BRADY_HYSTRATE: NORMAL
MDC_IDC_SET_BRADY_LOWRATE: 60 {BEATS}/MIN
MDC_IDC_SET_BRADY_MAX_SENSOR_RATE: 130 {BEATS}/MIN
MDC_IDC_SET_BRADY_MAX_TRACKING_RATE: 130 {BEATS}/MIN
MDC_IDC_SET_BRADY_MODE: NORMAL
MDC_IDC_SET_BRADY_PAV_DELAY_LOW: 220 MS
MDC_IDC_SET_BRADY_SAV_DELAY_LOW: 200 MS
MDC_IDC_SET_LEADCHNL_RA_PACING_AMPLITUDE: 1.5 V
MDC_IDC_SET_LEADCHNL_RA_PACING_ANODE_ELECTRODE_1: NORMAL
MDC_IDC_SET_LEADCHNL_RA_PACING_ANODE_LOCATION_1: NORMAL
MDC_IDC_SET_LEADCHNL_RA_PACING_CAPTURE_MODE: NORMAL
MDC_IDC_SET_LEADCHNL_RA_PACING_CATHODE_ELECTRODE_1: NORMAL
MDC_IDC_SET_LEADCHNL_RA_PACING_CATHODE_LOCATION_1: NORMAL
MDC_IDC_SET_LEADCHNL_RA_PACING_POLARITY: NORMAL
MDC_IDC_SET_LEADCHNL_RA_PACING_PULSEWIDTH: 0.4 MS
MDC_IDC_SET_LEADCHNL_RA_SENSING_ANODE_ELECTRODE_1: NORMAL
MDC_IDC_SET_LEADCHNL_RA_SENSING_ANODE_LOCATION_1: NORMAL
MDC_IDC_SET_LEADCHNL_RA_SENSING_CATHODE_ELECTRODE_1: NORMAL
MDC_IDC_SET_LEADCHNL_RA_SENSING_CATHODE_LOCATION_1: NORMAL
MDC_IDC_SET_LEADCHNL_RA_SENSING_POLARITY: NORMAL
MDC_IDC_SET_LEADCHNL_RA_SENSING_SENSITIVITY: 0.6 MV
MDC_IDC_SET_LEADCHNL_RV_PACING_AMPLITUDE: 2.75 V
MDC_IDC_SET_LEADCHNL_RV_PACING_ANODE_ELECTRODE_1: NORMAL
MDC_IDC_SET_LEADCHNL_RV_PACING_ANODE_LOCATION_1: NORMAL
MDC_IDC_SET_LEADCHNL_RV_PACING_CAPTURE_MODE: NORMAL
MDC_IDC_SET_LEADCHNL_RV_PACING_CATHODE_ELECTRODE_1: NORMAL
MDC_IDC_SET_LEADCHNL_RV_PACING_CATHODE_LOCATION_1: NORMAL
MDC_IDC_SET_LEADCHNL_RV_PACING_POLARITY: NORMAL
MDC_IDC_SET_LEADCHNL_RV_PACING_PULSEWIDTH: 0.4 MS
MDC_IDC_SET_LEADCHNL_RV_SENSING_ANODE_ELECTRODE_1: NORMAL
MDC_IDC_SET_LEADCHNL_RV_SENSING_ANODE_LOCATION_1: NORMAL
MDC_IDC_SET_LEADCHNL_RV_SENSING_CATHODE_ELECTRODE_1: NORMAL
MDC_IDC_SET_LEADCHNL_RV_SENSING_CATHODE_LOCATION_1: NORMAL
MDC_IDC_SET_LEADCHNL_RV_SENSING_POLARITY: NORMAL
MDC_IDC_SET_LEADCHNL_RV_SENSING_SENSITIVITY: 0.9 MV
MDC_IDC_SET_ZONE_DETECTION_INTERVAL: 350 MS
MDC_IDC_SET_ZONE_DETECTION_INTERVAL: 400 MS
MDC_IDC_SET_ZONE_STATUS: NORMAL
MDC_IDC_SET_ZONE_STATUS: NORMAL
MDC_IDC_SET_ZONE_TYPE: NORMAL
MDC_IDC_SET_ZONE_VENDOR_TYPE: NORMAL
MDC_IDC_STAT_AT_BURDEN_PERCENT: 0 %
MDC_IDC_STAT_AT_DTM_END: NORMAL
MDC_IDC_STAT_AT_DTM_START: NORMAL
MDC_IDC_STAT_BRADY_AP_VP_PERCENT: 55.45 %
MDC_IDC_STAT_BRADY_AP_VS_PERCENT: 0.02 %
MDC_IDC_STAT_BRADY_AS_VP_PERCENT: 44.47 %
MDC_IDC_STAT_BRADY_AS_VS_PERCENT: 0.06 %
MDC_IDC_STAT_BRADY_DTM_END: NORMAL
MDC_IDC_STAT_BRADY_DTM_START: NORMAL
MDC_IDC_STAT_BRADY_RA_PERCENT_PACED: 55.37 %
MDC_IDC_STAT_BRADY_RV_PERCENT_PACED: 99.92 %
MDC_IDC_STAT_EPISODE_RECENT_COUNT: 0
MDC_IDC_STAT_EPISODE_RECENT_COUNT_DTM_END: NORMAL
MDC_IDC_STAT_EPISODE_RECENT_COUNT_DTM_START: NORMAL
MDC_IDC_STAT_EPISODE_TOTAL_COUNT: 0
MDC_IDC_STAT_EPISODE_TOTAL_COUNT_DTM_END: NORMAL
MDC_IDC_STAT_EPISODE_TOTAL_COUNT_DTM_START: NORMAL
MDC_IDC_STAT_EPISODE_TYPE: NORMAL
MDC_IDC_STAT_TACHYTHERAPY_RECENT_DTM_END: NORMAL
MDC_IDC_STAT_TACHYTHERAPY_RECENT_DTM_START: NORMAL
MDC_IDC_STAT_TACHYTHERAPY_TOTAL_DTM_END: NORMAL
MDC_IDC_STAT_TACHYTHERAPY_TOTAL_DTM_START: NORMAL

## 2025-06-03 ENCOUNTER — TRANSFERRED RECORDS (OUTPATIENT)
Dept: HEALTH INFORMATION MANAGEMENT | Facility: CLINIC | Age: 80
End: 2025-06-03
Payer: MEDICARE

## 2025-06-10 ENCOUNTER — LAB (OUTPATIENT)
Dept: LAB | Facility: CLINIC | Age: 80
End: 2025-06-10
Payer: MEDICARE

## 2025-06-10 DIAGNOSIS — E03.9 ACQUIRED HYPOTHYROIDISM: ICD-10-CM

## 2025-06-10 PROCEDURE — 84480 ASSAY TRIIODOTHYRONINE (T3): CPT | Mod: ORL | Performed by: FAMILY MEDICINE

## 2025-06-10 PROCEDURE — 84443 ASSAY THYROID STIM HORMONE: CPT | Mod: ORL | Performed by: FAMILY MEDICINE

## 2025-06-10 PROCEDURE — 84439 ASSAY OF FREE THYROXINE: CPT | Mod: ORL | Performed by: FAMILY MEDICINE

## 2025-06-11 LAB
T3 SERPL-MCNC: 91 NG/DL (ref 85–202)
T4 FREE SERPL-MCNC: 1.34 NG/DL (ref 0.9–1.7)
TSH SERPL DL<=0.005 MIU/L-ACNC: 1.68 UIU/ML (ref 0.3–4.2)

## 2025-06-13 ENCOUNTER — ANCILLARY PROCEDURE (OUTPATIENT)
Dept: CARDIOLOGY | Facility: CLINIC | Age: 80
End: 2025-06-13
Payer: MEDICARE

## 2025-06-13 ENCOUNTER — RESULTS FOLLOW-UP (OUTPATIENT)
Dept: FAMILY MEDICINE | Facility: CLINIC | Age: 80
End: 2025-06-13

## 2025-06-13 DIAGNOSIS — I45.9 HEART BLOCK: ICD-10-CM

## 2025-06-13 DIAGNOSIS — Z95.0 CARDIAC PACEMAKER IN SITU: ICD-10-CM

## 2025-06-13 LAB — LVEF ECHO: NORMAL

## 2025-06-13 PROCEDURE — 93306 TTE W/DOPPLER COMPLETE: CPT | Performed by: INTERNAL MEDICINE

## 2025-06-16 ENCOUNTER — RESULTS FOLLOW-UP (OUTPATIENT)
Dept: CARDIOLOGY | Facility: CLINIC | Age: 80
End: 2025-06-16

## 2025-06-18 ENCOUNTER — OFFICE VISIT (OUTPATIENT)
Dept: FAMILY MEDICINE | Facility: CLINIC | Age: 80
End: 2025-06-18
Payer: MEDICARE

## 2025-06-18 VITALS
BODY MASS INDEX: 25.77 KG/M2 | OXYGEN SATURATION: 92 % | DIASTOLIC BLOOD PRESSURE: 66 MMHG | TEMPERATURE: 94.2 F | SYSTOLIC BLOOD PRESSURE: 110 MMHG | WEIGHT: 140.04 LBS | HEART RATE: 77 BPM | HEIGHT: 62 IN

## 2025-06-18 DIAGNOSIS — Z01.818 PRE-OP EVALUATION: Primary | ICD-10-CM

## 2025-06-18 NOTE — NURSING NOTE
"Pat  79 year old    Chief Complaint   Patient presents with    Pre-Op Exam     DOS:7/8/25-Left eye Cataract surgery @[facility] with Dr Justin Tubbs and then 2 weeks after 07/22/25 right eye Cataract surgery @[facility] with Dr Justin Tubbs               Blood pressure 110/66, pulse 77, temperature (!) 94.2  F (34.6  C), height 1.578 m (5' 2.13\"), weight 63.5 kg (140 lb 0.6 oz), last menstrual period 01/01/2000, SpO2 92%, not currently breastfeeding. Body mass index is 25.51 kg/m .    Patient Active Problem List   Diagnosis    Adenocarcinoma of breast (H)    Allergic rhinitis    Hyperlipidemia LDL goal <100    Acquired hypothyroidism    Heart block    Cardiac pacemaker in situ- Medtronic, dual chamber- NOT dependent (Advisa: MRI conditional)    Diverticulosis of large intestine without hemorrhage    BPPV (benign paroxysmal positional vertigo), unspecified laterality    Primary osteoarthritis of left knee    Tear of medial meniscus of left knee    Patellofemoral syndrome, left    S/P TKR (total knee replacement), left    Medicare annual wellness visit, subsequent    Paresthesias    Atrophic vaginitis    Secondary insomnia    Polyneuropathy             Wt Readings from Last 2 Encounters:   06/18/25 63.5 kg (140 lb 0.6 oz)   05/21/25 66.1 kg (145 lb 12.8 oz)       BP Readings from Last 3 Encounters:   06/18/25 110/66   05/21/25 123/77   05/02/25 125/81                Current Outpatient Medications   Medication Sig Dispense Refill    calcium carbonate (OS-DALLAS 500 MG [place]. [place]) 500 MG tablet Take 600 mg by mouth daily       cetirizine (ZYRTEC) 10 MG tablet Take 10 mg by mouth daily      Cyanocobalamin (B-12) 1000 MCG TBCR 1,000 mcg      estradiol (ESTRACE) 0.1 MG/GM vaginal cream Place 1 g vaginally three times a week Place 1g vaginally nightly x 2 weeks and then 2-3 times per week for maintenance 42.5 g 4    fluticasone (FLONASE) 50 MCG/ACT nasal spray Spray 2 sprays into both nostrils daily. 16 g 11    " ibuprofen (ADVIL/MOTRIN) 600 MG tablet TAKE 1 TABLET BY MOUTH BY MOUTH EVERY 6 HOURS AS NEEDED      latanoprost (XALATAN) 0.005 % ophthalmic solution       levothyroxine (SYNTHROID/LEVOTHROID) 75 MCG tablet Take 1 tablet (75 mcg) by mouth every other day. Alternating with 50 mcg 90 tablet 0    simvastatin (ZOCOR) 20 MG tablet Take 1 tablet (20 mg) by mouth at bedtime. 90 tablet 3    VITAMIN D, CHOLECALCIFEROL, PO Take 1,000 Units by mouth daily       zolpidem (AMBIEN) 5 MG tablet Take 1 tablet (5 mg) by mouth nightly as needed for sleep. 30 tablet 1     No current facility-administered medications for this visit.             Social History     Tobacco Use    Smoking status: Never    Smokeless tobacco: Never   Substance Use Topics    Alcohol use: Yes     Comment: 1 per night    Drug use: No           There are no preventive care reminders to display for this patient.          Lab Results   Component Value Date    PAP NIL 12/02/2019 June 18, 2025 3:55 PM

## 2025-06-18 NOTE — PROGRESS NOTES
Preoperative Evaluation  [facility] [address], [address]  [facility] [address]  Phone: [phone]  Fax: [phone]  Primary Provider: Ruslan Jean MD  Pre-op Performing Provider: Ruslan Jean MD  Jun 18, 2025 6/17/2025   Surgical Information   What procedure is being done?  Bilateral cataract surgery   Facility or Hospital where procedure/surgery will be performed: [facility]   Who is doing the procedure / surgery? Justin Tubbs MD   Date of surgery / procedure: 7/8/25 (left eye) and 7/22/25 (right eye)   Time of surgery / procedure: 8 am   Where do you plan to recover after surgery? at home with family     Fax number for surgical facility: [phone]    Subjective   Kristal is a 79 year old, presenting for the following:  Pre-Op Exam    HPI: Kristal has bilateral cataracts that have affected her vision.    NOTE: Kristal is cleared for surgery and her underlying medical conditions are optimized at this time.        6/17/2025   Pre-Op Questionnaire   Have you ever had a heart attack or stroke? No   Have you ever had surgery on your heart or blood vessels, such as a stent placement, a coronary artery bypass, or surgery on an artery in your head, neck, heart, or legs? No   Do you have chest pain with activity? No   Do you have a history of heart failure? No   Do you currently have a cold, bronchitis or symptoms of other infection? No   Do you have a cough, shortness of breath, or wheezing? No   Do you or anyone in your family have previous history of blood clots? No   Do you or does anyone in your family have a serious bleeding problem such as prolonged bleeding following surgeries or cuts? No   Have you ever had problems with anemia or been told to take iron pills? No   Have you had any abnormal blood loss such as black, tarry or bloody stools, or abnormal vaginal bleeding? No   Have you ever had a blood transfusion? No   Are you willing to  have a blood transfusion if it is medically needed before, during, or after your surgery? Yes   Have you or any of your relatives ever had problems with anesthesia? No   Do you have sleep apnea, excessive snoring or daytime drowsiness? No   Do you have any artifical heart valves or other implanted medical devices like a pacemaker, defibrillator, or continuous glucose monitor? (!) YES   What type of device do you have? Pacemaker   Name of the clinic that manages your device [facility]   Do you have artificial joints? (!) YES   Are you allergic to latex? No     Patient Active Problem List    Diagnosis Date Noted    Polyneuropathy 07/13/2022     Priority: Medium    Atrophic vaginitis 06/09/2021     Priority: Medium    Secondary insomnia 06/09/2021     Priority: Medium    Paresthesias 06/22/2020     Priority: Medium     6/15/20 EMG/NCS  Results:  Nerve conduction studies:  1. Left median-D2 sensory response shows normal amplitude and mildly slowed CV.   2. Left ulnar-D5, left sural, and bilateral superficial peroneal sensory responses are normal.   3. Left median-ulnar palmar interlatency difference is prolonged.   4. Left median-APB, left ulnar-ADM, bilateral peroneal-EDB, and left tibial-AH motor responses are normal.   5. Bilateral peroneal-TA motor responses are normal and symmetric.      Needle EMG of selected proximal and distal left lower limb muscles was performed as tabulated below. No abnormal spontaneous activity was observed in the sampled muscles. Motor unit potential morphology and recruitment patterns were normal.      Interpretation:  This is an abnormal study. There is electrophysiologic evidence of a mild left-sided median neuropathy at the wrist (e.g., carpal tunnel syndrome). There is no evidence of a large-fiber polyneuropathy, focal neuropathy, or lumbosacral radiculopathy affecting the left lower limb on the basis of this study. Clinical correlation is recommended.       Medicare annual  wellness visit, subsequent 01/28/2020     Priority: Medium    S/P TKR (total knee replacement), left 05/15/2018     Priority: Medium    Primary osteoarthritis of left knee 06/26/2017     Priority: Medium    Diverticulosis of large intestine without hemorrhage 08/01/2016     Priority: Medium    BPPV (benign paroxysmal positional vertigo), unspecified laterality 08/01/2016     Priority: Medium    Tear of medial meniscus of left knee 04/18/2016     Priority: Medium    Cardiac pacemaker in situ- Medtronic, dual chamber- NOT dependent (Advisa: MRI conditional) 10/08/2015     Priority: Medium    Heart block 06/22/2015     Priority: Medium    Patellofemoral syndrome, left 02/20/2015     Priority: Medium    Allergic rhinitis 10/16/2012     Priority: Medium    Hyperlipidemia LDL goal <100 10/16/2012     Priority: Medium    Acquired hypothyroidism 10/16/2012     Priority: Medium    Adenocarcinoma of breast (H) 02/20/2012     Priority: Medium      Past Medical History:   Diagnosis Date    Does use hearing aid     Malignant neoplasm (H)      Past Surgical History:   Procedure Laterality Date    EP PACEMAKER GENERATOR REPLACEMENT- DUAL N/A 1/28/2025    Procedure: Pacemaker Generator Replacement Dual;  Surgeon: Nomi Alva MD;  Location:  HEART CARDIAC CATH LAB    JOINT REPLACEMENT  2018    left knee    PACEMAKER/ICD CHECK       Foot surgery.      Current Outpatient Medications   Medication Sig Dispense Refill    calcium carbonate (OS-DALLAS 500 MG [place]. [place]) 500 MG tablet Take 600 mg by mouth daily       cetirizine (ZYRTEC) 10 MG tablet Take 10 mg by mouth daily      Cyanocobalamin (B-12) 1000 MCG TBCR 1,000 mcg      estradiol (ESTRACE) 0.1 MG/GM vaginal cream Place 1 g vaginally three times a week Place 1g vaginally nightly x 2 weeks and then 2-3 times per week for maintenance 42.5 g 4    fluticasone (FLONASE) 50 MCG/ACT nasal spray Spray 2 sprays into both nostrils daily. 16 g 11    ibuprofen (ADVIL/MOTRIN) 600 MG tablet TAKE 1  "TABLET BY MOUTH BY MOUTH EVERY 6 HOURS AS NEEDED      latanoprost (XALATAN) 0.005 % ophthalmic solution       levothyroxine (SYNTHROID/LEVOTHROID) 75 MCG tablet Take 1 tablet (75 mcg) by mouth every other day. Alternating with 50 mcg 90 tablet 0    simvastatin (ZOCOR) 20 MG tablet Take 1 tablet (20 mg) by mouth at bedtime. 90 tablet 3    VITAMIN D, CHOLECALCIFEROL, PO Take 1,000 Units by mouth daily       zolpidem (AMBIEN) 5 MG tablet Take 1 tablet (5 mg) by mouth nightly as needed for sleep. 30 tablet 1     Allergies   Allergen Reactions    Gabapentin Rash and Swelling     Face swelling    Face swelling   Face swelling      Social History     Tobacco Use    Smoking status: Never    Smokeless tobacco: Never   Substance Use Topics    Alcohol use: Yes     Comment: 1 per night     Family History   Problem Relation Age of Onset    Cancer Mother     Heart Disease Father      History   Drug Use No     Review of Systems  Constitutional, neuro, ENT, endocrine, pulmonary, cardiac, gastrointestinal, genitourinary, musculoskeletal, integument and psychiatric systems are negative, except as otherwise noted.    Objective    /66   Pulse 77   Temp (!) 94.2  F (34.6  C)   Ht 1.578 m (5' 2.13\")   Wt 63.5 kg (140 lb 0.6 oz)   LMP 01/01/2000   SpO2 92%   BMI 25.51 kg/m       Estimated body mass index is 25.51 kg/m  as calculated from the following:    Height as of this encounter: 1.578 m (5' 2.13\").    Weight as of this encounter: 63.5 kg (140 lb 0.6 oz).    Physical Exam  GENERAL: alert and no distress  EYES: Eyes grossly normal to inspection, PERRL and conjunctivae and sclerae normal  HENT: ear canals and TM's normal, nose and mouth without ulcers or lesions.  Some air bubbled are noted behind left TM.  NECK: no adenopathy, no asymmetry, masses, or scars  RESP: lungs clear to auscultation - no rales, rhonchi or wheezes  CV: regular rate and rhythm, normal S1 S2, no S3 or S4, no murmur, click or rub, no peripheral " edema  MS: no gross musculoskeletal defects noted, no edema  SKIN: no suspicious lesions or rashes  NEURO: Normal strength and tone, mentation intact and speech normal  PSYCH: mentation appears normal, affect normal/bright    Recent Labs   Lab Test 01/28/25  1242 10/15/24  1515 07/31/24  0825   HGB 14.1 13.5 13.7    234 276     --  142   POTASSIUM 4.4  --  4.2   CR 0.91  --  0.80      Diagnostics  No labs were ordered during this visit.   No EKG required for low risk surgery (cataract, skin procedure, breast biopsy, etc).    Revised Cardiac Risk Index (RCRI)  The patient has the following serious cardiovascular risks for perioperative complications:   - No serious cardiac risks = 0 points     RCRI Interpretation: 0 points: Class I (very low risk - 0.4% complication rate)      Signed Electronically by: Ruslan Jean MD

## 2025-08-22 ENCOUNTER — ANCILLARY PROCEDURE (OUTPATIENT)
Dept: CARDIOLOGY | Facility: CLINIC | Age: 80
End: 2025-08-22
Attending: INTERNAL MEDICINE
Payer: MEDICARE

## 2025-08-22 DIAGNOSIS — I44.30 ATRIOVENTRICULAR BLOCK: ICD-10-CM

## 2025-08-22 LAB
MDC_IDC_LEAD_CONNECTION_STATUS: NORMAL
MDC_IDC_LEAD_CONNECTION_STATUS: NORMAL
MDC_IDC_LEAD_IMPLANT_DT: NORMAL
MDC_IDC_LEAD_IMPLANT_DT: NORMAL
MDC_IDC_LEAD_LOCATION: NORMAL
MDC_IDC_LEAD_LOCATION: NORMAL
MDC_IDC_LEAD_LOCATION_DETAIL_1: NORMAL
MDC_IDC_LEAD_LOCATION_DETAIL_1: NORMAL
MDC_IDC_LEAD_MFG: NORMAL
MDC_IDC_LEAD_MFG: NORMAL
MDC_IDC_LEAD_MODEL: NORMAL
MDC_IDC_LEAD_MODEL: NORMAL
MDC_IDC_LEAD_POLARITY_TYPE: NORMAL
MDC_IDC_LEAD_POLARITY_TYPE: NORMAL
MDC_IDC_LEAD_SERIAL: NORMAL
MDC_IDC_LEAD_SERIAL: NORMAL
MDC_IDC_MSMT_BATTERY_DTM: NORMAL
MDC_IDC_MSMT_BATTERY_REMAINING_LONGEVITY: 115 MO
MDC_IDC_MSMT_BATTERY_RRT_TRIGGER: 2.62
MDC_IDC_MSMT_BATTERY_STATUS: NORMAL
MDC_IDC_MSMT_BATTERY_VOLTAGE: 3.14 V
MDC_IDC_MSMT_LEADCHNL_RA_IMPEDANCE_VALUE: 342 OHM
MDC_IDC_MSMT_LEADCHNL_RA_IMPEDANCE_VALUE: 418 OHM
MDC_IDC_MSMT_LEADCHNL_RA_PACING_THRESHOLD_AMPLITUDE: 0.62 V
MDC_IDC_MSMT_LEADCHNL_RA_PACING_THRESHOLD_PULSEWIDTH: 0.4 MS
MDC_IDC_MSMT_LEADCHNL_RA_SENSING_INTR_AMPL: 4 MV
MDC_IDC_MSMT_LEADCHNL_RV_IMPEDANCE_VALUE: 380 OHM
MDC_IDC_MSMT_LEADCHNL_RV_IMPEDANCE_VALUE: 475 OHM
MDC_IDC_MSMT_LEADCHNL_RV_PACING_THRESHOLD_AMPLITUDE: 1.5 V
MDC_IDC_MSMT_LEADCHNL_RV_PACING_THRESHOLD_PULSEWIDTH: 0.4 MS
MDC_IDC_MSMT_LEADCHNL_RV_SENSING_INTR_AMPL: 23.12 MV
MDC_IDC_PG_IMPLANT_DTM: NORMAL
MDC_IDC_PG_MFG: NORMAL
MDC_IDC_PG_MODEL: NORMAL
MDC_IDC_PG_SERIAL: NORMAL
MDC_IDC_PG_TYPE: NORMAL
MDC_IDC_SESS_CLINIC_NAME: NORMAL
MDC_IDC_SESS_DTM: NORMAL
MDC_IDC_SESS_TYPE: NORMAL
MDC_IDC_SET_BRADY_AT_MODE_SWITCH_RATE: 171 {BEATS}/MIN
MDC_IDC_SET_BRADY_HYSTRATE: NORMAL
MDC_IDC_SET_BRADY_LOWRATE: 60 {BEATS}/MIN
MDC_IDC_SET_BRADY_MAX_SENSOR_RATE: 130 {BEATS}/MIN
MDC_IDC_SET_BRADY_MAX_TRACKING_RATE: 130 {BEATS}/MIN
MDC_IDC_SET_BRADY_MODE: NORMAL
MDC_IDC_SET_BRADY_PAV_DELAY_LOW: 220 MS
MDC_IDC_SET_BRADY_SAV_DELAY_LOW: 200 MS
MDC_IDC_SET_LEADCHNL_RA_PACING_AMPLITUDE: 1.5 V
MDC_IDC_SET_LEADCHNL_RA_PACING_ANODE_ELECTRODE_1: NORMAL
MDC_IDC_SET_LEADCHNL_RA_PACING_ANODE_LOCATION_1: NORMAL
MDC_IDC_SET_LEADCHNL_RA_PACING_CAPTURE_MODE: NORMAL
MDC_IDC_SET_LEADCHNL_RA_PACING_CATHODE_ELECTRODE_1: NORMAL
MDC_IDC_SET_LEADCHNL_RA_PACING_CATHODE_LOCATION_1: NORMAL
MDC_IDC_SET_LEADCHNL_RA_PACING_POLARITY: NORMAL
MDC_IDC_SET_LEADCHNL_RA_PACING_PULSEWIDTH: 0.4 MS
MDC_IDC_SET_LEADCHNL_RA_SENSING_ANODE_ELECTRODE_1: NORMAL
MDC_IDC_SET_LEADCHNL_RA_SENSING_ANODE_LOCATION_1: NORMAL
MDC_IDC_SET_LEADCHNL_RA_SENSING_CATHODE_ELECTRODE_1: NORMAL
MDC_IDC_SET_LEADCHNL_RA_SENSING_CATHODE_LOCATION_1: NORMAL
MDC_IDC_SET_LEADCHNL_RA_SENSING_POLARITY: NORMAL
MDC_IDC_SET_LEADCHNL_RA_SENSING_SENSITIVITY: 0.6 MV
MDC_IDC_SET_LEADCHNL_RV_PACING_AMPLITUDE: 3 V
MDC_IDC_SET_LEADCHNL_RV_PACING_ANODE_ELECTRODE_1: NORMAL
MDC_IDC_SET_LEADCHNL_RV_PACING_ANODE_LOCATION_1: NORMAL
MDC_IDC_SET_LEADCHNL_RV_PACING_CAPTURE_MODE: NORMAL
MDC_IDC_SET_LEADCHNL_RV_PACING_CATHODE_ELECTRODE_1: NORMAL
MDC_IDC_SET_LEADCHNL_RV_PACING_CATHODE_LOCATION_1: NORMAL
MDC_IDC_SET_LEADCHNL_RV_PACING_POLARITY: NORMAL
MDC_IDC_SET_LEADCHNL_RV_PACING_PULSEWIDTH: 0.4 MS
MDC_IDC_SET_LEADCHNL_RV_SENSING_ANODE_ELECTRODE_1: NORMAL
MDC_IDC_SET_LEADCHNL_RV_SENSING_ANODE_LOCATION_1: NORMAL
MDC_IDC_SET_LEADCHNL_RV_SENSING_CATHODE_ELECTRODE_1: NORMAL
MDC_IDC_SET_LEADCHNL_RV_SENSING_CATHODE_LOCATION_1: NORMAL
MDC_IDC_SET_LEADCHNL_RV_SENSING_POLARITY: NORMAL
MDC_IDC_SET_LEADCHNL_RV_SENSING_SENSITIVITY: 0.9 MV
MDC_IDC_SET_ZONE_DETECTION_INTERVAL: 350 MS
MDC_IDC_SET_ZONE_DETECTION_INTERVAL: 400 MS
MDC_IDC_SET_ZONE_STATUS: NORMAL
MDC_IDC_SET_ZONE_STATUS: NORMAL
MDC_IDC_SET_ZONE_TYPE: NORMAL
MDC_IDC_SET_ZONE_VENDOR_TYPE: NORMAL
MDC_IDC_STAT_AT_BURDEN_PERCENT: 0.1 %
MDC_IDC_STAT_AT_DTM_END: NORMAL
MDC_IDC_STAT_AT_DTM_START: NORMAL
MDC_IDC_STAT_BRADY_AP_VP_PERCENT: 56.45 %
MDC_IDC_STAT_BRADY_AP_VS_PERCENT: 0.01 %
MDC_IDC_STAT_BRADY_AS_VP_PERCENT: 43.5 %
MDC_IDC_STAT_BRADY_AS_VS_PERCENT: 0.04 %
MDC_IDC_STAT_BRADY_DTM_END: NORMAL
MDC_IDC_STAT_BRADY_DTM_START: NORMAL
MDC_IDC_STAT_BRADY_RA_PERCENT_PACED: 56.37 %
MDC_IDC_STAT_BRADY_RV_PERCENT_PACED: 99.95 %
MDC_IDC_STAT_EPISODE_RECENT_COUNT: 0
MDC_IDC_STAT_EPISODE_RECENT_COUNT_DTM_END: NORMAL
MDC_IDC_STAT_EPISODE_RECENT_COUNT_DTM_START: NORMAL
MDC_IDC_STAT_EPISODE_TOTAL_COUNT: 0
MDC_IDC_STAT_EPISODE_TOTAL_COUNT_DTM_END: NORMAL
MDC_IDC_STAT_EPISODE_TOTAL_COUNT_DTM_START: NORMAL
MDC_IDC_STAT_EPISODE_TYPE: NORMAL
MDC_IDC_STAT_TACHYTHERAPY_RECENT_DTM_END: NORMAL
MDC_IDC_STAT_TACHYTHERAPY_RECENT_DTM_START: NORMAL
MDC_IDC_STAT_TACHYTHERAPY_TOTAL_DTM_END: NORMAL
MDC_IDC_STAT_TACHYTHERAPY_TOTAL_DTM_START: NORMAL

## 2025-08-22 PROCEDURE — 93296 REM INTERROG EVL PM/IDS: CPT

## (undated) DEVICE — ESU PENCIL SMOKE EVAC W/ROCKER SWITCH 0703-047-000

## (undated) DEVICE — CABLE PACING ALLIGATOR CLIP 12FT 5833SL

## (undated) DEVICE — ELECTRODE DEFIB CADENCE 22550R

## (undated) DEVICE — KIT WRENCH 5873W

## (undated) DEVICE — PACK HEART LEFT CUSTOM

## (undated) RX ORDER — FENTANYL CITRATE 50 UG/ML
INJECTION, SOLUTION INTRAMUSCULAR; INTRAVENOUS
Status: DISPENSED
Start: 2025-01-28

## (undated) RX ORDER — CEFAZOLIN SODIUM 1 G/3ML
INJECTION, POWDER, FOR SOLUTION INTRAMUSCULAR; INTRAVENOUS
Status: DISPENSED
Start: 2025-01-28

## (undated) RX ORDER — CEFAZOLIN SODIUM 2 G/100ML
INJECTION, SOLUTION INTRAVENOUS
Status: DISPENSED
Start: 2025-01-28